# Patient Record
Sex: FEMALE | Race: WHITE | NOT HISPANIC OR LATINO | Employment: OTHER | URBAN - METROPOLITAN AREA
[De-identification: names, ages, dates, MRNs, and addresses within clinical notes are randomized per-mention and may not be internally consistent; named-entity substitution may affect disease eponyms.]

---

## 2017-01-04 ENCOUNTER — OFFICE VISIT (OUTPATIENT)
Dept: INTERNAL MEDICINE | Facility: CLINIC | Age: 82
End: 2017-01-04

## 2017-01-04 VITALS
HEIGHT: 62 IN | DIASTOLIC BLOOD PRESSURE: 52 MMHG | SYSTOLIC BLOOD PRESSURE: 124 MMHG | WEIGHT: 156.2 LBS | BODY MASS INDEX: 28.74 KG/M2 | HEART RATE: 76 BPM

## 2017-01-04 DIAGNOSIS — I50.32 CHRONIC DIASTOLIC HEART FAILURE (HCC): ICD-10-CM

## 2017-01-04 DIAGNOSIS — I10 SYSTOLIC HYPERTENSION: ICD-10-CM

## 2017-01-04 DIAGNOSIS — Z98.890 H/O AORTIC VALVULOPLASTY: ICD-10-CM

## 2017-01-04 DIAGNOSIS — I51.89 DIASTOLIC DYSFUNCTION: ICD-10-CM

## 2017-01-04 DIAGNOSIS — E11.9 TYPE 2 DIABETES MELLITUS WITHOUT COMPLICATION, WITHOUT LONG-TERM CURRENT USE OF INSULIN (HCC): Primary | ICD-10-CM

## 2017-01-04 PROCEDURE — 99213 OFFICE O/P EST LOW 20 MIN: CPT | Performed by: INTERNAL MEDICINE

## 2017-01-04 NOTE — MR AVS SNAPSHOT
Mary Shi   1/4/2017 3:20 PM   Office Visit    Dept Phone:  [phone]   Encounter #:  [id number]    Provider:  Taiwo Huston Jr., MD   Department:  [facility] INTERNAL MEDICINE                Your Full Care Plan              Your Updated Medication List          This list is accurate as of: 1/4/17  4:09 PM.  Always use your most recent med list.                aspirin 81 MG EC tablet       clopidogrel 75 MG tablet   Commonly known as:  PLAVIX   Take 1 tablet by mouth Daily.       fish oil 1000 MG capsule capsule       furosemide 20 MG tablet   Commonly known as:  LASIX       HYDROcodone-acetaminophen 5-325 MG per tablet   Commonly known as:  NORCO   Take 1 tablet by mouth Every 6 (Six) Hours As Needed for moderate pain (4-6).       metFORMIN 500 MG tablet   Commonly known as:  GLUCOPHAGE       MULTIVITAMIN ADULT PO       mupirocin 2 % nasal ointment   Commonly known as:  BACTROBAN       NAMENDA XR 28 MG capsule sustained-release 24 hr extended release capsule   Generic drug:  memantine       naproxen sodium 220 MG tablet   Commonly known as:  ALEVE       PEPCID 20 MG tablet   Generic drug:  famotidine       PERIDEX 0.12 % solution   Generic drug:  chlorhexidine       potassium chloride 20 MEQ CR tablet   Commonly known as:  K-DUR,KLOR-CON       PRILOSEC OTC 20 MG EC tablet   Generic drug:  omeprazole OTC       vitamin C 250 MG tablet   Commonly known as:  ASCORBIC ACID               You Were Diagnosed With        Codes Comments    Type 2 diabetes mellitus without complication, without long-term current use of insulin    -  Primary ICD-10-CM: E11.9  ICD-9-CM: 250.00     Diastolic dysfunction     ICD-10-CM: I51.9  ICD-9-CM: 429.9     Chronic diastolic heart failure     ICD-10-CM: I50.32  ICD-9-CM: 428.32     H/O aortic valvuloplasty     ICD-10-CM: Z98.890  ICD-9-CM: V45.89     Systolic hypertension     ICD-10-CM: I10  ICD-9-CM: 401.9       Instructions     None     Patient Instructions History      Upcoming Appointments     Visit Type Date Time Department    OFFICE VISIT 2017  3:20 PM [facility] FOLLOW UP 2017  1:40 PM [facility] [place]    HOSPITAL FOLLOW UP 2/3/2017  2:40 PM [facility] ECHO 2D COMPLETE VT 2/3/2017  2:00 PM  [facility]    OFFICE VISIT 2017  1:40 PM [facility] NAUN ECHO 2D COMPLETE VT 2017 12:00 PM  [facility]    FOLLOW UP 2017  1:00 PM [facility] [place]      Andel Signup     [facility] Andel allows you to send messages to your doctor, view your test results, renew your prescriptions, schedule appointments, and more. To sign up, go to [employer] and click on the Sign Up Now link in the New User? box. Enter your Andel Activation Code exactly as it appears below along with the last four digits of your Social Security Number and your Date of Birth () to complete the sign-up process. If you do not sign up before the expiration date, you must request a new code.    Andel Activation Code: [email or web address]  Expires: 2017 10:02 AM    If you have questions, you can email [email or web address] or call [phone] to talk to our Andel staff. Remember, Andel is NOT to be used for urgent needs. For medical emergencies, dial 911.               Other Info from Your Visit           Your Appointments     2017  1:40 PM [facility]   Hospital Follow Up with DRAGAN Rivero   [facility] [place] CARDIOLOGY (--)    [address]  [facility] [address] [phone]            2017  2:00 PM EST   ECHOCARDIOGRAM 2D COMPLETE VISIT with [facility] OUTPATIENT ECHOCARDIOGRAPHY ([place])    [address] [facility]  [address]  [facility] [address] [phone]           Bring your insurance cards with you. Wear comfortable clothing and shoes.            2017  2:40 PM EST  "  Hospital Follow Up with Eduardo Brown MD   [facility] CARDIOLOGY (--)    [address]  [facility] [address] [phone]            Apr 06, 2017  1:40 PM EDT   Office Visit with Taiwo Huston Jr., MD   [facility] INTERNAL MEDICINE (--)    [address]  [facility] [address] [phone]           Arrive 15 minutes prior to appointment.            May 09, 2017 12:00 PM EDT   ECHOCARDIOGRAM 2D COMPLETE VISIT with [facility]UN [facility] E[facility] OUTPATIENT ECHOCARDIOGRAPHY ([place])    [address]  [address]  [facility] [address] [phone]           Bring your insurance cards with you. Wear comfortable clothing and shoes.              Allergies     Codeine  Itching, GI Intolerance    Morphine And Related        Reason for Visit     Follow-up HOSP F/U      Vital Signs     Blood Pressure Pulse Height Weight Body Mass Index Smoking Status    124/52 76 62\" (157.5 cm) 156 lb 3.2 oz (70.9 kg) 28.57 kg/m2 Never Smoker      Problems and Diagnoses Noted     Diabetes    Diastolic dysfunction    History of aortic valvuloplasty    Systolic hypertension      No Longer an Issue     Angina at rest    Heart failure    VHD (valvular heart disease)        "

## 2017-01-04 NOTE — PROGRESS NOTES
Subjective   Mary Shi is a 89 y.o. female.     History of Present Illness she is here today for follow-up of her recent TAVR procedure for severe aortic stenosis.  She has done very well since the procedure little over a week ago.  Her appetite is good and she is ambulating well at home with a walker.  She denies any dyspnea or angina which she did have prior to the procedure.  She has had no dizziness or focal neurologic symptoms.  She is sleeping well.      Review of Systems   Constitutional: Negative for activity change, appetite change and fatigue.   Respiratory: Negative for cough, chest tightness and wheezing.    Cardiovascular: Negative for chest pain, palpitations and leg swelling.   Gastrointestinal: Negative for abdominal pain, diarrhea, nausea and vomiting.   Genitourinary: Negative for dysuria, flank pain, frequency and hematuria.   Musculoskeletal: Negative for arthralgias, back pain and gait problem.   Neurological: Negative for syncope, weakness and headaches.       Objective   Physical Exam   Constitutional: She is oriented to person, place, and time. Vital signs are normal. She appears well-developed and well-nourished. She is active. She does not appear ill.   Eyes: Conjunctivae are normal.   Neck: Carotid bruit is not present.   Cardiovascular: Normal rate, regular rhythm, S1 normal and S2 normal.  Exam reveals no S3 and no S4.    No murmur heard.  Pulses:       Dorsalis pedis pulses are 0 on the right side, and 0 on the left side.        Posterior tibial pulses are 0 on the right side, and 0 on the left side.   Pulmonary/Chest: No tachypnea. No respiratory distress. She has no decreased breath sounds. She has no wheezes. She has no rhonchi. She has no rales.   Abdominal: Soft. Normal appearance and bowel sounds are normal. She exhibits no abdominal bruit and no mass. There is no hepatosplenomegaly. There is no tenderness.       Vascular Status -  Her exam exhibits no right foot edema.  Her exam exhibits no left foot edema.  Neurological: She is alert and oriented to person, place, and time. Gait normal.   Psychiatric: She has a normal mood and affect. Her speech is normal and behavior is normal. Judgment and thought content normal. Cognition and memory are normal.       Assessment/Plan assessment #1 aortic valvuloplasty-he looks very good and is doing well. #2 hypertension-good control    Plan # 1follow-up scheduled for cardiology in one month.  Routine follow-up with me in 3 months.

## 2017-01-18 RX ORDER — FUROSEMIDE 20 MG/1
20 TABLET ORAL DAILY
COMMUNITY
Start: 2017-01-18 | End: 2017-02-03

## 2017-01-18 RX ORDER — POTASSIUM CHLORIDE 20 MEQ/1
20 TABLET, EXTENDED RELEASE ORAL DAILY
COMMUNITY
Start: 2017-01-18 | End: 2017-02-03

## 2017-01-23 RX ORDER — FAMOTIDINE 20 MG/1
TABLET, FILM COATED ORAL
Qty: 90 TABLET | Refills: 1 | Status: SHIPPED | OUTPATIENT
Start: 2017-01-23 | End: 2017-07-26 | Stop reason: SDUPTHER

## 2017-01-23 RX ORDER — MEMANTINE HYDROCHLORIDE 28 MG/1
CAPSULE, EXTENDED RELEASE ORAL
Qty: 90 CAPSULE | Refills: 1 | Status: SHIPPED | OUTPATIENT
Start: 2017-01-23 | End: 2017-07-26 | Stop reason: SDUPTHER

## 2017-02-03 ENCOUNTER — HOSPITAL ENCOUNTER (OUTPATIENT)
Dept: CARDIOLOGY | Facility: HOSPITAL | Age: 82
Discharge: HOME | End: 2017-02-03
Attending: INTERNAL MEDICINE | Admitting: INTERNAL MEDICINE

## 2017-02-03 ENCOUNTER — LAB (OUTPATIENT)
Dept: LAB | Facility: HOSPITAL | Age: 82
End: 2017-02-03

## 2017-02-03 ENCOUNTER — OFFICE VISIT (OUTPATIENT)
Dept: CARDIOLOGY | Facility: CLINIC | Age: 82
End: 2017-02-03

## 2017-02-03 VITALS
HEIGHT: 62 IN | DIASTOLIC BLOOD PRESSURE: 60 MMHG | SYSTOLIC BLOOD PRESSURE: 140 MMHG | WEIGHT: 156 LBS | HEART RATE: 83 BPM | BODY MASS INDEX: 28.71 KG/M2

## 2017-02-03 VITALS
WEIGHT: 155 LBS | HEIGHT: 62 IN | HEART RATE: 77 BPM | BODY MASS INDEX: 28.52 KG/M2 | DIASTOLIC BLOOD PRESSURE: 74 MMHG | SYSTOLIC BLOOD PRESSURE: 142 MMHG

## 2017-02-03 DIAGNOSIS — I50.33 ACUTE ON CHRONIC DIASTOLIC CONGESTIVE HEART FAILURE (HCC): ICD-10-CM

## 2017-02-03 DIAGNOSIS — I35.0 NONRHEUMATIC AORTIC VALVE STENOSIS: ICD-10-CM

## 2017-02-03 DIAGNOSIS — Z95.2 S/P TAVR (TRANSCATHETER AORTIC VALVE REPLACEMENT): Primary | ICD-10-CM

## 2017-02-03 DIAGNOSIS — I10 SYSTOLIC HYPERTENSION: ICD-10-CM

## 2017-02-03 DIAGNOSIS — E78.49 OTHER HYPERLIPIDEMIA: ICD-10-CM

## 2017-02-03 DIAGNOSIS — I65.29 STENOSIS OF CAROTID ARTERY, UNSPECIFIED LATERALITY: ICD-10-CM

## 2017-02-03 DIAGNOSIS — I51.89 DIASTOLIC DYSFUNCTION: ICD-10-CM

## 2017-02-03 PROBLEM — Z98.890 H/O AORTIC VALVULOPLASTY: Status: RESOLVED | Noted: 2017-01-04 | Resolved: 2017-02-03

## 2017-02-03 LAB
ALBUMIN SERPL-MCNC: 3.7 G/DL (ref 3.5–5.2)
ALBUMIN/GLOB SERPL: 1.2 G/DL
ALP SERPL-CCNC: 108 U/L (ref 39–117)
ALT SERPL W P-5'-P-CCNC: 19 U/L (ref 1–33)
ANION GAP SERPL CALCULATED.3IONS-SCNC: 13.2 MMOL/L
ASCENDING AORTA: 3.3 CM
AST SERPL-CCNC: 17 U/L (ref 1–32)
BH CV ECHO MEAS - AO MAX PG (FULL): 17.1 MMHG
BH CV ECHO MEAS - AO MAX PG: 24.1 MMHG
BH CV ECHO MEAS - AO MEAN PG (FULL): 9.4 MMHG
BH CV ECHO MEAS - AO MEAN PG: 13.3 MMHG
BH CV ECHO MEAS - AO ROOT AREA (BSA CORRECTED): 2.2
BH CV ECHO MEAS - AO ROOT AREA: 10.9 CM^2
BH CV ECHO MEAS - AO ROOT DIAM: 3.7 CM
BH CV ECHO MEAS - AO V2 MAX: 245.4 CM/SEC
BH CV ECHO MEAS - AO V2 MEAN: 168.5 CM/SEC
BH CV ECHO MEAS - AO V2 VTI: 54 CM
BH CV ECHO MEAS - AVA(I,A): 1.5 CM^2
BH CV ECHO MEAS - AVA(I,D): 1.5 CM^2
BH CV ECHO MEAS - AVA(V,A): 1.5 CM^2
BH CV ECHO MEAS - AVA(V,D): 1.5 CM^2
BH CV ECHO MEAS - BSA(HAYCOCK): 1.8 M^2
BH CV ECHO MEAS - BSA: 1.7 M^2
BH CV ECHO MEAS - BZI_BMI: 28.5 KILOGRAMS/M^2
BH CV ECHO MEAS - BZI_METRIC_HEIGHT: 157.5 CM
BH CV ECHO MEAS - BZI_METRIC_WEIGHT: 70.8 KG
BH CV ECHO MEAS - CONTRAST EF (2CH): 61.5 ML/M^2
BH CV ECHO MEAS - CONTRAST EF 4CH: 75.6 ML/M^2
BH CV ECHO MEAS - EDV(MOD-SP2): 39 ML
BH CV ECHO MEAS - EDV(MOD-SP4): 45 ML
BH CV ECHO MEAS - EDV(TEICH): 118.5 ML
BH CV ECHO MEAS - EF(CUBED): 72.5 %
BH CV ECHO MEAS - EF(MOD-SP2): 61.5 %
BH CV ECHO MEAS - EF(MOD-SP4): 75.6 %
BH CV ECHO MEAS - EF(TEICH): 64 %
BH CV ECHO MEAS - ESV(MOD-SP2): 15 ML
BH CV ECHO MEAS - ESV(MOD-SP4): 11 ML
BH CV ECHO MEAS - ESV(TEICH): 42.7 ML
BH CV ECHO MEAS - FS: 35 %
BH CV ECHO MEAS - IVS/LVPW: 1.2
BH CV ECHO MEAS - IVSD: 1.1 CM
BH CV ECHO MEAS - LAT PEAK E' VEL: 25 CM/SEC
BH CV ECHO MEAS - LV DIASTOLIC VOL/BSA (35-75): 26.2 ML/M^2
BH CV ECHO MEAS - LV MASS(C)D: 196.6 GRAMS
BH CV ECHO MEAS - LV MASS(C)DI: 114.3 GRAMS/M^2
BH CV ECHO MEAS - LV MAX PG: 7 MMHG
BH CV ECHO MEAS - LV MEAN PG: 4 MMHG
BH CV ECHO MEAS - LV SYSTOLIC VOL/BSA (12-30): 6.4 ML/M^2
BH CV ECHO MEAS - LV V1 MAX: 132 CM/SEC
BH CV ECHO MEAS - LV V1 MEAN: 90.4 CM/SEC
BH CV ECHO MEAS - LV V1 VTI: 29 CM
BH CV ECHO MEAS - LVIDD: 5 CM
BH CV ECHO MEAS - LVIDS: 3.3 CM
BH CV ECHO MEAS - LVLD AP2: 6.1 CM
BH CV ECHO MEAS - LVLD AP4: 5.3 CM
BH CV ECHO MEAS - LVLS AP2: 5.2 CM
BH CV ECHO MEAS - LVLS AP4: 4.3 CM
BH CV ECHO MEAS - LVOT AREA (M): 2.8 CM^2
BH CV ECHO MEAS - LVOT AREA: 2.8 CM^2
BH CV ECHO MEAS - LVOT DIAM: 1.9 CM
BH CV ECHO MEAS - LVPWD: 0.98 CM
BH CV ECHO MEAS - MED PEAK E' VEL: 43 CM/SEC
BH CV ECHO MEAS - MR MAX PG: 81.6 MMHG
BH CV ECHO MEAS - MR MAX VEL: 451.7 CM/SEC
BH CV ECHO MEAS - MV A DUR: 0.14 SEC
BH CV ECHO MEAS - MV A MAX VEL: 179.1 CM/SEC
BH CV ECHO MEAS - MV DEC SLOPE: 427.1 CM/SEC^2
BH CV ECHO MEAS - MV DEC TIME: 0.3 SEC
BH CV ECHO MEAS - MV E MAX VEL: 174 CM/SEC
BH CV ECHO MEAS - MV E/A: 0.97
BH CV ECHO MEAS - MV MAX PG: 12.6 MMHG
BH CV ECHO MEAS - MV MEAN PG: 7.7 MMHG
BH CV ECHO MEAS - MV P1/2T MAX VEL: 175.8 CM/SEC
BH CV ECHO MEAS - MV P1/2T: 120.6 MSEC
BH CV ECHO MEAS - MV V2 MAX: 177.8 CM/SEC
BH CV ECHO MEAS - MV V2 MEAN: 131.7 CM/SEC
BH CV ECHO MEAS - MV V2 VTI: 52.9 CM
BH CV ECHO MEAS - MVA P1/2T LCG: 1.3 CM^2
BH CV ECHO MEAS - MVA(P1/2T): 1.8 CM^2
BH CV ECHO MEAS - MVA(VTI): 1.5 CM^2
BH CV ECHO MEAS - PA MAX PG (FULL): 2.6 MMHG
BH CV ECHO MEAS - PA MAX PG: 4.6 MMHG
BH CV ECHO MEAS - PA V2 MAX: 106.9 CM/SEC
BH CV ECHO MEAS - PULM A REVS DUR: 0.1 SEC
BH CV ECHO MEAS - PULM A REVS VEL: 37.6 CM/SEC
BH CV ECHO MEAS - PULM DIAS VEL: 36.1 CM/SEC
BH CV ECHO MEAS - PULM S/D: 1.1
BH CV ECHO MEAS - PULM SYS VEL: 38.1 CM/SEC
BH CV ECHO MEAS - PVA(V,A): 2.2 CM^2
BH CV ECHO MEAS - PVA(V,D): 2.2 CM^2
BH CV ECHO MEAS - QP/QS: 0.61
BH CV ECHO MEAS - RAP SYSTOLE: 3 MMHG
BH CV ECHO MEAS - RV MAX PG: 2 MMHG
BH CV ECHO MEAS - RV MEAN PG: 1.2 MMHG
BH CV ECHO MEAS - RV V1 MAX: 69.8 CM/SEC
BH CV ECHO MEAS - RV V1 MEAN: 50.8 CM/SEC
BH CV ECHO MEAS - RV V1 VTI: 14.3 CM
BH CV ECHO MEAS - RVOT AREA: 3.4 CM^2
BH CV ECHO MEAS - RVOT DIAM: 2.1 CM
BH CV ECHO MEAS - RVSP: 48 MMHG
BH CV ECHO MEAS - SI(AO): 343.7 ML/M^2
BH CV ECHO MEAS - SI(CUBED): 52.8 ML/M^2
BH CV ECHO MEAS - SI(LVOT): 46.6 ML/M^2
BH CV ECHO MEAS - SI(MOD-SP2): 14 ML/M^2
BH CV ECHO MEAS - SI(MOD-SP4): 19.8 ML/M^2
BH CV ECHO MEAS - SI(TEICH): 44.1 ML/M^2
BH CV ECHO MEAS - SUP REN AO DIAM: 1.8 CM
BH CV ECHO MEAS - SV(AO): 591.2 ML
BH CV ECHO MEAS - SV(CUBED): 90.8 ML
BH CV ECHO MEAS - SV(LVOT): 80.1 ML
BH CV ECHO MEAS - SV(MOD-SP2): 24 ML
BH CV ECHO MEAS - SV(MOD-SP4): 34 ML
BH CV ECHO MEAS - SV(RVOT): 48.6 ML
BH CV ECHO MEAS - SV(TEICH): 75.8 ML
BH CV ECHO MEAS - TAPSE (>1.6): 1.6 CM2
BH CV ECHO MEAS - TR MAX VEL: 333.9 CM/SEC
BH CV XLRA - RV BASE: 3.6 CM
BH CV XLRA - TDI S': 15 CM/SEC
BILIRUB SERPL-MCNC: 0.2 MG/DL (ref 0.1–1.2)
BUN BLD-MCNC: 16 MG/DL (ref 8–23)
BUN/CREAT SERPL: 19.8 (ref 7–25)
CALCIUM SPEC-SCNC: 9.4 MG/DL (ref 8.2–9.6)
CHLORIDE SERPL-SCNC: 103 MMOL/L (ref 98–107)
CO2 SERPL-SCNC: 24.8 MMOL/L (ref 22–29)
CREAT BLD-MCNC: 0.81 MG/DL (ref 0.57–1)
DEPRECATED RDW RBC AUTO: 45.6 FL (ref 37–54)
E/E' RATIO: 34
ERYTHROCYTE [DISTWIDTH] IN BLOOD BY AUTOMATED COUNT: 14.3 % (ref 11.7–13)
GFR SERPL CREATININE-BSD FRML MDRD: 66 ML/MIN/1.73
GLOBULIN UR ELPH-MCNC: 3 GM/DL
GLUCOSE BLD-MCNC: 199 MG/DL (ref 65–99)
HCT VFR BLD AUTO: 35.7 % (ref 35.6–45.5)
HGB BLD-MCNC: 11.6 G/DL (ref 11.9–15.5)
LEFT ATRIUM VOLUME INDEX: 36 ML/M2
LV EF 2D ECHO EST: 70 %
MCH RBC QN AUTO: 28.5 PG (ref 26.9–32)
MCHC RBC AUTO-ENTMCNC: 32.5 G/DL (ref 32.4–36.3)
MCV RBC AUTO: 87.7 FL (ref 80.5–98.2)
PLATELET # BLD AUTO: 201 10*3/MM3 (ref 140–500)
PMV BLD AUTO: 10.8 FL (ref 6–12)
POTASSIUM BLD-SCNC: 4.2 MMOL/L (ref 3.5–5.2)
PROT SERPL-MCNC: 6.7 G/DL (ref 6–8.5)
RBC # BLD AUTO: 4.07 10*6/MM3 (ref 3.9–5.2)
SINUS: 2.7 CM
SODIUM BLD-SCNC: 141 MMOL/L (ref 136–145)
STJ: 3.2 CM
WBC NRBC COR # BLD: 7.65 10*3/MM3 (ref 4.5–10.7)

## 2017-02-03 PROCEDURE — 80053 COMPREHEN METABOLIC PANEL: CPT

## 2017-02-03 PROCEDURE — 85027 COMPLETE CBC AUTOMATED: CPT

## 2017-02-03 PROCEDURE — 76376 3D RENDER W/INTRP POSTPROCES: CPT | Performed by: INTERNAL MEDICINE

## 2017-02-03 PROCEDURE — 93306 TTE W/DOPPLER COMPLETE: CPT

## 2017-02-03 PROCEDURE — 99214 OFFICE O/P EST MOD 30 MIN: CPT | Performed by: INTERNAL MEDICINE

## 2017-02-03 PROCEDURE — 36415 COLL VENOUS BLD VENIPUNCTURE: CPT

## 2017-02-03 PROCEDURE — 93306 TTE W/DOPPLER COMPLETE: CPT | Performed by: INTERNAL MEDICINE

## 2017-02-03 PROCEDURE — 93000 ELECTROCARDIOGRAM COMPLETE: CPT | Performed by: INTERNAL MEDICINE

## 2017-02-03 RX ORDER — ATENOLOL 25 MG/1
12.5 TABLET ORAL DAILY
Qty: 45 TABLET | Refills: 3 | Status: SHIPPED | OUTPATIENT
Start: 2017-02-03 | End: 2017-10-13 | Stop reason: SDUPTHER

## 2017-02-03 NOTE — PROGRESS NOTES
Date of Office Visit: 2017  Encounter Provider: Eduardo Brown MD  Place of Service: [facility] CARDIOLOGY  Patient Name: Mary Shi  :3/3/1925  [id number]    Chief Complaint   Patient presents with   • aortic valve replacement     1 mo follow up   :     HPI: Mary Shi is a 89 y.o. female   She is here for followup after her TAVR.  We put a #23 Rubina in her about a month ago, and she had severe degenerative aortic stenosis.  No significant coronary artery disease, and she has done extremely well.  She went home, I think, on day one afterward.   She has been feeling great.  She has no orthopnea or PND, which she was having before this.   Her breathing is better.  She is able to do more activity.  She is 89 years old or, as she says 89 years old, so she is not running but I think she has class 1 [employer] [place] Heart Association Heart failure symptoms.          Past Medical History   Diagnosis Date   • Amaurosis fugax    • Anemia    • Aortic stenosis    • Aortic valve stenosis    • Arteriosclerosis of carotid artery    • Carotid artery stenosis    • Cognitive disorder    • Congestive heart failure    • Dementia    • Diabetes mellitus    • Diastolic dysfunction    • Dyspnea on exertion    • Generalized osteoarthritis of multiple sites    • GERD (gastroesophageal reflux disease)    • Health care maintenance    • Heart murmur    • Hiatal hernia    • Hyperlipidemia    • Hypertension    • Mitral valve stenosis    • MS (mitral stenosis)    • Nasal lesion    • Nasal stenosis    • Osteoarthritis      multiple sites   • Palpitations    • PONV (postoperative nausea and vomiting)    • Systolic hypertension    • Varicose veins        Past Surgical History   Procedure Laterality Date   • Hysterectomy     • Knee surgery     • Bladder repair     • Cardiac catheterization N/A 2016     Procedure: Right Heart Cath;  Surgeon: Eduardo Brown MD;  Location: [facility] INVASIVE  LOCATION;  Service:    • Cardiac catheterization N/A 12/14/2016     Procedure: Coronary angiography;  Surgeon: Eduardo Brown MD;  Location: [facility] CATH INVASIVE LOCATION;  Service:    • Aortic valve repair/replacement N/A 12/27/2016     Procedure: Transfemoral LEFT Transcatheter Aortic Valve Replacement TRANSESOPHAGEAL ECHOCARDIOGRAM WITH ANESTHESIA;  Surgeon: Eduardo Brown MD;  Location: [facility] OR 18/19;  Service:        Social History     Social History   • Marital status:      Spouse name: N/A   • Number of children: N/A   • Years of education: N/A     Occupational History   • Not on file.     Social History Main Topics   • Smoking status: Never Smoker   • Smokeless tobacco: Not on file      Comment: caffeine use   • Alcohol use No      Comment: rare -once every 2-3 months   • Drug use: No   • Sexual activity: Defer     Other Topics Concern   • Not on file     Social History Narrative       Family History   Problem Relation Age of Onset   • Heart disease Mother    • Coronary artery disease Mother    • Hyperlipidemia Mother    • Hypertension Mother    • Cancer Sister    • Cancer Brother    • Diabetes Daughter    • Diabetes Daughter    • Cancer Sister    • Cancer Sister    • Cancer Sister    • Cancer Other        Review of Systems   Constitution: Negative for decreased appetite, fever, malaise/fatigue and weight loss.   HENT: Negative for nosebleeds.    Eyes: Negative for double vision.   Cardiovascular: Negative for chest pain, claudication, cyanosis, dyspnea on exertion, irregular heartbeat, leg swelling, near-syncope, orthopnea, palpitations, paroxysmal nocturnal dyspnea and syncope.   Respiratory: Negative for cough, hemoptysis and shortness of breath.    Hematologic/Lymphatic: Negative for bleeding problem.   Skin: Negative for rash.   Musculoskeletal: Negative for falls and myalgias.   Gastrointestinal: Negative for hematochezia, jaundice, melena, nausea and vomiting.   Genitourinary:  "Negative for hematuria.   Neurological: Negative for dizziness and seizures.   Psychiatric/Behavioral: Negative for altered mental status and memory loss.       Allergies   Allergen Reactions   • Codeine Itching and GI Intolerance   • Morphine And Related          Current Outpatient Prescriptions:   •  aspirin 81 MG EC tablet, Take 81 mg by mouth Daily. HOLD PER MD INSTR, Disp: , Rfl:   •  clopidogrel (PLAVIX) 75 MG tablet, Take 1 tablet by mouth Daily., Disp: 30 tablet, Rfl: 6  •  famotidine (PEPCID) 20 MG tablet, TAKE ONE TABLET BY MOUTH EVERY NIGHT AT BEDTIME, Disp: 90 tablet, Rfl: 1  •  HYDROcodone-acetaminophen (NORCO) 5-325 MG per tablet, Take 1 tablet by mouth Every 6 (Six) Hours As Needed for moderate pain (4-6)., Disp: 12 tablet, Rfl: 0  •  metFORMIN (GLUCOPHAGE) 500 MG tablet, Take 500 mg by mouth 2 (Two) Times a Day With Meals., Disp: , Rfl:   •  Multiple Vitamins-Minerals (MULTIVITAMIN ADULT PO), Take  by mouth., Disp: , Rfl:   •  NAMENDA XR 28 MG capsule sustained-release 24 hr extended release capsule, TAKE ONE TABLET BY MOUTH DAILY, Disp: 90 capsule, Rfl: 1  •  omeprazole OTC (PRILOSEC OTC) 20 MG EC tablet, Take 20 mg by mouth Daily., Disp: , Rfl:   •  atenolol (TENORMIN) 25 MG tablet, Take 0.5 tablets by mouth Daily., Disp: 45 tablet, Rfl: 3     Objective:     Vitals:    02/03/17 1432   BP: 142/74   Pulse: 77   Weight: 155 lb (70.3 kg)   Height: 62\" (157.5 cm)     Body mass index is 28.35 kg/(m^2).    Physical Exam   Constitutional: She is oriented to person, place, and time. She appears well-developed and well-nourished.   HENT:   Head: Normocephalic.   Eyes: No scleral icterus.   Neck: No JVD present. No thyromegaly present.   Cardiovascular: Normal rate, regular rhythm and normal heart sounds.  Exam reveals no gallop and no friction rub.    No murmur heard.  Pulmonary/Chest: Effort normal and breath sounds normal. She has no wheezes. She has no rales.   Abdominal: Soft. There is no " hepatosplenomegaly. There is no tenderness.   Musculoskeletal: Normal range of motion. She exhibits no edema.   Lymphadenopathy:     She has no cervical adenopathy.   Neurological: She is alert and oriented to person, place, and time.   Skin: Skin is warm and dry. No rash noted.   Psychiatric: She has a normal mood and affect.         ECG 12 Lead  Date/Time: 2/3/2017 3:34 PM  Performed by: YOUNG BROWN  Authorized by: YOUNG BROWN   Rhythm: sinus rhythm  Ectopy: infrequent PVCs and atrial premature contractions  Clinical impression: abnormal ECG             Assessment:       Diagnosis Plan   1. S/P TAVR (transcatheter aortic valve replacement)     2. Systolic hypertension     3. Other hyperlipidemia     4. Diastolic dysfunction     5. Stenosis of carotid artery, unspecified laterality            Plan:        The patient is status post TAVR and is doing great.  Her EKG looks good.  Her QRS is narrow.  We looked at her aortic valve today.  She has no lead.  This is really hyperdynamic left ventricle.  I would actually like to put her on just a little bit of atenolol, a half of a tablet one time a day, and see if we could kind of knock that down a little bit, but she is pretty sensitive to medicine.  I am going to have her come back in and see me in a year.  She will see Dr. Mcmahan in six months.          As always, it has been a pleasure to participate in your patient's care.      Sincerely,       Young Brown MD

## 2017-03-06 ENCOUNTER — TELEPHONE (OUTPATIENT)
Dept: CARDIOLOGY | Facility: CLINIC | Age: 82
End: 2017-03-06

## 2017-03-06 RX ORDER — AMOXICILLIN 500 MG/1
2000 CAPSULE ORAL SEE ADMIN INSTRUCTIONS
Qty: 12 CAPSULE | Refills: 3 | Status: SHIPPED | OUTPATIENT
Start: 2017-03-06 | End: 2017-04-06

## 2017-03-06 NOTE — TELEPHONE ENCOUNTER
Daughter calling to request antibiotic for mother (pt). States mom had an artificial valve placed recently and is requesting antibiotic for dental cleaning. Please advise or call pt daughter. Thanks, Demetris

## 2017-04-06 ENCOUNTER — OFFICE VISIT (OUTPATIENT)
Dept: INTERNAL MEDICINE | Facility: CLINIC | Age: 82
End: 2017-04-06

## 2017-04-06 VITALS
WEIGHT: 158 LBS | HEIGHT: 62 IN | DIASTOLIC BLOOD PRESSURE: 72 MMHG | BODY MASS INDEX: 29.08 KG/M2 | HEART RATE: 76 BPM | SYSTOLIC BLOOD PRESSURE: 160 MMHG

## 2017-04-06 DIAGNOSIS — E11.9 TYPE 2 DIABETES MELLITUS WITHOUT COMPLICATION, WITHOUT LONG-TERM CURRENT USE OF INSULIN (HCC): ICD-10-CM

## 2017-04-06 DIAGNOSIS — Z95.2 S/P TAVR (TRANSCATHETER AORTIC VALVE REPLACEMENT): ICD-10-CM

## 2017-04-06 DIAGNOSIS — I10 SYSTOLIC HYPERTENSION: Primary | ICD-10-CM

## 2017-04-06 DIAGNOSIS — I51.89 DIASTOLIC DYSFUNCTION: ICD-10-CM

## 2017-04-06 LAB
ANION GAP SERPL CALCULATED.3IONS-SCNC: 5 MMOL/L
BASOPHILS # BLD AUTO: 0.03 10*3/MM3 (ref 0–0.2)
BASOPHILS NFR BLD AUTO: 0.4 % (ref 0–1.5)
BUN BLD-MCNC: 16 MG/DL (ref 6–22)
BUN/CREAT SERPL: 28.6 (ref 7–25)
CALCIUM SPEC-SCNC: 9.2 MG/DL (ref 8.6–10.5)
CHLORIDE SERPL-SCNC: 103 MMOL/L (ref 95–107)
CO2 SERPL-SCNC: 30 MMOL/L (ref 23–32)
CREAT BLD-MCNC: 0.56 MG/DL (ref 0.55–1.02)
EOSINOPHIL # BLD AUTO: 0.03 10*3/MM3 (ref 0–0.7)
EOSINOPHIL # BLD AUTO: 0.4 % (ref 0.3–6.2)
ERYTHROCYTE [DISTWIDTH] IN BLOOD BY AUTOMATED COUNT: 14.6 % (ref 11.7–13)
GFR SERPL CREATININE-BSD FRML MDRD: 101 ML/MIN/1.73
GLUCOSE BLD-MCNC: 123 MG/DL (ref 70–100)
HBA1C MFR BLD: 6.7 % (ref 4.8–5.6)
HCT VFR BLD AUTO: 53.8 % (ref 35.6–45.5)
HGB BLD-MCNC: 17.2 G/DL (ref 11.9–15.5)
IMM GRANULOCYTES # BLD: 0.02 10*3/MM3 (ref 0–0.03)
IMM GRANULOCYTES NFR BLD: 0.3 % (ref 0–0.5)
LYMPHOCYTES # BLD AUTO: 2.94 10*3/MM3 (ref 0.9–4.8)
LYMPHOCYTES NFR BLD AUTO: 40.3 % (ref 19.6–45.3)
MCH RBC QN AUTO: 27 PG (ref 26.9–32)
MCHC RBC AUTO-ENTMCNC: 32 G/DL (ref 32.4–36.3)
MCV RBC AUTO: 84.5 FL (ref 80.5–98.2)
MONOCYTES # BLD AUTO: 0.38 10*3/MM3 (ref 0.2–1.2)
MONOCYTES NFR BLD AUTO: 5.2 % (ref 5–12)
NEUTROPHILS # BLD AUTO: 3.9 10*3/MM3 (ref 1.9–8.1)
NEUTROPHILS NFR BLD AUTO: 53.4 % (ref 42.7–76)
NRBC BLD AUTO-RTO: 0 /100 WBC (ref 0–0)
PLATELET # BLD AUTO: 109 10*3/MM3 (ref 140–500)
POTASSIUM BLD-SCNC: 4.5 MMOL/L (ref 3.3–5.3)
RBC # BLD AUTO: 6.37 10*6/MM3 (ref 3.9–5.2)
SODIUM BLD-SCNC: 138 MMOL/L (ref 136–145)
WBC # BLD AUTO: 7.3 10*3/MM3 (ref 4.5–10.7)

## 2017-04-06 PROCEDURE — 80048 BASIC METABOLIC PNL TOTAL CA: CPT | Performed by: INTERNAL MEDICINE

## 2017-04-06 PROCEDURE — 99213 OFFICE O/P EST LOW 20 MIN: CPT | Performed by: INTERNAL MEDICINE

## 2017-04-06 NOTE — PROGRESS NOTES
Subjective   Mary Shi is a 89 y.o. female.     History of Present Illness she is here today for follow-up of hypertension and diabetes mellitus.  Since having her TVAR she has felt very well.  Her energy level and her exercise tolerance has improved significantly.  She did have some mild dyspnea on exertion for about a week but that has resolved.  She denied any PND or chest pain.  She has not had any leg edema.  She has not had any dizziness or focal neurologic symptoms.  Her appetite is good.  Her recent blood pressures have been 130s to 140s systolic over 70s to 80s diastolic.        Review of Systems   Constitutional: Negative for activity change, appetite change and fatigue.   Respiratory: Negative for cough, chest tightness, shortness of breath and wheezing.    Cardiovascular: Negative for chest pain, palpitations and leg swelling.   Gastrointestinal: Negative for abdominal pain, diarrhea, nausea and vomiting.   Genitourinary: Negative for flank pain and hematuria.   Musculoskeletal: Negative for arthralgias, back pain and gait problem.   Neurological: Negative for dizziness, syncope, weakness and headaches.   Psychiatric/Behavioral: Negative for dysphoric mood. The patient is not nervous/anxious.        Objective   Physical Exam   Constitutional: She is oriented to person, place, and time. She appears well-developed and well-nourished. She is active. She does not appear ill.   Eyes: Conjunctivae are normal.   Neck: Carotid bruit is not present.   Cardiovascular: Normal rate, regular rhythm, S1 normal and S2 normal.  Exam reveals no S3 and no S4.    No murmur heard.  Pulmonary/Chest: No tachypnea. No respiratory distress. She has no decreased breath sounds. She has no wheezes. She has no rhonchi. She has no rales.   Abdominal: Soft. Normal appearance and bowel sounds are normal. She exhibits no abdominal bruit and no mass. There is no hepatosplenomegaly. There is no tenderness.       Vascular Status  -  Her exam exhibits no right foot edema. Her exam exhibits no left foot edema.  Neurological: She is alert and oriented to person, place, and time. Gait normal.   Psychiatric: She has a normal mood and affect. Her speech is normal and behavior is normal. Judgment and thought content normal. Cognition and memory are normal.       Assessment/Plan ssessment #1 hypertension-appropriate out of office readings #2 diabetes mellitus-generally well-controlled #3 history of aortic stenosis, status post TVAR -she has done very well.    Plan #1 no change medication #2 CBC, hemoglobin A1c, BMP today.  Routine follow-up with me in 6 months.

## 2017-05-03 DIAGNOSIS — I50.32 CHRONIC DIASTOLIC CONGESTIVE HEART FAILURE (HCC): ICD-10-CM

## 2017-05-03 DIAGNOSIS — I35.0 NONRHEUMATIC AORTIC VALVE STENOSIS: Primary | ICD-10-CM

## 2017-07-26 ENCOUNTER — TELEPHONE (OUTPATIENT)
Dept: CARDIOLOGY | Facility: CLINIC | Age: 82
End: 2017-07-26

## 2017-07-26 RX ORDER — FAMOTIDINE 20 MG/1
TABLET, FILM COATED ORAL
Qty: 90 TABLET | Refills: 1 | Status: SHIPPED | OUTPATIENT
Start: 2017-07-26 | End: 2017-10-13

## 2017-07-26 RX ORDER — MEMANTINE HYDROCHLORIDE 28 MG/1
CAPSULE, EXTENDED RELEASE ORAL
Qty: 90 CAPSULE | Refills: 1 | Status: SHIPPED | OUTPATIENT
Start: 2017-07-26 | End: 2017-10-13 | Stop reason: SDUPTHER

## 2017-07-26 NOTE — TELEPHONE ENCOUNTER
Patients daughter Liat calling wanting to know if her mother is to D/C her plavix been 6month  [phone]

## 2017-07-27 NOTE — TELEPHONE ENCOUNTER
Called patient. S/w Liat the daughter and told her ok to stop plavix and take a baby aspirin 81 mg a day.

## 2017-08-16 ENCOUNTER — OFFICE VISIT (OUTPATIENT)
Dept: CARDIOLOGY | Facility: CLINIC | Age: 82
End: 2017-08-16

## 2017-08-16 ENCOUNTER — TELEPHONE (OUTPATIENT)
Dept: CARDIOLOGY | Facility: CLINIC | Age: 82
End: 2017-08-16

## 2017-08-16 VITALS
HEART RATE: 73 BPM | WEIGHT: 169 LBS | DIASTOLIC BLOOD PRESSURE: 60 MMHG | BODY MASS INDEX: 31.1 KG/M2 | HEIGHT: 62 IN | SYSTOLIC BLOOD PRESSURE: 142 MMHG

## 2017-08-16 DIAGNOSIS — I10 SYSTOLIC HYPERTENSION: ICD-10-CM

## 2017-08-16 DIAGNOSIS — I51.89 DIASTOLIC DYSFUNCTION: ICD-10-CM

## 2017-08-16 DIAGNOSIS — E78.49 OTHER HYPERLIPIDEMIA: ICD-10-CM

## 2017-08-16 DIAGNOSIS — E11.9 TYPE 2 DIABETES MELLITUS WITHOUT COMPLICATION, WITHOUT LONG-TERM CURRENT USE OF INSULIN (HCC): ICD-10-CM

## 2017-08-16 DIAGNOSIS — Z95.2 S/P TAVR (TRANSCATHETER AORTIC VALVE REPLACEMENT): Primary | ICD-10-CM

## 2017-08-16 PROCEDURE — 99214 OFFICE O/P EST MOD 30 MIN: CPT | Performed by: INTERNAL MEDICINE

## 2017-08-16 PROCEDURE — 93000 ELECTROCARDIOGRAM COMPLETE: CPT | Performed by: INTERNAL MEDICINE

## 2017-08-16 NOTE — PROGRESS NOTES
Date of Office Visit: 2017  Encounter Provider: Elissa Mcmahan MD  Place of Service: [facility] CARDIOLOGY  Patient Name: Mary Shi  :3/3/1925      Patient ID:  Mary Shi is a 89 y.o. female is here for  followup for AS, s/p TAVR.         History of Present Illness    She had an echocardiogram done 2012. This showed moderate aortic stenosis,  mild mitral stenosis, moderate left atrial dilation, grade I-A diastolic dysfunction,  moderate concentric left ventricular hypertrophy, ejection fraction of 65-70%. She also  had a carotid duplex study showing 50-60% stenosis of the right internal carotid artery.   She feels somewhat short-winded when she bends over because of the hiatal hernia.      She was in the hospital with chest pain and difficulty breathing on 10/08/2014. She had had a prior stress nuclear perfusion study, which was done 2014 for chest pain and this showed no evidence of ischemia. During the hospitalization in October we felt the chest pain was probably mostly related to a hiatal hernia, somewhat related to her valvular heart disease, and possibly a small amount of heart failure as well, although her BNP was not markedly elevated. We did repeat her echocardiogram, which was done 2013, showing ejection fraction of 63%, moderate concentric left ventricular hypertrophy, grade 1A diastolic dysfunction, moderate mitral insufficiency, moderate mitral stenosis, moderate aortic stenosis. This is essentially unchanged from her echocardiograms both in 2013 and 2012. She had another echocardiogram done 10/2014, which showed a grade 2 diastolic dysfunction, ejection fraction of 67%, moderate concentric left ventricular hypertrophy, moderate mitral and moderate aortic stenosis, trivial aortic regurgitation. Again, this is unchanged from echoes dated 2012 and 2013.      She had a couple of children who wanted to get a second  opinion at  so they did in 2014. They set her up to see a surgeon there thinking that she needed aortic valve replacement, specifically ERICKSON. The surgeon there said she was not a candidate for this, as I had already kind of told them. They put her through a stress echocardiogram because they were, it sounds like not quite in agreement with what he thought, but the stress echocardiogram did not show an increase in right ventricular systolic pressure nor a change in her aortic valve area. She had a workup for her hiatal hernia there as well and that has been stable, but obviously she cannot eat late at night or have spicy food or she has problems all night long.       She had a carotid duplex study done in 11/2013. She had plaquing but no significant stenosis.          She had a 2D echocardiogram with Doppler which was done 11/18/2015. This showed ejection  fraction of 74%, left ventricular hypertrophy which is moderate, RVSP which is normal,  mean gradient was 26 mmHg, and peak gradient 47 mmHg. Aortic valve area was 1.8 sq cm and  dimension index of 0.5. This is consistent with mild to moderate aortic stenosis. When  compared with her prior study done in 11/2013, this is really no change.      She had a 2-D echocardiogram with Doppler done on 11/29/2016, showing severe aortic stenosis with a mean gradient of 42 mmHg.  Her peak velocity was 3.8 meters per second with an aortic valve area of 0.92 centimeters squared.  This also showed moderate mitral stenosis and mild mitral insufficiency.  Her ejection fraction was 60% with grade 2 diastolic dysfunction and moderate to severe left ventricular hypertrophy.  In addition, she had severe left atrial enlargement.      In December 2016, she underwent TAVR with a #23 S3 Rubina valve.    She feels much better at this point.  Her breathing is good.  She's been brandyn.  She's had no tachycardia, dizziness or syncope.  She has no exertional chest tightness or pressure.   She's thinking very busy.  She said that the TAVR made a big difference for her all around.          Past Medical History:   Diagnosis Date   • Amaurosis fugax    • Anemia    • Aortic stenosis    • Aortic valve stenosis    • Arteriosclerosis of carotid artery    • Carotid artery stenosis    • Cognitive disorder    • Congestive heart failure    • Dementia    • Diabetes mellitus    • Diastolic dysfunction    • Dyspnea on exertion    • Generalized osteoarthritis of multiple sites    • GERD (gastroesophageal reflux disease)    • Health care maintenance    • Heart murmur    • Hiatal hernia    • Hyperlipidemia    • Hypertension    • Mitral valve stenosis    • MS (mitral stenosis)    • Nasal lesion    • Nasal stenosis    • Osteoarthritis     multiple sites   • Palpitations    • PONV (postoperative nausea and vomiting)    • Systolic hypertension    • Varicose veins          Past Surgical History:   Procedure Laterality Date   • AORTIC VALVE REPAIR/REPLACEMENT N/A 12/27/2016    Procedure: Transfemoral LEFT Transcatheter Aortic Valve Replacement TRANSESOPHAGEAL ECHOCARDIOGRAM WITH ANESTHESIA;  Surgeon: Eduardo Brown MD;  Location: [facility] OR 18/19;  Service:    • BLADDER REPAIR     • CARDIAC CATHETERIZATION N/A 12/14/2016    Procedure: Right Heart Cath;  Surgeon: Eduardo Brown MD;  Location: [facility] CATH INVASIVE LOCATION;  Service:    • CARDIAC CATHETERIZATION N/A 12/14/2016    Procedure: Coronary angiography;  Surgeon: Eduardo Brown MD;  Location: [facility] INVASIVE LOCATION;  Service:    • HYSTERECTOMY     • KNEE SURGERY         Current Outpatient Prescriptions on File Prior to Visit   Medication Sig Dispense Refill   • aspirin 81 MG EC tablet Take 81 mg by mouth Daily. HOLD PER MD INSTR     • atenolol (TENORMIN) 25 MG tablet Take 0.5 tablets by mouth Daily. 45 tablet 3   • famotidine (PEPCID) 20 MG tablet TAKE ONE TABLET BY MOUTH EVERY NIGHT AT BEDTIME 90 tablet 1   • metFORMIN (GLUCOPHAGE) 500 MG tablet Take  500 mg by mouth 2 (Two) Times a Day With Meals.     • Multiple Vitamins-Minerals (MULTIVITAMIN ADULT PO) Take  by mouth.     • NAMENDA XR 28 MG capsule sustained-release 24 hr extended release capsule TAKE ONE CAPSULE BY MOUTH DAILY 90 capsule 1   • omeprazole OTC (PRILOSEC OTC) 20 MG EC tablet Take 20 mg by mouth Daily.     • [DISCONTINUED] clopidogrel (PLAVIX) 75 MG tablet Take 1 tablet by mouth Daily. 30 tablet 6     No current facility-administered medications on file prior to visit.        Social History     Social History   • Marital status:      Spouse name: N/A   • Number of children: N/A   • Years of education: N/A     Occupational History   • Not on file.     Social History Main Topics   • Smoking status: Never Smoker   • Smokeless tobacco: Not on file      Comment: caffeine use   • Alcohol use No      Comment: rare -once every 2-3 months   • Drug use: No   • Sexual activity: Defer     Other Topics Concern   • Not on file     Social History Narrative           Review of Systems   Constitution: Positive for malaise/fatigue.   HENT: Negative for congestion and headaches.    Eyes: Negative for vision loss in left eye and vision loss in right eye.   Cardiovascular: Positive for dyspnea on exertion.   Respiratory: Negative.  Negative for cough, hemoptysis, shortness of breath, sleep disturbances due to breathing, snoring, sputum production and wheezing.    Endocrine: Negative.    Hematologic/Lymphatic: Negative.    Skin: Negative for poor wound healing and rash.   Musculoskeletal: Positive for joint pain and joint swelling. Negative for falls, gout, muscle cramps and myalgias.   Gastrointestinal: Negative for abdominal pain, diarrhea, dysphagia, hematemesis, melena, nausea and vomiting.   Neurological: Negative for excessive daytime sleepiness, dizziness, light-headedness, loss of balance, seizures and vertigo.   Psychiatric/Behavioral: Negative for depression and substance abuse. The patient is not  "nervous/anxious.        Procedures    ECG 12 Lead  Date/Time: 8/16/2017 10:58 AM  Performed by: AJ GUEVARA  Authorized by: AJ GUEVARA   Comparison: compared with previous ECG   Similar to previous ECG  Rhythm: sinus rhythm  Clinical impression: normal ECG               Objective:      Vitals:    08/16/17 1044   BP: 142/60   BP Location: Right arm   Patient Position: Sitting   Pulse: 73   Weight: 169 lb (76.7 kg)   Height: 62\" (157.5 cm)     Body mass index is 30.91 kg/(m^2).    Physical Exam   Constitutional: She is oriented to person, place, and time. She appears well-developed and well-nourished. No distress.   HENT:   Head: Normocephalic and atraumatic.   Eyes: Conjunctivae are normal. No scleral icterus.   Neck: Neck supple. No JVD present. Carotid bruit is not present. No thyromegaly present.   Cardiovascular: Normal rate, regular rhythm, S1 normal, S2 normal, normal heart sounds and intact distal pulses.   No extrasystoles are present. PMI is not displaced.  Exam reveals no gallop.    No murmur heard.  Pulses:       Carotid pulses are 2+ on the right side, and 2+ on the left side.       Radial pulses are 2+ on the right side, and 2+ on the left side.        Dorsalis pedis pulses are 2+ on the right side, and 2+ on the left side.        Posterior tibial pulses are 2+ on the right side, and 2+ on the left side.   Pulmonary/Chest: Effort normal and breath sounds normal. No respiratory distress. She has no wheezes. She has no rhonchi. She has no rales. She exhibits no tenderness.   Abdominal: Soft. Bowel sounds are normal. She exhibits no distension, no abdominal bruit and no mass. There is no tenderness.   Musculoskeletal: She exhibits no edema or deformity.   Lymphadenopathy:     She has no cervical adenopathy.   Neurological: She is alert and oriented to person, place, and time. No cranial nerve deficit.   Skin: Skin is warm and dry. No rash noted. She is not diaphoretic. No cyanosis. No " pallor. Nails show no clubbing.   Psychiatric: She has a normal mood and affect. Judgment normal.   Vitals reviewed.      Lab Review:       Assessment:      Diagnosis Plan   1. S/P TAVR (transcatheter aortic valve replacement)     2. Other hyperlipidemia     3. Systolic hypertension     4. Diastolic dysfunction     5. Type 2 diabetes mellitus without complication, without long-term current use of insulin       1. Severe AS. S/p TAVR 12/2016.   2. Right carotid artery stenosis, 50-60%.   3. Hypertension. Well controlled.  4. Hyperlipidemia. Per  Dr. Huston.   5. Varicose veins; stable.   6. Diabetes mellitus type 2.   7. Dementia, fairly advanced.  8. Moderate MS.  9. Grade 2 diastolic dysfunction.  10. Hiatal hernia which causes her dyspnea and chest pain.  11. Venous insufficiency, use compression stockings.     Plan:       No changes, see back in 6 months when she has her 1 year echo.

## 2017-08-16 NOTE — TELEPHONE ENCOUNTER
----- Message from Hansel Arteaga sent at 8/16/2017 11:11 AM EDT -----  Patient is already scheduled with [facility] on 02/09/2018 and echo, does  also want to see patient in Feb?

## 2017-10-13 ENCOUNTER — OFFICE VISIT (OUTPATIENT)
Dept: INTERNAL MEDICINE | Facility: CLINIC | Age: 82
End: 2017-10-13

## 2017-10-13 VITALS
WEIGHT: 162 LBS | BODY MASS INDEX: 29.81 KG/M2 | HEIGHT: 62 IN | SYSTOLIC BLOOD PRESSURE: 142 MMHG | DIASTOLIC BLOOD PRESSURE: 58 MMHG | HEART RATE: 70 BPM

## 2017-10-13 DIAGNOSIS — Z95.2 S/P TAVR (TRANSCATHETER AORTIC VALVE REPLACEMENT): ICD-10-CM

## 2017-10-13 DIAGNOSIS — F09 COGNITIVE DISORDER: ICD-10-CM

## 2017-10-13 DIAGNOSIS — E11.9 TYPE 2 DIABETES MELLITUS WITHOUT COMPLICATION, WITHOUT LONG-TERM CURRENT USE OF INSULIN (HCC): ICD-10-CM

## 2017-10-13 DIAGNOSIS — M15.9 GENERALIZED OSTEOARTHRITIS: ICD-10-CM

## 2017-10-13 DIAGNOSIS — I10 SYSTOLIC HYPERTENSION: ICD-10-CM

## 2017-10-13 DIAGNOSIS — K21.9 GASTROESOPHAGEAL REFLUX DISEASE WITHOUT ESOPHAGITIS: ICD-10-CM

## 2017-10-13 DIAGNOSIS — I65.29 STENOSIS OF CAROTID ARTERY, UNSPECIFIED LATERALITY: Primary | ICD-10-CM

## 2017-10-13 DIAGNOSIS — E11.9 TYPE 2 DIABETES MELLITUS WITHOUT COMPLICATION, WITHOUT LONG-TERM CURRENT USE OF INSULIN (HCC): Primary | ICD-10-CM

## 2017-10-13 DIAGNOSIS — Z23 FLU VACCINE NEED: ICD-10-CM

## 2017-10-13 DIAGNOSIS — I51.89 DIASTOLIC DYSFUNCTION: ICD-10-CM

## 2017-10-13 DIAGNOSIS — G63 POLYNEUROPATHY ASSOCIATED WITH UNDERLYING DISEASE (HCC): ICD-10-CM

## 2017-10-13 LAB
ALBUMIN SERPL-MCNC: 3.9 G/DL (ref 3.5–5.2)
ALBUMIN/GLOB SERPL: 1.3 G/DL
ALP SERPL-CCNC: 119 U/L (ref 39–117)
ALT SERPL W P-5'-P-CCNC: 16 U/L (ref 1–33)
ANION GAP SERPL CALCULATED.3IONS-SCNC: 12.9 MMOL/L
AST SERPL-CCNC: 12 U/L (ref 1–32)
BASOPHILS # BLD AUTO: 0.01 10*3/MM3 (ref 0–0.2)
BASOPHILS NFR BLD AUTO: 0.2 % (ref 0–2)
BILIRUB SERPL-MCNC: 0.2 MG/DL (ref 0.1–1.2)
BUN BLD-MCNC: 19 MG/DL (ref 8–23)
BUN/CREAT SERPL: 32.8 (ref 7–25)
CALCIUM SPEC-SCNC: 10 MG/DL (ref 8.2–9.6)
CHLORIDE SERPL-SCNC: 103 MMOL/L (ref 98–107)
CO2 SERPL-SCNC: 25.1 MMOL/L (ref 22–29)
CREAT BLD-MCNC: 0.58 MG/DL (ref 0.57–1)
DEPRECATED RDW RBC AUTO: 43.1 FL (ref 37–54)
EOSINOPHIL # BLD AUTO: 0.03 10*3/MM3 (ref 0–0.7)
EOSINOPHIL NFR BLD AUTO: 0.5 % (ref 0–5)
ERYTHROCYTE [DISTWIDTH] IN BLOOD BY AUTOMATED COUNT: 14 % (ref 11.5–15)
GFR SERPL CREATININE-BSD FRML MDRD: 97 ML/MIN/1.73
GLOBULIN UR ELPH-MCNC: 2.9 GM/DL
GLUCOSE BLD-MCNC: 214 MG/DL (ref 65–99)
HBA1C MFR BLD: 6.78 % (ref 4.8–5.6)
HCT VFR BLD AUTO: 36.1 % (ref 34.1–44.9)
HGB BLD-MCNC: 12.1 G/DL (ref 11.2–15.7)
LYMPHOCYTES # BLD AUTO: 1.87 10*3/MM3 (ref 0.8–7)
LYMPHOCYTES NFR BLD AUTO: 28.6 % (ref 10–60)
MCH RBC QN AUTO: 29.2 PG (ref 26–34)
MCHC RBC AUTO-ENTMCNC: 33.5 G/DL (ref 31–37)
MCV RBC AUTO: 87 FL (ref 80–100)
MONOCYTES # BLD AUTO: 0.57 10*3/MM3 (ref 0–1)
MONOCYTES NFR BLD AUTO: 8.7 % (ref 0–13)
NEUTROPHILS # BLD AUTO: 4.05 10*3/MM3 (ref 1–11)
NEUTROPHILS NFR BLD AUTO: 62 % (ref 30–85)
PLATELET # BLD AUTO: 174 10*3/MM3 (ref 150–450)
PMV BLD AUTO: 11.9 FL (ref 6–12)
POTASSIUM BLD-SCNC: 4.6 MMOL/L (ref 3.5–5.2)
PROT SERPL-MCNC: 6.8 G/DL (ref 6–8.5)
RBC # BLD AUTO: 4.15 10*6/MM3 (ref 3.93–5.22)
SODIUM BLD-SCNC: 141 MMOL/L (ref 136–145)
WBC NRBC COR # BLD: 6.53 10*3/MM3 (ref 5–10)

## 2017-10-13 PROCEDURE — G0008 ADMIN INFLUENZA VIRUS VAC: HCPCS | Performed by: INTERNAL MEDICINE

## 2017-10-13 PROCEDURE — 83036 HEMOGLOBIN GLYCOSYLATED A1C: CPT | Performed by: INTERNAL MEDICINE

## 2017-10-13 PROCEDURE — 99214 OFFICE O/P EST MOD 30 MIN: CPT | Performed by: INTERNAL MEDICINE

## 2017-10-13 PROCEDURE — 36415 COLL VENOUS BLD VENIPUNCTURE: CPT | Performed by: INTERNAL MEDICINE

## 2017-10-13 PROCEDURE — 85025 COMPLETE CBC W/AUTO DIFF WBC: CPT | Performed by: INTERNAL MEDICINE

## 2017-10-13 PROCEDURE — 80053 COMPREHEN METABOLIC PANEL: CPT | Performed by: INTERNAL MEDICINE

## 2017-10-13 RX ORDER — OMEPRAZOLE 20 MG/1
20 TABLET, DELAYED RELEASE ORAL DAILY
Qty: 90 TABLET | Refills: 3 | Status: SHIPPED | OUTPATIENT
Start: 2017-10-13 | End: 2019-09-09

## 2017-10-13 RX ORDER — MEMANTINE HYDROCHLORIDE 28 MG/1
28 CAPSULE, EXTENDED RELEASE ORAL DAILY
Qty: 90 CAPSULE | Refills: 3 | Status: SHIPPED | OUTPATIENT
Start: 2017-10-13 | End: 2018-10-22 | Stop reason: SDUPTHER

## 2017-10-13 RX ORDER — ATENOLOL 25 MG/1
12.5 TABLET ORAL DAILY
Qty: 45 TABLET | Refills: 3 | Status: SHIPPED | OUTPATIENT
Start: 2017-10-13 | End: 2018-07-18 | Stop reason: SDUPTHER

## 2017-10-13 NOTE — PROGRESS NOTES
Subjective   Mary Shi is a 89 y.o. female.     History of Present Illness she is here today for her yearly visit which includes follow-up of hypertension, diabetes mellitus, aortic valve disease, dementia, and osteoarthritis primarily of the feet.  Since her valvular surgery nearly a year ago she has done remarkably well.  She continues to enjoy herself.  She has a good appetite.  Other than chronic foot pain related to osteoarthritis she really has a negative review systems.  She denies any dizziness, headache, syncope, or focal neurologic episodes.  She has not had any PND, FLORES, chest pain, or swelling in the ankles.  She just finished brandyn 40 quarts of tomatoes.        Review of Systems   Constitutional: Negative for activity change, appetite change and fatigue.   HENT: Negative for trouble swallowing.    Respiratory: Negative for cough, chest tightness, shortness of breath and wheezing.    Cardiovascular: Negative for chest pain, palpitations and leg swelling.   Gastrointestinal: Negative for abdominal pain, anal bleeding, blood in stool, constipation, diarrhea, nausea and vomiting.   Genitourinary: Negative for dysuria, flank pain and hematuria.   Musculoskeletal: Positive for arthralgias. Negative for back pain and gait problem.   Neurological: Negative for dizziness, syncope, facial asymmetry, speech difficulty, weakness, numbness and headaches.   Psychiatric/Behavioral: Negative for dysphoric mood. The patient is not nervous/anxious.        Objective   Physical Exam   Constitutional: She appears well-developed and well-nourished. She is active. She does not appear ill.   Eyes: Conjunctivae are normal.   Fundoscopic exam:       The right eye shows no AV nicking, no exudate and no hemorrhage.        The left eye shows no exudate and no hemorrhage.   Neck: Carotid bruit is not present. No thyroid mass and no thyromegaly present.   Cardiovascular: Normal rate, regular rhythm, S1 normal and S2  normal.  Exam reveals no S3 and no S4.    No murmur heard.  Pulmonary/Chest: No tachypnea. No respiratory distress. She has no decreased breath sounds. She has no wheezes. She has no rhonchi. She has no rales.   Abdominal: Soft. Normal appearance and bowel sounds are normal. She exhibits no abdominal bruit and no mass. There is no hepatosplenomegaly. There is no tenderness.       Vascular Status -  Her exam exhibits no right foot edema. Her exam exhibits no left foot edema.  Neurological: She is alert. Gait normal.   Reflex Scores:       Bicep reflexes are 1+ on the right side.  Psychiatric: She has a normal mood and affect. Her speech is normal and behavior is normal. Judgment and thought content normal. Cognition and memory are impaired.       Assessment/Plan assessment #1 diabetes mellitus-all wheeze well-controlled with metformin # 2 hypertension-acceptable with low-dose atenolol # 3 history of aortic valve disease-she has done very well since TVAR  #4 dementia-mild to moderate severity but relatively stable on Namenda.    Plan #1 no change medication #2 flu vaccination today #3 CBC, CMP, globin A1c today.  Routine follow-up with me in 6 months.

## 2017-10-16 ENCOUNTER — TELEPHONE (OUTPATIENT)
Dept: CARDIOLOGY | Facility: CLINIC | Age: 82
End: 2017-10-16

## 2017-10-16 NOTE — TELEPHONE ENCOUNTER
----- Message from Andare Madrigal sent at 10/12/2017 10:58 AM EDT -----  Regarding: FW: 1 Year TAVR Follow up  Itzel,    Read message below and let me know were the patient can be scheduled to see dr. Brown sooner then Feb.    Andrae   ----- Message -----     From: Melissa Morel     Sent: 10/12/2017  10:43 AM       To: Andrae Madrigal  Subject: RE: 1 Year TAVR Follow up                        Let's work her into Dr. Brown's clinic at earliest convenience and do TTE/blood tests too...essentially, do the 1 year TAVR visit earlier because she is having some problems.  We can cancel the Feb appointment.  Thanks!  LPR  ----- Message -----     From: Andrae Madrigal     Sent: 10/12/2017   9:41 AM       To: Melissa Gotti  Subject: RE: 1 Year TAVR Follow up                        Melissa,    The 1yr with Tavr is scheduled for 2/9/18 with Dr. Brown. Does that need to be rescheduled or just schedule her to see Dr. Brown since she is having issues?    Andrae  ----- Message -----     From: Melissa Morel     Sent: 10/11/2017  10:48 AM       To: Andrae Gotti  Subject: 1 Year TAVR Follow up                            Good morning Ladies!  Ms. Shi is due for her 1 year TAVR follow up visit.  Her granddaughter works here and told me that she's having a few symptoms.  Will you please call her and get her in to see Dr. Kevin GONZALEZ, with TTE first, so we can make sure she's ok?  Let me know when it is scheduled and I'll send the reminder letter.  The CBC, CMP and TTE orders are in Epic.  Thanks!  LPR

## 2017-10-17 NOTE — TELEPHONE ENCOUNTER
Patient coming in at 12:45 for Echo prior to seeing Dr. Brown at 2:30  S/W Melissa and she will notify the patient. rb

## 2017-10-19 ENCOUNTER — OFFICE VISIT (OUTPATIENT)
Dept: CARDIOLOGY | Facility: CLINIC | Age: 82
End: 2017-10-19

## 2017-10-19 ENCOUNTER — HOSPITAL ENCOUNTER (OUTPATIENT)
Dept: CARDIOLOGY | Facility: HOSPITAL | Age: 82
Discharge: HOME | End: 2017-10-19
Attending: INTERNAL MEDICINE | Admitting: INTERNAL MEDICINE

## 2017-10-19 VITALS
WEIGHT: 162 LBS | HEART RATE: 85 BPM | SYSTOLIC BLOOD PRESSURE: 140 MMHG | BODY MASS INDEX: 29.81 KG/M2 | DIASTOLIC BLOOD PRESSURE: 62 MMHG | HEIGHT: 62 IN

## 2017-10-19 VITALS
BODY MASS INDEX: 30.02 KG/M2 | DIASTOLIC BLOOD PRESSURE: 80 MMHG | HEIGHT: 61 IN | WEIGHT: 159 LBS | HEART RATE: 80 BPM | SYSTOLIC BLOOD PRESSURE: 180 MMHG

## 2017-10-19 DIAGNOSIS — I51.89 DIASTOLIC DYSFUNCTION: ICD-10-CM

## 2017-10-19 DIAGNOSIS — I34.2 NON-RHEUMATIC MITRAL VALVE STENOSIS: ICD-10-CM

## 2017-10-19 DIAGNOSIS — I35.0 NONRHEUMATIC AORTIC VALVE STENOSIS: ICD-10-CM

## 2017-10-19 DIAGNOSIS — I05.0 RHEUMATIC MITRAL STENOSIS: ICD-10-CM

## 2017-10-19 DIAGNOSIS — Z95.2 S/P TAVR (TRANSCATHETER AORTIC VALVE REPLACEMENT): Primary | ICD-10-CM

## 2017-10-19 DIAGNOSIS — E11.59 TYPE 2 DIABETES MELLITUS WITH OTHER CIRCULATORY COMPLICATION, WITHOUT LONG-TERM CURRENT USE OF INSULIN (HCC): ICD-10-CM

## 2017-10-19 LAB
ASCENDING AORTA: 3.6 CM
BH CV ECHO MEAS - ACS: 1.4 CM
BH CV ECHO MEAS - AI DEC SLOPE: 175.4 CM/SEC^2
BH CV ECHO MEAS - AI MAX PG: 51.4 MMHG
BH CV ECHO MEAS - AI MAX VEL: 358.3 CM/SEC
BH CV ECHO MEAS - AI P1/2T: 598.5 MSEC
BH CV ECHO MEAS - AO MAX PG (FULL): 13.5 MMHG
BH CV ECHO MEAS - AO MAX PG: 19.5 MMHG
BH CV ECHO MEAS - AO MEAN PG (FULL): 7.4 MMHG
BH CV ECHO MEAS - AO MEAN PG: 11.4 MMHG
BH CV ECHO MEAS - AO ROOT AREA (BSA CORRECTED): 1.2
BH CV ECHO MEAS - AO ROOT AREA: 3.4 CM^2
BH CV ECHO MEAS - AO ROOT DIAM: 2.1 CM
BH CV ECHO MEAS - AO V2 MAX: 220.7 CM/SEC
BH CV ECHO MEAS - AO V2 MEAN: 155.4 CM/SEC
BH CV ECHO MEAS - AO V2 VTI: 44.4 CM
BH CV ECHO MEAS - AVA(I,A): 1.6 CM^2
BH CV ECHO MEAS - AVA(I,D): 1.6 CM^2
BH CV ECHO MEAS - AVA(V,A): 1.4 CM^2
BH CV ECHO MEAS - AVA(V,D): 1.4 CM^2
BH CV ECHO MEAS - BSA(HAYCOCK): 1.8 M^2
BH CV ECHO MEAS - BSA: 1.7 M^2
BH CV ECHO MEAS - BZI_BMI: 29.6 KILOGRAMS/M^2
BH CV ECHO MEAS - BZI_METRIC_HEIGHT: 157.5 CM
BH CV ECHO MEAS - BZI_METRIC_WEIGHT: 73.5 KG
BH CV ECHO MEAS - CONTRAST EF 4CH: 62.7 ML/M^2
BH CV ECHO MEAS - EDV(MOD-SP4): 83 ML
BH CV ECHO MEAS - EDV(TEICH): 90.3 ML
BH CV ECHO MEAS - EF(CUBED): 69.6 %
BH CV ECHO MEAS - EF(MOD-SP4): 62.7 %
BH CV ECHO MEAS - EF(TEICH): 61.4 %
BH CV ECHO MEAS - ESV(MOD-SP4): 31 ML
BH CV ECHO MEAS - ESV(TEICH): 34.9 ML
BH CV ECHO MEAS - FS: 32.8 %
BH CV ECHO MEAS - IVS/LVPW: 1
BH CV ECHO MEAS - IVSD: 1.2 CM
BH CV ECHO MEAS - LAT PEAK E' VEL: 4 CM/SEC
BH CV ECHO MEAS - LV DIASTOLIC VOL/BSA (35-75): 47.5 ML/M^2
BH CV ECHO MEAS - LV MASS(C)D: 188.5 GRAMS
BH CV ECHO MEAS - LV MASS(C)DI: 107.8 GRAMS/M^2
BH CV ECHO MEAS - LV MAX PG: 6 MMHG
BH CV ECHO MEAS - LV MEAN PG: 4 MMHG
BH CV ECHO MEAS - LV SYSTOLIC VOL/BSA (12-30): 17.7 ML/M^2
BH CV ECHO MEAS - LV V1 MAX: 122.4 CM/SEC
BH CV ECHO MEAS - LV V1 MEAN: 95.4 CM/SEC
BH CV ECHO MEAS - LV V1 VTI: 27.5 CM
BH CV ECHO MEAS - LVIDD: 4.5 CM
BH CV ECHO MEAS - LVIDS: 3 CM
BH CV ECHO MEAS - LVLD AP4: 6.6 CM
BH CV ECHO MEAS - LVLS AP4: 6 CM
BH CV ECHO MEAS - LVOT AREA (M): 2.5 CM^2
BH CV ECHO MEAS - LVOT AREA: 2.5 CM^2
BH CV ECHO MEAS - LVOT DIAM: 1.8 CM
BH CV ECHO MEAS - LVPWD: 1.2 CM
BH CV ECHO MEAS - MED PEAK E' VEL: 5 CM/SEC
BH CV ECHO MEAS - MR MAX PG: 47.4 MMHG
BH CV ECHO MEAS - MR MAX VEL: 344.2 CM/SEC
BH CV ECHO MEAS - MV A DUR: 0.14 SEC
BH CV ECHO MEAS - MV A MAX VEL: 181.8 CM/SEC
BH CV ECHO MEAS - MV DEC SLOPE: 464.1 CM/SEC^2
BH CV ECHO MEAS - MV DEC TIME: 0.29 SEC
BH CV ECHO MEAS - MV E MAX VEL: 176.3 CM/SEC
BH CV ECHO MEAS - MV E/A: 0.97
BH CV ECHO MEAS - MV MAX PG: 13.7 MMHG
BH CV ECHO MEAS - MV MEAN PG: 7.6 MMHG
BH CV ECHO MEAS - MV P1/2T MAX VEL: 175.5 CM/SEC
BH CV ECHO MEAS - MV P1/2T: 110.8 MSEC
BH CV ECHO MEAS - MV V2 MAX: 185.2 CM/SEC
BH CV ECHO MEAS - MV V2 MEAN: 131.6 CM/SEC
BH CV ECHO MEAS - MV V2 VTI: 60.3 CM
BH CV ECHO MEAS - MVA P1/2T LCG: 1.3 CM^2
BH CV ECHO MEAS - MVA(P1/2T): 2 CM^2
BH CV ECHO MEAS - MVA(VTI): 1.1 CM^2
BH CV ECHO MEAS - PA ACC TIME: 0.14 SEC
BH CV ECHO MEAS - PA MAX PG (FULL): 1.1 MMHG
BH CV ECHO MEAS - PA MAX PG: 3.8 MMHG
BH CV ECHO MEAS - PA PR(ACCEL): 15.6 MMHG
BH CV ECHO MEAS - PA V2 MAX: 97.9 CM/SEC
BH CV ECHO MEAS - PULM A REVS DUR: 0.13 SEC
BH CV ECHO MEAS - PULM A REVS VEL: 31.1 CM/SEC
BH CV ECHO MEAS - PULM DIAS VEL: 34.8 CM/SEC
BH CV ECHO MEAS - PULM S/D: 1.3
BH CV ECHO MEAS - PULM SYS VEL: 43.9 CM/SEC
BH CV ECHO MEAS - PVA(V,A): 2 CM^2
BH CV ECHO MEAS - PVA(V,D): 2 CM^2
BH CV ECHO MEAS - QP/QS: 0.57
BH CV ECHO MEAS - RAP SYSTOLE: 3 MMHG
BH CV ECHO MEAS - RV MAX PG: 2.8 MMHG
BH CV ECHO MEAS - RV MEAN PG: 1.5 MMHG
BH CV ECHO MEAS - RV V1 MAX: 82.9 CM/SEC
BH CV ECHO MEAS - RV V1 MEAN: 56.8 CM/SEC
BH CV ECHO MEAS - RV V1 VTI: 16.4 CM
BH CV ECHO MEAS - RVOT AREA: 2.4 CM^2
BH CV ECHO MEAS - RVOT DIAM: 1.8 CM
BH CV ECHO MEAS - RVSP: 43 MMHG
BH CV ECHO MEAS - SI(AO): 87 ML/M^2
BH CV ECHO MEAS - SI(CUBED): 35.2 ML/M^2
BH CV ECHO MEAS - SI(LVOT): 39.5 ML/M^2
BH CV ECHO MEAS - SI(MOD-SP4): 29.8 ML/M^2
BH CV ECHO MEAS - SI(TEICH): 31.7 ML/M^2
BH CV ECHO MEAS - SUP REN AO DIAM: 1.6 CM
BH CV ECHO MEAS - SV(AO): 152.1 ML
BH CV ECHO MEAS - SV(CUBED): 61.6 ML
BH CV ECHO MEAS - SV(LVOT): 69 ML
BH CV ECHO MEAS - SV(MOD-SP4): 52 ML
BH CV ECHO MEAS - SV(RVOT): 39.6 ML
BH CV ECHO MEAS - SV(TEICH): 55.4 ML
BH CV ECHO MEAS - TAPSE (>1.6): 2.4 CM2
BH CV ECHO MEAS - TR MAX VEL: 314.9 CM/SEC
BH CV XLRA - RV BASE: 3.3 CM
BH CV XLRA - TDI S': 11 CM/SEC
E/E' RATIO: 39.5
LEFT ATRIUM VOLUME INDEX: 58 ML/M2
LV EF 2D ECHO EST: 63 %
SINUS: 3.3 CM
STJ: 2.6 CM

## 2017-10-19 PROCEDURE — 93306 TTE W/DOPPLER COMPLETE: CPT | Performed by: INTERNAL MEDICINE

## 2017-10-19 PROCEDURE — 25010000002 PERFLUTREN (DEFINITY) 8.476 MG IN SODIUM CHLORIDE 0.9 % 10 ML INJECTION: Performed by: INTERNAL MEDICINE

## 2017-10-19 PROCEDURE — 0399T HC MYOCARDL STRAIN IMAG QUAN ASSMT PER SESS: CPT

## 2017-10-19 PROCEDURE — 99214 OFFICE O/P EST MOD 30 MIN: CPT | Performed by: INTERNAL MEDICINE

## 2017-10-19 PROCEDURE — 0399T ADULT TRANSTHORACIC ECHO COMPLETE: CPT | Performed by: INTERNAL MEDICINE

## 2017-10-19 PROCEDURE — 93000 ELECTROCARDIOGRAM COMPLETE: CPT | Performed by: INTERNAL MEDICINE

## 2017-10-19 PROCEDURE — 93306 TTE W/DOPPLER COMPLETE: CPT

## 2017-10-19 RX ADMIN — PERFLUTREN 1.5 ML: 6.52 INJECTION, SUSPENSION INTRAVENOUS at 13:36

## 2017-10-19 NOTE — PROGRESS NOTES
Date of Office Visit: 10/19/2017  Encounter Provider: Eduardo Brown MD  Place of Service: [facility] CARDIOLOGY  Patient Name: Mary Shi  :3/3/1925  [id number]    Chief Complaint   Patient presents with   • Cardiac Valve Problem   :     HPI: Mary Shi is a 89 y.o. female  lady has a #23 [employer]'s's Rubina transaortic valve replacement last year.  She's been doing great she feels much improved she doesn't think she would be doing as well as she is right now she had not have that done she doesn't get as tired she sleeps better she gets around better and in general is doing quite well she has not been checking her blood pressure but doesn't really think it's high usually at home like it is here today    Past Medical History:   Diagnosis Date   • Amaurosis fugax    • Anemia    • Aortic stenosis    • Aortic valve stenosis    • Arteriosclerosis of carotid artery    • Carotid artery stenosis    • Cognitive disorder    • Congestive heart failure    • Dementia    • Diabetes mellitus    • Diastolic dysfunction    • Dyspnea on exertion    • Generalized osteoarthritis of multiple sites    • GERD (gastroesophageal reflux disease)    • Health care maintenance    • Heart murmur    • Hiatal hernia    • Hyperlipidemia    • Hypertension    • Mitral valve stenosis    • MS (mitral stenosis)    • Nasal lesion    • Nasal stenosis    • Osteoarthritis     multiple sites   • Palpitations    • PONV (postoperative nausea and vomiting)    • Systolic hypertension    • Varicose veins        Past Surgical History:   Procedure Laterality Date   • AORTIC VALVE REPAIR/REPLACEMENT N/A 2016    Procedure: Transfemoral LEFT Transcatheter Aortic Valve Replacement TRANSESOPHAGEAL ECHOCARDIOGRAM WITH ANESTHESIA;  Surgeon: Eduardo Brown MD;  Location: [facility] ;  Service:    • BLADDER REPAIR     • CARDIAC CATHETERIZATION N/A 2016    Procedure: Right Heart Cath;  Surgeon: Eduardo  MD Kevin;  Location: [facility] CATH INVASIVE LOCATION;  Service:    • CARDIAC CATHETERIZATION N/A 12/14/2016    Procedure: Coronary angiography;  Surgeon: Eduardo Brown MD;  Location: [facility] INVASIVE LOCATION;  Service:    • HYSTERECTOMY     • KNEE SURGERY         Social History     Social History   • Marital status:      Spouse name: N/A   • Number of children: N/A   • Years of education: N/A     Occupational History   • Not on file.     Social History Main Topics   • Smoking status: Never Smoker   • Smokeless tobacco: Not on file      Comment: caffeine use   • Alcohol use No      Comment: rare -once every 2-3 months   • Drug use: No   • Sexual activity: Defer     Other Topics Concern   • Not on file     Social History Narrative       Family History   Problem Relation Age of Onset   • Heart disease Mother    • Coronary artery disease Mother    • Hyperlipidemia Mother    • Hypertension Mother    • Cancer Sister    • Cancer Brother    • Diabetes Daughter    • Diabetes Daughter    • Cancer Sister    • Cancer Sister    • Cancer Sister    • Cancer Other        Review of Systems   Constitution: Negative for decreased appetite, fever, malaise/fatigue and weight loss.   HENT: Negative for nosebleeds.    Eyes: Negative for double vision.   Cardiovascular: Negative for chest pain, claudication, cyanosis, dyspnea on exertion, irregular heartbeat, leg swelling, near-syncope, orthopnea, palpitations, paroxysmal nocturnal dyspnea and syncope.   Respiratory: Negative for cough, hemoptysis and shortness of breath.    Hematologic/Lymphatic: Negative for bleeding problem.   Skin: Negative for rash.   Musculoskeletal: Negative for falls and myalgias.   Gastrointestinal: Negative for hematochezia, jaundice, melena, nausea and vomiting.   Genitourinary: Negative for hematuria.   Neurological: Negative for dizziness and seizures.   Psychiatric/Behavioral: Negative for altered mental status and memory loss.       Allergies  "  Allergen Reactions   • Codeine Itching and GI Intolerance   • Morphine And Related          Current Outpatient Prescriptions:   •  aspirin 81 MG EC tablet, Take 81 mg by mouth Daily. HOLD PER MD INSTR, Disp: , Rfl:   •  atenolol (TENORMIN) 25 MG tablet, Take 0.5 tablets by mouth Daily., Disp: 45 tablet, Rfl: 3  •  glucose blood test strip, Patient uses TRUE METRIX TEST STRIPS 2 TIMES DAILY., Disp: 200 each, Rfl: 11  •  memantine (NAMENDA XR) 28 MG capsule sustained-release 24 hr extended release capsule, Take 1 capsule by mouth Daily., Disp: 90 capsule, Rfl: 3  •  metFORMIN (GLUCOPHAGE) 500 MG tablet, Take 1 tablet by mouth 2 (Two) Times a Day With Meals., Disp: 180 tablet, Rfl: 3  •  Multiple Vitamins-Minerals (MULTIVITAMIN ADULT PO), Take  by mouth., Disp: , Rfl:   •  omeprazole OTC (PRILOSEC OTC) 20 MG EC tablet, Take 1 tablet by mouth Daily., Disp: 90 tablet, Rfl: 3  No current facility-administered medications for this visit.      Objective:     Vitals:    10/19/17 1409   BP: 180/80   Pulse: 80   Weight: 159 lb (72.1 kg)   Height: 61\" (154.9 cm)     Body mass index is 30.04 kg/(m^2).    Physical Exam   Constitutional: She is oriented to person, place, and time. She appears well-developed and well-nourished.   HENT:   Head: Normocephalic.   Eyes: No scleral icterus.   Neck: No JVD present. No thyromegaly present.   Cardiovascular: Normal rate and regular rhythm.  Exam reveals no gallop and no friction rub.    Murmur heard.   Harsh crescendo-decrescendo early systolic murmur is present with a grade of 1/6  at the upper right sternal border radiating to the neck  Pulmonary/Chest: Effort normal and breath sounds normal. She has no wheezes. She has no rales.   Abdominal: Soft. There is no hepatosplenomegaly. There is no tenderness.   Musculoskeletal: Normal range of motion. She exhibits no edema.   Lymphadenopathy:     She has no cervical adenopathy.   Neurological: She is alert and oriented to person, place, and " time.   Skin: Skin is warm and dry. No rash noted.   Psychiatric: She has a normal mood and affect.         ECG 12 Lead  Date/Time: 10/19/2017 2:53 PM  Performed by: YOUNG BROWN  Authorized by: YOUNG BROWN   Comparison: compared with previous ECG   Rhythm: sinus rhythm  Clinical impression: normal ECG             Assessment:       Diagnosis Plan   1. S/P TAVR (transcatheter aortic valve replacement)     2. Rheumatic mitral stenosis     3. Type 2 diabetes mellitus with other circulatory complication, without long-term current use of insulin            Plan:       She's doing really well reviewed her echo today LV functions normal valve looks great there is a trivial aortic leak she does have a little bit of mitral stenosis of the gradients about 5 across it asymptomatic we knew anything about it she's got a watch her blood pressures at home and if they are tuyet Dr. Santana no I discussed have her come back and see me as needed she'll continue to follow-up with Dr. Elissa Mcmahan    As always, it has been a pleasure to participate in your patient's care.      Sincerely,       Young Brown MD

## 2017-12-06 ENCOUNTER — TELEPHONE (OUTPATIENT)
Dept: INTERNAL MEDICINE | Facility: CLINIC | Age: 82
End: 2017-12-06

## 2017-12-06 DIAGNOSIS — R60.0 BILATERAL LEG EDEMA: Primary | ICD-10-CM

## 2017-12-21 ENCOUNTER — TELEPHONE (OUTPATIENT)
Dept: CARDIOLOGY | Facility: CLINIC | Age: 82
End: 2017-12-21

## 2017-12-21 ENCOUNTER — TELEPHONE (OUTPATIENT)
Dept: INTERNAL MEDICINE | Facility: CLINIC | Age: 82
End: 2017-12-21

## 2017-12-21 DIAGNOSIS — R06.09 DYSPNEA ON EXERTION: Primary | ICD-10-CM

## 2017-12-21 RX ORDER — FUROSEMIDE 40 MG/1
40 TABLET ORAL DAILY
Qty: 30 TABLET | Refills: 0 | Status: SHIPPED | OUTPATIENT
Start: 2017-12-21 | End: 2018-04-01

## 2017-12-21 RX ORDER — POTASSIUM CHLORIDE 20 MEQ/1
20 TABLET, EXTENDED RELEASE ORAL DAILY
Qty: 30 TABLET | Refills: 0 | Status: SHIPPED | OUTPATIENT
Start: 2017-12-21 | End: 2018-05-16

## 2017-12-21 RX ORDER — BUMETANIDE 0.5 MG/1
0.5 TABLET ORAL DAILY
Qty: 90 TABLET | Refills: 3 | Status: SHIPPED | OUTPATIENT
Start: 2017-12-21 | End: 2018-04-04

## 2017-12-21 NOTE — TELEPHONE ENCOUNTER
I would use Lasix 40 mg by mouth daily with KCl 20 mEq by mouth daily this morning and tomorrow morning to see if symptoms improve.

## 2017-12-21 NOTE — TELEPHONE ENCOUNTER
----- Message from Tianna Canas sent at 12/21/2017  8:21 AM EST -----  Contact: aiyana pt Daughter  Pt was in the hospital and she was taken off lasix this spring.  She is SOA last two days worsening.  Pt was advised acute clinic is closed      Aiyana [phone]

## 2018-01-19 ENCOUNTER — DOCUMENTATION (OUTPATIENT)
Dept: CARDIOLOGY | Facility: CLINIC | Age: 83
End: 2018-01-19

## 2018-04-01 ENCOUNTER — HOSPITAL ENCOUNTER (EMERGENCY)
Facility: HOSPITAL | Age: 83
Discharge: HOME | End: 2018-04-01
Attending: FAMILY MEDICINE | Admitting: FAMILY MEDICINE

## 2018-04-01 VITALS
TEMPERATURE: 97.7 F | DIASTOLIC BLOOD PRESSURE: 57 MMHG | HEIGHT: 62 IN | OXYGEN SATURATION: 93 % | RESPIRATION RATE: 16 BRPM | HEART RATE: 82 BPM | SYSTOLIC BLOOD PRESSURE: 121 MMHG | BODY MASS INDEX: 28.52 KG/M2 | WEIGHT: 155 LBS

## 2018-04-01 DIAGNOSIS — R19.7 VOMITING AND DIARRHEA: Primary | ICD-10-CM

## 2018-04-01 DIAGNOSIS — R11.10 VOMITING AND DIARRHEA: Primary | ICD-10-CM

## 2018-04-01 LAB
ALBUMIN SERPL-MCNC: 3.8 G/DL (ref 3.5–5.2)
ALBUMIN/GLOB SERPL: 1.5 G/DL
ALP SERPL-CCNC: 89 U/L (ref 39–117)
ALT SERPL W P-5'-P-CCNC: 14 U/L (ref 1–33)
ANION GAP SERPL CALCULATED.3IONS-SCNC: 13.4 MMOL/L
AST SERPL-CCNC: 17 U/L (ref 1–32)
BASOPHILS # BLD AUTO: 0.01 10*3/MM3 (ref 0–0.2)
BASOPHILS NFR BLD AUTO: 0.1 % (ref 0–1.5)
BILIRUB SERPL-MCNC: 0.5 MG/DL (ref 0.1–1.2)
BILIRUB UR QL STRIP: NEGATIVE
BUN BLD-MCNC: 33 MG/DL (ref 8–23)
BUN/CREAT SERPL: 53.2 (ref 7–25)
CALCIUM SPEC-SCNC: 8.8 MG/DL (ref 8.2–9.6)
CHLORIDE SERPL-SCNC: 101 MMOL/L (ref 98–107)
CLARITY UR: ABNORMAL
CO2 SERPL-SCNC: 25.6 MMOL/L (ref 22–29)
COLOR UR: ABNORMAL
CREAT BLD-MCNC: 0.62 MG/DL (ref 0.57–1)
DEPRECATED RDW RBC AUTO: 43.9 FL (ref 37–54)
EOSINOPHIL # BLD AUTO: 0.03 10*3/MM3 (ref 0–0.7)
EOSINOPHIL NFR BLD AUTO: 0.4 % (ref 0.3–6.2)
ERYTHROCYTE [DISTWIDTH] IN BLOOD BY AUTOMATED COUNT: 13.8 % (ref 11.7–13)
GFR SERPL CREATININE-BSD FRML MDRD: 90 ML/MIN/1.73
GLOBULIN UR ELPH-MCNC: 2.6 GM/DL
GLUCOSE BLD-MCNC: 158 MG/DL (ref 65–99)
GLUCOSE UR STRIP-MCNC: NEGATIVE MG/DL
HCT VFR BLD AUTO: 36.4 % (ref 35.6–45.5)
HGB BLD-MCNC: 12 G/DL (ref 11.9–15.5)
HGB UR QL STRIP.AUTO: NEGATIVE
HOLD SPECIMEN: NORMAL
HOLD SPECIMEN: NORMAL
IMM GRANULOCYTES # BLD: 0.02 10*3/MM3 (ref 0–0.03)
IMM GRANULOCYTES NFR BLD: 0.3 % (ref 0–0.5)
KETONES UR QL STRIP: NEGATIVE
LEUKOCYTE ESTERASE UR QL STRIP.AUTO: NEGATIVE
LYMPHOCYTES # BLD AUTO: 1.35 10*3/MM3 (ref 0.9–4.8)
LYMPHOCYTES NFR BLD AUTO: 18.8 % (ref 19.6–45.3)
MCH RBC QN AUTO: 28.7 PG (ref 26.9–32)
MCHC RBC AUTO-ENTMCNC: 33 G/DL (ref 32.4–36.3)
MCV RBC AUTO: 87.1 FL (ref 80.5–98.2)
MONOCYTES # BLD AUTO: 0.64 10*3/MM3 (ref 0.2–1.2)
MONOCYTES NFR BLD AUTO: 8.9 % (ref 5–12)
NEUTROPHILS # BLD AUTO: 5.15 10*3/MM3 (ref 1.9–8.1)
NEUTROPHILS NFR BLD AUTO: 71.5 % (ref 42.7–76)
NITRITE UR QL STRIP: NEGATIVE
NRBC BLD MANUAL-RTO: 0 /100 WBC (ref 0–0)
NT-PROBNP SERPL-MCNC: 1647 PG/ML (ref 0–1800)
PH UR STRIP.AUTO: 5.5 [PH] (ref 5–8)
PLATELET # BLD AUTO: 175 10*3/MM3 (ref 140–500)
PMV BLD AUTO: 11.4 FL (ref 6–12)
POTASSIUM BLD-SCNC: 3.5 MMOL/L (ref 3.5–5.2)
PROT SERPL-MCNC: 6.4 G/DL (ref 6–8.5)
PROT UR QL STRIP: ABNORMAL
RBC # BLD AUTO: 4.18 10*6/MM3 (ref 3.9–5.2)
SODIUM BLD-SCNC: 140 MMOL/L (ref 136–145)
SP GR UR STRIP: 1.02 (ref 1–1.03)
UROBILINOGEN UR QL STRIP: ABNORMAL
WBC NRBC COR # BLD: 7.2 10*3/MM3 (ref 4.5–10.7)
WHOLE BLOOD HOLD SPECIMEN: NORMAL
WHOLE BLOOD HOLD SPECIMEN: NORMAL

## 2018-04-01 PROCEDURE — 25010000002 ONDANSETRON PER 1 MG: Performed by: FAMILY MEDICINE

## 2018-04-01 PROCEDURE — 81003 URINALYSIS AUTO W/O SCOPE: CPT | Performed by: FAMILY MEDICINE

## 2018-04-01 PROCEDURE — 83880 ASSAY OF NATRIURETIC PEPTIDE: CPT | Performed by: FAMILY MEDICINE

## 2018-04-01 PROCEDURE — 99284 EMERGENCY DEPT VISIT MOD MDM: CPT

## 2018-04-01 PROCEDURE — 85025 COMPLETE CBC W/AUTO DIFF WBC: CPT | Performed by: FAMILY MEDICINE

## 2018-04-01 PROCEDURE — 96374 THER/PROPH/DIAG INJ IV PUSH: CPT

## 2018-04-01 PROCEDURE — 80053 COMPREHEN METABOLIC PANEL: CPT | Performed by: FAMILY MEDICINE

## 2018-04-01 RX ORDER — ONDANSETRON 4 MG/1
4 TABLET, FILM COATED ORAL EVERY 4 HOURS PRN
Qty: 4 TABLET | Refills: 0 | Status: SHIPPED | OUTPATIENT
Start: 2018-04-01 | End: 2018-04-04

## 2018-04-01 RX ORDER — ONDANSETRON 2 MG/ML
4 INJECTION INTRAMUSCULAR; INTRAVENOUS ONCE
Status: COMPLETED | OUTPATIENT
Start: 2018-04-01 | End: 2018-04-01

## 2018-04-01 RX ADMIN — SODIUM CHLORIDE 500 ML: 9 INJECTION, SOLUTION INTRAVENOUS at 10:22

## 2018-04-01 RX ADMIN — ONDANSETRON 4 MG: 2 INJECTION INTRAMUSCULAR; INTRAVENOUS at 10:30

## 2018-04-01 NOTE — DISCHARGE INSTRUCTIONS
Immodium as needed.   We expect you to continue improving over 24 hours.  If not, or if you worsen or develop new symptoms, return.

## 2018-04-01 NOTE — ED PROVIDER NOTES
EMERGENCY DEPARTMENT ENCOUNTER    CHIEF COMPLAINT  Chief Complaint: Diarrhea  History given by: Pt  History limited by: none  Room Number: 41/41  PMD: Taiwo Huston Jr., MD      HPI:  Pt is a 89 y.o. female who presents complaining of diarrhea that began yesterday morning. She also complains of N/V that has slightly improved. She denies abd pain and blood in stool. Pt and her family state that they believe the symptoms were caused by a bad bologna sandwich she ate 2 nights ago. Pt's granddaughter had similar symptoms last week and pt was on abx this week for a dental procedure.    Duration:  1 day  Onset: gradual  Timing: constant  Intensity/Severity: moderate  Progression: N/V has slightly improved  Associated Symptoms: N/V  Aggravating Factors: Pt and her family believe her symptoms were caused by a bologna sandwich  Alleviating Factors: none stated  Previous Episodes: none stated  Treatment before arrival: none stated    PAST MEDICAL HISTORY  Active Ambulatory Problems     Diagnosis Date Noted   • Amaurosis fugax 05/24/2016   • Cognitive disorder 05/24/2016   • Diastolic dysfunction 05/24/2016   • Generalized osteoarthritis 05/24/2016   • Gastroesophageal reflux disease 05/24/2016   • Hyperlipidemia 05/24/2016   • Systolic hypertension 05/24/2016   • Carotid artery stenosis 06/06/2016   • Polyneuropathy associated with underlying disease 06/06/2016   • S/P TAVR (transcatheter aortic valve replacement) 02/03/2017   • Type 2 diabetes mellitus 04/06/2017   • Rheumatic mitral stenosis 10/19/2017     Resolved Ambulatory Problems     Diagnosis Date Noted   • Anemia 05/24/2016   • Disorder of aorta 05/24/2016   • Arteriosclerotic vascular disease 05/24/2016   • Diabetes mellitus 05/24/2016   • Diastolic heart failure 05/24/2016   • Mitral valve stenosis 05/24/2016   • Lesion of nose 05/24/2016   • Nonrheumatic aortic valve stenosis 06/06/2016   • Angina at rest 12/13/2016   • VHD (valvular heart disease) 12/27/2016    • H/O aortic valvuloplasty 01/04/2017     Past Medical History:   Diagnosis Date   • Amaurosis fugax    • Anemia    • Aortic stenosis    • Aortic valve stenosis    • Arteriosclerosis of carotid artery    • Carotid artery stenosis    • Cognitive disorder    • Congestive heart failure    • Dementia    • Diabetes mellitus    • Diastolic dysfunction    • Dyspnea on exertion    • Generalized osteoarthritis of multiple sites    • GERD (gastroesophageal reflux disease)    • Health care maintenance    • Heart murmur    • Hiatal hernia    • Hyperlipidemia    • Hypertension    • Mitral valve stenosis    • MS (mitral stenosis)    • Nasal lesion    • Nasal stenosis    • Osteoarthritis    • Palpitations    • PONV (postoperative nausea and vomiting)    • Systolic hypertension    • Varicose veins        PAST SURGICAL HISTORY  Past Surgical History:   Procedure Laterality Date   • AORTIC VALVE REPAIR/REPLACEMENT N/A 12/27/2016    Procedure: Transfemoral LEFT Transcatheter Aortic Valve Replacement TRANSESOPHAGEAL ECHOCARDIOGRAM WITH ANESTHESIA;  Surgeon: Eduardo Brown MD;  Location: [facility] OR 18/19;  Service:    • BLADDER REPAIR     • CARDIAC CATHETERIZATION N/A 12/14/2016    Procedure: Right Heart Cath;  Surgeon: Eduardo Brown MD;  Location: [facility] INVASIVE LOCATION;  Service:    • CARDIAC CATHETERIZATION N/A 12/14/2016    Procedure: Coronary angiography;  Surgeon: Eduardo Brown MD;  Location: [facility] INVASIVE LOCATION;  Service:    • HYSTERECTOMY     • KNEE SURGERY         FAMILY HISTORY  Family History   Problem Relation Age of Onset   • Heart disease Mother    • Coronary artery disease Mother    • Hyperlipidemia Mother    • Hypertension Mother    • Cancer Sister    • Cancer Brother    • Diabetes Daughter    • Diabetes Daughter    • Cancer Sister    • Cancer Sister    • Cancer Sister    • Cancer Other        SOCIAL HISTORY  Social History     Social History   • Marital status:      Spouse name: N/A    • Number of children: N/A   • Years of education: N/A     Occupational History   • Not on file.     Social History Main Topics   • Smoking status: Never Smoker   • Smokeless tobacco: Not on file      Comment: caffeine use   • Alcohol use No      Comment: rare -once every 2-3 months   • Drug use: No   • Sexual activity: Defer     Other Topics Concern   • Not on file     Social History Narrative   • No narrative on file       ALLERGIES  Codeine and Morphine and related    REVIEW OF SYSTEMS  Review of Systems   Constitutional: Negative for fever.   HENT: Negative for sore throat.    Eyes: Negative.    Respiratory: Negative for cough and shortness of breath.    Cardiovascular: Negative for chest pain.   Gastrointestinal: Positive for diarrhea, nausea and vomiting. Negative for abdominal pain and blood in stool.   Genitourinary: Negative for dysuria.   Musculoskeletal: Negative for neck pain.   Skin: Negative for rash.   Allergic/Immunologic: Negative.    Neurological: Negative for weakness, numbness and headaches.   Hematological: Negative.    Psychiatric/Behavioral: Negative.    All other systems reviewed and are negative.      PHYSICAL EXAM  ED Triage Vitals [04/01/18 0954]   Temp Heart Rate Resp BP SpO2   -- 94 16 -- 96 %      Temp src Heart Rate Source Patient Position BP Location FiO2 (%)   -- -- -- -- --       Physical Exam   Constitutional: She is oriented to person, place, and time and well-developed, well-nourished, and in no distress. No distress.   HENT:   Head: Normocephalic and atraumatic.   Eyes: EOM are normal. Pupils are equal, round, and reactive to light.   Neck: Normal range of motion. Neck supple.   Cardiovascular: Normal rate, regular rhythm and normal heart sounds.    Pulmonary/Chest: Effort normal and breath sounds normal. No respiratory distress.   Abdominal: Soft. There is no tenderness. There is no rebound and no guarding.   Musculoskeletal: Normal range of motion. She exhibits no edema.    Neurological: She is alert and oriented to person, place, and time. She has normal sensation and normal strength.   Skin: Skin is warm and dry. No rash noted.   Psychiatric: Mood and affect normal.   Nursing note and vitals reviewed.      LAB RESULTS  Lab Results (last 24 hours)     Procedure Component Value Units Date/Time    CBC & Differential [[id number]] Collected:  04/01/18 1012    Specimen:  Blood Updated:  04/01/18 1032    Narrative:       The following orders were created for panel order CBC & Differential.  Procedure                               Abnormality         Status                     ---------                               -----------         ------                     CBC Auto Differential[[id number]]        Abnormal            Final result                 Please view results for these tests on the individual orders.    Comprehensive Metabolic Panel [[id number]]  (Abnormal) Collected:  04/01/18 1012    Specimen:  Blood Updated:  04/01/18 1052     Glucose 158 (H) mg/dL      BUN 33 (H) mg/dL      Creatinine 0.62 mg/dL      Sodium 140 mmol/L      Potassium 3.5 mmol/L      Chloride 101 mmol/L      CO2 25.6 mmol/L      Calcium 8.8 mg/dL      Total Protein 6.4 g/dL      Albumin 3.80 g/dL      ALT (SGPT) 14 U/L      AST (SGOT) 17 U/L      Alkaline Phosphatase 89 U/L      Total Bilirubin 0.5 mg/dL      eGFR Non African Amer 90 mL/min/1.73      Globulin 2.6 gm/dL      A/G Ratio 1.5 g/dL      BUN/Creatinine Ratio 53.2 (H)     Anion Gap 13.4 mmol/L     Narrative:       The MDRD GFR formula is only valid for adults with stable renal function between ages 18 and 70.    BNP [[id number]]  (Normal) Collected:  04/01/18 1012    Specimen:  Blood Updated:  04/01/18 1050     proBNP 1,647.0 pg/mL     Narrative:       Among patients with dyspnea, NT-proBNP is highly sensitive for the detection of acute congestive heart failure. In addition NT-proBNP of <300 pg/ml effectively rules out acute congestive heart failure with  99% negative predictive value.    CBC Auto Differential [[id number]]  (Abnormal) Collected:  04/01/18 1012    Specimen:  Blood Updated:  04/01/18 1032     WBC 7.20 10*3/mm3      RBC 4.18 10*6/mm3      Hemoglobin 12.0 g/dL      Hematocrit 36.4 %      MCV 87.1 fL      MCH 28.7 pg      MCHC 33.0 g/dL      RDW 13.8 (H) %      RDW-SD 43.9 fl      MPV 11.4 fL      Platelets 175 10*3/mm3      Neutrophil % 71.5 %      Lymphocyte % 18.8 (L) %      Monocyte % 8.9 %      Eosinophil % 0.4 %      Basophil % 0.1 %      Immature Grans % 0.3 %      Neutrophils, Absolute 5.15 10*3/mm3      Lymphocytes, Absolute 1.35 10*3/mm3      Monocytes, Absolute 0.64 10*3/mm3      Eosinophils, Absolute 0.03 10*3/mm3      Basophils, Absolute 0.01 10*3/mm3      Immature Grans, Absolute 0.02 10*3/mm3      nRBC 0.0 /100 WBC     Urinalysis With / Culture If Indicated - Urine, Clean Catch [[id number]]  (Abnormal) Collected:  04/01/18 1017    Specimen:  Urine from Urine, Clean Catch Updated:  04/01/18 1028     Color, UA Dark Yellow (A)     Appearance, UA Cloudy (A)     pH, UA 5.5     Specific Gravity, UA 1.025     Glucose, UA Negative     Ketones, UA Negative     Bilirubin, UA Negative     Blood, UA Negative     Protein, UA Trace (A)     Leuk Esterase, UA Negative     Nitrite, UA Negative     Urobilinogen, UA 1.0 E.U./dL    Narrative:       Urine microscopic not indicated.          I ordered the above labs and reviewed the results      PROCEDURES  Procedures      PROGRESS AND CONSULTS  ED Course   10:09 AM  Ordered IVF for hydration    10:28 AM  Ordered Zofran for pt's nausea.    11:23 AM  Per RN, Pt was able to keep all of her fluids down and states feeling better.    11:39 AM  BP- 127/56 HR- 87 Temp- 97.7 °F (36.5 °C) (Oral) O2 sat- 93%  Rechecked the patient who is in NAD and is resting comfortably. Pt states she feels much better and I informed her and her family of the results of her blood work being improved and that they point to mild dehydration.   She has had no further V or D. I then informed them of the plan for discharge with zofran and instructions to purchase OTC imodium.Pt understands and agrees with the plan, all questions answered.          MEDICAL DECISION MAKING  Results were reviewed/discussed with the patient and they were also made aware of online access. Pt also made aware that some labs, such as cultures, will not be resulted during ER visit and follow up with PMD is necessary.     MDM  Number of Diagnoses or Management Options  Vomiting and diarrhea:      Amount and/or Complexity of Data Reviewed  Clinical lab tests: reviewed and ordered (Glucose-158(H)  BUN-33(H)  BUN/Creatinine Ratio-53.2(H))  Decide to obtain previous medical records or to obtain history from someone other than the patient: yes  Review and summarize past medical records: yes           DIAGNOSIS  Final diagnoses:   Vomiting and diarrhea       DISPOSITION  DISCHARGE    Patient discharged in stable condition.    Reviewed implications of results, diagnosis, meds, responsibility to follow up, warning signs and symptoms of possible worsening, potential complications and reasons to return to ER.    Patient/Family voiced understanding of above instructions.    Discussed plan for discharge, as there is no emergent indication for admission. Patient referred to primary care provider for BP management due to today's BP. Pt/family is agreeable and understands need for follow up and repeat testing.  Pt is aware that discharge does not mean that nothing is wrong but it indicates no emergency is present that requires admission and they must continue care with follow-up as given below or physician of their choice.     FOLLOW-UP  No follow-up provider specified.       Medication List      No changes were made to your prescriptions during this visit.           Latest Documented Vital Signs:  As of 11:30 AM  BP- 127/56 HR- 87 Temp- 97.7 °F (36.5 °C) (Oral) O2 sat- 93%    --  Documentation  assistance provided by marino Loyd for .  Information recorded by the scribe was done at my direction and has been verified and validated by me.            Abelardo Loyd  04/01/18 1141       Fabián Pena MD  04/01/18 6479

## 2018-04-01 NOTE — ED NOTES
"Pt reports continuous n/v/d since yesterday at 10:00. Pt reports, \"I just cannot keep anything down!\" Pt denies any new confusion, fever, or respiratory symptoms     Laura Henderson RN  04/01/18 1011    "

## 2018-04-02 ENCOUNTER — EPISODE CHANGES (OUTPATIENT)
Dept: CASE MANAGEMENT | Facility: OTHER | Age: 83
End: 2018-04-02

## 2018-04-03 ENCOUNTER — TELEPHONE (OUTPATIENT)
Dept: SOCIAL WORK | Facility: HOSPITAL | Age: 83
End: 2018-04-03

## 2018-04-04 ENCOUNTER — OFFICE VISIT (OUTPATIENT)
Dept: INTERNAL MEDICINE | Facility: CLINIC | Age: 83
End: 2018-04-04

## 2018-04-04 ENCOUNTER — TELEPHONE (OUTPATIENT)
Dept: INTERNAL MEDICINE | Facility: CLINIC | Age: 83
End: 2018-04-04

## 2018-04-04 VITALS
DIASTOLIC BLOOD PRESSURE: 80 MMHG | HEIGHT: 62 IN | OXYGEN SATURATION: 97 % | SYSTOLIC BLOOD PRESSURE: 170 MMHG | WEIGHT: 155 LBS | HEART RATE: 81 BPM | BODY MASS INDEX: 28.52 KG/M2

## 2018-04-04 DIAGNOSIS — R41.0 ACUTE CONFUSION: ICD-10-CM

## 2018-04-04 DIAGNOSIS — E11.59 TYPE 2 DIABETES MELLITUS WITH OTHER CIRCULATORY COMPLICATION, WITHOUT LONG-TERM CURRENT USE OF INSULIN (HCC): Primary | ICD-10-CM

## 2018-04-04 DIAGNOSIS — R41.0 CONFUSION: ICD-10-CM

## 2018-04-04 DIAGNOSIS — F09 COGNITIVE DISORDER: ICD-10-CM

## 2018-04-04 LAB
ALBUMIN SERPL-MCNC: 4.5 G/DL (ref 3.5–5.2)
ALBUMIN/GLOB SERPL: 1.7 G/DL
ALP SERPL-CCNC: 90 U/L (ref 39–117)
ALT SERPL-CCNC: 17 U/L (ref 1–33)
AST SERPL-CCNC: 17 U/L (ref 1–32)
BASOPHILS # BLD AUTO: 0.01 10*3/MM3 (ref 0–0.2)
BASOPHILS NFR BLD AUTO: 0.1 % (ref 0–2)
BILIRUB SERPL-MCNC: 0.3 MG/DL (ref 0.1–1.2)
BILIRUB UR QL STRIP: NEGATIVE
BUN SERPL-MCNC: 13 MG/DL (ref 8–23)
BUN/CREAT SERPL: 20.6 (ref 7–25)
CALCIUM SERPL-MCNC: 10.1 MG/DL (ref 8.2–9.6)
CHLORIDE SERPL-SCNC: 103 MMOL/L (ref 98–107)
CLARITY UR: CLEAR
CO2 SERPL-SCNC: 27 MMOL/L (ref 22–29)
COLOR UR: YELLOW
CREAT SERPL-MCNC: 0.63 MG/DL (ref 0.57–1)
DEPRECATED RDW RBC AUTO: 41.9 FL (ref 37–54)
EOSINOPHIL # BLD AUTO: 0.01 10*3/MM3 (ref 0–0.7)
EOSINOPHIL NFR BLD AUTO: 0.1 % (ref 0–5)
ERYTHROCYTE [DISTWIDTH] IN BLOOD BY AUTOMATED COUNT: 13.6 % (ref 11.5–15)
GLOBULIN SER CALC-MCNC: 2.6 GM/DL
GLUCOSE SERPL-MCNC: 127 MG/DL (ref 65–99)
GLUCOSE UR STRIP-MCNC: NEGATIVE MG/DL
HCT VFR BLD AUTO: 36 % (ref 34.1–44.9)
HGB BLD-MCNC: 12.2 G/DL (ref 11.2–15.7)
HGB UR QL STRIP.AUTO: NEGATIVE
KETONES UR QL STRIP: NEGATIVE
LEUKOCYTE ESTERASE UR QL STRIP.AUTO: NEGATIVE
LYMPHOCYTES # BLD AUTO: 2.43 10*3/MM3 (ref 0.8–7)
LYMPHOCYTES NFR BLD AUTO: 33.8 % (ref 10–60)
MCH RBC QN AUTO: 29.5 PG (ref 26–34)
MCHC RBC AUTO-ENTMCNC: 33.9 G/DL (ref 31–37)
MCV RBC AUTO: 87 FL (ref 80–100)
MONOCYTES # BLD AUTO: 0.75 10*3/MM3 (ref 0–1)
MONOCYTES NFR BLD AUTO: 10.4 % (ref 0–13)
NEUTROPHILS # BLD AUTO: 3.98 10*3/MM3 (ref 1–11)
NEUTROPHILS NFR BLD AUTO: 55.6 % (ref 30–85)
NITRITE UR QL STRIP: NEGATIVE
PH UR STRIP.AUTO: <=5 [PH] (ref 5–8)
PLATELET # BLD AUTO: 191 10*3/MM3 (ref 150–450)
PMV BLD AUTO: 11.1 FL (ref 6–12)
POTASSIUM SERPL-SCNC: 4.1 MMOL/L (ref 3.5–5.2)
PROT SERPL-MCNC: 7.1 G/DL (ref 6–8.5)
PROT UR QL STRIP: NEGATIVE
RBC # BLD AUTO: 4.14 10*6/MM3 (ref 3.93–5.22)
SODIUM SERPL-SCNC: 144 MMOL/L (ref 136–145)
SP GR UR STRIP: <=1.005 (ref 1–1.03)
UROBILINOGEN UR QL STRIP: NORMAL
WBC NRBC COR # BLD: 7.18 10*3/MM3 (ref 5–10)

## 2018-04-04 PROCEDURE — 81003 URINALYSIS AUTO W/O SCOPE: CPT | Performed by: INTERNAL MEDICINE

## 2018-04-04 PROCEDURE — 85025 COMPLETE CBC W/AUTO DIFF WBC: CPT | Performed by: INTERNAL MEDICINE

## 2018-04-04 PROCEDURE — 99213 OFFICE O/P EST LOW 20 MIN: CPT | Performed by: INTERNAL MEDICINE

## 2018-04-04 NOTE — TELEPHONE ENCOUNTER
Patient was seen in [facility] ER Sunday for vomitting and diarrhea. She was given zofran now today confused and hallucinating. Please advise

## 2018-04-04 NOTE — TELEPHONE ENCOUNTER
----- Message from Tianna Canas sent at 4/4/2018 10:09 AM EDT -----  Contact: Christina pt daughter  Pt was in ER Sunday, with virus.  Stopped yesterday, but today she is confused and hallucinating.       Caller#   [phone]

## 2018-04-04 NOTE — PROGRESS NOTES
Subjective   Mary Shi is a 89 y.o. female.     History of Present Illness she is here today for evaluation and treatment of recent gastroenteritis and subsequent confusion and hallucinations.  Over the weekend she had nausea vomiting and diarrhea with lower abdominal pain.  She was evaluated in the emergency room and treated with Zofran and 1 L of saline intravenously.  Her symptoms have resolved and she is back eating soft bland diet.  She has not had any falls or overt head injuries.  She denies any dizziness or syncope.  She does not have headache.        Review of Systems   Constitutional: Negative for activity change, appetite change and fatigue.   HENT: Negative for trouble swallowing.    Respiratory: Negative for cough, chest tightness, shortness of breath and wheezing.    Cardiovascular: Negative for chest pain, palpitations and leg swelling.   Gastrointestinal: Negative for abdominal pain, diarrhea, nausea and vomiting.   Genitourinary: Negative for dysuria, flank pain, frequency and hematuria.   Neurological: Positive for confusion. Negative for dizziness, facial asymmetry, speech difficulty, weakness, numbness and headaches.   Psychiatric/Behavioral: Positive for hallucinations.       Objective   Physical Exam   Constitutional: She appears well-developed and well-nourished. She is active. She does not appear ill.   Eyes: Conjunctivae are normal.   Cardiovascular: Normal rate, regular rhythm, S1 normal and S2 normal.  Exam reveals no S3 and no S4.    Murmur heard.   Systolic murmur is present with a grade of 2/6   There is a grade 3/6 systolic ejection murmur heard best at the base radiating down the left sternal border   Pulmonary/Chest: No tachypnea. No respiratory distress. She has no decreased breath sounds. She has no wheezes. She has no rhonchi. She has no rales.   Abdominal: Soft. Normal appearance. She exhibits no abdominal bruit and no mass. There is no hepatosplenomegaly. There is no  tenderness.     Vascular Status -  Her right foot exhibits no edema. Her left foot exhibits no edema.  Neurological: She is alert. She has normal strength. She displays no tremor. No cranial nerve deficit. Gait abnormal.   Reflex Scores:       Bicep reflexes are 2+ on the right side and 2+ on the left side.  Mild slowing but stable with use of a cane in her walk.  She knows it is 2018 although she believes it is February.  She no shoes in [facility].   Psychiatric: She has a normal mood and affect. Her speech is normal and behavior is normal. Cognition and memory are impaired.         Assessment/Plan glucose at 3:30 PM is 136 .  On 1 April ER visit showed BUN 33 and creatinine 0.62.  Glucose 158.  Hematocrit 36.4 hemoglobin 12 white count 7000.  Urinalysis normal.    Assessment #1 acute confusion and hallucinations-hopefully a temporary encephalopathy related to recent acute gastroenteritis    Plan #1 decrease metformin to 250 mg twice a day.  #2 hold Bumex for now #3 The family will be with her at all times and monitor her condition over the next few days.  #4 CBC, CMP, urine C&S.  If symptoms do not improve then MRI of the head will be done.

## 2018-05-16 ENCOUNTER — OFFICE VISIT (OUTPATIENT)
Dept: INTERNAL MEDICINE | Facility: CLINIC | Age: 83
End: 2018-05-16

## 2018-05-16 ENCOUNTER — OFFICE VISIT (OUTPATIENT)
Dept: CARDIOLOGY | Facility: CLINIC | Age: 83
End: 2018-05-16

## 2018-05-16 VITALS
HEIGHT: 62 IN | SYSTOLIC BLOOD PRESSURE: 120 MMHG | DIASTOLIC BLOOD PRESSURE: 72 MMHG | RESPIRATION RATE: 14 BRPM | WEIGHT: 155 LBS | BODY MASS INDEX: 28.52 KG/M2

## 2018-05-16 VITALS
HEIGHT: 61 IN | HEART RATE: 62 BPM | BODY MASS INDEX: 29.64 KG/M2 | WEIGHT: 157 LBS | SYSTOLIC BLOOD PRESSURE: 162 MMHG | DIASTOLIC BLOOD PRESSURE: 80 MMHG

## 2018-05-16 DIAGNOSIS — E11.59 TYPE 2 DIABETES MELLITUS WITH OTHER CIRCULATORY COMPLICATION, WITHOUT LONG-TERM CURRENT USE OF INSULIN (HCC): Primary | ICD-10-CM

## 2018-05-16 DIAGNOSIS — I10 SYSTOLIC HYPERTENSION: ICD-10-CM

## 2018-05-16 DIAGNOSIS — I51.89 DIASTOLIC DYSFUNCTION: ICD-10-CM

## 2018-05-16 DIAGNOSIS — I05.0 RHEUMATIC MITRAL STENOSIS: ICD-10-CM

## 2018-05-16 DIAGNOSIS — I49.3 PVC (PREMATURE VENTRICULAR CONTRACTION): ICD-10-CM

## 2018-05-16 DIAGNOSIS — Z95.2 S/P TAVR (TRANSCATHETER AORTIC VALVE REPLACEMENT): Primary | ICD-10-CM

## 2018-05-16 DIAGNOSIS — M15.9 GENERALIZED OSTEOARTHRITIS: ICD-10-CM

## 2018-05-16 PROBLEM — R41.0 ACUTE CONFUSION: Status: RESOLVED | Noted: 2018-04-04 | Resolved: 2018-05-16

## 2018-05-16 LAB — HBA1C MFR BLD: 6.85 % (ref 4.8–5.6)

## 2018-05-16 PROCEDURE — 36415 COLL VENOUS BLD VENIPUNCTURE: CPT | Performed by: INTERNAL MEDICINE

## 2018-05-16 PROCEDURE — 93000 ELECTROCARDIOGRAM COMPLETE: CPT | Performed by: NURSE PRACTITIONER

## 2018-05-16 PROCEDURE — 99213 OFFICE O/P EST LOW 20 MIN: CPT | Performed by: INTERNAL MEDICINE

## 2018-05-16 PROCEDURE — 99214 OFFICE O/P EST MOD 30 MIN: CPT | Performed by: NURSE PRACTITIONER

## 2018-05-16 PROCEDURE — 83036 HEMOGLOBIN GLYCOSYLATED A1C: CPT | Performed by: INTERNAL MEDICINE

## 2018-05-16 RX ORDER — BUMETANIDE 0.5 MG/1
0.5 TABLET ORAL DAILY
COMMUNITY
End: 2018-12-10 | Stop reason: SDUPTHER

## 2018-05-16 NOTE — PROGRESS NOTES
Date of Office Visit: 2018  Encounter Provider: RAYRAY Carranza  Place of Service: [facility] CARDIOLOGY  Patient Name: Mary Shi  :3/3/1925    Chief Complaint   Patient presents with   • S/P TAVR (transcatheter aortic valve replacement)     6 month follow up   :     HPI: Mary Shi is a 89 y.o. female who presents today for six-month cardiac follow-up.  She is a new patient to me and her previous records have been reviewed.  She has a history of hypertension, hyperlipidemia, diastolic dysfunction, and type 2 diabetes mellitus.    She has a history of aortic stenosis and underwent TAVR with #23 S3 Rubina valve in 2016.  She has an established patient of Dr. Elissa Mcmahan and was last seen in 2017.  She followed up with Dr. Major Brown in 2017 and he thought she was doing very well with her aortic valve.  He reviewed the echocardiogram from 2017 which showed a trivial aortic leak and severe mitral annular calcification with moderate mitral stenosis.  He recommended that she follow-up with Dr. Mcmahan in the future and could see him on an as-needed basis. Her daughter contacted our office in 2017 stating that her mother was getting short of breath and fatigued.  She started Bumex 0.5 mg daily and CMP in April showed normal BUN, creatinine, and potassium.    She presents today with her daughter accompanying her. He has been doing very well and feels that her shortness of breath has improved.  She only experiences lower extremity edema if she doesn't wear her elastic stockings.  She denies chest pain, PND, orthopnea, cough, palpitations, dizziness, or syncope.  Her blood pressure is elevated today and they will continue to monitor.    The following portion of the patient's history were reviewed and updated as appropriate: past medical history, past surgical history, past social history, past family history,  allergies, current medications, and problem list.    Past Medical History:   Diagnosis Date   • Amaurosis fugax    • Anemia    • Aortic stenosis     s/p TAVR    • Carotid artery stenosis    • Cognitive disorder    • Congestive heart failure    • Dementia    • Diabetes mellitus    • Diastolic dysfunction    • Dyspnea on exertion    • Generalized osteoarthritis of multiple sites    • GERD (gastroesophageal reflux disease)    • Health care maintenance    • Hiatal hernia    • Hyperlipidemia    • Hypertension    • Mitral valve stenosis     Moderate per echocardiogram 2/2017; severe mitral annular calcification   • Nasal lesion    • Nasal stenosis    • Osteoarthritis     multiple sites   • PONV (postoperative nausea and vomiting)    • Varicose veins        Past Surgical History:   Procedure Laterality Date   • AORTIC VALVE REPAIR/REPLACEMENT N/A 12/27/2016    Procedure: Transfemoral LEFT Transcatheter Aortic Valve Replacement TRANSESOPHAGEAL ECHOCARDIOGRAM WITH ANESTHESIA;  Surgeon: Eduardo Brown MD;  Location: [facility] OR 18/19;  Service:    • BLADDER REPAIR     • CARDIAC CATHETERIZATION N/A 12/14/2016    Procedure: Right Heart Cath;  Surgeon: Eduardo Brown MD;  Location: [facility] INVASIVE LOCATION;  Service:    • CARDIAC CATHETERIZATION N/A 12/14/2016    Procedure: Coronary angiography;  Surgeon: Eduardo Brown MD;  Location: [facility] INVASIVE LOCATION;  Service:    • HYSTERECTOMY     • KNEE SURGERY         Social History     Social History   • Marital status:      Spouse name: N/A   • Number of children: N/A   • Years of education: N/A     Occupational History   • Not on file.     Social History Main Topics   • Smoking status: Never Smoker   • Smokeless tobacco: Never Used      Comment: caffeine use   • Alcohol use No      Comment: rare -once every 2-3 months   • Drug use: No   • Sexual activity: Defer       Family History   Problem Relation Age of Onset   • Heart disease Mother    • Coronary  artery disease Mother    • Hyperlipidemia Mother    • Hypertension Mother    • Cancer Sister    • Cancer Brother    • Diabetes Daughter    • Diabetes Daughter    • Cancer Sister    • Cancer Sister    • Cancer Sister    • Cancer Other        Review of Systems   Constitution: Negative for chills, diaphoresis, fever, malaise/fatigue, night sweats, weight gain and weight loss.   HENT: Negative for hearing loss, nosebleeds, sore throat and tinnitus.    Eyes: Negative for blurred vision, double vision, pain and visual disturbance.   Cardiovascular: Positive for dyspnea on exertion and leg swelling. Negative for chest pain, claudication, cyanosis, irregular heartbeat, near-syncope, orthopnea, palpitations, paroxysmal nocturnal dyspnea and syncope.   Respiratory: Negative for cough, hemoptysis, shortness of breath, snoring and wheezing.    Endocrine: Negative for cold intolerance, heat intolerance and polyuria.   Hematologic/Lymphatic: Negative for bleeding problem. Does not bruise/bleed easily.   Skin: Negative for color change, dry skin, flushing and itching.   Musculoskeletal: Negative for falls, joint pain, joint swelling, muscle cramps, muscle weakness and myalgias.   Gastrointestinal: Negative for abdominal pain, constipation, heartburn, melena, nausea and vomiting.   Genitourinary: Negative for dysuria and hematuria.   Neurological: Negative for excessive daytime sleepiness, dizziness, light-headedness, loss of balance, numbness, paresthesias, seizures and vertigo.   Psychiatric/Behavioral: Negative for altered mental status, depression, memory loss and substance abuse. The patient does not have insomnia and is not nervous/anxious.    Allergic/Immunologic: Negative for environmental allergies.       Allergies   Allergen Reactions   • Codeine Itching and GI Intolerance   • Morphine And Related          Current Outpatient Prescriptions:   •  bumetanide (BUMEX) 0.5 MG tablet, Take 0.5 mg by mouth Daily., Disp: , Rfl:  "  •  aspirin 81 MG EC tablet, Take 81 mg by mouth Daily. HOLD PER MD CALLOWAY, Disp: , Rfl:   •  atenolol (TENORMIN) 25 MG tablet, Take 0.5 tablets by mouth Daily., Disp: 45 tablet, Rfl: 3  •  glucose blood test strip, Patient uses TRUE METRIX TEST STRIPS 2 TIMES DAILY., Disp: 200 each, Rfl: 11  •  memantine (NAMENDA XR) 28 MG capsule sustained-release 24 hr extended release capsule, Take 1 capsule by mouth Daily., Disp: 90 capsule, Rfl: 3  •  metFORMIN (GLUCOPHAGE) 500 MG tablet, Take 1 tablet by mouth 2 (Two) Times a Day With Meals., Disp: 180 tablet, Rfl: 3  •  Multiple Vitamins-Minerals (MULTIVITAMIN ADULT PO), Take  by mouth., Disp: , Rfl:   •  omeprazole OTC (PRILOSEC OTC) 20 MG EC tablet, Take 1 tablet by mouth Daily., Disp: 90 tablet, Rfl: 3      Objective:     Vitals:    05/16/18 1314   BP: 162/80   BP Location: Left arm   Pulse: 62   Weight: 71.2 kg (157 lb)   Height: 154.9 cm (61\")     Body mass index is 29.66 kg/m².    PHYSICAL EXAM:    Vitals Reviewed.   General Appearance: No acute distress, well developed and well nourished. Ambulates with cane.  Eyes: Conjunctiva and lids: No erythema, swelling, or discharge. Sclera non-icteric. Wears glasses.  HENT: Atraumatic, normocephalic. External eyes, ears, and nose normal. No hearing loss noted. Mucous membranes normal. Lips not cyanotic. Neck supple with no tenderness.  Respiratory: No signs of respiratory distress. Respiration rhythm and depth normal.   Clear to auscultation. No rales, crackles, rhonchi, or wheezing auscultated.   Cardiovascular:  Jugular Venous Pressure: Normal  Heart Rate and Rhythm: Normal, Heart Sounds: Normal S1 and S2. No S3 or S4 noted.  Murmurs: Apical grade 1/6 diastolic heart murmurs noted. No rubs, thrills, or gallops.   Arterial Pulses: Carotid pulses normal. No carotid bruit noted. Posterior tibialis and dorsalis pedis pulses normal.   Lower Extremities: No edema noted.Wearing elastic stockings.  Gastrointestinal:  Abdomen soft, " non-distended, non-tender. Normal bowel sounds. No hepatomegaly.   Musculoskeletal: Normal movement of extremities  Skin and Nails: General appearance normal. No pallor, cyanosis, diaphoresis. Skin temperature normal. No clubbing of fingernails.  Bilateral knee incision hours.  Psychiatric: Patient alert and oriented to person, place, and time. Speech and behavior appropriate. Normal mood and affect.       ECG 12 Lead  Date/Time: 5/16/2018 1:16 PM  Performed by: DEMETRIO STEWART  Authorized by: DEMETRIO STEWART   Comparison: compared with previous ECG from 10/19/2017  Comparison to previous ECG: Normal sinus rhythm  Rhythm: sinus rhythm  Ectopy: PVCs  Rate: normal  BPM: 62  Conduction: conduction normal  ST Segments: ST segments normal  T Waves: T waves normal  Clinical impression: abnormal ECG              Assessment:       Diagnosis Plan   1. S/P TAVR (transcatheter aortic valve replacement)     2. Systolic hypertension     3. Rheumatic mitral stenosis     4. PVC (premature ventricular contraction)     5. Diastolic dysfunction            Plan:       1. S/p TAVR: Doing well.  Last echocardiogram March 2017 showed stable functioning aortic valve. Her shortness of breath has improved with Bumex and recent BMP stable.    2.  Hypertension: Blood pressure is elevated today.  She's had some fluctuations looking at her vital signs heart.  Neck and a make any changes today and they will continue to monitor and call with any concerns.    3.  Mitral Valve Stenosis: Noted to be moderate per echocardiogram in March 2017.  Her shortness of breath has improved and she denies any other symptoms of worsening valvular function.  I discussed with Dr. Mcmahan and we'll hold off on repeat an echocardiogram at this time.    4.  PVCs: Noted on EKG and she is asymptomatic.    5.  Diastolic dysfunction: Stable and denies symptoms.    6.  I have recommended follow-up with Dr. Elissa Mcmahan in 6 months, unless otherwise needed  sooner.    As always, it has been a pleasure to participate in your patient's care.      Sincerely,         RAYRAY Ramos

## 2018-05-16 NOTE — PROGRESS NOTES
Subjective   Mary Shi is a 89 y.o. female.     History of Present Illness he is here today for follow-up of a confusional episode as well as diabetes mellitus, hypertension, and diastolic dysfunction.  After her last visit her confusion cleared quickly.  She is back to her normal self.  She is happy active and she has a good appetite.  She denies any PND, FLORES, chest pain, swelling in the ankles, dizziness, or focal neurologic symptoms.  Her only real complaint is pain over the instep area and both feet.  She has been using topical lidocaine with occasional Aleve with fair relief.  There is no numbness tingling or burning sensation in the feet or toes.        Review of Systems   Constitutional: Positive for activity change and appetite change. Negative for fatigue and unexpected weight gain.   Respiratory: Negative for cough, chest tightness, shortness of breath and wheezing.    Cardiovascular: Negative for chest pain, palpitations and leg swelling.   Gastrointestinal: Negative for abdominal pain, diarrhea, nausea and vomiting.   Genitourinary: Negative for flank pain and hematuria.   Musculoskeletal: Positive for arthralgias.   Neurological: Negative for dizziness, syncope, facial asymmetry, speech difficulty, weakness, numbness and headache.   Psychiatric/Behavioral: Negative for depressed mood. The patient is not nervous/anxious.        Objective   Physical Exam   Constitutional: She is oriented to person, place, and time. Vital signs are normal. She appears well-developed and well-nourished. She is active. She does not appear ill.   Eyes: Conjunctivae are normal.   Neck: Carotid bruit is not present.   Cardiovascular: Normal rate, regular rhythm, S1 normal and S2 normal.  Exam reveals no S3 and no S4.    No murmur heard.  Pulses:       Dorsalis pedis pulses are 2+ on the right side, and 2+ on the left side.   Pulmonary/Chest: No tachypnea. No respiratory distress. She has no decreased breath sounds.  She has no wheezes. She has no rhonchi. She has no rales.        Neurological: She is alert and oriented to person, place, and time. Gait normal.   Psychiatric: She has a normal mood and affect. Her speech is normal and behavior is normal. Judgment and thought content normal. Cognition and memory are impaired.         Assessment/Plan assessment #1 hypertension-good control #2 diabetes mellitus-I want to make certain that she is not overcontrolled and this was discussed with the patient and her daughter #3 diastolic dysfunction-nicely compensated #4 osteoarthritis-symptomatic both feet    Plan #1 she may continue to use topical lidocaine #2 switch from Aleve or Advil or Tylenol when necessary #3 hemoglobin A1c today.  Routine follow-up in 6 months.

## 2018-06-23 ENCOUNTER — APPOINTMENT (OUTPATIENT)
Dept: GENERAL RADIOLOGY | Facility: HOSPITAL | Age: 83
End: 2018-06-23

## 2018-06-23 ENCOUNTER — HOSPITAL ENCOUNTER (OUTPATIENT)
Facility: HOSPITAL | Age: 83
Setting detail: OBSERVATION
LOS: 1 days | Discharge: HOME | End: 2018-06-24
Attending: EMERGENCY MEDICINE | Admitting: HOSPITALIST

## 2018-06-23 DIAGNOSIS — J90 PLEURAL EFFUSION: ICD-10-CM

## 2018-06-23 DIAGNOSIS — I50.33 ACUTE ON CHRONIC DIASTOLIC CONGESTIVE HEART FAILURE (HCC): Primary | ICD-10-CM

## 2018-06-23 PROBLEM — R09.02 HYPOXEMIA: Status: ACTIVE | Noted: 2018-06-23

## 2018-06-23 LAB
ALBUMIN SERPL-MCNC: 4 G/DL (ref 3.5–5.2)
ALBUMIN/GLOB SERPL: 1.3 G/DL
ALP SERPL-CCNC: 126 U/L (ref 39–117)
ALT SERPL W P-5'-P-CCNC: 21 U/L (ref 1–33)
ANION GAP SERPL CALCULATED.3IONS-SCNC: 15.2 MMOL/L
AST SERPL-CCNC: 27 U/L (ref 1–32)
BASOPHILS # BLD AUTO: 0.03 10*3/MM3 (ref 0–0.2)
BASOPHILS NFR BLD AUTO: 0.4 % (ref 0–1.5)
BILIRUB SERPL-MCNC: 0.3 MG/DL (ref 0.1–1.2)
BUN BLD-MCNC: 19 MG/DL (ref 8–23)
BUN/CREAT SERPL: 26.8 (ref 7–25)
CALCIUM SPEC-SCNC: 9.6 MG/DL (ref 8.2–9.6)
CHLORIDE SERPL-SCNC: 101 MMOL/L (ref 98–107)
CO2 SERPL-SCNC: 23.8 MMOL/L (ref 22–29)
CREAT BLD-MCNC: 0.71 MG/DL (ref 0.57–1)
DEPRECATED RDW RBC AUTO: 44.9 FL (ref 37–54)
EOSINOPHIL # BLD AUTO: 0.01 10*3/MM3 (ref 0–0.7)
EOSINOPHIL NFR BLD AUTO: 0.1 % (ref 0.3–6.2)
ERYTHROCYTE [DISTWIDTH] IN BLOOD BY AUTOMATED COUNT: 14.1 % (ref 11.7–13)
GFR SERPL CREATININE-BSD FRML MDRD: 77 ML/MIN/1.73
GLOBULIN UR ELPH-MCNC: 3.1 GM/DL
GLUCOSE BLD-MCNC: 201 MG/DL (ref 65–99)
GLUCOSE BLDC GLUCOMTR-MCNC: 171 MG/DL (ref 70–130)
HCT VFR BLD AUTO: 36.8 % (ref 35.6–45.5)
HGB BLD-MCNC: 12 G/DL (ref 11.9–15.5)
IMM GRANULOCYTES # BLD: 0.02 10*3/MM3 (ref 0–0.03)
IMM GRANULOCYTES NFR BLD: 0.3 % (ref 0–0.5)
LYMPHOCYTES # BLD AUTO: 1.6 10*3/MM3 (ref 0.9–4.8)
LYMPHOCYTES NFR BLD AUTO: 22.3 % (ref 19.6–45.3)
MCH RBC QN AUTO: 28.6 PG (ref 26.9–32)
MCHC RBC AUTO-ENTMCNC: 32.6 G/DL (ref 32.4–36.3)
MCV RBC AUTO: 87.8 FL (ref 80.5–98.2)
MONOCYTES # BLD AUTO: 0.52 10*3/MM3 (ref 0.2–1.2)
MONOCYTES NFR BLD AUTO: 7.2 % (ref 5–12)
NEUTROPHILS # BLD AUTO: 5 10*3/MM3 (ref 1.9–8.1)
NEUTROPHILS NFR BLD AUTO: 69.7 % (ref 42.7–76)
NRBC BLD MANUAL-RTO: 0 /100 WBC (ref 0–0)
NT-PROBNP SERPL-MCNC: 1934 PG/ML (ref 0–1800)
PLATELET # BLD AUTO: 198 10*3/MM3 (ref 140–500)
PMV BLD AUTO: 11.1 FL (ref 6–12)
POTASSIUM BLD-SCNC: 4.8 MMOL/L (ref 3.5–5.2)
PROT SERPL-MCNC: 7.1 G/DL (ref 6–8.5)
RBC # BLD AUTO: 4.19 10*6/MM3 (ref 3.9–5.2)
SODIUM BLD-SCNC: 140 MMOL/L (ref 136–145)
TROPONIN T SERPL-MCNC: <0.01 NG/ML (ref 0–0.03)
WBC NRBC COR # BLD: 7.18 10*3/MM3 (ref 4.5–10.7)

## 2018-06-23 PROCEDURE — 82962 GLUCOSE BLOOD TEST: CPT

## 2018-06-23 PROCEDURE — 96374 THER/PROPH/DIAG INJ IV PUSH: CPT

## 2018-06-23 PROCEDURE — 93010 ELECTROCARDIOGRAM REPORT: CPT | Performed by: INTERNAL MEDICINE

## 2018-06-23 PROCEDURE — 80053 COMPREHEN METABOLIC PANEL: CPT | Performed by: EMERGENCY MEDICINE

## 2018-06-23 PROCEDURE — 93005 ELECTROCARDIOGRAM TRACING: CPT

## 2018-06-23 PROCEDURE — 63710000001 INSULIN ASPART PER 5 UNITS: Performed by: HOSPITALIST

## 2018-06-23 PROCEDURE — 99219 PR INITIAL OBSERVATION CARE/DAY 50 MINUTES: CPT | Performed by: INTERNAL MEDICINE

## 2018-06-23 PROCEDURE — 71046 X-RAY EXAM CHEST 2 VIEWS: CPT

## 2018-06-23 PROCEDURE — 83880 ASSAY OF NATRIURETIC PEPTIDE: CPT | Performed by: EMERGENCY MEDICINE

## 2018-06-23 PROCEDURE — 84484 ASSAY OF TROPONIN QUANT: CPT | Performed by: EMERGENCY MEDICINE

## 2018-06-23 PROCEDURE — 85025 COMPLETE CBC W/AUTO DIFF WBC: CPT | Performed by: EMERGENCY MEDICINE

## 2018-06-23 PROCEDURE — 99285 EMERGENCY DEPT VISIT HI MDM: CPT

## 2018-06-23 RX ORDER — SODIUM CHLORIDE 0.9 % (FLUSH) 0.9 %
10 SYRINGE (ML) INJECTION AS NEEDED
Status: DISCONTINUED | OUTPATIENT
Start: 2018-06-23 | End: 2018-06-24 | Stop reason: HOSPADM

## 2018-06-23 RX ORDER — SODIUM CHLORIDE 0.9 % (FLUSH) 0.9 %
1-10 SYRINGE (ML) INJECTION AS NEEDED
Status: DISCONTINUED | OUTPATIENT
Start: 2018-06-23 | End: 2018-06-24 | Stop reason: HOSPADM

## 2018-06-23 RX ORDER — DEXTROSE MONOHYDRATE 25 G/50ML
25 INJECTION, SOLUTION INTRAVENOUS
Status: DISCONTINUED | OUTPATIENT
Start: 2018-06-23 | End: 2018-06-24 | Stop reason: HOSPADM

## 2018-06-23 RX ORDER — ONDANSETRON 4 MG/1
4 TABLET, ORALLY DISINTEGRATING ORAL EVERY 6 HOURS PRN
Status: DISCONTINUED | OUTPATIENT
Start: 2018-06-23 | End: 2018-06-24 | Stop reason: HOSPADM

## 2018-06-23 RX ORDER — BUMETANIDE 0.25 MG/ML
1 INJECTION INTRAMUSCULAR; INTRAVENOUS EVERY 12 HOURS
Status: DISCONTINUED | OUTPATIENT
Start: 2018-06-23 | End: 2018-06-24

## 2018-06-23 RX ORDER — NICOTINE POLACRILEX 4 MG
15 LOZENGE BUCCAL
Status: DISCONTINUED | OUTPATIENT
Start: 2018-06-23 | End: 2018-06-24 | Stop reason: HOSPADM

## 2018-06-23 RX ORDER — ACETAMINOPHEN 325 MG/1
650 TABLET ORAL EVERY 4 HOURS PRN
Status: DISCONTINUED | OUTPATIENT
Start: 2018-06-23 | End: 2018-06-24 | Stop reason: HOSPADM

## 2018-06-23 RX ORDER — ONDANSETRON 2 MG/ML
4 INJECTION INTRAMUSCULAR; INTRAVENOUS EVERY 6 HOURS PRN
Status: DISCONTINUED | OUTPATIENT
Start: 2018-06-23 | End: 2018-06-24 | Stop reason: HOSPADM

## 2018-06-23 RX ORDER — BUMETANIDE 0.25 MG/ML
1 INJECTION INTRAMUSCULAR; INTRAVENOUS ONCE
Status: COMPLETED | OUTPATIENT
Start: 2018-06-23 | End: 2018-06-23

## 2018-06-23 RX ORDER — ONDANSETRON 4 MG/1
4 TABLET, FILM COATED ORAL EVERY 6 HOURS PRN
Status: DISCONTINUED | OUTPATIENT
Start: 2018-06-23 | End: 2018-06-24 | Stop reason: HOSPADM

## 2018-06-23 RX ORDER — ATENOLOL 25 MG/1
12.5 TABLET ORAL DAILY
Status: DISCONTINUED | OUTPATIENT
Start: 2018-06-23 | End: 2018-06-24 | Stop reason: HOSPADM

## 2018-06-23 RX ORDER — NITROGLYCERIN 0.4 MG/1
0.4 TABLET SUBLINGUAL
Status: DISCONTINUED | OUTPATIENT
Start: 2018-06-23 | End: 2018-06-24 | Stop reason: HOSPADM

## 2018-06-23 RX ORDER — ASPIRIN 81 MG/1
81 TABLET ORAL DAILY
Status: DISCONTINUED | OUTPATIENT
Start: 2018-06-23 | End: 2018-06-24 | Stop reason: HOSPADM

## 2018-06-23 RX ADMIN — BUMETANIDE 1 MG: 0.25 INJECTION, SOLUTION INTRAMUSCULAR; INTRAVENOUS at 10:29

## 2018-06-23 RX ADMIN — INSULIN ASPART 2 UNITS: 100 INJECTION, SOLUTION INTRAVENOUS; SUBCUTANEOUS at 18:49

## 2018-06-24 ENCOUNTER — APPOINTMENT (OUTPATIENT)
Dept: CARDIOLOGY | Facility: HOSPITAL | Age: 83
End: 2018-06-24
Attending: HOSPITALIST

## 2018-06-24 VITALS
HEART RATE: 86 BPM | OXYGEN SATURATION: 94 % | BODY MASS INDEX: 26.46 KG/M2 | SYSTOLIC BLOOD PRESSURE: 123 MMHG | RESPIRATION RATE: 18 BRPM | WEIGHT: 155 LBS | DIASTOLIC BLOOD PRESSURE: 51 MMHG | TEMPERATURE: 97.9 F | HEIGHT: 64 IN

## 2018-06-24 LAB
ALBUMIN SERPL-MCNC: 3.8 G/DL (ref 3.5–5.2)
ANION GAP SERPL CALCULATED.3IONS-SCNC: 14.3 MMOL/L
ASCENDING AORTA: 3.2 CM
BH CV ECHO MEAS - ACS: 1.4 CM
BH CV ECHO MEAS - AI DEC SLOPE: 230 CM/SEC^2
BH CV ECHO MEAS - AI MAX PG: 40.4 MMHG
BH CV ECHO MEAS - AI MAX VEL: 318 CM/SEC
BH CV ECHO MEAS - AI P1/2T: 405 MSEC
BH CV ECHO MEAS - AO MAX PG (FULL): 7.7 MMHG
BH CV ECHO MEAS - AO MAX PG: 18.8 MMHG
BH CV ECHO MEAS - AO MEAN PG (FULL): 1 MMHG
BH CV ECHO MEAS - AO MEAN PG: 7 MMHG
BH CV ECHO MEAS - AO ROOT AREA (BSA CORRECTED): 1.7
BH CV ECHO MEAS - AO ROOT AREA: 6.6 CM^2
BH CV ECHO MEAS - AO ROOT DIAM: 2.9 CM
BH CV ECHO MEAS - AO V2 MAX: 217 CM/SEC
BH CV ECHO MEAS - AO V2 MEAN: 118 CM/SEC
BH CV ECHO MEAS - AO V2 VTI: 42.4 CM
BH CV ECHO MEAS - ASC AORTA: 3.2 CM
BH CV ECHO MEAS - AVA(I,A): 1.7 CM^2
BH CV ECHO MEAS - AVA(I,D): 1.7 CM^2
BH CV ECHO MEAS - AVA(V,A): 2 CM^2
BH CV ECHO MEAS - AVA(V,D): 2 CM^2
BH CV ECHO MEAS - BSA(HAYCOCK): 1.8 M^2
BH CV ECHO MEAS - BSA: 1.8 M^2
BH CV ECHO MEAS - BZI_BMI: 26.6 KILOGRAMS/M^2
BH CV ECHO MEAS - BZI_METRIC_HEIGHT: 162.6 CM
BH CV ECHO MEAS - BZI_METRIC_WEIGHT: 70.3 KG
BH CV ECHO MEAS - CONTRAST EF (2CH): 63 ML/M^2
BH CV ECHO MEAS - CONTRAST EF 4CH: 68.7 ML/M^2
BH CV ECHO MEAS - EDV(CUBED): 79.5 ML
BH CV ECHO MEAS - EDV(MOD-SP2): 81 ML
BH CV ECHO MEAS - EDV(MOD-SP4): 83 ML
BH CV ECHO MEAS - EDV(TEICH): 83.1 ML
BH CV ECHO MEAS - EF(CUBED): 92.7 %
BH CV ECHO MEAS - EF(MOD-BP): 68 %
BH CV ECHO MEAS - EF(MOD-SP2): 63 %
BH CV ECHO MEAS - EF(MOD-SP4): 68.7 %
BH CV ECHO MEAS - EF(TEICH): 88.3 %
BH CV ECHO MEAS - ESV(CUBED): 5.8 ML
BH CV ECHO MEAS - ESV(MOD-SP2): 30 ML
BH CV ECHO MEAS - ESV(MOD-SP4): 26 ML
BH CV ECHO MEAS - ESV(TEICH): 9.7 ML
BH CV ECHO MEAS - FS: 58.1 %
BH CV ECHO MEAS - IVS/LVPW: 1
BH CV ECHO MEAS - IVSD: 1.1 CM
BH CV ECHO MEAS - LAT PEAK E' VEL: 4 CM/SEC
BH CV ECHO MEAS - LV DIASTOLIC VOL/BSA (35-75): 47.3 ML/M^2
BH CV ECHO MEAS - LV MASS(C)D: 162.9 GRAMS
BH CV ECHO MEAS - LV MASS(C)DI: 92.8 GRAMS/M^2
BH CV ECHO MEAS - LV MAX PG: 11.2 MMHG
BH CV ECHO MEAS - LV MEAN PG: 6 MMHG
BH CV ECHO MEAS - LV SYSTOLIC VOL/BSA (12-30): 14.8 ML/M^2
BH CV ECHO MEAS - LV V1 MAX: 167 CM/SEC
BH CV ECHO MEAS - LV V1 MEAN: 109 CM/SEC
BH CV ECHO MEAS - LV V1 VTI: 28.1 CM
BH CV ECHO MEAS - LVIDD: 4.3 CM
BH CV ECHO MEAS - LVIDS: 1.8 CM
BH CV ECHO MEAS - LVLD AP2: 6.6 CM
BH CV ECHO MEAS - LVLD AP4: 6.8 CM
BH CV ECHO MEAS - LVLS AP2: 5.6 CM
BH CV ECHO MEAS - LVLS AP4: 5.9 CM
BH CV ECHO MEAS - LVOT AREA (M): 2.5 CM^2
BH CV ECHO MEAS - LVOT AREA: 2.5 CM^2
BH CV ECHO MEAS - LVOT DIAM: 1.8 CM
BH CV ECHO MEAS - LVPWD: 1.1 CM
BH CV ECHO MEAS - MED PEAK E' VEL: 3 CM/SEC
BH CV ECHO MEAS - MR MAX PG: 100.8 MMHG
BH CV ECHO MEAS - MR MAX VEL: 502 CM/SEC
BH CV ECHO MEAS - MV A DUR: 0.13 SEC
BH CV ECHO MEAS - MV A MAX VEL: 205 CM/SEC
BH CV ECHO MEAS - MV DEC SLOPE: 854 CM/SEC^2
BH CV ECHO MEAS - MV DEC TIME: 0.31 SEC
BH CV ECHO MEAS - MV E MAX VEL: 169 CM/SEC
BH CV ECHO MEAS - MV E/A: 0.82
BH CV ECHO MEAS - MV MAX PG: 19 MMHG
BH CV ECHO MEAS - MV MEAN PG: 10 MMHG
BH CV ECHO MEAS - MV P1/2T MAX VEL: 221 CM/SEC
BH CV ECHO MEAS - MV P1/2T: 75.8 MSEC
BH CV ECHO MEAS - MV V2 MAX: 218 CM/SEC
BH CV ECHO MEAS - MV V2 MEAN: 147 CM/SEC
BH CV ECHO MEAS - MV V2 VTI: 63.8 CM
BH CV ECHO MEAS - MVA P1/2T LCG: 1 CM^2
BH CV ECHO MEAS - MVA(P1/2T): 2.9 CM^2
BH CV ECHO MEAS - MVA(VTI): 1.1 CM^2
BH CV ECHO MEAS - PA ACC TIME: 0.13 SEC
BH CV ECHO MEAS - PA MAX PG (FULL): 1.8 MMHG
BH CV ECHO MEAS - PA MAX PG: 4.4 MMHG
BH CV ECHO MEAS - PA PR(ACCEL): 18.7 MMHG
BH CV ECHO MEAS - PA V2 MAX: 105 CM/SEC
BH CV ECHO MEAS - PULM A REVS DUR: 0.13 SEC
BH CV ECHO MEAS - PULM A REVS VEL: 29.6 CM/SEC
BH CV ECHO MEAS - PULM DIAS VEL: 31.5 CM/SEC
BH CV ECHO MEAS - PULM S/D: 1.2
BH CV ECHO MEAS - PULM SYS VEL: 36.9 CM/SEC
BH CV ECHO MEAS - PVA(V,A): 2.4 CM^2
BH CV ECHO MEAS - PVA(V,D): 2.4 CM^2
BH CV ECHO MEAS - QP/QS: 0.72
BH CV ECHO MEAS - RAP SYSTOLE: 3 MMHG
BH CV ECHO MEAS - RV MAX PG: 2.6 MMHG
BH CV ECHO MEAS - RV MEAN PG: 1 MMHG
BH CV ECHO MEAS - RV V1 MAX: 80.4 CM/SEC
BH CV ECHO MEAS - RV V1 MEAN: 51.3 CM/SEC
BH CV ECHO MEAS - RV V1 VTI: 16.4 CM
BH CV ECHO MEAS - RVOT AREA: 3.1 CM^2
BH CV ECHO MEAS - RVOT DIAM: 2 CM
BH CV ECHO MEAS - RVSP: 56.9 MMHG
BH CV ECHO MEAS - SI(AO): 159.6 ML/M^2
BH CV ECHO MEAS - SI(CUBED): 42 ML/M^2
BH CV ECHO MEAS - SI(LVOT): 40.7 ML/M^2
BH CV ECHO MEAS - SI(MOD-SP2): 29.1 ML/M^2
BH CV ECHO MEAS - SI(MOD-SP4): 32.5 ML/M^2
BH CV ECHO MEAS - SI(TEICH): 41.8 ML/M^2
BH CV ECHO MEAS - SV(AO): 280.1 ML
BH CV ECHO MEAS - SV(CUBED): 73.7 ML
BH CV ECHO MEAS - SV(LVOT): 71.5 ML
BH CV ECHO MEAS - SV(MOD-SP2): 51 ML
BH CV ECHO MEAS - SV(MOD-SP4): 57 ML
BH CV ECHO MEAS - SV(RVOT): 51.5 ML
BH CV ECHO MEAS - SV(TEICH): 73.3 ML
BH CV ECHO MEAS - TAPSE (>1.6): 2.8 CM2
BH CV ECHO MEAS - TR MAX VEL: 367 CM/SEC
BH CV ECHO MEASUREMENTS AVERAGE E/E' RATIO: 48.29
BH CV VAS BP RIGHT ARM: NORMAL MMHG
BH CV XLRA - RV BASE: 3 CM
BH CV XLRA - TDI S': 11 CM/SEC
BUN BLD-MCNC: 17 MG/DL (ref 8–23)
BUN/CREAT SERPL: 27.4 (ref 7–25)
CALCIUM SPEC-SCNC: 9.2 MG/DL (ref 8.2–9.6)
CHLORIDE SERPL-SCNC: 100 MMOL/L (ref 98–107)
CHOLEST SERPL-MCNC: 167 MG/DL (ref 0–200)
CO2 SERPL-SCNC: 28.7 MMOL/L (ref 22–29)
CREAT BLD-MCNC: 0.62 MG/DL (ref 0.57–1)
GFR SERPL CREATININE-BSD FRML MDRD: 90 ML/MIN/1.73
GLUCOSE BLD-MCNC: 147 MG/DL (ref 65–99)
GLUCOSE BLDC GLUCOMTR-MCNC: 152 MG/DL (ref 70–130)
GLUCOSE BLDC GLUCOMTR-MCNC: 277 MG/DL (ref 70–130)
GLUCOSE BLDC GLUCOMTR-MCNC: 311 MG/DL (ref 70–130)
HDLC SERPL-MCNC: 33 MG/DL (ref 40–60)
LDLC SERPL CALC-MCNC: 71 MG/DL (ref 0–100)
LDLC/HDLC SERPL: 2.16 {RATIO}
LEFT ATRIUM VOLUME INDEX: 70 ML/M2
LV EF 2D ECHO EST: 68 %
MAGNESIUM SERPL-MCNC: 1.9 MG/DL (ref 1.7–2.3)
MAXIMAL PREDICTED HEART RATE: 127 BPM
PHOSPHATE SERPL-MCNC: 4.4 MG/DL (ref 2.5–4.5)
POTASSIUM BLD-SCNC: 3.5 MMOL/L (ref 3.5–5.2)
PROCALCITONIN SERPL-MCNC: <0.02 NG/ML (ref 0.1–0.25)
SODIUM BLD-SCNC: 143 MMOL/L (ref 136–145)
STRESS TARGET HR: 108 BPM
TRIGL SERPL-MCNC: 314 MG/DL (ref 0–150)
URATE SERPL-MCNC: 7.1 MG/DL (ref 2.4–5.7)
VLDLC SERPL-MCNC: 62.8 MG/DL (ref 5–40)

## 2018-06-24 PROCEDURE — 99226 PR SBSQ OBSERVATION CARE/DAY 35 MINUTES: CPT | Performed by: INTERNAL MEDICINE

## 2018-06-24 PROCEDURE — G0378 HOSPITAL OBSERVATION PER HR: HCPCS

## 2018-06-24 PROCEDURE — 93306 TTE W/DOPPLER COMPLETE: CPT

## 2018-06-24 PROCEDURE — 83735 ASSAY OF MAGNESIUM: CPT | Performed by: HOSPITALIST

## 2018-06-24 PROCEDURE — 80061 LIPID PANEL: CPT | Performed by: HOSPITALIST

## 2018-06-24 PROCEDURE — 82962 GLUCOSE BLOOD TEST: CPT

## 2018-06-24 PROCEDURE — 96376 TX/PRO/DX INJ SAME DRUG ADON: CPT

## 2018-06-24 PROCEDURE — 84145 PROCALCITONIN (PCT): CPT | Performed by: HOSPITALIST

## 2018-06-24 PROCEDURE — 84550 ASSAY OF BLOOD/URIC ACID: CPT | Performed by: HOSPITALIST

## 2018-06-24 PROCEDURE — 63710000001 INSULIN ASPART PER 5 UNITS: Performed by: HOSPITALIST

## 2018-06-24 PROCEDURE — 93306 TTE W/DOPPLER COMPLETE: CPT | Performed by: INTERNAL MEDICINE

## 2018-06-24 PROCEDURE — 80069 RENAL FUNCTION PANEL: CPT | Performed by: HOSPITALIST

## 2018-06-24 RX ORDER — BUMETANIDE 0.5 MG/1
0.5 TABLET ORAL DAILY
Status: DISCONTINUED | OUTPATIENT
Start: 2018-06-25 | End: 2018-06-24 | Stop reason: HOSPADM

## 2018-06-24 RX ADMIN — BUMETANIDE 1 MG: 0.25 INJECTION INTRAMUSCULAR; INTRAVENOUS at 09:14

## 2018-06-24 RX ADMIN — INSULIN ASPART 2 UNITS: 100 INJECTION, SOLUTION INTRAVENOUS; SUBCUTANEOUS at 00:56

## 2018-06-24 RX ADMIN — INSULIN ASPART 4 UNITS: 100 INJECTION, SOLUTION INTRAVENOUS; SUBCUTANEOUS at 12:11

## 2018-06-24 RX ADMIN — ASPIRIN 81 MG: 81 TABLET, DELAYED RELEASE ORAL at 09:15

## 2018-06-24 RX ADMIN — ATENOLOL 12.5 MG: 25 TABLET ORAL at 09:15

## 2018-06-24 RX ADMIN — BUMETANIDE 1 MG: 0.25 INJECTION INTRAMUSCULAR; INTRAVENOUS at 00:02

## 2018-06-25 ENCOUNTER — EPISODE CHANGES (OUTPATIENT)
Dept: CASE MANAGEMENT | Facility: OTHER | Age: 83
End: 2018-06-25

## 2018-07-02 ENCOUNTER — TELEPHONE (OUTPATIENT)
Dept: SOCIAL WORK | Facility: HOSPITAL | Age: 83
End: 2018-07-02

## 2018-07-02 NOTE — TELEPHONE ENCOUNTER
I called [facility] and left a msg for Dr. Mcmahan's  requesting they call the patient to set up an appt and to call me back to comfirm that has happened.

## 2018-07-18 ENCOUNTER — OFFICE VISIT (OUTPATIENT)
Dept: CARDIOLOGY | Facility: CLINIC | Age: 83
End: 2018-07-18

## 2018-07-18 VITALS
WEIGHT: 156.8 LBS | BODY MASS INDEX: 26.77 KG/M2 | SYSTOLIC BLOOD PRESSURE: 128 MMHG | HEIGHT: 64 IN | HEART RATE: 65 BPM | DIASTOLIC BLOOD PRESSURE: 62 MMHG

## 2018-07-18 DIAGNOSIS — Z95.2 S/P TAVR (TRANSCATHETER AORTIC VALVE REPLACEMENT): ICD-10-CM

## 2018-07-18 DIAGNOSIS — I50.33 ACUTE ON CHRONIC DIASTOLIC CONGESTIVE HEART FAILURE (HCC): Primary | ICD-10-CM

## 2018-07-18 DIAGNOSIS — I10 SYSTOLIC HYPERTENSION: ICD-10-CM

## 2018-07-18 DIAGNOSIS — I05.0 RHEUMATIC MITRAL STENOSIS: ICD-10-CM

## 2018-07-18 PROCEDURE — 93000 ELECTROCARDIOGRAM COMPLETE: CPT | Performed by: NURSE PRACTITIONER

## 2018-07-18 PROCEDURE — 99214 OFFICE O/P EST MOD 30 MIN: CPT | Performed by: NURSE PRACTITIONER

## 2018-07-18 RX ORDER — ATENOLOL 25 MG/1
25 TABLET ORAL DAILY
Qty: 90 TABLET | Refills: 1 | Status: SHIPPED | OUTPATIENT
Start: 2018-07-18 | End: 2019-01-25 | Stop reason: SDUPTHER

## 2018-07-18 NOTE — PROGRESS NOTES
Date of Office Visit: 2018  Encounter Provider: RAYRAY Carranza  Place of Service: [facility] CARDIOLOGY  Patient Name: Mary Shi  :3/3/1925    Chief Complaint   Patient presents with   • Follow-up   :     HPI: Mary hSi is a 89 y.o. female who presents today for hospital follow-up.  She is a new patient to me and her previous records have been reviewed.  She has a history of hypertension, hyperlipidemia, diastolic dysfunction, and type 2 diabetes mellitus.    She has a history of aortic stenosis and underwent TAVR with #23 S3 Rubina valve in 2016.  She is an established patient of Dr. Elissa Mcmahan. She followed up with Dr. Major Brown in 2017 and he thought she was doing very well with her aortic valve.  He reviewed the echocardiogram from 2017 which showed a trivial aortic leak and severe mitral annular calcification with moderate mitral stenosis.  He recommended that she follow-up with Dr. Mcmahan in the future and could see him on an as-needed basis. Her daughter contacted our office in 2017 stating that her mother was getting short of breath and fatigued.  She started Bumex 0.5 mg daily and CMP in April showed normal BUN, creatinine, and potassium.    I evaluated her in May 2018. She was doing well and her shortness of breath had improved.  She only experiences lower extremity edema if she doesn't wear her elastic stockings.     She was recently hospitalized in 2018 for shortness of breath felt to be secondary to acute on chronic diastolic heart failure.  She responded well to IV diuresis and was transitioned back to her oral bumetanide.  This exacerbation was felt to be secondary to eating a high sodium diet and going out [place] [address] with family visiting her.  Her A1c was 6.85.  Her blood pressure control improved with additional diuresis and if it remains elevated the consideration of ace versus R may  be recommended.  Her triglycerides were elevated at 314 and she was not recommended to start statin therapy due to her age.    She presents today for follow-up with her daughter accompanying her.  They been doing an excellent job of monitoring her home weights which is stable at 153 pounds.  Her blood pressures at been elevated at home in the 140s-160s over 50s to 60s with heart rates in the 70s and 80s.  The daughter does not believe that her home blood pressure machine's have been double checked for accuracy.  Overall the patient is feeling much better.  She is following a very low salt diet at home.  She denies chest pain, shortness of air, PND, orthopnea, cough, edema, palpitations, dizziness, or syncope.  Her blood pressure was checked 3 times today in the office and is normal.    The following portion of the patient's history were reviewed and updated as appropriate: past medical history, past surgical history, past social history, past family history, allergies, current medications, and problem list.    Past Medical History:   Diagnosis Date   • Amaurosis fugax    • Anemia    • Aortic stenosis     s/p TAVR    • Carotid artery stenosis    • Cognitive disorder    • Congestive heart failure (CMS/HCC)    • Dementia    • Diabetes mellitus (CMS/HCC)    • Diastolic dysfunction    • Dyspnea on exertion    • Generalized osteoarthritis of multiple sites    • GERD (gastroesophageal reflux disease)    • Health care maintenance    • Hiatal hernia    • Hyperlipidemia    • Hypertension    • Mitral valve stenosis     Moderate per echocardiogram 2/2017; severe mitral annular calcification   • Nasal lesion    • Nasal stenosis    • Osteoarthritis     multiple sites   • PONV (postoperative nausea and vomiting)    • Varicose veins        Past Surgical History:   Procedure Laterality Date   • AORTIC VALVE REPAIR/REPLACEMENT N/A 12/27/2016    Procedure: Transfemoral LEFT Transcatheter Aortic Valve Replacement TRANSESOPHAGEAL  ECHOCARDIOGRAM WITH ANESTHESIA;  Surgeon: Eduardo Brown MD;  Location: [facility] HYBRID OR 18/19;  Service:    • BLADDER REPAIR     • CARDIAC CATHETERIZATION N/A 12/14/2016    Procedure: Right Heart Cath;  Surgeon: Eduardo Brown MD;  Location: [facility] CATH INVASIVE LOCATION;  Service:    • CARDIAC CATHETERIZATION N/A 12/14/2016    Procedure: Coronary angiography;  Surgeon: Eduardo Brown MD;  Location: [facility] CATH INVASIVE LOCATION;  Service:    • HYSTERECTOMY     • KNEE SURGERY         Social History     Social History   • Marital status:      Spouse name: N/A   • Number of children: N/A   • Years of education: N/A     Occupational History   • Not on file.     Social History Main Topics   • Smoking status: Never Smoker   • Smokeless tobacco: Never Used      Comment: caffeine use   • Alcohol use No      Comment: rare -once every 2-3 months   • Drug use: No   • Sexual activity: Defer       Family History   Problem Relation Age of Onset   • Heart disease Mother    • Coronary artery disease Mother    • Hyperlipidemia Mother    • Hypertension Mother    • Cancer Sister    • Cancer Brother    • Diabetes Daughter    • Diabetes Daughter    • Cancer Sister    • Cancer Sister    • Cancer Sister    • Cancer Other        Review of Systems   Constitution: Positive for malaise/fatigue. Negative for chills, diaphoresis, fever, night sweats, weight gain and weight loss.   HENT: Positive for hearing loss. Negative for nosebleeds, sore throat and tinnitus.    Eyes: Negative for blurred vision, double vision, pain and visual disturbance.   Cardiovascular: Positive for dyspnea on exertion and leg swelling. Negative for chest pain, claudication, cyanosis, irregular heartbeat, near-syncope, orthopnea, palpitations, paroxysmal nocturnal dyspnea and syncope.   Respiratory: Negative for cough, hemoptysis, shortness of breath, snoring and wheezing.    Endocrine: Negative for cold intolerance, heat intolerance and polyuria.    Hematologic/Lymphatic: Negative for bleeding problem. Does not bruise/bleed easily.   Skin: Negative for color change, dry skin, flushing and itching.   Musculoskeletal: Negative for falls, joint pain, joint swelling, muscle cramps, muscle weakness and myalgias.   Gastrointestinal: Negative for abdominal pain, constipation, heartburn, melena, nausea and vomiting.   Genitourinary: Positive for frequency. Negative for dysuria and hematuria.   Neurological: Positive for excessive daytime sleepiness. Negative for dizziness, light-headedness, loss of balance, numbness, paresthesias, seizures and vertigo.   Psychiatric/Behavioral: Negative for altered mental status, depression, memory loss and substance abuse. The patient does not have insomnia and is not nervous/anxious.    Allergic/Immunologic: Negative for environmental allergies.       Allergies   Allergen Reactions   • Codeine Itching and GI Intolerance   • Morphine And Related Itching         Current Outpatient Prescriptions:   •  aspirin 81 MG EC tablet, Take 81 mg by mouth Daily. HOLD PER MD CALLOWAY, Disp: , Rfl:   •  atenolol (TENORMIN) 25 MG tablet, Take 1 tablet by mouth Daily., Disp: 90 tablet, Rfl: 1  •  bumetanide (BUMEX) 0.5 MG tablet, Take 0.5 mg by mouth Daily., Disp: , Rfl:   •  glucose blood test strip, Patient uses TRUE METRIX TEST STRIPS 2 TIMES DAILY., Disp: 200 each, Rfl: 11  •  memantine (NAMENDA XR) 28 MG capsule sustained-release 24 hr extended release capsule, Take 1 capsule by mouth Daily., Disp: 90 capsule, Rfl: 3  •  metFORMIN (GLUCOPHAGE) 500 MG tablet, Take one half tablet by mouth twice a day., Disp: 180 tablet, Rfl: 3  •  Multiple Vitamins-Minerals (MULTIVITAMIN ADULT PO), Take  by mouth., Disp: , Rfl:   •  omeprazole OTC (PRILOSEC OTC) 20 MG EC tablet, Take 1 tablet by mouth Daily., Disp: 90 tablet, Rfl: 3      Objective:     Vitals:    07/18/18 0146 07/18/18 1301 07/18/18 1345   BP: 120/60 120/60 128/62   BP Location: Left arm  Right  "arm   Patient Position: Sitting  Sitting   Pulse:  65    Weight:  71.1 kg (156 lb 12.8 oz)    Height:  162.6 cm (64\")      Body mass index is 26.91 kg/m².    PHYSICAL EXAM:    Vitals Reviewed.   General Appearance: No acute distress, well developed and well nourished. Ambulates with cane.  Eyes: Conjunctiva and lids: No erythema, swelling, or discharge. Sclera non-icteric. Wears glasses.  HENT: Atraumatic, normocephalic. External eyes, ears, and nose normal. No hearing loss noted. Mucous membranes normal. Lips not cyanotic. Neck supple with no tenderness.  Respiratory: No signs of respiratory distress. Respiration rhythm and depth normal.   Clear to auscultation. No rales, crackles, rhonchi, or wheezing auscultated.   Cardiovascular:  Jugular Venous Pressure: Normal  Heart Rate and Rhythm: Normal, Heart Sounds: Normal S1 and S2. No S3 or S4 noted.  Murmurs: Apical grade 1/6 diastolic heart murmurs noted. No rubs, thrills, or gallops.   Arterial Pulses: Carotid pulses normal. No carotid bruit noted. Posterior tibialis and dorsalis pedis pulses normal.   Lower Extremities: No edema noted. Wearing elastic stockings.  Gastrointestinal:  Abdomen soft, non-distended, non-tender. Normal bowel sounds. No hepatomegaly.   Musculoskeletal: Normal movement of extremities  Skin and Nails: General appearance normal. No pallor, cyanosis, diaphoresis. Skin temperature normal. No clubbing of fingernails.  Bilateral knee incision hours.  Psychiatric: Patient alert and oriented to person, place, and time. Speech and behavior appropriate. Normal mood and affect.       ECG 12 Lead  Date/Time: 7/18/2018 12:58 PM  Performed by: DEMETRIO STEWART  Authorized by: DEMETRIO STEWART   Comparison: compared with previous ECG from 5/16/2018  Similar to previous ECG  Rhythm: sinus rhythm  Ectopy: PVCs  Rate: normal  BPM: 65  Conduction: conduction normal  ST Segments: ST segments normal  T Waves: T waves normal  QRS axis: normal  Q waves: III and " aVF  Clinical impression: abnormal ECG              Assessment:       Diagnosis Plan   1. Acute on chronic diastolic congestive heart failure (CMS/HCC)     2. Systolic hypertension     3. S/P TAVR (transcatheter aortic valve replacement)     4. Rheumatic mitral stenosis            Plan:       1. Diastolic Heart Failure: Recently hospitalized for diastolic heart failure exacerbation secondary to high sodium foods and eating out frequently with family visiting in town.  She is now following a low sodium diet and has resumed her oral bumetanide.  Her weights of been stable at home and she denies any further shortness of breath or edema.    Heart Failure  Assessment  • NYHA class II - There is slight limitation of physical activity. The patient is comfortable at rest, but physical activity results in fatigue, palpitations or shortness of breath.  • Beta blocker prescribed  • Diuretics prescribed  • Left ventricular function is normal by qualitative assessment    Plan  • The patient has received heart failure education on the following topics: dietary sodium restriction, medication instructions, symptom management, physical activity and weight monitoring        2.  S/p TAVR: She has done well.  Last echocardiogram March 2017 showed stable functioning aortic valve.    3.  Hypertension: She has had trouble in the past with fluctuations in blood pressure readings.  Her blood pressure at home is averaging 140s to 160s and here is well-controlled.  Before adding ACE/ARB therapy I have recommended increasing her atenolol to 25 mg 1 tablet daily.  I'm actually seeing her daughter in the office next month and we will double check the machines at that time.    4.  Mitral Valve Stenosis: Noted to be moderate per echocardiogram in March 2017.  Her shortness of breath has improved and she denies any other symptoms of worsening valvular function.  Dr. Montgomery did not feel that we needed to repeat an echocardiogram on the last  hospitalization.    5.  I have recommended follow-up with Dr. Elissa Mcmahan in 6 months, unless otherwise needed sooner.    As always, it has been a pleasure to participate in your patient's care.      Sincerely,         RAYRAY Ramos

## 2018-07-20 ENCOUNTER — EPISODE CHANGES (OUTPATIENT)
Dept: CASE MANAGEMENT | Facility: OTHER | Age: 83
End: 2018-07-20

## 2018-08-08 ENCOUNTER — TELEPHONE (OUTPATIENT)
Dept: CARDIOLOGY | Facility: CLINIC | Age: 83
End: 2018-08-08

## 2018-08-08 NOTE — TELEPHONE ENCOUNTER
----- Message from RAYRAY Ibrahim sent at 7/18/2018  1:40 PM EDT -----    Call to reassess BP/HR check at home on increased atenolol 25 mg daily

## 2018-08-08 NOTE — TELEPHONE ENCOUNTER
I called and s/w Liat(daughter-JOSE ALFREDO).  She states she is at work today and will have to get the readings and call me back tomorrow./justyn

## 2018-08-09 NOTE — TELEPHONE ENCOUNTER
Pts daughter called back with bp readings:    8/1/18: 129/55  8/2/18: 131/61  8/3/18: 165/72  8/4/18: 140/52  8/5/18: 145/64  8/6/18: 129/57 P: 79  8/7/18: 164/65 P: 72  8/8/18: 130/51    Average heart rate has been 66-74

## 2018-10-05 ENCOUNTER — OFFICE VISIT (OUTPATIENT)
Dept: FAMILY MEDICINE CLINIC | Facility: CLINIC | Age: 83
End: 2018-10-05

## 2018-10-05 VITALS
TEMPERATURE: 98.2 F | WEIGHT: 152 LBS | SYSTOLIC BLOOD PRESSURE: 120 MMHG | BODY MASS INDEX: 25.95 KG/M2 | HEIGHT: 64 IN | OXYGEN SATURATION: 98 % | DIASTOLIC BLOOD PRESSURE: 62 MMHG | RESPIRATION RATE: 18 BRPM | HEART RATE: 72 BPM

## 2018-10-05 DIAGNOSIS — I10 SYSTOLIC HYPERTENSION: Primary | ICD-10-CM

## 2018-10-05 DIAGNOSIS — Z23 NEED FOR VACCINATION: ICD-10-CM

## 2018-10-05 DIAGNOSIS — E11.59 TYPE 2 DIABETES MELLITUS WITH OTHER CIRCULATORY COMPLICATION, WITHOUT LONG-TERM CURRENT USE OF INSULIN (HCC): ICD-10-CM

## 2018-10-05 DIAGNOSIS — F09 COGNITIVE DISORDER: ICD-10-CM

## 2018-10-05 DIAGNOSIS — E78.49 OTHER HYPERLIPIDEMIA: ICD-10-CM

## 2018-10-05 PROCEDURE — G0008 ADMIN INFLUENZA VIRUS VAC: HCPCS | Performed by: INTERNAL MEDICINE

## 2018-10-05 PROCEDURE — 90662 IIV NO PRSV INCREASED AG IM: CPT | Performed by: INTERNAL MEDICINE

## 2018-10-05 PROCEDURE — 99214 OFFICE O/P EST MOD 30 MIN: CPT | Performed by: INTERNAL MEDICINE

## 2018-10-05 RX ORDER — MELOXICAM 15 MG/1
TABLET ORAL
COMMUNITY
Start: 2018-10-04 | End: 2018-10-05

## 2018-10-05 NOTE — PROGRESS NOTES
Subjective   Mary Shi is a 89 y.o. female. Patient is here today for   Chief Complaint   Patient presents with   • Diabetes          Vitals:    10/05/18 1328   BP: 120/62   Pulse: 72   Resp: 18   Temp: 98.2 °F (36.8 °C)   SpO2: 98%     The following portions of the patient's history were reviewed and updated as appropriate: allergies, current medications, past family history, past medical history, past social history, past surgical history and problem list.    Past Medical History:   Diagnosis Date   • Amaurosis fugax    • Anemia    • Aortic stenosis     s/p TAVR    • Carotid artery stenosis    • Cognitive disorder    • Congestive heart failure (CMS/HCC)    • Dementia    • Diabetes mellitus (CMS/HCC)    • Diastolic dysfunction    • Dyspnea on exertion    • Generalized osteoarthritis of multiple sites    • GERD (gastroesophageal reflux disease)    • Health care maintenance    • Hiatal hernia    • Hyperlipidemia    • Hypertension    • Mitral valve stenosis     Moderate per echocardiogram 2/2017; severe mitral annular calcification   • Nasal lesion    • Nasal stenosis    • Osteoarthritis     multiple sites   • PONV (postoperative nausea and vomiting)    • Varicose veins       Allergies   Allergen Reactions   • Codeine Itching and GI Intolerance   • Morphine And Related Itching      Social History     Social History   • Marital status:      Spouse name: N/A   • Number of children: N/A   • Years of education: N/A     Occupational History   • Not on file.     Social History Main Topics   • Smoking status: Never Smoker   • Smokeless tobacco: Never Used      Comment: caffeine use   • Alcohol use No      Comment: rare -once every 2-3 months   • Drug use: No   • Sexual activity: Defer     Other Topics Concern   • Not on file     Social History Narrative   • No narrative on file        Current Outpatient Prescriptions:   •  aspirin 81 MG EC tablet, Take 81 mg by mouth Daily. HOLD PER MD INSTR, Disp: , Rfl:    •  atenolol (TENORMIN) 25 MG tablet, Take 1 tablet by mouth Daily., Disp: 90 tablet, Rfl: 1  •  bumetanide (BUMEX) 0.5 MG tablet, Take 0.5 mg by mouth Daily., Disp: , Rfl:   •  glucose blood test strip, Patient uses TRUE METRIX TEST STRIPS 2 TIMES DAILY., Disp: 200 each, Rfl: 11  •  metFORMIN (GLUCOPHAGE) 500 MG tablet, Take one half tablet by mouth twice a day., Disp: 180 tablet, Rfl: 3  •  Multiple Vitamins-Minerals (MULTIVITAMIN ADULT PO), Take  by mouth., Disp: , Rfl:   •  omeprazole OTC (PRILOSEC OTC) 20 MG EC tablet, Take 1 tablet by mouth Daily., Disp: 90 tablet, Rfl: 3  •  memantine (NAMENDA XR) 28 MG capsule sustained-release 24 hr extended release capsule, Take 1 capsule by mouth Daily., Disp: 90 capsule, Rfl: 1     Objective     Diabetes   Pertinent negatives for diabetes include no chest pain.    Zahra is here today as a new patient to me.  She is with her daughter.  She has upper tension, type 2 diabetes mellitus, hypertriglyceridemia, memory loss, and diastolic heart failure.  She is followed by cardiology.  She is status post T AVR.  She feels well.  She denies chest pain or any symptoms of heart failure.  He is on restricted sodium diet.  Accu-Cheks are around 150.  Blood pressure when checked at home stable.  Her only complaint is that her feet hurt when she walks.  She has no pain at rest.  She takes half of 500 mg metformin twice a day.  She is on Namenda XR    Review of Systems   Constitutional: Negative for unexpected weight change.   Respiratory: Negative for shortness of breath.    Cardiovascular: Negative for chest pain and leg swelling.   Gastrointestinal:        Takes Pepcid as needed   Musculoskeletal:        Feet pain when walking   Neurological: Negative.    Psychiatric/Behavioral: Negative for behavioral problems.       Physical Exam   Constitutional: She appears well-developed and well-nourished.   Elderly female who is pleasant alert and cooperative   Neck: No JVD present.    Cardiovascular: Normal rate, regular rhythm and normal heart sounds.  Exam reveals no gallop.    Pulmonary/Chest: Effort normal. She has no rales.   Musculoskeletal: She exhibits no edema.   Neurological: She is alert.   Psychiatric: She has a normal mood and affect. Her behavior is normal.   Vitals reviewed.      ASSESSMENT     Problem List Items Addressed This Visit        Cardiovascular and Mediastinum    Hyperlipidemia    Systolic hypertension - Primary       Endocrine    Type 2 diabetes mellitus (CMS/HCC)       Nervous and Auditory    Cognitive disorder      Other Visit Diagnoses     Need for vaccination        Relevant Orders    Fluzone High Dose =>65Years (Completed)          PLAN  Patient Instructions   Reviewed and discussed medical records and most recent lab work.  Blood pressure and Accu-Chek readings are stable.  Heart failure is stable.  Will continue current medicines and follow-up in a couple months with fasting labs.    Return in about 2 months (around 12/5/2018) for labsCBC,CMP,LIPID,TSH, urine MA.

## 2018-10-05 NOTE — PATIENT INSTRUCTIONS
Reviewed and discussed medical records and most recent lab work.  Blood pressure and Accu-Chek readings are stable.  Heart failure is stable.  Will continue current medicines and follow-up in a couple months with fasting labs.

## 2018-10-05 NOTE — PROGRESS NOTES
Subjective   Mary Shi is a 89 y.o. female. Patient is here today for   Chief Complaint   Patient presents with   • Diabetes          Vitals:    10/05/18 1328   BP: 120/62   Pulse: 72   Resp: 18   Temp: 98.2 °F (36.8 °C)   SpO2: 98%     The following portions of the patient's history were reviewed and updated as appropriate: allergies, current medications, past family history, past medical history, past social history, past surgical history and problem list.    Past Medical History:   Diagnosis Date   • Amaurosis fugax    • Anemia    • Aortic stenosis     s/p TAVR    • Carotid artery stenosis    • Cognitive disorder    • Congestive heart failure (CMS/HCC)    • Dementia    • Diabetes mellitus (CMS/HCC)    • Diastolic dysfunction    • Dyspnea on exertion    • Generalized osteoarthritis of multiple sites    • GERD (gastroesophageal reflux disease)    • Health care maintenance    • Hiatal hernia    • Hyperlipidemia    • Hypertension    • Mitral valve stenosis     Moderate per echocardiogram 2/2017; severe mitral annular calcification   • Nasal lesion    • Nasal stenosis    • Osteoarthritis     multiple sites   • PONV (postoperative nausea and vomiting)    • Varicose veins       Allergies   Allergen Reactions   • Codeine Itching and GI Intolerance   • Morphine And Related Itching      Social History     Social History   • Marital status:      Spouse name: N/A   • Number of children: N/A   • Years of education: N/A     Occupational History   • Not on file.     Social History Main Topics   • Smoking status: Never Smoker   • Smokeless tobacco: Never Used      Comment: caffeine use   • Alcohol use No      Comment: rare -once every 2-3 months   • Drug use: No   • Sexual activity: Defer     Other Topics Concern   • Not on file     Social History Narrative   • No narrative on file        Current Outpatient Prescriptions:   •  aspirin 81 MG EC tablet, Take 81 mg by mouth Daily. HOLD PER MD INSTR, Disp: , Rfl:    •  atenolol (TENORMIN) 25 MG tablet, Take 1 tablet by mouth Daily., Disp: 90 tablet, Rfl: 1  •  bumetanide (BUMEX) 0.5 MG tablet, Take 0.5 mg by mouth Daily., Disp: , Rfl:   •  glucose blood test strip, Patient uses TRUE METRIX TEST STRIPS 2 TIMES DAILY., Disp: 200 each, Rfl: 11  •  memantine (NAMENDA XR) 28 MG capsule sustained-release 24 hr extended release capsule, Take 1 capsule by mouth Daily., Disp: 90 capsule, Rfl: 3  •  metFORMIN (GLUCOPHAGE) 500 MG tablet, Take one half tablet by mouth twice a day., Disp: 180 tablet, Rfl: 3  •  Multiple Vitamins-Minerals (MULTIVITAMIN ADULT PO), Take  by mouth., Disp: , Rfl:   •  omeprazole OTC (PRILOSEC OTC) 20 MG EC tablet, Take 1 tablet by mouth Daily., Disp: 90 tablet, Rfl: 3     Objective     History of Present Illness Zahra is here today as a new patient to me.  She is with her daughter.  She has upper tension, type 2 diabetes mellitus, hypertriglyceridemia, memory loss, and diastolic heart failure.  She is followed by cardiology.  She is status post T AVR.  She feels well.  She denies chest pain or any symptoms of heart failure.  He is on restricted sodium diet.  Accu-Cheks are around 150.  Blood pressure when checked at home stable.  Her only complaint is that her feet hurt when she walks.  She has no pain at rest.  She takes half of 500 mg metformin twice a day.  She is on Namenda XR    Review of Systems   Constitutional: Negative for unexpected weight change.   Respiratory: Negative for shortness of breath.    Cardiovascular: Negative for chest pain and leg swelling.   Gastrointestinal:        Takes Pepcid as needed   Musculoskeletal:        Feet pain when walking   Neurological: Negative.    Psychiatric/Behavioral: Negative for behavioral problems.       Physical Exam   Constitutional: She appears well-developed and well-nourished.   Elderly female who is pleasant alert and cooperative   Neck: No JVD present.   Cardiovascular: Normal rate, regular rhythm  and normal heart sounds.  Exam reveals no gallop.    Pulmonary/Chest: Effort normal. She has no rales.   Musculoskeletal: She exhibits no edema.   Neurological: She is alert.   Psychiatric: She has a normal mood and affect. Her behavior is normal.   Vitals reviewed.      ASSESSMENT     Problem List Items Addressed This Visit        Cardiovascular and Mediastinum    Hyperlipidemia    Systolic hypertension - Primary       Endocrine    Type 2 diabetes mellitus (CMS/HCC)       Nervous and Auditory    Cognitive disorder      Other Visit Diagnoses     Need for vaccination        Relevant Orders    Fluzone High Dose =>65Years (Completed)          PLAN  Patient Instructions   Reviewed and discussed medical records and most recent lab work.  Blood pressure and Accu-Chek readings are stable.  Heart failure is stable.  Will continue current medicines and follow-up in a couple months with fasting labs.    Return in about 2 months (around 12/5/2018) for labsCBC,CMP,LIPID,TSH, urine MA.

## 2018-10-09 ENCOUNTER — EPISODE CHANGES (OUTPATIENT)
Dept: CASE MANAGEMENT | Facility: OTHER | Age: 83
End: 2018-10-09

## 2018-10-22 ENCOUNTER — TELEPHONE (OUTPATIENT)
Dept: FAMILY MEDICINE CLINIC | Facility: CLINIC | Age: 83
End: 2018-10-22

## 2018-10-22 RX ORDER — MEMANTINE HYDROCHLORIDE 28 MG/1
28 CAPSULE, EXTENDED RELEASE ORAL DAILY
Qty: 90 CAPSULE | Refills: 1 | Status: SHIPPED | OUTPATIENT
Start: 2018-10-22 | End: 2019-04-26 | Stop reason: SDUPTHER

## 2018-10-22 NOTE — TELEPHONE ENCOUNTER
----- Message from Milana Snow sent at 10/22/2018 10:37 AM EDT -----  Contact: PT  DAUGHTER  NEEDS REFILL    MEMANTINE 28 MG ER   1 QD   [facility]     30 DAY W/ REFILLS    SHE WILL CHECK WITH PHARMACY LATER TODAY     [facility]  [phone]

## 2018-11-14 ENCOUNTER — OFFICE VISIT (OUTPATIENT)
Dept: FAMILY MEDICINE CLINIC | Facility: CLINIC | Age: 83
End: 2018-11-14

## 2018-11-14 VITALS
SYSTOLIC BLOOD PRESSURE: 164 MMHG | OXYGEN SATURATION: 98 % | BODY MASS INDEX: 26.8 KG/M2 | TEMPERATURE: 98 F | HEART RATE: 70 BPM | RESPIRATION RATE: 16 BRPM | DIASTOLIC BLOOD PRESSURE: 82 MMHG | HEIGHT: 64 IN | WEIGHT: 157 LBS

## 2018-11-14 DIAGNOSIS — M79.642 PAIN OF LEFT HAND: Primary | ICD-10-CM

## 2018-11-14 PROCEDURE — 99213 OFFICE O/P EST LOW 20 MIN: CPT | Performed by: INTERNAL MEDICINE

## 2018-11-14 RX ORDER — METHYLPREDNISOLONE 4 MG/1
TABLET ORAL
COMMUNITY
Start: 2018-11-13 | End: 2018-11-20

## 2018-11-14 NOTE — PATIENT INSTRUCTIONS
Uric acid was elevated 4 months ago.  Will recheck uric acid today.  Continue Medrol Dosepak as instructed.  Reviewed x-ray.  There is some arthritis of the DIP joints

## 2018-11-14 NOTE — PROGRESS NOTES
Subjective   Mary Shi is a 89 y.o. female. Patient is here today for   Chief Complaint   Patient presents with   • Hand Pain     hand pain and swelling x 2 days no known injury           Vitals:    11/14/18 0912   BP: 164/82   Pulse: 70   Resp: 16   Temp: 98 °F (36.7 °C)   SpO2: 98%     The following portions of the patient's history were reviewed and updated as appropriate: allergies, current medications, past family history, past medical history, past social history, past surgical history and problem list.    Past Medical History:   Diagnosis Date   • Amaurosis fugax    • Anemia    • Aortic stenosis     s/p TAVR    • Carotid artery stenosis    • Cognitive disorder    • Congestive heart failure (CMS/HCC)    • Dementia    • Diabetes mellitus (CMS/HCC)    • Diastolic dysfunction    • Dyspnea on exertion    • Generalized osteoarthritis of multiple sites    • GERD (gastroesophageal reflux disease)    • Health care maintenance    • Hiatal hernia    • Hyperlipidemia    • Hypertension    • Mitral valve stenosis     Moderate per echocardiogram 2/2017; severe mitral annular calcification   • Nasal lesion    • Nasal stenosis    • Osteoarthritis     multiple sites   • PONV (postoperative nausea and vomiting)    • Varicose veins       Allergies   Allergen Reactions   • Codeine Itching and GI Intolerance   • Morphine And Related Itching      Social History     Socioeconomic History   • Marital status:      Spouse name: Not on file   • Number of children: Not on file   • Years of education: Not on file   • Highest education level: Not on file   Social Needs   • Financial resource strain: Not on file   • Food insecurity - worry: Not on file   • Food insecurity - inability: Not on file   • Transportation needs - medical: Not on file   • Transportation needs - non-medical: Not on file   Occupational History   • Not on file   Tobacco Use   • Smoking status: Never Smoker   • Smokeless tobacco: Never Used   • Tobacco  comment: caffeine use   Substance and Sexual Activity   • Alcohol use: No     Comment: rare -once every 2-3 months   • Drug use: No   • Sexual activity: Defer   Other Topics Concern   • Not on file   Social History Narrative   • Not on file        Current Outpatient Medications:   •  aspirin 81 MG EC tablet, Take 81 mg by mouth Daily. HOLD PER MD INSTR, Disp: , Rfl:   •  atenolol (TENORMIN) 25 MG tablet, Take 1 tablet by mouth Daily., Disp: 90 tablet, Rfl: 1  •  bumetanide (BUMEX) 0.5 MG tablet, Take 0.5 mg by mouth Daily., Disp: , Rfl:   •  glucose blood test strip, Patient uses TRUE METRIX TEST STRIPS 2 TIMES DAILY., Disp: 200 each, Rfl: 11  •  memantine (NAMENDA XR) 28 MG capsule sustained-release 24 hr extended release capsule, Take 1 capsule by mouth Daily., Disp: 90 capsule, Rfl: 1  •  metFORMIN (GLUCOPHAGE) 500 MG tablet, Take one half tablet by mouth twice a day., Disp: 180 tablet, Rfl: 3  •  MethylPREDNISolone (MEDROL, NIYAH,) 4 MG tablet, , Disp: , Rfl:   •  Multiple Vitamins-Minerals (MULTIVITAMIN ADULT PO), Take  by mouth., Disp: , Rfl:   •  omeprazole OTC (PRILOSEC OTC) 20 MG EC tablet, Take 1 tablet by mouth Daily., Disp: 90 tablet, Rfl: 3     Objective     History of Present Illness Zahra complains of acute onset of left hand pain and swelling that started a couple days ago.  She denies any injury.  She went to an [facility] center in [facility] yesterday where an x-ray was done which just showed some arthritis.  She was given a Medrol Dosepak.  Her pain is 5 out of 10 in severity and only when she moves the hand.  She had no prior problems with the hand.  The pain is worse with movement.  He also has taken some Tylenol.    Review of Systems   Constitutional: Negative.    Respiratory: Negative.    Cardiovascular: Negative.    Musculoskeletal: Positive for joint swelling.   Psychiatric/Behavioral: Negative.        Physical Exam   Constitutional: She appears well-developed and  well-nourished.   Elderly female who is alert pleasant and cooperative.  Her daughter is present.   Cardiovascular: Normal rate, regular rhythm and normal heart sounds.   Musculoskeletal:   And left hand is not swollen.  There is some tenderness of the MCP joints.  There is some deformity of the DIP joints.  Still is nontender and has normal range of motion.   Neurological: She is alert.   Psychiatric: She has a normal mood and affect. Her behavior is normal. Judgment and thought content normal.   Vitals reviewed.      ASSESSMENT     Problem List Items Addressed This Visit        Nervous and Auditory    Pain of left hand - Primary    Relevant Orders    Uric Acid          PLAN  Patient Instructions   Uric acid was elevated 4 months ago.  Will recheck uric acid today.  Continue Medrol Dosepak as instructed.  Reviewed x-ray.  There is some arthritis of the DIP joints     No Follow-up on file.

## 2018-11-20 ENCOUNTER — OFFICE VISIT (OUTPATIENT)
Dept: CARDIOLOGY | Facility: CLINIC | Age: 83
End: 2018-11-20

## 2018-11-20 VITALS
DIASTOLIC BLOOD PRESSURE: 62 MMHG | WEIGHT: 152 LBS | HEIGHT: 64 IN | BODY MASS INDEX: 25.95 KG/M2 | SYSTOLIC BLOOD PRESSURE: 120 MMHG | HEART RATE: 59 BPM

## 2018-11-20 DIAGNOSIS — I10 SYSTOLIC HYPERTENSION: ICD-10-CM

## 2018-11-20 DIAGNOSIS — Z95.2 S/P TAVR (TRANSCATHETER AORTIC VALVE REPLACEMENT): Primary | ICD-10-CM

## 2018-11-20 DIAGNOSIS — E11.59 TYPE 2 DIABETES MELLITUS WITH OTHER CIRCULATORY COMPLICATION, WITHOUT LONG-TERM CURRENT USE OF INSULIN (HCC): ICD-10-CM

## 2018-11-20 PROCEDURE — 99214 OFFICE O/P EST MOD 30 MIN: CPT | Performed by: INTERNAL MEDICINE

## 2018-11-20 PROCEDURE — 93000 ELECTROCARDIOGRAM COMPLETE: CPT | Performed by: INTERNAL MEDICINE

## 2018-11-20 NOTE — PROGRESS NOTES
Date of Office Visit: 2018  Encounter Provider: Elissa Mcmahan MD  Place of Service: [facility] CARDIOLOGY  Patient Name: Mary Shi  :3/3/1925      Patient ID:  Mary Shi is a 89 y.o. female is here for  followup for AS, s/p TAVR.         History of Present Illness       She had an echocardiogram done 2012. This showed moderate aortic stenosis,  mild mitral stenosis, moderate left atrial dilation, grade I-A diastolic dysfunction,  moderate concentric left ventricular hypertrophy, ejection fraction of 65-70%. She also  had a carotid duplex study showing 50-60% stenosis of the right internal carotid artery.   She feels somewhat short-winded when she bends over because of the hiatal hernia.      She was in the hospital with chest pain and difficulty breathing on 10/08/2014. She had had a prior stress nuclear perfusion study, which was done 2014 for chest pain and this showed no evidence of ischemia. During the hospitalization in October we felt the chest pain was probably mostly related to a hiatal hernia, somewhat related to her valvular heart disease, and possibly a small amount of heart failure as well, although her BNP was not markedly elevated. We did repeat her echocardiogram, which was done 2013, showing ejection fraction of 63%, moderate concentric left ventricular hypertrophy, grade 1A diastolic dysfunction, moderate mitral insufficiency, moderate mitral stenosis, moderate aortic stenosis. This is essentially unchanged from her echocardiograms both in 2013 and 2012. She had another echocardiogram done 10/2014, which showed a grade 2 diastolic dysfunction, ejection fraction of 67%, moderate concentric left ventricular hypertrophy, moderate mitral and moderate aortic stenosis, trivial aortic regurgitation. Again, this is unchanged from echoes dated 2012 and 2013.      She had a couple of children who wanted to get a  second opinion at  so they did in 2014. They set her up to see a surgeon there thinking that she needed aortic valve replacement, specifically ERICKSON. The surgeon there said she was not a candidate for this, as I had already kind of told them. They put her through a stress echocardiogram because they were, it sounds like not quite in agreement with what he thought, but the stress echocardiogram did not show an increase in right ventricular systolic pressure nor a change in her aortic valve area. She had a workup for her hiatal hernia there as well and that has been stable, but obviously she cannot eat late at night or have spicy food or she has problems all night long.       She had a carotid duplex study done in 11/2013. She had plaquing but no significant stenosis.          She had a 2D echocardiogram with Doppler which was done 11/18/2015. This showed ejection  fraction of 74%, left ventricular hypertrophy which is moderate, RVSP which is normal,  mean gradient was 26 mmHg, and peak gradient 47 mmHg. Aortic valve area was 1.8 sq cm and  dimension index of 0.5. This is consistent with mild to moderate aortic stenosis. When  compared with her prior study done in 11/2013, this is really no change.      She had a 2-D echocardiogram with Doppler done on 11/29/2016, showing severe aortic stenosis with a mean gradient of 42 mmHg.  Her peak velocity was 3.8 meters per second with an aortic valve area of 0.92 centimeters squared.  This also showed moderate mitral stenosis and mild mitral insufficiency.  Her ejection fraction was 60% with grade 2 diastolic dysfunction and moderate to severe left ventricular hypertrophy.  In addition, she had severe left atrial enlargement.       In December 2016, she underwent TAVR with a #23 S3 Rubina valve.    She had an echocardiogram done 6/24/18 showing LVEF 60%, severe left atrial enlargement, mild to moderate tricuspid insufficiency, RVSP elevated gradient of 55 mmHg, severe mitral  stenosis, mild mitral insufficiency.  The mean mitral valve gradient was 10 mmHg.     She was hospitalized in June 2018 for shortness of breath felt to be secondary to acute on chronic diastolic heart failure.  She responded well to IV diuresis and was transitioned back to her oral bumetanide.  This exacerbation was felt to be secondary to eating a high sodium diet and going out to eat with family visiting her.      She has her BP checked at home and it has been high, 160-170/70.  She's recently been on prednisone for gout.  She is not avoiding salt.  She does feel fatigued a lot.  She's had no dizziness or syncope.  She has no orthopnea or PND.  She's had no chest pain or pressure.  She denies exertional dyspnea.  She still stays very active.  She has a lot of arthritis in her feet.    Past Medical History:   Diagnosis Date   • Amaurosis fugax    • Anemia    • Aortic stenosis     s/p TAVR    • Carotid artery stenosis    • Cognitive disorder    • Congestive heart failure (CMS/HCC)    • Dementia    • Diabetes mellitus (CMS/HCC)    • Diastolic dysfunction    • Dyspnea on exertion    • Generalized osteoarthritis of multiple sites    • GERD (gastroesophageal reflux disease)    • Gout    • Health care maintenance    • Hiatal hernia    • Hyperlipidemia    • Hypertension    • Mitral valve stenosis     Moderate per echocardiogram 2/2017; severe mitral annular calcification   • Nasal lesion    • Nasal stenosis    • Osteoarthritis     multiple sites   • PONV (postoperative nausea and vomiting)    • Varicose veins          Past Surgical History:   Procedure Laterality Date   • BLADDER REPAIR     • HYSTERECTOMY     • KNEE SURGERY         Current Outpatient Medications on File Prior to Visit   Medication Sig Dispense Refill   • aspirin 81 MG EC tablet Take 81 mg by mouth Daily. HOLD PER MD INSTR     • atenolol (TENORMIN) 25 MG tablet Take 1 tablet by mouth Daily. 90 tablet 1   • bumetanide (BUMEX) 0.5 MG tablet Take 0.5 mg by  mouth Daily.     • glucose blood test strip Patient uses TRUE METRIX TEST STRIPS 2 TIMES DAILY. 200 each 11   • memantine (NAMENDA XR) 28 MG capsule sustained-release 24 hr extended release capsule Take 1 capsule by mouth Daily. 90 capsule 1   • metFORMIN (GLUCOPHAGE) 500 MG tablet Take 250 mg by mouth 2 (Two) Times a Day With Meals.     • Multiple Vitamins-Minerals (MULTIVITAMIN ADULT PO) Take  by mouth.     • omeprazole OTC (PRILOSEC OTC) 20 MG EC tablet Take 1 tablet by mouth Daily. 90 tablet 3   • [DISCONTINUED] metFORMIN (GLUCOPHAGE) 500 MG tablet Take one half tablet by mouth twice a day. 180 tablet 3   • [DISCONTINUED] MethylPREDNISolone (MEDROL, NIYAH,) 4 MG tablet        No current facility-administered medications on file prior to visit.        Social History     Socioeconomic History   • Marital status:      Spouse name: Not on file   • Number of children: Not on file   • Years of education: Not on file   • Highest education level: Not on file   Social Needs   • Financial resource strain: Not on file   • Food insecurity - worry: Not on file   • Food insecurity - inability: Not on file   • Transportation needs - medical: Not on file   • Transportation needs - non-medical: Not on file   Occupational History   • Not on file   Tobacco Use   • Smoking status: Never Smoker   • Smokeless tobacco: Never Used   • Tobacco comment: caffeine use   Substance and Sexual Activity   • Alcohol use: No     Comment: rare -once every 2-3 months   • Drug use: No   • Sexual activity: Defer   Other Topics Concern   • Not on file   Social History Narrative   • Not on file           Review of Systems   Constitution: Negative.   HENT: Negative for congestion.    Eyes: Negative for vision loss in left eye and vision loss in right eye.   Respiratory: Negative.  Negative for cough, hemoptysis, shortness of breath, sleep disturbances due to breathing, snoring, sputum production and wheezing.    Endocrine: Negative.   "  Hematologic/Lymphatic: Negative.    Skin: Negative for poor wound healing and rash.   Musculoskeletal: Negative for falls, gout, muscle cramps and myalgias.   Gastrointestinal: Negative for abdominal pain, diarrhea, dysphagia, hematemesis, melena, nausea and vomiting.   Neurological: Negative for excessive daytime sleepiness, dizziness, headaches, light-headedness, loss of balance, seizures and vertigo.   Psychiatric/Behavioral: Negative for depression and substance abuse. The patient is not nervous/anxious.        Procedures    ECG 12 Lead  Date/Time: 11/20/2018 12:13 PM  Performed by: Elissa Mcmahan MD  Authorized by: Elissa Mcmahan MD   Comparison: compared with previous ECG   Similar to previous ECG  Rhythm: sinus rhythm  Ectopy: atrial premature contractions  Other findings: PRWP  Clinical impression: non-specific ECG                Objective:      Vitals:    11/20/18 1202   BP: 120/62   BP Location: Right arm   Patient Position: Sitting   Pulse: 59   Weight: 68.9 kg (152 lb)   Height: 162.2 cm (63.86\")     Body mass index is 26.21 kg/m².    Physical Exam   Constitutional: She is oriented to person, place, and time. She appears well-developed and well-nourished. No distress.   HENT:   Head: Normocephalic and atraumatic.   Eyes: Conjunctivae are normal. No scleral icterus.   Neck: Neck supple. No JVD present. Carotid bruit is not present. No thyromegaly present.   Cardiovascular: Normal rate, regular rhythm, S1 normal, S2 normal, normal heart sounds and intact distal pulses.  No extrasystoles are present. PMI is not displaced. Exam reveals no gallop.   No murmur heard.  Pulses:       Carotid pulses are 2+ on the right side, and 2+ on the left side.       Radial pulses are 2+ on the right side, and 2+ on the left side.        Dorsalis pedis pulses are 2+ on the right side, and 2+ on the left side.        Posterior tibial pulses are 2+ on the right side, and 2+ on the left side.   Pulmonary/Chest: " Effort normal and breath sounds normal. No respiratory distress. She has no wheezes. She has no rhonchi. She has no rales. She exhibits no tenderness.   Abdominal: Soft. Bowel sounds are normal. She exhibits no distension, no abdominal bruit and no mass. There is no tenderness.   Musculoskeletal: She exhibits no edema or deformity.   Lymphadenopathy:     She has no cervical adenopathy.   Neurological: She is alert and oriented to person, place, and time. No cranial nerve deficit.   Skin: Skin is warm and dry. No rash noted. She is not diaphoretic. No cyanosis. No pallor. Nails show no clubbing.   Psychiatric: She has a normal mood and affect. Judgment normal.   Vitals reviewed.      Lab Review:       Assessment:      Diagnosis Plan   1. S/P TAVR (transcatheter aortic valve replacement)     2. Systolic hypertension     3. Type 2 diabetes mellitus with other circulatory complication, without long-term current use of insulin (CMS/[facility])       1. Severe AS. S/p TAVR 12/2016.   2. Right carotid artery stenosis, 50-60%.   3. Hypertension. BP high.  Has been on steroids for gout.   4. Hyperlipidemia. Per  Dr. Huston.   5. Varicose veins; stable.   6. Diabetes mellitus type 2.   7. Dementia, fairly advanced.  8. Moderate to severe MS.  9. Grade 2 diastolic dysfunction.  10. Hiatal hernia which causes her dyspnea and chest pain.  11. Venous insufficiency, use compression stockings.  12. Gout.      Plan:       F/u with marino in 6 months. No changes. Avoid salt.

## 2018-11-26 ENCOUNTER — TELEPHONE (OUTPATIENT)
Dept: FAMILY MEDICINE CLINIC | Facility: CLINIC | Age: 83
End: 2018-11-26

## 2018-11-26 DIAGNOSIS — E11.9 TYPE 2 DIABETES MELLITUS WITHOUT COMPLICATION, WITHOUT LONG-TERM CURRENT USE OF INSULIN (HCC): ICD-10-CM

## 2018-11-26 NOTE — TELEPHONE ENCOUNTER
Rx sent to pharmacy      ----- Message from Evita Larry sent at 11/26/2018 10:14 AM EST -----  REFILL ON:     LUIS E- METRIX GLUCOSE TEST STRIPS- TESTS 1QD QTY: 100    PHARMACY: [facility] -[phone]

## 2018-11-29 ENCOUNTER — OFFICE VISIT (OUTPATIENT)
Dept: FAMILY MEDICINE CLINIC | Facility: CLINIC | Age: 83
End: 2018-11-29

## 2018-11-29 VITALS
HEART RATE: 102 BPM | BODY MASS INDEX: 25.95 KG/M2 | HEIGHT: 64 IN | RESPIRATION RATE: 18 BRPM | SYSTOLIC BLOOD PRESSURE: 152 MMHG | OXYGEN SATURATION: 100 % | DIASTOLIC BLOOD PRESSURE: 64 MMHG | TEMPERATURE: 98.9 F | WEIGHT: 152 LBS

## 2018-11-29 DIAGNOSIS — J40 BRONCHITIS: Primary | ICD-10-CM

## 2018-11-29 PROCEDURE — 99213 OFFICE O/P EST LOW 20 MIN: CPT | Performed by: INTERNAL MEDICINE

## 2018-11-29 RX ORDER — ALBUTEROL SULFATE 90 UG/1
2 AEROSOL, METERED RESPIRATORY (INHALATION) EVERY 4 HOURS PRN
Qty: 18 G | Refills: 0 | Status: SHIPPED | OUTPATIENT
Start: 2018-11-29 | End: 2019-03-15

## 2018-11-29 RX ORDER — AZITHROMYCIN 250 MG/1
TABLET, FILM COATED ORAL
Qty: 6 TABLET | Refills: 0 | Status: SHIPPED | OUTPATIENT
Start: 2018-11-29 | End: 2018-12-14

## 2018-11-29 NOTE — PROGRESS NOTES
Subjective   Mary Shi is a 89 y.o. female. Patient is here today for   Chief Complaint   Patient presents with   • Cough     x 4 days           Vitals:    11/29/18 1032   BP: 152/64   Pulse: 102   Resp: 18   Temp: 98.9 °F (37.2 °C)   SpO2: 100%     The following portions of the patient's history were reviewed and updated as appropriate: allergies, current medications, past family history, past medical history, past social history, past surgical history and problem list.    Past Medical History:   Diagnosis Date   • Amaurosis fugax    • Anemia    • Aortic stenosis     s/p TAVR    • Carotid artery stenosis    • Cognitive disorder    • Congestive heart failure (CMS/HCC)    • Dementia    • Diabetes mellitus (CMS/HCC)    • Diastolic dysfunction    • Dyspnea on exertion    • Generalized osteoarthritis of multiple sites    • GERD (gastroesophageal reflux disease)    • Gout    • Health care maintenance    • Hiatal hernia    • Hyperlipidemia    • Hypertension    • Mitral valve stenosis     Moderate per echocardiogram 2/2017; severe mitral annular calcification   • Nasal lesion    • Nasal stenosis    • Osteoarthritis     multiple sites   • PONV (postoperative nausea and vomiting)    • Varicose veins       Allergies   Allergen Reactions   • Codeine Itching and GI Intolerance   • Morphine And Related Itching      Social History     Socioeconomic History   • Marital status:      Spouse name: Not on file   • Number of children: Not on file   • Years of education: Not on file   • Highest education level: Not on file   Social Needs   • Financial resource strain: Not on file   • Food insecurity - worry: Not on file   • Food insecurity - inability: Not on file   • Transportation needs - medical: Not on file   • Transportation needs - non-medical: Not on file   Occupational History   • Not on file   Tobacco Use   • Smoking status: Never Smoker   • Smokeless tobacco: Never Used   • Tobacco comment: caffeine use    Substance and Sexual Activity   • Alcohol use: No     Comment: rare -once every 2-3 months   • Drug use: No   • Sexual activity: Defer   Other Topics Concern   • Not on file   Social History Narrative   • Not on file        Current Outpatient Medications:   •  aspirin 81 MG EC tablet, Take 81 mg by mouth Daily. HOLD PER MD INSTR, Disp: , Rfl:   •  atenolol (TENORMIN) 25 MG tablet, Take 1 tablet by mouth Daily., Disp: 90 tablet, Rfl: 1  •  bumetanide (BUMEX) 0.5 MG tablet, Take 0.5 mg by mouth Daily., Disp: , Rfl:   •  glucose blood test strip, Patient uses TRUE METRIX TEST STRIPS 2 TIMES DAILY., Disp: 200 each, Rfl: 11  •  memantine (NAMENDA XR) 28 MG capsule sustained-release 24 hr extended release capsule, Take 1 capsule by mouth Daily., Disp: 90 capsule, Rfl: 1  •  metFORMIN (GLUCOPHAGE) 500 MG tablet, Take 250 mg by mouth 2 (Two) Times a Day With Meals., Disp: , Rfl:   •  Multiple Vitamins-Minerals (MULTIVITAMIN ADULT PO), Take  by mouth., Disp: , Rfl:   •  omeprazole OTC (PRILOSEC OTC) 20 MG EC tablet, Take 1 tablet by mouth Daily., Disp: 90 tablet, Rfl: 3  •  albuterol (PROVENTIL HFA;VENTOLIN HFA;PROAIR HFA) 108 (90 Base) MCG/ACT inhaler, Inhale 2 puffs Every 4 (Four) Hours As Needed for Wheezing., Disp: 18 g, Rfl: 0  •  azithromycin (ZITHROMAX) 250 MG tablet, Take 2 tablets the first day, then 1 tablet daily for 4 days., Disp: 6 tablet, Rfl: 0     Objective     History of Present Illness Zahra is here today with her daughter.  She complains of a cough and wheezing that started 4 days ago.  She denies any fever, chills, or shortness of air.  Her daughter is been giving her Mucinex.  Her appetite is good and she is drinking plenty of fluids.    Review of Systems   Constitutional: Negative for fever.   HENT: Positive for congestion.    Respiratory: Positive for cough and wheezing. Negative for shortness of breath.        Physical Exam   HENT:   Right Ear: Tympanic membrane normal.   Left Ear: Tympanic  membrane normal.   Nose: Mucosal edema present.   Mouth/Throat: Oropharynx is clear and moist.   Pulmonary/Chest: Effort normal. She has wheezes. She has no rales.   Psychiatric: She has a normal mood and affect.   Vitals reviewed.      ASSESSMENT     Problem List Items Addressed This Visit        Respiratory    Bronchitis - Primary    Relevant Medications    albuterol (PROVENTIL HFA;VENTOLIN HFA;PROAIR HFA) 108 (90 Base) MCG/ACT inhaler          PLAN  Patient Instructions   Continue fluids.  Start azithromycin as directed.  Use Proventil inhaler 2 puffs every 6 hours as needed for wheezing.    Return if symptoms worsen or fail to improve.

## 2018-11-29 NOTE — PATIENT INSTRUCTIONS
Continue fluids.  Start azithromycin as directed.  Use Proventil inhaler 2 puffs every 6 hours as needed for wheezing.

## 2018-12-05 DIAGNOSIS — E11.9 TYPE 2 DIABETES MELLITUS WITHOUT COMPLICATION, WITHOUT LONG-TERM CURRENT USE OF INSULIN (HCC): ICD-10-CM

## 2018-12-05 DIAGNOSIS — E78.49 OTHER HYPERLIPIDEMIA: ICD-10-CM

## 2018-12-10 LAB
ALBUMIN SERPL-MCNC: 4.3 G/DL (ref 3.5–5.2)
ALBUMIN/GLOB SERPL: 1.7 G/DL
ALP SERPL-CCNC: 104 U/L (ref 39–117)
ALT SERPL-CCNC: 12 U/L (ref 1–33)
AST SERPL-CCNC: 15 U/L (ref 1–32)
BASOPHILS # BLD AUTO: 0.02 10*3/MM3 (ref 0–0.2)
BASOPHILS NFR BLD AUTO: 0.3 % (ref 0–1.5)
BILIRUB SERPL-MCNC: 0.2 MG/DL (ref 0.1–1.2)
BUN SERPL-MCNC: 16 MG/DL (ref 8–23)
BUN/CREAT SERPL: 23.9 (ref 7–25)
CALCIUM SERPL-MCNC: 10.1 MG/DL (ref 8.2–9.6)
CHLORIDE SERPL-SCNC: 99 MMOL/L (ref 98–107)
CHOLEST SERPL-MCNC: 209 MG/DL (ref 0–200)
CO2 SERPL-SCNC: 28.8 MMOL/L (ref 22–29)
CREAT SERPL-MCNC: 0.67 MG/DL (ref 0.57–1)
EOSINOPHIL # BLD AUTO: 0.09 10*3/MM3 (ref 0–0.7)
EOSINOPHIL NFR BLD AUTO: 1.4 % (ref 0.3–6.2)
ERYTHROCYTE [DISTWIDTH] IN BLOOD BY AUTOMATED COUNT: 13.7 % (ref 11.7–13)
GLOBULIN SER CALC-MCNC: 2.6 GM/DL
GLUCOSE SERPL-MCNC: 140 MG/DL (ref 65–99)
HBA1C MFR BLD: 6.7 % (ref 4.8–5.6)
HCT VFR BLD AUTO: 39.7 % (ref 35.6–45.5)
HDLC SERPL-MCNC: 31 MG/DL (ref 40–60)
HGB BLD-MCNC: 12.3 G/DL (ref 11.9–15.5)
IMM GRANULOCYTES # BLD: 0.02 10*3/MM3 (ref 0–0.03)
IMM GRANULOCYTES NFR BLD: 0.3 % (ref 0–0.5)
LDLC SERPL CALC-MCNC: ABNORMAL MG/DL
LDLC/HDLC SERPL: ABNORMAL {RATIO}
LYMPHOCYTES # BLD AUTO: 2.02 10*3/MM3 (ref 0.9–4.8)
LYMPHOCYTES NFR BLD AUTO: 32.1 % (ref 19.6–45.3)
MCH RBC QN AUTO: 28.1 PG (ref 26.9–32)
MCHC RBC AUTO-ENTMCNC: 31 G/DL (ref 32.4–36.3)
MCV RBC AUTO: 90.8 FL (ref 80.5–98.2)
MICROALBUMIN UR-MCNC: 7.3 UG/ML
MONOCYTES # BLD AUTO: 0.42 10*3/MM3 (ref 0.2–1.2)
MONOCYTES NFR BLD AUTO: 6.7 % (ref 5–12)
NEUTROPHILS # BLD AUTO: 3.73 10*3/MM3 (ref 1.9–8.1)
NEUTROPHILS NFR BLD AUTO: 59.2 % (ref 42.7–76)
PLATELET # BLD AUTO: 221 10*3/MM3 (ref 140–500)
POTASSIUM SERPL-SCNC: 4.9 MMOL/L (ref 3.5–5.2)
PROT SERPL-MCNC: 6.9 G/DL (ref 6–8.5)
RBC # BLD AUTO: 4.37 10*6/MM3 (ref 3.9–5.2)
SODIUM SERPL-SCNC: 139 MMOL/L (ref 136–145)
TRIGL SERPL-MCNC: 587 MG/DL (ref 0–150)
TSH SERPL DL<=0.005 MIU/L-ACNC: 1.66 MIU/ML (ref 0.27–4.2)
VLDLC SERPL CALC-MCNC: ABNORMAL MG/DL
WBC # BLD AUTO: 6.3 10*3/MM3 (ref 4.5–10.7)

## 2018-12-10 RX ORDER — BUMETANIDE 0.5 MG/1
TABLET ORAL
Qty: 90 TABLET | Refills: 1 | Status: SHIPPED | OUTPATIENT
Start: 2018-12-10 | End: 2019-04-26 | Stop reason: SDUPTHER

## 2018-12-14 ENCOUNTER — OFFICE VISIT (OUTPATIENT)
Dept: FAMILY MEDICINE CLINIC | Facility: CLINIC | Age: 83
End: 2018-12-14

## 2018-12-14 VITALS
WEIGHT: 148 LBS | BODY MASS INDEX: 25.27 KG/M2 | HEIGHT: 64 IN | RESPIRATION RATE: 16 BRPM | TEMPERATURE: 98.2 F | HEART RATE: 72 BPM | SYSTOLIC BLOOD PRESSURE: 112 MMHG | OXYGEN SATURATION: 94 % | DIASTOLIC BLOOD PRESSURE: 62 MMHG

## 2018-12-14 DIAGNOSIS — E78.49 OTHER HYPERLIPIDEMIA: ICD-10-CM

## 2018-12-14 DIAGNOSIS — E11.59 TYPE 2 DIABETES MELLITUS WITH OTHER CIRCULATORY COMPLICATION, WITHOUT LONG-TERM CURRENT USE OF INSULIN (HCC): ICD-10-CM

## 2018-12-14 DIAGNOSIS — I10 SYSTOLIC HYPERTENSION: Primary | ICD-10-CM

## 2018-12-14 PROCEDURE — 99214 OFFICE O/P EST MOD 30 MIN: CPT | Performed by: INTERNAL MEDICINE

## 2018-12-14 NOTE — PATIENT INSTRUCTIONS
Blood pressure is stable.  Fasting blood sugar is 140 and hemoglobin A1c is stable at 6.7.  Cholesterol is elevated and triglyceride is over 500 and are much higher than when checked 6 months ago.  Kidney and liver functions are normal.  Complete blood count is normal.  Discussed diet.  We'll continue current medicines.  Discussed getting a mammogram area did

## 2018-12-14 NOTE — PROGRESS NOTES
Subjective   Mary Shi is a 89 y.o. female. Patient is here today for   Chief Complaint   Patient presents with   • Bronchitis     follow up          Vitals:    12/14/18 1402   BP: 112/62   Pulse: 72   Resp: 16   Temp: 98.2 °F (36.8 °C)   SpO2: 94%       The following portions of the patient's history were reviewed and updated as appropriate: allergies, current medications, past family history, past medical history, past social history, past surgical history and problem list.    Past Medical History:   Diagnosis Date   • Amaurosis fugax    • Anemia    • Aortic stenosis     s/p TAVR    • Carotid artery stenosis    • Cognitive disorder    • Congestive heart failure (CMS/HCC)    • Dementia    • Diabetes mellitus (CMS/HCC)    • Diastolic dysfunction    • Dyspnea on exertion    • Generalized osteoarthritis of multiple sites    • GERD (gastroesophageal reflux disease)    • Gout    • Health care maintenance    • Hiatal hernia    • Hyperlipidemia    • Hypertension    • Mitral valve stenosis     Moderate per echocardiogram 2/2017; severe mitral annular calcification   • Nasal lesion    • Nasal stenosis    • Osteoarthritis     multiple sites   • PONV (postoperative nausea and vomiting)    • Varicose veins       Allergies   Allergen Reactions   • Codeine Itching and GI Intolerance   • Morphine And Related Itching      Social History     Socioeconomic History   • Marital status:      Spouse name: Not on file   • Number of children: Not on file   • Years of education: Not on file   • Highest education level: Not on file   Social Needs   • Financial resource strain: Not on file   • Food insecurity - worry: Not on file   • Food insecurity - inability: Not on file   • Transportation needs - medical: Not on file   • Transportation needs - non-medical: Not on file   Occupational History   • Not on file   Tobacco Use   • Smoking status: Never Smoker   • Smokeless tobacco: Never Used   • Tobacco comment: caffeine use    Substance and Sexual Activity   • Alcohol use: No     Comment: rare -once every 2-3 months   • Drug use: No   • Sexual activity: Defer   Other Topics Concern   • Not on file   Social History Narrative   • Not on file        Current Outpatient Medications:   •  albuterol (PROVENTIL HFA;VENTOLIN HFA;PROAIR HFA) 108 (90 Base) MCG/ACT inhaler, Inhale 2 puffs Every 4 (Four) Hours As Needed for Wheezing., Disp: 18 g, Rfl: 0  •  aspirin 81 MG EC tablet, Take 81 mg by mouth Daily. HOLD PER MD INSTR, Disp: , Rfl:   •  atenolol (TENORMIN) 25 MG tablet, Take 1 tablet by mouth Daily., Disp: 90 tablet, Rfl: 1  •  bumetanide (BUMEX) 0.5 MG tablet, TAKE ONE TABLET BY MOUTH DAILY, Disp: 90 tablet, Rfl: 1  •  glucose blood test strip, Patient uses TRUE METRIX TEST STRIPS 2 TIMES DAILY., Disp: 200 each, Rfl: 11  •  memantine (NAMENDA XR) 28 MG capsule sustained-release 24 hr extended release capsule, Take 1 capsule by mouth Daily., Disp: 90 capsule, Rfl: 1  •  metFORMIN (GLUCOPHAGE) 500 MG tablet, Take 250 mg by mouth 2 (Two) Times a Day With Meals., Disp: , Rfl:   •  Multiple Vitamins-Minerals (MULTIVITAMIN ADULT PO), Take  by mouth., Disp: , Rfl:   •  omeprazole OTC (PRILOSEC OTC) 20 MG EC tablet, Take 1 tablet by mouth Daily., Disp: 90 tablet, Rfl: 3     Objective   History of Present Illness Zahra is here today with her her daughter.  She has hypertension, hyperlipidemia, heart failure, type 2 diabetes, carotid artery disease, and dementia.  She feels well.  She eats healthy and is physically active.  She does eat sweets.  She was seen recently and treated for bronchitis and is getting better.  She uses albuterol occasionally wheezing.  She denies any chest pain or any symptoms of heart failure.    Review of Systems   Constitutional: Negative.    Respiratory: Positive for cough.    Cardiovascular: Negative.    Genitourinary: Negative.    Psychiatric/Behavioral: Negative for behavioral problems.       Physical Exam    Constitutional: She appears well-developed and well-nourished.   Neck: No JVD present.   Cardiovascular: Normal rate and regular rhythm.   Murmur heard.   Systolic murmur is present with a grade of 2/6.  Pulmonary/Chest: Effort normal. She has no wheezes. She has no rales.   Musculoskeletal: She exhibits no edema.   Neurological: She is alert.   Psychiatric: She has a normal mood and affect. Her behavior is normal.   Vitals reviewed.      ASSESSMENT     Problem List Items Addressed This Visit        Cardiovascular and Mediastinum    Hyperlipidemia    Systolic hypertension - Primary       Endocrine    Type 2 diabetes mellitus (CMS/HCC)          PLAN  Patient Instructions   Blood pressure is stable.  Fasting blood sugar is 140 and hemoglobin A1c is stable at 6.7.  Cholesterol is elevated and triglyceride is over 500 and are much higher than when checked 6 months ago.  Kidney and liver functions are normal.  Complete blood count is normal.  Discussed diet.  We'll continue current medicines.  Discussed getting a mammogram area did      Return in about 6 months (around 6/14/2019) for labslipid,BMP,A!C.

## 2019-01-23 ENCOUNTER — TELEPHONE (OUTPATIENT)
Dept: FAMILY MEDICINE CLINIC | Facility: CLINIC | Age: 84
End: 2019-01-23

## 2019-01-23 NOTE — TELEPHONE ENCOUNTER
Called patient's daughter back and notified her that in her previous records that we received, it did not shot she had any pneumonia shots.   She is certain she got one but not sure which one or when.   I advised that she can call patient's insurance and they might be able to let her know.         ----- Message from Morelia Brandt sent at 1/23/2019 11:17 AM EST -----  Wanting to know whem she had her pnemonia shot form her previous medical records     Please call [phone]

## 2019-01-25 RX ORDER — ATENOLOL 25 MG/1
25 TABLET ORAL DAILY
Qty: 90 TABLET | Refills: 0 | Status: SHIPPED | OUTPATIENT
Start: 2019-01-25 | End: 2019-09-09 | Stop reason: SDUPTHER

## 2019-01-28 ENCOUNTER — TELEPHONE (OUTPATIENT)
Dept: FAMILY MEDICINE CLINIC | Facility: CLINIC | Age: 84
End: 2019-01-28

## 2019-01-28 DIAGNOSIS — E11.9 TYPE 2 DIABETES MELLITUS WITHOUT COMPLICATION, WITHOUT LONG-TERM CURRENT USE OF INSULIN (HCC): ICD-10-CM

## 2019-01-28 RX ORDER — LANCETS 28 GAUGE
EACH MISCELLANEOUS
Qty: 100 EACH | Refills: 2 | Status: SHIPPED | OUTPATIENT
Start: 2019-01-28 | End: 2019-04-08 | Stop reason: SDUPTHER

## 2019-01-28 NOTE — TELEPHONE ENCOUNTER
Rx sent to pharmacy      ----- Message from Ange Palmer sent at 1/28/2019 12:07 PM EST -----  Refill on     Free style Lancets tests once a day    Pharmacy: [phone]    [facility]

## 2019-03-15 ENCOUNTER — OFFICE VISIT (OUTPATIENT)
Dept: FAMILY MEDICINE CLINIC | Facility: CLINIC | Age: 84
End: 2019-03-15

## 2019-03-15 VITALS
HEIGHT: 64 IN | DIASTOLIC BLOOD PRESSURE: 58 MMHG | HEART RATE: 68 BPM | RESPIRATION RATE: 16 BRPM | TEMPERATURE: 97.7 F | OXYGEN SATURATION: 98 % | SYSTOLIC BLOOD PRESSURE: 116 MMHG | WEIGHT: 145 LBS | BODY MASS INDEX: 24.75 KG/M2

## 2019-03-15 DIAGNOSIS — R11.2 NAUSEA AND VOMITING, INTRACTABILITY OF VOMITING NOT SPECIFIED, UNSPECIFIED VOMITING TYPE: Primary | ICD-10-CM

## 2019-03-15 PROCEDURE — 99214 OFFICE O/P EST MOD 30 MIN: CPT | Performed by: INTERNAL MEDICINE

## 2019-03-15 RX ORDER — ONDANSETRON 4 MG/1
4 TABLET, FILM COATED ORAL EVERY 8 HOURS PRN
Qty: 20 TABLET | Refills: 1 | Status: SHIPPED | OUTPATIENT
Start: 2019-03-15 | End: 2019-06-18

## 2019-03-15 NOTE — PROGRESS NOTES
Subjective   Mary Shi is a 89 y.o. female. Patient is here today for   Chief Complaint   Patient presents with   • Vomiting     x 1 day          Vitals:    03/15/19 1501   BP: 116/58   Pulse: 68   Resp: 16   Temp: 97.7 °F (36.5 °C)   SpO2: 98%     The following portions of the patient's history were reviewed and updated as appropriate: allergies, current medications, past family history, past medical history, past social history, past surgical history and problem list.    Past Medical History:   Diagnosis Date   • Amaurosis fugax    • Anemia    • Aortic stenosis     s/p TAVR    • Carotid artery stenosis    • Cognitive disorder    • Congestive heart failure (CMS/HCC)    • Dementia    • Diabetes mellitus (CMS/HCC)    • Diastolic dysfunction    • Dyspnea on exertion    • Generalized osteoarthritis of multiple sites    • GERD (gastroesophageal reflux disease)    • Gout    • Health care maintenance    • Hiatal hernia    • Hyperlipidemia    • Hypertension    • Mitral valve stenosis     Moderate per echocardiogram 2/2017; severe mitral annular calcification   • Nasal lesion    • Nasal stenosis    • Osteoarthritis     multiple sites   • PONV (postoperative nausea and vomiting)    • Varicose veins       Allergies   Allergen Reactions   • Codeine Itching and GI Intolerance   • Morphine And Related Itching      Social History     Socioeconomic History   • Marital status:      Spouse name: Not on file   • Number of children: Not on file   • Years of education: Not on file   • Highest education level: Not on file   Social Needs   • Financial resource strain: Not on file   • Food insecurity - worry: Not on file   • Food insecurity - inability: Not on file   • Transportation needs - medical: Not on file   • Transportation needs - non-medical: Not on file   Occupational History   • Not on file   Tobacco Use   • Smoking status: Never Smoker   • Smokeless tobacco: Never Used   • Tobacco comment: caffeine use    Substance and Sexual Activity   • Alcohol use: No     Comment: rare -once every 2-3 months   • Drug use: No   • Sexual activity: Defer   Other Topics Concern   • Not on file   Social History Narrative   • Not on file        Current Outpatient Medications:   •  aspirin 81 MG EC tablet, Take 81 mg by mouth Daily. HOLD PER MD INSTR, Disp: , Rfl:   •  atenolol (TENORMIN) 25 MG tablet, TAKE 1 TABLET BY MOUTH DAILY, Disp: 90 tablet, Rfl: 0  •  bumetanide (BUMEX) 0.5 MG tablet, TAKE ONE TABLET BY MOUTH DAILY, Disp: 90 tablet, Rfl: 1  •  glucose blood test strip, Patient uses TRUE METRIX TEST STRIPS 2 TIMES DAILY., Disp: 200 each, Rfl: 11  •  Lancets (FREESTYLE) lancets, USE TO TEST BLOOD SUGAR ONCE DAILY., Disp: 100 each, Rfl: 2  •  memantine (NAMENDA XR) 28 MG capsule sustained-release 24 hr extended release capsule, Take 1 capsule by mouth Daily., Disp: 90 capsule, Rfl: 1  •  metFORMIN (GLUCOPHAGE) 500 MG tablet, Take 250 mg by mouth 2 (Two) Times a Day With Meals., Disp: , Rfl:   •  Multiple Vitamins-Minerals (MULTIVITAMIN ADULT PO), Take  by mouth., Disp: , Rfl:   •  omeprazole OTC (PRILOSEC OTC) 20 MG EC tablet, Take 1 tablet by mouth Daily., Disp: 90 tablet, Rfl: 3  •  ondansetron (ZOFRAN) 4 MG tablet, Take 1 tablet by mouth Every 8 (Eight) Hours As Needed for Nausea or Vomiting., Disp: 20 tablet, Rfl: 1     Objective     History of Present Illness Zahra complains of nausea and vomiting that started last night.  HER-2 daughters are present today as well.  She ate Mexican food last night.  She does have a hiatal hernia.  She denies fever, chills, abdominal pain, or diarrhea.  She try to take her medicines this morning but threw them up.  She has history of heart failure and is on Bumex and also diabetes and is on metformin.    Review of Systems   Constitutional: Negative for fever.   Gastrointestinal: Positive for nausea and vomiting. Negative for abdominal pain.   Psychiatric/Behavioral: Negative.         Physical Exam   Constitutional: She appears well-developed and well-nourished.   Elderly female who is alert and cooperative.   HENT:   Mucous membranes are moist   Cardiovascular: Normal rate, regular rhythm and normal heart sounds.   Pulmonary/Chest: Effort normal and breath sounds normal.   Abdominal: Soft.   Bowel sounds are increased.  Abdomen is soft and nontender   Musculoskeletal: She exhibits no edema.   Neurological: She is alert.   Psychiatric: She has a normal mood and affect. Her behavior is normal. Judgment and thought content normal.   Vitals reviewed.      ASSESSMENT     Problem List Items Addressed This Visit        Digestive    Nausea and vomiting - Primary          PLAN  Patient Instructions   Symptoms and examination are most consistent with gastroenteritis.  Suggest Gatorade and Zofran 4 mg as prescribed.  Start brat diet when better and advance diet as tolerated.  Suggest holding metformin and Bumex until p.o. intake is adequate.    Return if symptoms worsen or fail to improve.

## 2019-04-08 ENCOUNTER — TELEPHONE (OUTPATIENT)
Dept: FAMILY MEDICINE CLINIC | Facility: CLINIC | Age: 84
End: 2019-04-08

## 2019-04-08 RX ORDER — LANCETS 28 GAUGE
EACH MISCELLANEOUS
Qty: 100 EACH | Refills: 5 | Status: SHIPPED | OUTPATIENT
Start: 2019-04-08 | End: 2019-09-09

## 2019-04-08 NOTE — TELEPHONE ENCOUNTER
----- Message from Fara Castillo sent at 4/8/2019  8:58 AM EDT -----  Contact: PT'S DAUGHTER BELINDA [phone]  PT'S DAUGHTER BELINDA CALL AND SAID PT USED HER LAST LANCET TODAY AND NEED A REFILL SENT TO     [facility], [phone]    ULTRA THIN LANCETS 30 G PT TEST 1 TIME PER DAY     PT'S DAUGHTER BELINDA [phone]

## 2019-04-11 ENCOUNTER — TELEPHONE (OUTPATIENT)
Dept: FAMILY MEDICINE CLINIC | Facility: CLINIC | Age: 84
End: 2019-04-11

## 2019-04-11 NOTE — TELEPHONE ENCOUNTER
Called daughter and notified her not to take anything OTC since she has an appointment tomorrow. She understood.     ----- Message from Evita Larry sent at 4/11/2019  9:42 AM EDT -----  PTS DAUGHTER CALLED AND SAID THAT SHAILA IS POSSIBLE CATCHING A COLD AND WANTED TO KNOW WHAT DR SAMUEL THINKS SHE SHOULD TAKE OVER THE COUNTER. SHE IS COMING ITOMORROW AS WELL    PLEASE CALL DAUGHTER MONROE BACK - [phone]

## 2019-04-12 ENCOUNTER — OFFICE VISIT (OUTPATIENT)
Dept: FAMILY MEDICINE CLINIC | Facility: CLINIC | Age: 84
End: 2019-04-12

## 2019-04-12 VITALS
BODY MASS INDEX: 25.27 KG/M2 | OXYGEN SATURATION: 98 % | HEART RATE: 64 BPM | DIASTOLIC BLOOD PRESSURE: 58 MMHG | HEIGHT: 64 IN | TEMPERATURE: 98.5 F | SYSTOLIC BLOOD PRESSURE: 122 MMHG | WEIGHT: 148 LBS | RESPIRATION RATE: 16 BRPM

## 2019-04-12 DIAGNOSIS — J40 BRONCHITIS: Primary | ICD-10-CM

## 2019-04-12 PROCEDURE — 99214 OFFICE O/P EST MOD 30 MIN: CPT | Performed by: INTERNAL MEDICINE

## 2019-04-12 RX ORDER — AZITHROMYCIN 250 MG/1
TABLET, FILM COATED ORAL
Qty: 6 TABLET | Refills: 0 | Status: SHIPPED | OUTPATIENT
Start: 2019-04-12 | End: 2019-06-18

## 2019-04-12 NOTE — PATIENT INSTRUCTIONS
Drink plenty of fluids and start azithromycin as instructed.  If symptoms continue suggest loratadine 10 mg daily.

## 2019-04-12 NOTE — PROGRESS NOTES
Subjective   Mary Shi is a 89 y.o. female. Patient is here today for   Chief Complaint   Patient presents with   • Cough     congestion, runny nose x 2 days           Vitals:    04/12/19 1121   BP: 122/58   Pulse: 64   Resp: 16   Temp: 98.5 °F (36.9 °C)   SpO2: 98%     The following portions of the patient's history were reviewed and updated as appropriate: allergies, current medications, past family history, past medical history, past social history, past surgical history and problem list.    Past Medical History:   Diagnosis Date   • Amaurosis fugax    • Anemia    • Aortic stenosis     s/p TAVR    • Carotid artery stenosis    • Cognitive disorder    • Congestive heart failure (CMS/HCC)    • Dementia    • Diabetes mellitus (CMS/HCC)    • Diastolic dysfunction    • Dyspnea on exertion    • Generalized osteoarthritis of multiple sites    • GERD (gastroesophageal reflux disease)    • Gout    • Health care maintenance    • Hiatal hernia    • Hyperlipidemia    • Hypertension    • Mitral valve stenosis     Moderate per echocardiogram 2/2017; severe mitral annular calcification   • Nasal lesion    • Nasal stenosis    • Osteoarthritis     multiple sites   • PONV (postoperative nausea and vomiting)    • Varicose veins       Allergies   Allergen Reactions   • Codeine Itching and GI Intolerance   • Morphine And Related Itching      Social History     Socioeconomic History   • Marital status:      Spouse name: Not on file   • Number of children: Not on file   • Years of education: Not on file   • Highest education level: Not on file   Tobacco Use   • Smoking status: Never Smoker   • Smokeless tobacco: Never Used   • Tobacco comment: caffeine use   Substance and Sexual Activity   • Alcohol use: No     Comment: rare -once every 2-3 months   • Drug use: No   • Sexual activity: Defer        Current Outpatient Medications:   •  aspirin 81 MG EC tablet, Take 81 mg by mouth Daily. HOLD PER MD INSTR, Disp: , Rfl:   •   atenolol (TENORMIN) 25 MG tablet, TAKE 1 TABLET BY MOUTH DAILY, Disp: 90 tablet, Rfl: 0  •  bumetanide (BUMEX) 0.5 MG tablet, TAKE ONE TABLET BY MOUTH DAILY, Disp: 90 tablet, Rfl: 1  •  glucose blood test strip, Patient uses TRUE METRIX TEST STRIPS 2 TIMES DAILY., Disp: 200 each, Rfl: 11  •  Lancets (FREESTYLE) lancets, ULTRA FINE LANCETS 30G. USE TO TEST BLOOD SUGAR ONCE DAILY., Disp: 100 each, Rfl: 5  •  memantine (NAMENDA XR) 28 MG capsule sustained-release 24 hr extended release capsule, Take 1 capsule by mouth Daily., Disp: 90 capsule, Rfl: 1  •  metFORMIN (GLUCOPHAGE) 500 MG tablet, Take 250 mg by mouth 2 (Two) Times a Day With Meals., Disp: , Rfl:   •  Multiple Vitamins-Minerals (MULTIVITAMIN ADULT PO), Take  by mouth., Disp: , Rfl:   •  omeprazole OTC (PRILOSEC OTC) 20 MG EC tablet, Take 1 tablet by mouth Daily., Disp: 90 tablet, Rfl: 3  •  ondansetron (ZOFRAN) 4 MG tablet, Take 1 tablet by mouth Every 8 (Eight) Hours As Needed for Nausea or Vomiting., Disp: 20 tablet, Rfl: 1  •  azithromycin (ZITHROMAX) 250 MG tablet, Take 2 tablets the first day, then 1 tablet daily for 4 days., Disp: 6 tablet, Rfl: 0     Objective     History of Present Illness Zahra is here today with her daughter.  She complains of nasal congestion, postnasal drip, and cough that started 2 days ago.  She does not feel bad.  She denies fever, chills, shortness of breath.  She has no history of allergies.  She had similar symptoms last November and was treated for bronchitis.    Review of Systems   Constitutional: Negative for fatigue and fever.   HENT: Positive for congestion and postnasal drip. Negative for sneezing and sore throat.    Respiratory: Positive for cough. Negative for shortness of breath and wheezing.    Musculoskeletal: Negative for myalgias.   Neurological: Negative for weakness and headaches.       Physical Exam   Constitutional: She appears well-developed and well-nourished.   HENT:   Right Ear: Hearing normal.    Left Ear: Hearing normal.   Nose: Nose normal.   Mouth/Throat: Oropharynx is clear and moist.   Cardiovascular: S1 normal and S2 normal. An irregular rhythm present.   Pulmonary/Chest: Effort normal. She has no wheezes. She has no rales.   Psychiatric: She has a normal mood and affect. Her behavior is normal. Judgment and thought content normal.   Vitals reviewed.      ASSESSMENT     Problem List Items Addressed This Visit        Respiratory    Bronchitis - Primary          PLAN  Patient Instructions   Drink plenty of fluids and start azithromycin as instructed.  If symptoms continue suggest loratadine 10 mg daily.    No Follow-up on file.

## 2019-04-26 RX ORDER — MEMANTINE HYDROCHLORIDE 28 MG/1
CAPSULE, EXTENDED RELEASE ORAL
Qty: 90 CAPSULE | Refills: 0 | Status: SHIPPED | OUTPATIENT
Start: 2019-04-26 | End: 2019-07-19 | Stop reason: SDUPTHER

## 2019-04-26 RX ORDER — ATENOLOL 25 MG/1
TABLET ORAL
Qty: 90 TABLET | Refills: 0 | OUTPATIENT
Start: 2019-04-26

## 2019-04-26 RX ORDER — BUMETANIDE 0.5 MG/1
TABLET ORAL
Qty: 90 TABLET | Refills: 0 | Status: SHIPPED | OUTPATIENT
Start: 2019-04-26 | End: 2019-06-16 | Stop reason: SDUPTHER

## 2019-04-26 RX ORDER — ATENOLOL 25 MG/1
25 TABLET ORAL DAILY
Qty: 90 TABLET | Refills: 0 | Status: SHIPPED | OUTPATIENT
Start: 2019-04-26 | End: 2019-07-25 | Stop reason: SDUPTHER

## 2019-05-21 ENCOUNTER — OFFICE VISIT (OUTPATIENT)
Dept: CARDIOLOGY | Facility: CLINIC | Age: 84
End: 2019-05-21

## 2019-05-21 VITALS
HEART RATE: 64 BPM | SYSTOLIC BLOOD PRESSURE: 120 MMHG | DIASTOLIC BLOOD PRESSURE: 66 MMHG | HEIGHT: 64 IN | BODY MASS INDEX: 24.96 KG/M2 | WEIGHT: 146.2 LBS

## 2019-05-21 DIAGNOSIS — I10 SYSTOLIC HYPERTENSION: ICD-10-CM

## 2019-05-21 DIAGNOSIS — I50.33 ACUTE ON CHRONIC DIASTOLIC CONGESTIVE HEART FAILURE (HCC): ICD-10-CM

## 2019-05-21 DIAGNOSIS — I65.29 STENOSIS OF CAROTID ARTERY, UNSPECIFIED LATERALITY: ICD-10-CM

## 2019-05-21 DIAGNOSIS — Z95.2 S/P TAVR (TRANSCATHETER AORTIC VALVE REPLACEMENT): ICD-10-CM

## 2019-05-21 DIAGNOSIS — I05.0 RHEUMATIC MITRAL STENOSIS: Primary | ICD-10-CM

## 2019-05-21 PROBLEM — R11.2 NAUSEA AND VOMITING: Status: RESOLVED | Noted: 2019-03-15 | Resolved: 2019-05-21

## 2019-05-21 PROBLEM — M79.642 PAIN OF LEFT HAND: Status: RESOLVED | Noted: 2018-11-14 | Resolved: 2019-05-21

## 2019-05-21 PROBLEM — J40 BRONCHITIS: Status: RESOLVED | Noted: 2018-11-29 | Resolved: 2019-05-21

## 2019-05-21 PROBLEM — R09.02 HYPOXEMIA: Status: RESOLVED | Noted: 2018-06-23 | Resolved: 2019-05-21

## 2019-05-21 PROCEDURE — 93000 ELECTROCARDIOGRAM COMPLETE: CPT | Performed by: NURSE PRACTITIONER

## 2019-05-21 PROCEDURE — 99214 OFFICE O/P EST MOD 30 MIN: CPT | Performed by: NURSE PRACTITIONER

## 2019-05-21 NOTE — PROGRESS NOTES
Date of Office Visit: 2019  Encounter Provider: RAYRAY Carranza  Place of Service: [facility] CARDIOLOGY  Patient Name: Mary Shi  :3/3/1925      Chief Complaint   Patient presents with   • Congestive Heart Failure   :     Dear Dr. Rubio,     HPI: Mary Shi is a pleasant 89 y.o. female who presents today for cardiac follow up. In , she was diagnosed with aortic stenosis and mitral stenosis per echocardiogram.  She continued to have echocardiograms throughout the years.  In 2016, she underwent TAVR with a #23 Rubina valve.     She was hospitalized in 2018 for shortness of breath due to acute on chronic diastolic heart failure and high sodium diet.  She had a repeat echocardiogram  which revealed an EF of 60%, severe left atrial enlargement, normal functioning aortic valve, mild to moderate tricuspid insufficiency, RVSP elevated at 55 mmHg, severe mitral annular calcification, mild mitral valve regurgitation, and severe mitral valve stenosis. She underwent IV diuresis and was transitioned back to oral bumetanide. She was last seen in our office in 2018 by Dr. Mcmahan and was recommended to avoid salt because of her elevated blood pressure.    She presents today for a six-month follow-up visit with her daughter accompanying her.  She still lives independently and her family visits with her about 4 hours a day.  Her biggest complaint is fatigue but this is not changed since her last visit.  She denies chest pain, shortness of air, PND, orthopnea, cough, edema, palpitations, dizziness, or syncope.  Her blood pressure and heart rate are both normal.  She had a CMP completed in 2018 which showed normal kidney function and potassium since she remains on bumetanide.    Past Medical History:   Diagnosis Date   • Amaurosis fugax    • Anemia    • Aortic stenosis     s/p TAVR    • Carotid artery stenosis     Per  duplex 12/16/2016-proximal right internal carotid artery mild stenosis and proximal left moderate stenosis   • Cognitive disorder    • Congestive heart failure (CMS/HCC)    • Dementia    • Diabetes mellitus (CMS/HCC)    • Diastolic dysfunction    • Dyspnea on exertion    • Generalized osteoarthritis of multiple sites    • GERD (gastroesophageal reflux disease)    • Gout    • Health care maintenance    • Hiatal hernia    • Hyperlipidemia    • Hypertension    • Mitral valve stenosis     Moderate per echocardiogram 2/2017; severe mitral annular calcification   • Nasal lesion    • Nasal stenosis    • Osteoarthritis     multiple sites   • PONV (postoperative nausea and vomiting)    • Systolic hypertension    • Varicose veins        Past Surgical History:   Procedure Laterality Date   • AORTIC VALVE REPAIR/REPLACEMENT N/A 12/27/2016    Procedure: Transfemoral LEFT Transcatheter Aortic Valve Replacement TRANSESOPHAGEAL ECHOCARDIOGRAM WITH ANESTHESIA;  Surgeon: Eduardo Brown MD;  Location: [facility] OR 18/19;  Service:    • BLADDER REPAIR     • CARDIAC CATHETERIZATION N/A 12/14/2016    Procedure: Right Heart Cath;  Surgeon: Eduardo Brown MD;  Location: [facility] INVASIVE LOCATION;  Service:    • CARDIAC CATHETERIZATION N/A 12/14/2016    Procedure: Coronary angiography;  Surgeon: Eduardo Brown MD;  Location: [facility] INVASIVE LOCATION;  Service:    • HYSTERECTOMY     • KNEE SURGERY         Social History     Socioeconomic History   • Marital status:      Spouse name: Not on file   • Number of children: Not on file   • Years of education: Not on file   • Highest education level: Not on file   Tobacco Use   • Smoking status: Never Smoker   • Smokeless tobacco: Never Used   Substance and Sexual Activity   • Alcohol use: No     Comment: caffeine use   • Drug use: No   • Sexual activity: Defer       Family History   Problem Relation Age of Onset   • Heart disease Mother    • Coronary artery disease Mother     • Hyperlipidemia Mother    • Hypertension Mother    • Cancer Sister    • Cancer Brother    • Diabetes Daughter    • Diabetes Daughter    • Cancer Sister    • Cancer Sister    • Cancer Sister    • Cancer Other        The following portion of the patient's history were reviewed and updated as appropriate: past medical history, past surgical history, past social history, past family history, allergies, current medications, and problem list.    Review of Systems   Constitution: Positive for malaise/fatigue. Negative for chills, diaphoresis, fever, night sweats, weight gain and weight loss.   HENT: Negative for hearing loss, nosebleeds, sore throat and tinnitus.    Eyes: Positive for blurred vision. Negative for double vision, pain and visual disturbance.        Dry eyes   Cardiovascular: Negative for chest pain, claudication, cyanosis, dyspnea on exertion, irregular heartbeat, leg swelling, near-syncope, orthopnea, palpitations, paroxysmal nocturnal dyspnea and syncope.   Respiratory: Positive for shortness of breath. Negative for cough, hemoptysis, snoring and wheezing.    Endocrine: Negative for cold intolerance, heat intolerance and polyuria.   Hematologic/Lymphatic: Negative for bleeding problem. Does not bruise/bleed easily.   Skin: Negative for color change, dry skin, flushing and itching.   Musculoskeletal: Negative for falls, joint pain, joint swelling, muscle cramps, muscle weakness and myalgias.   Gastrointestinal: Negative for abdominal pain, constipation, heartburn, melena, nausea and vomiting.   Genitourinary: Negative for dysuria and hematuria.   Neurological: Negative for excessive daytime sleepiness, dizziness, light-headedness, loss of balance, numbness, paresthesias, seizures and vertigo.   Psychiatric/Behavioral: Negative for altered mental status, depression, memory loss and substance abuse. The patient does not have insomnia and is not nervous/anxious.    Allergic/Immunologic: Negative for  "environmental allergies.       Allergies   Allergen Reactions   • Codeine Itching and GI Intolerance   • Morphine And Related Itching         Current Outpatient Medications:   •  aspirin 81 MG EC tablet, Take 81 mg by mouth Daily. HOLD PER MD CALLOWAY, Disp: , Rfl:   •  atenolol (TENORMIN) 25 MG tablet, TAKE 1 TABLET BY MOUTH DAILY, Disp: 90 tablet, Rfl: 0  •  bumetanide (BUMEX) 0.5 MG tablet, TAKE ONE TABLET BY MOUTH DAILY, Disp: 90 tablet, Rfl: 0  •  glucose blood test strip, Patient uses TRUE METRIX TEST STRIPS 2 TIMES DAILY., Disp: 200 each, Rfl: 11  •  Lancets (FREESTYLE) lancets, ULTRA FINE LANCETS 30G. USE TO TEST BLOOD SUGAR ONCE DAILY., Disp: 100 each, Rfl: 5  •  memantine (NAMENDA XR) 28 MG capsule sustained-release 24 hr extended release capsule, TAKE ONE CAPSULE BY MOUTH DAILY, Disp: 90 capsule, Rfl: 0  •  metFORMIN (GLUCOPHAGE) 500 MG tablet, Take 250 mg by mouth 2 (Two) Times a Day With Meals., Disp: , Rfl:   •  Multiple Vitamins-Minerals (MULTIVITAMIN ADULT PO), Take  by mouth., Disp: , Rfl:   •  omeprazole OTC (PRILOSEC OTC) 20 MG EC tablet, Take 1 tablet by mouth Daily., Disp: 90 tablet, Rfl: 3  •  atenolol (TENORMIN) 25 MG tablet, TAKE 1 TABLET BY MOUTH DAILY, Disp: 90 tablet, Rfl: 0  •  azithromycin (ZITHROMAX) 250 MG tablet, Take 2 tablets the first day, then 1 tablet daily for 4 days., Disp: 6 tablet, Rfl: 0  •  ondansetron (ZOFRAN) 4 MG tablet, Take 1 tablet by mouth Every 8 (Eight) Hours As Needed for Nausea or Vomiting., Disp: 20 tablet, Rfl: 1        Objective:     Vitals:    05/21/19 1239   BP: 120/66   BP Location: Left arm   Pulse: 64   Weight: 66.3 kg (146 lb 3.2 oz)   Height: 162.6 cm (64\")     Body mass index is 25.1 kg/m².    PHYSICAL EXAM:    Vitals Reviewed.   General Appearance: No acute distress, well developed and well nourished.   Eyes: Conjunctiva and lids: No erythema, swelling, or discharge. Sclera non-icteric.  Wears glasses  HENT: Atraumatic, normocephalic. External eyes, " ears, and nose normal.  Hearing loss noted. Mucous membranes normal. Lips not cyanotic. Neck supple with no tenderness.  Respiratory: No signs of respiratory distress. Respiration rhythm and depth normal.   Clear to auscultation. No rales, crackles, rhonchi, or wheezing auscultated.   Cardiovascular:  Jugular Venous Pressure: Normal  Heart Rate and Rhythm: Normal, Heart Sounds: Normal S1 and S2. No S3 or S4 noted.  Murmurs: No murmurs noted. No rubs, thrills, or gallops.   Arterial Pulses: Carotid pulses normal. No carotid bruit noted. Posterior tibialis and dorsalis pedis pulses normal.   Lower Extremities: No edema noted.  Gastrointestinal:  Abdomen soft, non-distended, non-tender. Normal bowel sounds. No hepatomegaly.   Musculoskeletal: Normal movement of extremities  Skin and Nails: General appearance normal. No pallor, cyanosis, diaphoresis. Skin temperature normal. No clubbing of fingernails.   Psychiatric: Patient alert and oriented to person, place, and time. Speech and behavior appropriate. Normal mood and affect.       ECG 12 Lead  Date/Time: 5/21/2019 12:47 PM  Performed by: Marylin Conway APRN  Authorized by: Marylin Conway APRN   Comparison: compared with previous ECG from 11/20/2018  Similar to previous ECG  Rhythm: sinus rhythm  Rate: normal  BPM: 64  Conduction: 1st degree AV block  Q waves: V1, V2 and V3    ST Segments: ST segments normal  T Waves: T waves normal  QRS axis: normal    Clinical impression: abnormal EKG              Assessment:       Diagnosis Plan   1. Rheumatic mitral stenosis     2. S/P TAVR (transcatheter aortic valve replacement)     3. Acute on chronic diastolic congestive heart failure (CMS/HCC)     4. Systolic hypertension     5. Stenosis of carotid artery, unspecified laterality            Plan:       1.   Mitral Valve Stenosis: Noted to be severe per echocardiogram 6/2018.  She denies symptoms of worsening valvular function.  I discussed with Dr. Mcmahan and we do not  feel that we need to repeat an echocardiogram at this time.    2.  Aortic Stenosis: Status post TAVR and has done well.  Echocardiogram 6/2018 showed normal functioning aortic valve.    3. Diastolic Heart Failure: She was hospitalized with diastolic heart failure in June 2018 and was due to high sodium intake. She avoids salt now and is well controlled on bumetanide.    Heart Failure  Assessment  • NYHA class II - There is slight limitation of physical activity. The patient is comfortable at rest, but physical activity results in fatigue, palpitations or shortness of breath.  • Beta blocker prescribed  • Diuretics prescribed    Plan  • The patient has received heart failure education on the following topics: dietary sodium restriction, medication instructions, symptom management, physical activity and weight monitoring  • The heart failure care plan was discussed with the patient today including: continuing the current program    Subjective/Objective  • The patient reports dyspnea        4.  Hypertension: Her blood pressure is under good control.    5.  Carotid Artery Stenosis: Per duplex 12/16/2016-proximal right internal carotid artery mild stenosis and proximal left moderate stenosis.    5.  I have recommended follow-up with Dr. Elissa Mcmahan in 6 months, unless otherwise needed sooner.    As always, it has been a pleasure to participate in your patient's care. Thank you.       Sincerely,         RAYRAY Ramos        **Dragon Disclaimer:**  Much of this encounter note is an electronic transcription/translation of spoken language to printed text. The electronic translation of spoken language may permit erroneous, or at times, nonsensical words or phrases to be inadvertently transcribed. Although I have reviewed the note for such errors, some may still exist.

## 2019-06-10 ENCOUNTER — TELEPHONE (OUTPATIENT)
Dept: FAMILY MEDICINE CLINIC | Facility: CLINIC | Age: 84
End: 2019-06-10

## 2019-06-11 ENCOUNTER — TELEPHONE (OUTPATIENT)
Dept: FAMILY MEDICINE CLINIC | Facility: CLINIC | Age: 84
End: 2019-06-11

## 2019-06-11 NOTE — TELEPHONE ENCOUNTER
Prescription sent and lab faxed  ----- Message from Morelia Brandt sent at 6/7/2019  9:49 AM EDT -----  NEEDS RX FOR METFORMIN 500MG  BID # [id number]       PLEASE SEND TO [facility]     PLEASE CALL PT WITH QUESTIONS     [phone]      PT SAYS SHE IS GETTING NEUROPATHY IN HER FEET AND IS WANTING TO WHAT OPTIONS DOES THE PT HAVE ? SAYS SHE CAN BARELY WALK WITH THE NEUROPATHY       ALSO NEEDS LAB ORDER SENT TO [facility] IN [place]  ( SHE IS GETTING HER LABS THERE )

## 2019-06-12 LAB
ALBUMIN SERPL-MCNC: 4.2 G/DL (ref 3.2–4.6)
ALBUMIN/GLOB SERPL: 2 {RATIO} (ref 1.2–2.2)
ALP SERPL-CCNC: 111 IU/L (ref 39–117)
ALT SERPL-CCNC: 14 IU/L (ref 0–32)
AST SERPL-CCNC: 15 IU/L (ref 0–40)
BASOPHILS # BLD AUTO: 0 X10E3/UL (ref 0–0.2)
BASOPHILS NFR BLD AUTO: 0 %
BILIRUB SERPL-MCNC: 0.3 MG/DL (ref 0–1.2)
BUN SERPL-MCNC: 24 MG/DL (ref 10–36)
BUN/CREAT SERPL: 35 (ref 12–28)
CALCIUM SERPL-MCNC: 9.4 MG/DL (ref 8.7–10.3)
CHLORIDE SERPL-SCNC: 101 MMOL/L (ref 96–106)
CHOLEST SERPL-MCNC: 155 MG/DL (ref 100–199)
CO2 SERPL-SCNC: 25 MMOL/L (ref 20–29)
CREAT SERPL-MCNC: 0.69 MG/DL (ref 0.57–1)
EOSINOPHIL # BLD AUTO: 0 X10E3/UL (ref 0–0.4)
EOSINOPHIL NFR BLD AUTO: 0 %
ERYTHROCYTE [DISTWIDTH] IN BLOOD BY AUTOMATED COUNT: 14.3 % (ref 12.3–15.4)
GLOBULIN SER CALC-MCNC: 2.1 G/DL (ref 1.5–4.5)
GLUCOSE SERPL-MCNC: 129 MG/DL (ref 65–99)
HBA1C MFR BLD: 6.4 % (ref 4.8–5.6)
HCT VFR BLD AUTO: 34.9 % (ref 34–46.6)
HDLC SERPL-MCNC: 31 MG/DL
HGB BLD-MCNC: 11.7 G/DL (ref 11.1–15.9)
IMM GRANULOCYTES # BLD AUTO: 0 X10E3/UL (ref 0–0.1)
IMM GRANULOCYTES NFR BLD AUTO: 0 %
LDLC SERPL CALC-MCNC: 60 MG/DL (ref 0–99)
LDLC/HDLC SERPL: 1.9 RATIO (ref 0–3.2)
LYMPHOCYTES # BLD AUTO: 2.3 X10E3/UL (ref 0.7–3.1)
LYMPHOCYTES NFR BLD AUTO: 33 %
MCH RBC QN AUTO: 28.7 PG (ref 26.6–33)
MCHC RBC AUTO-ENTMCNC: 33.5 G/DL (ref 31.5–35.7)
MCV RBC AUTO: 86 FL (ref 79–97)
MONOCYTES # BLD AUTO: 0.6 X10E3/UL (ref 0.1–0.9)
MONOCYTES NFR BLD AUTO: 8 %
NEUTROPHILS # BLD AUTO: 4.1 X10E3/UL (ref 1.4–7)
NEUTROPHILS NFR BLD AUTO: 59 %
PLATELET # BLD AUTO: 218 X10E3/UL (ref 150–450)
POTASSIUM SERPL-SCNC: 4.8 MMOL/L (ref 3.5–5.2)
PROT SERPL-MCNC: 6.3 G/DL (ref 6–8.5)
RBC # BLD AUTO: 4.07 X10E6/UL (ref 3.77–5.28)
SODIUM SERPL-SCNC: 140 MMOL/L (ref 134–144)
TRIGL SERPL-MCNC: 321 MG/DL (ref 0–149)
VLDLC SERPL CALC-MCNC: 64 MG/DL (ref 5–40)
WBC # BLD AUTO: 7.1 X10E3/UL (ref 3.4–10.8)

## 2019-06-17 RX ORDER — BUMETANIDE 0.5 MG/1
TABLET ORAL
Qty: 90 TABLET | Refills: 0 | Status: SHIPPED | OUTPATIENT
Start: 2019-06-17 | End: 2019-09-09

## 2019-06-18 ENCOUNTER — OFFICE VISIT (OUTPATIENT)
Dept: FAMILY MEDICINE CLINIC | Facility: CLINIC | Age: 84
End: 2019-06-18

## 2019-06-18 VITALS
BODY MASS INDEX: 24.92 KG/M2 | HEART RATE: 68 BPM | SYSTOLIC BLOOD PRESSURE: 115 MMHG | DIASTOLIC BLOOD PRESSURE: 60 MMHG | RESPIRATION RATE: 16 BRPM | HEIGHT: 64 IN | WEIGHT: 146 LBS | OXYGEN SATURATION: 94 %

## 2019-06-18 DIAGNOSIS — G63 POLYNEUROPATHY ASSOCIATED WITH UNDERLYING DISEASE (HCC): ICD-10-CM

## 2019-06-18 DIAGNOSIS — E78.49 OTHER HYPERLIPIDEMIA: Primary | ICD-10-CM

## 2019-06-18 DIAGNOSIS — E11.59 TYPE 2 DIABETES MELLITUS WITH OTHER CIRCULATORY COMPLICATION, WITHOUT LONG-TERM CURRENT USE OF INSULIN (HCC): ICD-10-CM

## 2019-06-18 DIAGNOSIS — I10 SYSTOLIC HYPERTENSION: ICD-10-CM

## 2019-06-18 PROCEDURE — 99214 OFFICE O/P EST MOD 30 MIN: CPT | Performed by: INTERNAL MEDICINE

## 2019-06-18 NOTE — PATIENT INSTRUCTIONS
Blood pressure is normal.  Fasting blood sugar is mildly elevated at 129 and hemoglobin A1c is stable at 6.4.  Total cholesterol is normal but triglycerides are high.  Kidney and liver functions are normal.  Complete blood count is normal.  To new current diet and medicines.  Suggest taking Tylenol extra strength for neuropathy symptoms but do not exceed 3000 mg daily.

## 2019-06-18 NOTE — PROGRESS NOTES
Subjective   Mary Shi is a 89 y.o. female. Patient is here today for   Chief Complaint   Patient presents with   • Hypertension     lab f/u    • Hyperlipidemia   • Diabetes          Vitals:    06/18/19 1015   BP: 115/60   Pulse: 68   Resp: 16   SpO2: 94%       The following portions of the patient's history were reviewed and updated as appropriate: allergies, current medications, past family history, past medical history, past social history, past surgical history and problem list.    Past Medical History:   Diagnosis Date   • Amaurosis fugax    • Anemia    • Aortic stenosis     s/p TAVR    • Carotid artery stenosis     Per duplex 12/16/2016-proximal right internal carotid artery mild stenosis and proximal left moderate stenosis   • Cognitive disorder    • Congestive heart failure (CMS/HCC)    • Dementia    • Diabetes mellitus (CMS/HCC)    • Diastolic dysfunction    • Dyspnea on exertion    • Generalized osteoarthritis of multiple sites    • GERD (gastroesophageal reflux disease)    • Gout    • Health care maintenance    • Hiatal hernia    • Hyperlipidemia    • Hypertension    • Mitral valve stenosis     Moderate per echocardiogram 2/2017; severe mitral annular calcification   • Nasal lesion    • Nasal stenosis    • Osteoarthritis     multiple sites   • PONV (postoperative nausea and vomiting)    • Systolic hypertension    • Varicose veins       Allergies   Allergen Reactions   • Codeine Itching and GI Intolerance   • Morphine And Related Itching      Social History     Socioeconomic History   • Marital status:      Spouse name: Not on file   • Number of children: Not on file   • Years of education: Not on file   • Highest education level: Not on file   Tobacco Use   • Smoking status: Never Smoker   • Smokeless tobacco: Never Used   Substance and Sexual Activity   • Alcohol use: No     Comment: caffeine use   • Drug use: No   • Sexual activity: Defer        Current Outpatient Medications:   •   aspirin 81 MG EC tablet, Take 81 mg by mouth Daily. HOLD PER MD INSTR, Disp: , Rfl:   •  atenolol (TENORMIN) 25 MG tablet, TAKE 1 TABLET BY MOUTH DAILY, Disp: 90 tablet, Rfl: 0  •  atenolol (TENORMIN) 25 MG tablet, TAKE 1 TABLET BY MOUTH DAILY, Disp: 90 tablet, Rfl: 0  •  bumetanide (BUMEX) 0.5 MG tablet, TAKE ONE TABLET BY MOUTH DAILY, Disp: 90 tablet, Rfl: 0  •  glucose blood test strip, Patient uses TRUE METRIX TEST STRIPS 2 TIMES DAILY., Disp: 200 each, Rfl: 11  •  Lancets (FREESTYLE) lancets, ULTRA FINE LANCETS 30G. USE TO TEST BLOOD SUGAR ONCE DAILY., Disp: 100 each, Rfl: 5  •  memantine (NAMENDA XR) 28 MG capsule sustained-release 24 hr extended release capsule, TAKE ONE CAPSULE BY MOUTH DAILY, Disp: 90 capsule, Rfl: 0  •  metFORMIN (GLUCOPHAGE) 500 MG tablet, Take 0.5 tablets by mouth 2 (Two) Times a Day With Meals., Disp: 180 tablet, Rfl: 1  •  Multiple Vitamins-Minerals (MULTIVITAMIN ADULT PO), Take  by mouth., Disp: , Rfl:   •  omeprazole OTC (PRILOSEC OTC) 20 MG EC tablet, Take 1 tablet by mouth Daily., Disp: 90 tablet, Rfl: 3     Objective   History of Present Illness Zahra is here today with her daughter for a blood pressure and lab follow-up.  She has hypertension, type 2 diabetes mellitus, mild dementia, carotid artery stenosis, and history of heart failure.  She also is status post TAVR.  She feels well.  Her daughters make sure she eats healthy.  She tries to be physically active and does use a cane when she walks.  She does complain of some burning and stinging of both knees and sometimes both feet.  She has had both knees replaced twice.    Review of Systems   Constitutional: Negative for activity change and unexpected weight change.   Respiratory: Negative.    Cardiovascular: Negative.    Musculoskeletal:        As in HPI   Neurological: Negative.    Psychiatric/Behavioral: Negative.        Physical Exam   Constitutional: She appears well-developed and well-nourished.   Cardiovascular:  Normal rate, regular rhythm and normal heart sounds.   Pulmonary/Chest: Effort normal and breath sounds normal.   Musculoskeletal: She exhibits no edema.   Neurological: She is alert.   Psychiatric: She has a normal mood and affect. Her behavior is normal. Judgment and thought content normal.   Vitals reviewed.      ASSESSMENT     Problem List Items Addressed This Visit        Cardiovascular and Mediastinum    Hyperlipidemia - Primary    Systolic hypertension       Endocrine    Type 2 diabetes mellitus (CMS/HCC)       Nervous and Auditory    Polyneuropathy associated with underlying disease (CMS/HCC)          PLAN  Patient Instructions   Blood pressure is normal.  Fasting blood sugar is mildly elevated at 129 and hemoglobin A1c is stable at 6.4.  Total cholesterol is normal but triglycerides are high.  Kidney and liver functions are normal.  Complete blood count is normal.  To new current diet and medicines.  Suggest taking Tylenol extra strength for neuropathy symptoms but do not exceed 3000 mg daily.      Return in about 6 months (around 12/18/2019) for labsBMP,A1C.

## 2019-07-19 RX ORDER — MEMANTINE HYDROCHLORIDE 28 MG/1
CAPSULE, EXTENDED RELEASE ORAL
Qty: 90 CAPSULE | Refills: 0 | Status: SHIPPED | OUTPATIENT
Start: 2019-07-19 | End: 2019-09-09

## 2019-07-25 RX ORDER — ATENOLOL 25 MG/1
TABLET ORAL
Qty: 90 TABLET | Refills: 0 | Status: SHIPPED | OUTPATIENT
Start: 2019-07-25 | End: 2019-09-09

## 2019-09-03 DIAGNOSIS — G63 POLYNEUROPATHY ASSOCIATED WITH UNDERLYING DISEASE (HCC): Primary | ICD-10-CM

## 2019-09-03 DIAGNOSIS — M15.9 GENERALIZED OSTEOARTHRITIS: ICD-10-CM

## 2019-09-09 ENCOUNTER — APPOINTMENT (OUTPATIENT)
Dept: CT IMAGING | Facility: HOSPITAL | Age: 84
End: 2019-09-09

## 2019-09-09 ENCOUNTER — APPOINTMENT (OUTPATIENT)
Dept: GENERAL RADIOLOGY | Facility: HOSPITAL | Age: 84
End: 2019-09-09

## 2019-09-09 ENCOUNTER — HOSPITAL ENCOUNTER (INPATIENT)
Facility: HOSPITAL | Age: 84
LOS: 1 days | Discharge: HOME HEALTH | End: 2019-09-11
Attending: EMERGENCY MEDICINE | Admitting: INTERNAL MEDICINE

## 2019-09-09 DIAGNOSIS — I63.512 ACUTE ISCHEMIC LEFT MCA STROKE (HCC): ICD-10-CM

## 2019-09-09 DIAGNOSIS — I50.33 ACUTE ON CHRONIC DIASTOLIC CONGESTIVE HEART FAILURE (HCC): ICD-10-CM

## 2019-09-09 DIAGNOSIS — R41.82 ALTERED MENTAL STATUS, UNSPECIFIED ALTERED MENTAL STATUS TYPE: ICD-10-CM

## 2019-09-09 DIAGNOSIS — R53.1 WEAKNESS: Primary | ICD-10-CM

## 2019-09-09 LAB
ALBUMIN SERPL-MCNC: 4.1 G/DL (ref 3.5–5.2)
ALBUMIN/GLOB SERPL: 1.3 G/DL
ALP SERPL-CCNC: 89 U/L (ref 39–117)
ALT SERPL W P-5'-P-CCNC: 11 U/L (ref 1–33)
ANION GAP SERPL CALCULATED.3IONS-SCNC: 14 MMOL/L (ref 5–15)
ARTERIAL PATENCY WRIST A: POSITIVE
AST SERPL-CCNC: 18 U/L (ref 1–32)
ATMOSPHERIC PRESS: 750 MMHG
BASE EXCESS BLDA CALC-SCNC: 2.5 MMOL/L (ref 0–2)
BASOPHILS # BLD AUTO: 0.04 10*3/MM3 (ref 0–0.2)
BASOPHILS NFR BLD AUTO: 0.5 % (ref 0–1.5)
BDY SITE: ABNORMAL
BILIRUB SERPL-MCNC: 0.4 MG/DL (ref 0.2–1.2)
BILIRUB UR QL STRIP: NEGATIVE
BUN BLD-MCNC: 22 MG/DL (ref 8–23)
BUN/CREAT SERPL: 27.8 (ref 7–25)
CALCIUM SPEC-SCNC: 9.3 MG/DL (ref 8.2–9.6)
CHLORIDE SERPL-SCNC: 98 MMOL/L (ref 98–107)
CLARITY UR: CLEAR
CO2 SERPL-SCNC: 24 MMOL/L (ref 22–29)
COLOR UR: YELLOW
CREAT BLD-MCNC: 0.79 MG/DL (ref 0.57–1)
DEPRECATED RDW RBC AUTO: 42.7 FL (ref 37–54)
EOSINOPHIL # BLD AUTO: 0.03 10*3/MM3 (ref 0–0.4)
EOSINOPHIL NFR BLD AUTO: 0.4 % (ref 0.3–6.2)
ERYTHROCYTE [DISTWIDTH] IN BLOOD BY AUTOMATED COUNT: 13.6 % (ref 12.3–15.4)
GFR SERPL CREATININE-BSD FRML MDRD: 68 ML/MIN/1.73
GLOBULIN UR ELPH-MCNC: 3.1 GM/DL
GLUCOSE BLD-MCNC: 258 MG/DL (ref 65–99)
GLUCOSE BLDC GLUCOMTR-MCNC: 169 MG/DL (ref 70–130)
GLUCOSE UR STRIP-MCNC: NEGATIVE MG/DL
HCO3 BLDA-SCNC: 26.9 MMOL/L (ref 22–28)
HCT VFR BLD AUTO: 39.9 % (ref 34–46.6)
HGB BLD-MCNC: 12.8 G/DL (ref 12–15.9)
HGB UR QL STRIP.AUTO: NEGATIVE
IMM GRANULOCYTES # BLD AUTO: 0.03 10*3/MM3 (ref 0–0.05)
IMM GRANULOCYTES NFR BLD AUTO: 0.4 % (ref 0–0.5)
KETONES UR QL STRIP: NEGATIVE
LEUKOCYTE ESTERASE UR QL STRIP.AUTO: NEGATIVE
LYMPHOCYTES # BLD AUTO: 1.61 10*3/MM3 (ref 0.7–3.1)
LYMPHOCYTES NFR BLD AUTO: 19.5 % (ref 19.6–45.3)
MCH RBC QN AUTO: 27.8 PG (ref 26.6–33)
MCHC RBC AUTO-ENTMCNC: 32.1 G/DL (ref 31.5–35.7)
MCV RBC AUTO: 86.7 FL (ref 79–97)
MODALITY: ABNORMAL
MONOCYTES # BLD AUTO: 0.51 10*3/MM3 (ref 0.1–0.9)
MONOCYTES NFR BLD AUTO: 6.2 % (ref 5–12)
NEUTROPHILS # BLD AUTO: 6.02 10*3/MM3 (ref 1.7–7)
NEUTROPHILS NFR BLD AUTO: 73 % (ref 42.7–76)
NITRITE UR QL STRIP: NEGATIVE
NRBC BLD AUTO-RTO: 0.1 /100 WBC (ref 0–0.2)
NT-PROBNP SERPL-MCNC: 2611 PG/ML (ref 5–1800)
PCO2 BLDA: 40 MM HG (ref 35–45)
PH BLDA: 7.44 PH UNITS (ref 7.35–7.45)
PH UR STRIP.AUTO: <=5 [PH] (ref 5–8)
PLAT MORPH BLD: NORMAL
PLATELET # BLD AUTO: 141 10*3/MM3 (ref 140–450)
PMV BLD AUTO: 11.5 FL (ref 6–12)
PO2 BLDA: 63.4 MM HG (ref 80–100)
POTASSIUM BLD-SCNC: 4.2 MMOL/L (ref 3.5–5.2)
PROT SERPL-MCNC: 7.2 G/DL (ref 6–8.5)
PROT UR QL STRIP: NEGATIVE
RBC # BLD AUTO: 4.6 10*6/MM3 (ref 3.77–5.28)
RBC MORPH BLD: NORMAL
SAO2 % BLDCOA: 92.6 % (ref 92–99)
SODIUM BLD-SCNC: 136 MMOL/L (ref 136–145)
SP GR UR STRIP: 1.01 (ref 1–1.03)
TOTAL RATE: 16 BREATHS/MINUTE
TROPONIN T SERPL-MCNC: <0.01 NG/ML (ref 0–0.03)
UROBILINOGEN UR QL STRIP: NORMAL
WBC MORPH BLD: NORMAL
WBC NRBC COR # BLD: 8.24 10*3/MM3 (ref 3.4–10.8)

## 2019-09-09 PROCEDURE — G0378 HOSPITAL OBSERVATION PER HR: HCPCS

## 2019-09-09 PROCEDURE — 94799 UNLISTED PULMONARY SVC/PX: CPT

## 2019-09-09 PROCEDURE — 85025 COMPLETE CBC W/AUTO DIFF WBC: CPT | Performed by: NURSE PRACTITIONER

## 2019-09-09 PROCEDURE — 80053 COMPREHEN METABOLIC PANEL: CPT | Performed by: NURSE PRACTITIONER

## 2019-09-09 PROCEDURE — 36600 WITHDRAWAL OF ARTERIAL BLOOD: CPT

## 2019-09-09 PROCEDURE — 84484 ASSAY OF TROPONIN QUANT: CPT | Performed by: NURSE PRACTITIONER

## 2019-09-09 PROCEDURE — 99284 EMERGENCY DEPT VISIT MOD MDM: CPT

## 2019-09-09 PROCEDURE — 93005 ELECTROCARDIOGRAM TRACING: CPT | Performed by: NURSE PRACTITIONER

## 2019-09-09 PROCEDURE — 83880 ASSAY OF NATRIURETIC PEPTIDE: CPT | Performed by: NURSE PRACTITIONER

## 2019-09-09 PROCEDURE — 82962 GLUCOSE BLOOD TEST: CPT

## 2019-09-09 PROCEDURE — 93010 ELECTROCARDIOGRAM REPORT: CPT | Performed by: INTERNAL MEDICINE

## 2019-09-09 PROCEDURE — 70450 CT HEAD/BRAIN W/O DYE: CPT

## 2019-09-09 PROCEDURE — 71045 X-RAY EXAM CHEST 1 VIEW: CPT

## 2019-09-09 PROCEDURE — 81003 URINALYSIS AUTO W/O SCOPE: CPT | Performed by: NURSE PRACTITIONER

## 2019-09-09 PROCEDURE — 85007 BL SMEAR W/DIFF WBC COUNT: CPT | Performed by: NURSE PRACTITIONER

## 2019-09-09 PROCEDURE — P9612 CATHETERIZE FOR URINE SPEC: HCPCS

## 2019-09-09 PROCEDURE — 82803 BLOOD GASES ANY COMBINATION: CPT

## 2019-09-09 RX ORDER — PANTOPRAZOLE SODIUM 40 MG/1
40 TABLET, DELAYED RELEASE ORAL EVERY MORNING
Status: DISCONTINUED | OUTPATIENT
Start: 2019-09-10 | End: 2019-09-11 | Stop reason: HOSPADM

## 2019-09-09 RX ORDER — SODIUM CHLORIDE 0.9 % (FLUSH) 0.9 %
10 SYRINGE (ML) INJECTION AS NEEDED
Status: DISCONTINUED | OUTPATIENT
Start: 2019-09-09 | End: 2019-09-11 | Stop reason: HOSPADM

## 2019-09-09 RX ORDER — ATENOLOL 25 MG/1
25 TABLET ORAL DAILY
COMMUNITY
End: 2019-09-24 | Stop reason: SDUPTHER

## 2019-09-09 RX ORDER — DEXTROSE MONOHYDRATE 25 G/50ML
25 INJECTION, SOLUTION INTRAVENOUS
Status: DISCONTINUED | OUTPATIENT
Start: 2019-09-09 | End: 2019-09-11 | Stop reason: HOSPADM

## 2019-09-09 RX ORDER — ASPIRIN 81 MG/1
81 TABLET ORAL DAILY
Status: DISCONTINUED | OUTPATIENT
Start: 2019-09-10 | End: 2019-09-11

## 2019-09-09 RX ORDER — ACETAMINOPHEN 325 MG/1
650 TABLET ORAL EVERY 6 HOURS PRN
Status: DISCONTINUED | OUTPATIENT
Start: 2019-09-09 | End: 2019-09-11 | Stop reason: HOSPADM

## 2019-09-09 RX ORDER — ATENOLOL 25 MG/1
25 TABLET ORAL DAILY
Status: DISCONTINUED | OUTPATIENT
Start: 2019-09-10 | End: 2019-09-11 | Stop reason: HOSPADM

## 2019-09-09 RX ORDER — OMEPRAZOLE 20 MG/1
20 TABLET, DELAYED RELEASE ORAL DAILY
COMMUNITY
End: 2019-09-24 | Stop reason: SDUPTHER

## 2019-09-09 RX ORDER — NICOTINE POLACRILEX 4 MG
15 LOZENGE BUCCAL
Status: DISCONTINUED | OUTPATIENT
Start: 2019-09-09 | End: 2019-09-11 | Stop reason: HOSPADM

## 2019-09-09 RX ORDER — SENNOSIDES 8.6 MG
650 CAPSULE ORAL EVERY 8 HOURS PRN
COMMUNITY

## 2019-09-09 RX ORDER — MEMANTINE HYDROCHLORIDE 28 MG/1
28 CAPSULE, EXTENDED RELEASE ORAL DAILY
COMMUNITY
End: 2019-09-24 | Stop reason: SDUPTHER

## 2019-09-09 RX ORDER — MULTIPLE VITAMINS W/ MINERALS TAB 9MG-400MCG
1 TAB ORAL DAILY
Status: DISCONTINUED | OUTPATIENT
Start: 2019-09-10 | End: 2019-09-11 | Stop reason: HOSPADM

## 2019-09-09 RX ORDER — MULTIPLE VITAMINS W/ MINERALS TAB 9MG-400MCG
1 TAB ORAL DAILY
COMMUNITY

## 2019-09-09 RX ORDER — SODIUM CHLORIDE 0.9 % (FLUSH) 0.9 %
10 SYRINGE (ML) INJECTION EVERY 12 HOURS SCHEDULED
Status: DISCONTINUED | OUTPATIENT
Start: 2019-09-09 | End: 2019-09-11 | Stop reason: HOSPADM

## 2019-09-09 RX ORDER — BUMETANIDE 0.5 MG/1
0.5 TABLET ORAL DAILY
Status: DISCONTINUED | OUTPATIENT
Start: 2019-09-10 | End: 2019-09-10

## 2019-09-09 RX ORDER — BUMETANIDE 0.5 MG/1
0.5 TABLET ORAL DAILY
COMMUNITY
End: 2019-09-24 | Stop reason: SDUPTHER

## 2019-09-09 RX ORDER — MEMANTINE HYDROCHLORIDE 10 MG/1
10 TABLET ORAL EVERY 12 HOURS SCHEDULED
Status: DISCONTINUED | OUTPATIENT
Start: 2019-09-09 | End: 2019-09-11 | Stop reason: HOSPADM

## 2019-09-09 RX ADMIN — SODIUM CHLORIDE, PRESERVATIVE FREE 10 ML: 5 INJECTION INTRAVENOUS at 22:53

## 2019-09-09 RX ADMIN — MEMANTINE HYDROCHLORIDE 10 MG: 10 TABLET, FILM COATED ORAL at 22:52

## 2019-09-09 NOTE — ED TRIAGE NOTES
"Patient presents to er via ems from home.  Patient family called ems reporting altered mental status, decreased responsiveness.  Patient is alert and oriented at this time GCS 15,  Negative stroke scale. Patient states she has been experiencing \"feeling bad\" generalized weakness for several days.  States \"I just felt faint for a few minutes\"  Ambulatory on scene.    "

## 2019-09-09 NOTE — ED PROVIDER NOTES
"Patient presents to the ED with altered mental status and confusion. Per the pt's daughter, the pt has had intermittent speech difficulty,  will \"abruptly stop talking\", and has difficulty following commands intermittently. Pt denies chest pain, SOA, headache, or dysuria.The pt's daughter reports the patient's last known normal was earlier this morning. Pt has a hx of UTIs.      On exam,   GENERAL: not distressed  HENT: nares patent  EYES: no scleral icterus  CV: regular rate, soft systolic murmur at RUSB  RESPIRATORY: normal effort, CTAB  ABDOMEN: soft  MUSCULOSKELETAL: no deformity  NEURO: cranial nerves are intact, slowed cognition, difficulty following two step commands, intact finger-nose-finger  SKIN: warm, dry    Vital signs and nursing notes reviewed.      ED Course as of Sep 09 2235   Mon Sep 09, 2019   1335 I viewed and interpreted the EKG.  It was performed at 11:24 AM.  It shows atrial fibrillation.  Rate 73.  There is low voltage in the limb leads.  Normal axis.  There is nonspecific ST depression in the precordial leads, most notably in V3 through V5.  [TD]   1336 I reviewed old EKG that was performed on 6/23/2018.  This EKG showed a sinus rhythm.  There were no ST depressions at that time.  [TD]      ED Course User Index  [TD] Jonh De Santiago II, MD         I agree with the plan to admit the patient for further evaluation and management. Pt and family understand and agree with the plan. All questions have been answered.        MD ATTESTATION NOTE    The ROSA and I have discussed this patient's history, physical exam, and treatment plan.  I have reviewed the documentation and personally had a face to face interaction with the patient. I affirm the documentation and agree with the treatment and plan.  The attached note describes my personal findings.      Documentation assistance provided by marino Ramos for Dr. De Santiago.  Information recorded by the marino was done at my direction and has " been verified and validated by me.     Rosmery Ramos  09/09/19 1415       Nelsy Foster  09/09/19 1416       Jonh De Santiago II, MD  09/09/19 [id number]

## 2019-09-09 NOTE — PROGRESS NOTES
Clinical Pharmacy Services: Medication History    Mary Shi is a 89 y.o. female presenting to [facility] for   Chief Complaint   Patient presents with   • Weakness - Generalized       She  has a past medical history of 'light-for-dates' infant with signs of fetal malnutrition, Amaurosis fugax, Anemia, Aortic stenosis, Carotid artery stenosis, Cognitive disorder, Congestive heart failure (CMS/HCC), Dementia, Diabetes mellitus (CMS/HCC), Diastolic dysfunction, Dyspnea on exertion, Generalized osteoarthritis of multiple sites, GERD (gastroesophageal reflux disease), Gout, Health care maintenance, Hiatal hernia, Hyperlipidemia, Hypertension, Mitral valve stenosis, Nasal lesion, Nasal stenosis, Osteoarthritis, PONV (postoperative nausea and vomiting), Systolic hypertension, and Varicose veins.    Allergies as of 09/09/2019 - Reviewed 09/09/2019   Allergen Reaction Noted   • Codeine Itching and GI Intolerance 05/21/2016   • Morphine and related Itching 05/21/2016       Medication information was obtained from: Patient  Pharmacy and Phone Number: [facility] [phone]    Prior to Admission Medications     Prescriptions Last Dose Informant Patient Reported? Taking?    acetaminophen (TYLENOL) 650 MG 8 hr tablet  Family Member Yes Yes    Take 650 mg by mouth Every 8 (Eight) Hours As Needed for Mild Pain .    aspirin 81 MG EC tablet 9/9/2019 Family Member Yes Yes    Take 81 mg by mouth Daily.    atenolol (TENORMIN) 25 MG tablet 9/9/2019 Family Member Yes Yes    Take 25 mg by mouth Daily.    bumetanide (BUMEX) 0.5 MG tablet 9/9/2019 Family Member Yes Yes    Take 0.5 mg by mouth Daily.    memantine (NAMENDA XR) 28 MG capsule sustained-release 24 hr extended release capsule 9/9/2019 Family Member Yes Yes    Take 28 mg by mouth Daily.    metFORMIN (GLUCOPHAGE) 500 MG tablet 9/9/2019 Family Member No Yes    Take 0.5 tablets by mouth 2 (Two) Times a Day With Meals.    Multiple Vitamins-Minerals (MULTIVITAMIN  WITH MINERALS) tablet tablet 9/9/2019 Family Member Yes Yes    Take 1 tablet by mouth Daily.    omeprazole OTC (PriLOSEC OTC) 20 MG EC tablet 9/9/2019 Family Member Yes Yes    Take 20 mg by mouth Daily.            Medication notes: None    This medication list is complete to the best of my knowledge as of 9/9/2019    Please call if questions.    Elissa Tse, Medication History Technician  9/9/2019 4:55 PM

## 2019-09-09 NOTE — ED PROVIDER NOTES
EMERGENCY DEPARTMENT ENCOUNTER    CHIEF COMPLAINT  Chief Complaint: Generalized weakness  History given by:Pt  History limited by:none  Time Seen: 1108  Room Number: 34/34  PMD: Everette Rubio MD      HPI:  Pt is a 89 y.o. female who presents with generalized weakness and fatigue for the past several days. Per EMS, they were called by NH for AMS. The pt states other than feeling weak and fatigued she currently feels fine. The pt denies CP, abd pain, headache, nausea, vomiting, SOA, fever, cough, dizziness, numbness, tingling, or focal weakness. The pt states she slept decently last night. She denies eating today. She has no further complaints.     Duration: several days  Timing:consant  Location:generalized  Radiation:none  Quality:weakness;fatigue  Intensity/Severity:moderate  Progression:unchanged  Associated Symptoms:none  Aggravating Factors:none  Alleviating Factors:none  Previous Episodes:n/a  Treatment before arrival:n/a    PAST MEDICAL HISTORY  Active Ambulatory Problems     Diagnosis Date Noted   • Cognitive disorder 05/24/2016   • Generalized osteoarthritis 05/24/2016   • Gastroesophageal reflux disease 05/24/2016   • Hyperlipidemia 05/24/2016   • Systolic hypertension 05/24/2016   • Carotid artery stenosis 06/06/2016   • Polyneuropathy associated with underlying disease (CMS/[facility]) 06/06/2016   • S/P TAVR (transcatheter aortic valve replacement) 02/03/2017   • Type 2 diabetes mellitus (CMS/[facility]) 04/06/2017   • Rheumatic mitral stenosis 10/19/2017   • PVC (premature ventricular contraction) 05/16/2018   • Acute on chronic diastolic congestive heart failure (CMS/[facility]) 06/23/2018     Resolved Ambulatory Problems     Diagnosis Date Noted   • Amaurosis fugax 05/24/2016   • Anemia 05/24/2016   • Disorder of aorta (CMS/[facility]) 05/24/2016   • Arteriosclerotic vascular disease 05/24/2016   • Diabetes mellitus (CMS/[facility]) 05/24/2016   • Diastolic heart failure (CMS/[facility]) 05/24/2016   • Diastolic dysfunction 05/24/2016    • Mitral valve stenosis 05/24/2016   • Lesion of nose 05/24/2016   • Nonrheumatic aortic valve stenosis 06/06/2016   • Angina at rest (CMS/HCC) 12/13/2016   • VHD (valvular heart disease) 12/27/2016   • H/O aortic valvuloplasty 01/04/2017   • Acute confusion 04/04/2018   • Hypoxemia 06/23/2018   • Pain of left hand 11/14/2018   • Bronchitis 11/29/2018   • Nausea and vomiting 03/15/2019     Past Medical History:   Diagnosis Date   • 'light-for-dates' infant with signs of fetal malnutrition    • Amaurosis fugax    • Anemia    • Aortic stenosis    • Carotid artery stenosis    • Cognitive disorder    • Congestive heart failure (CMS/HCC)    • Dementia    • Diabetes mellitus (CMS/[facility])    • Diastolic dysfunction    • Dyspnea on exertion    • Generalized osteoarthritis of multiple sites    • GERD (gastroesophageal reflux disease)    • Gout    • Health care maintenance    • Hiatal hernia    • Hyperlipidemia    • Hypertension    • Mitral valve stenosis    • Nasal lesion    • Nasal stenosis    • Osteoarthritis    • PONV (postoperative nausea and vomiting)    • Systolic hypertension    • Varicose veins        PAST SURGICAL HISTORY  Past Surgical History:   Procedure Laterality Date   • AORTIC VALVE REPAIR/REPLACEMENT N/A 12/27/2016    Procedure: Transfemoral LEFT Transcatheter Aortic Valve Replacement TRANSESOPHAGEAL ECHOCARDIOGRAM WITH ANESTHESIA;  Surgeon: Eduardo Brown MD;  Location: [facility] OR 18/19;  Service:    • BLADDER REPAIR     • CARDIAC CATHETERIZATION N/A 12/14/2016    Procedure: Right Heart Cath;  Surgeon: Eduardo Brown MD;  Location: [facility] INVASIVE LOCATION;  Service:    • CARDIAC CATHETERIZATION N/A 12/14/2016    Procedure: Coronary angiography;  Surgeon: Eduardo Brown MD;  Location: [facility] INVASIVE LOCATION;  Service:    • HYSTERECTOMY     • KNEE SURGERY         FAMILY HISTORY  Family History   Problem Relation Age of Onset   • Heart disease Mother    • Coronary artery disease Mother     • Hyperlipidemia Mother    • Hypertension Mother    • Cancer Sister    • Cancer Brother    • Diabetes Daughter    • Diabetes Daughter    • Cancer Sister    • Cancer Sister    • Cancer Sister    • Cancer Other        SOCIAL HISTORY  Social History     Socioeconomic History   • Marital status:      Spouse name: Not on file   • Number of children: Not on file   • Years of education: Not on file   • Highest education level: Not on file   Tobacco Use   • Smoking status: Never Smoker   • Smokeless tobacco: Never Used   Substance and Sexual Activity   • Alcohol use: No     Comment: caffeine use   • Drug use: No   • Sexual activity: Defer         ALLERGIES  Codeine and Morphine and related    REVIEW OF SYSTEMS  Review of Systems   Constitutional: Positive for fatigue. Negative for fever.   HENT: Negative for sore throat.    Eyes: Negative.    Respiratory: Negative for cough and shortness of breath.    Cardiovascular: Negative for chest pain.   Gastrointestinal: Negative for abdominal pain, diarrhea and vomiting.   Genitourinary: Negative for dysuria.   Musculoskeletal: Negative for neck pain.   Skin: Negative for rash.   Allergic/Immunologic: Negative.    Neurological: Positive for weakness (generalized). Negative for numbness and headaches.   Hematological: Negative.    Psychiatric/Behavioral: Negative.    All other systems reviewed and are negative.      PHYSICAL EXAM  ED Triage Vitals   Temp Pulse Resp BP SpO2   -- -- -- -- --      Temp src Heart Rate Source Patient Position BP Location FiO2 (%)   -- -- -- -- --       Physical Exam   Constitutional: She is oriented to person, place, and time and well-developed, well-nourished, and in no distress. No distress.   HENT:   Head: Normocephalic and atraumatic.   Mouth/Throat: Oropharynx is clear and moist and mucous membranes are normal.   Eyes: Pupils are equal, round, and reactive to light.   Neck: Normal range of motion.   Cardiovascular: Normal rate, regular  rhythm and normal heart sounds.   Pulmonary/Chest: Effort normal and breath sounds normal. She has no wheezes.   Abdominal: Soft. Bowel sounds are normal. There is no tenderness.   Musculoskeletal: Normal range of motion. She exhibits no edema.   Neurological: She is alert and oriented to person, place, and time. She has normal sensation and normal strength.   Slow to answer questions   Skin: Skin is warm and dry. No rash noted.   Psychiatric: Mood, memory, affect and judgment normal.   Nursing note and vitals reviewed.      LAB RESULTS  Recent Results (from the past 24 hour(s))   Comprehensive Metabolic Panel    Collection Time: 09/09/19 11:27 AM   Result Value Ref Range    Glucose 258 (H) 65 - 99 mg/dL    BUN 22 8 - 23 mg/dL    Creatinine 0.79 0.57 - 1.00 mg/dL    Sodium 136 136 - 145 mmol/L    Potassium 4.2 3.5 - 5.2 mmol/L    Chloride 98 98 - 107 mmol/L    CO2 24.0 22.0 - 29.0 mmol/L    Calcium 9.3 8.2 - 9.6 mg/dL    Total Protein 7.2 6.0 - 8.5 g/dL    Albumin 4.10 3.50 - 5.20 g/dL    ALT (SGPT) 11 1 - 33 U/L    AST (SGOT) 18 1 - 32 U/L    Alkaline Phosphatase 89 39 - 117 U/L    Total Bilirubin 0.4 0.2 - 1.2 mg/dL    eGFR Non African Amer 68 >60 mL/min/1.73    Globulin 3.1 gm/dL    A/G Ratio 1.3 g/dL    BUN/Creatinine Ratio 27.8 (H) 7.0 - 25.0    Anion Gap 14.0 5.0 - 15.0 mmol/L   Troponin    Collection Time: 09/09/19 11:27 AM   Result Value Ref Range    Troponin T <0.010 0.000 - 0.030 ng/mL   BNP    Collection Time: 09/09/19 11:27 AM   Result Value Ref Range    proBNP 2,611.0 (H) 5.0-1,800.0 pg/mL   CBC Auto Differential    Collection Time: 09/09/19 11:27 AM   Result Value Ref Range    WBC 8.24 3.40 - 10.80 10*3/mm3    RBC 4.60 3.77 - 5.28 10*6/mm3    Hemoglobin 12.8 12.0 - 15.9 g/dL    Hematocrit 39.9 34.0 - 46.6 %    MCV 86.7 79.0 - 97.0 fL    MCH 27.8 26.6 - 33.0 pg    MCHC 32.1 31.5 - 35.7 g/dL    RDW 13.6 12.3 - 15.4 %    RDW-SD 42.7 37.0 - 54.0 fl    MPV 11.5 6.0 - 12.0 fL    Platelets 141 140 - 450  10*3/mm3    Neutrophil % 73.0 42.7 - 76.0 %    Lymphocyte % 19.5 (L) 19.6 - 45.3 %    Monocyte % 6.2 5.0 - 12.0 %    Eosinophil % 0.4 0.3 - 6.2 %    Basophil % 0.5 0.0 - 1.5 %    Immature Grans % 0.4 0.0 - 0.5 %    Neutrophils, Absolute 6.02 1.70 - 7.00 10*3/mm3    Lymphocytes, Absolute 1.61 0.70 - 3.10 10*3/mm3    Monocytes, Absolute 0.51 0.10 - 0.90 10*3/mm3    Eosinophils, Absolute 0.03 0.00 - 0.40 10*3/mm3    Basophils, Absolute 0.04 0.00 - 0.20 10*3/mm3    Immature Grans, Absolute 0.03 0.00 - 0.05 10*3/mm3    nRBC 0.1 0.0 - 0.2 /100 WBC   Scan Slide    Collection Time: 09/09/19 11:27 AM   Result Value Ref Range    RBC Morphology Normal Normal    WBC Morphology Normal Normal    Platelet Morphology Normal Normal   Urinalysis With Microscopic If Indicated (No Culture) - Urine, Catheter In/Out    Collection Time: 09/09/19  2:22 PM   Result Value Ref Range    Color, UA Yellow Yellow, Straw    Appearance, UA Clear Clear    pH, UA <=5.0 5.0 - 8.0    Specific Gravity, UA 1.011 1.005 - 1.030    Glucose, UA Negative Negative    Ketones, UA Negative Negative    Bilirubin, UA Negative Negative    Blood, UA Negative Negative    Protein, UA Negative Negative    Leuk Esterase, UA Negative Negative    Nitrite, UA Negative Negative    Urobilinogen, UA 0.2 E.U./dL 0.2 - 1.0 E.U./dL       I ordered the above labs and reviewed the results    RADIOLOGY  XR Chest 1 View   Final Result   FINDINGS AND IMPRESSION:   The lungs are hypoinflated with bibasilar pulmonary opacification, right   greater than left. Superimposed pulmonary vascular congestion and   interstitial thickening is present. Small-to-moderate right pleural   effusion is suspected. Findings are favored to represent pulmonary edema   with multifocal pneumonia being less likely. Correlation with patient   history is recommended. No pneumothorax is seen. Heart size accentuated   by low lung volumes what is likely mild to moderately enlarged..       This report was  "finalized on 9/9/2019 12:38 PM by Dr. Toño Kat M.D.          CT Head Without Contrast   Final Result       No acute intracranial hemorrhage or hydrocephalus. Chronic changes of   the brain. If there is further clinical concern, MRI could be considered   for further evaluation.       This report was finalized on 9/9/2019 12:08 PM by Dr. Reymundo Cruz M.D.              I ordered the above noted radiological studies and reviewed the images on the PACS system.      EKG    ekg was interpreted by Dr. De Santiago      PROGRESS AND CONSULTS    Reviewed pt's history and workup with Dr. De Santiago.   After a bedside evaluation; Dr De Santiago agrees with the plan of care    Dr. De Santiago updated family on workup results and plan of care    Dr. De Santiago spoke with Dr. Redd to admit pt.       MEDICATIONS GIVEN IN ER  Medications - No data to display    /60   Pulse 76   Temp 97.8 °F (36.6 °C) (Oral)   Resp 18   Ht 157.5 cm (62\")   Wt 63.5 kg (140 lb)   SpO2 94%   BMI 25.61 kg/m²     ADMISSION    Discussed treatment plan and reason for admission with pt/family and admitting physician.  Pt/family voiced understanding of the plan for admission for further testing/treatment as needed.      DIAGNOSIS  Final diagnoses:   Weakness   Altered mental status, unspecified altered mental status type         Documentation assistance provided by marino Johnson for [employer].  Information recorded by the marino was done at my direction and has been verified and validated by me.         Macario Johnson  09/09/19 1315       Darline Montgomery APRN  09/09/19 1605    "

## 2019-09-10 ENCOUNTER — APPOINTMENT (OUTPATIENT)
Dept: CARDIOLOGY | Facility: HOSPITAL | Age: 84
End: 2019-09-10

## 2019-09-10 ENCOUNTER — APPOINTMENT (OUTPATIENT)
Dept: MRI IMAGING | Facility: HOSPITAL | Age: 84
End: 2019-09-10

## 2019-09-10 PROBLEM — I63.512 ACUTE ISCHEMIC LEFT MCA STROKE (HCC): Status: ACTIVE | Noted: 2019-09-10

## 2019-09-10 LAB
ALBUMIN SERPL-MCNC: 4.2 G/DL (ref 3.5–5.2)
ALBUMIN/GLOB SERPL: 1.9 G/DL
ALP SERPL-CCNC: 81 U/L (ref 39–117)
ALT SERPL W P-5'-P-CCNC: 9 U/L (ref 1–33)
ANION GAP SERPL CALCULATED.3IONS-SCNC: 10.8 MMOL/L (ref 5–15)
AORTIC DIMENSIONLESS INDEX: 0.5 (DI)
AST SERPL-CCNC: 11 U/L (ref 1–32)
BASOPHILS # BLD AUTO: 0.03 10*3/MM3 (ref 0–0.2)
BASOPHILS NFR BLD AUTO: 0.4 % (ref 0–1.5)
BH CV ECHO MEAS - ACS: 1.1 CM
BH CV ECHO MEAS - AI DEC SLOPE: 302 CM/SEC^2
BH CV ECHO MEAS - AI MAX PG: 76 MMHG
BH CV ECHO MEAS - AI MAX VEL: 436 CM/SEC
BH CV ECHO MEAS - AI P1/2T: 422.9 MSEC
BH CV ECHO MEAS - AO MAX PG (FULL): 12.4 MMHG
BH CV ECHO MEAS - AO MAX PG: 17.3 MMHG
BH CV ECHO MEAS - AO MEAN PG (FULL): 7 MMHG
BH CV ECHO MEAS - AO MEAN PG: 10 MMHG
BH CV ECHO MEAS - AO ROOT AREA (BSA CORRECTED): 2.1
BH CV ECHO MEAS - AO ROOT AREA: 9.1 CM^2
BH CV ECHO MEAS - AO ROOT DIAM: 3.4 CM
BH CV ECHO MEAS - AO V2 MAX: 208 CM/SEC
BH CV ECHO MEAS - AO V2 MEAN: 146 CM/SEC
BH CV ECHO MEAS - AO V2 VTI: 50.1 CM
BH CV ECHO MEAS - AVA(I,A): 1 CM^2
BH CV ECHO MEAS - AVA(I,D): 1 CM^2
BH CV ECHO MEAS - AVA(V,A): 1.1 CM^2
BH CV ECHO MEAS - AVA(V,D): 1.1 CM^2
BH CV ECHO MEAS - BSA(HAYCOCK): 1.7 M^2
BH CV ECHO MEAS - BSA: 1.6 M^2
BH CV ECHO MEAS - BZI_BMI: 25.6 KILOGRAMS/M^2
BH CV ECHO MEAS - BZI_METRIC_HEIGHT: 157.5 CM
BH CV ECHO MEAS - BZI_METRIC_WEIGHT: 63.5 KG
BH CV ECHO MEAS - EDV(CUBED): 79.5 ML
BH CV ECHO MEAS - EDV(MOD-SP2): 80 ML
BH CV ECHO MEAS - EDV(MOD-SP4): 66 ML
BH CV ECHO MEAS - EDV(TEICH): 83.1 ML
BH CV ECHO MEAS - EF(CUBED): 72.4 %
BH CV ECHO MEAS - EF(MOD-BP): 71 %
BH CV ECHO MEAS - EF(MOD-SP2): 73.8 %
BH CV ECHO MEAS - EF(MOD-SP4): 69.7 %
BH CV ECHO MEAS - EF(TEICH): 64.4 %
BH CV ECHO MEAS - ESV(CUBED): 22 ML
BH CV ECHO MEAS - ESV(MOD-SP2): 21 ML
BH CV ECHO MEAS - ESV(MOD-SP4): 20 ML
BH CV ECHO MEAS - ESV(TEICH): 29.6 ML
BH CV ECHO MEAS - FS: 34.9 %
BH CV ECHO MEAS - IVS/LVPW: 1
BH CV ECHO MEAS - IVSD: 1 CM
BH CV ECHO MEAS - LAT PEAK E' VEL: 5 CM/SEC
BH CV ECHO MEAS - LV DIASTOLIC VOL/BSA (35-75): 40.2 ML/M^2
BH CV ECHO MEAS - LV MASS(C)D: 142.5 GRAMS
BH CV ECHO MEAS - LV MASS(C)DI: 86.7 GRAMS/M^2
BH CV ECHO MEAS - LV MAX PG: 4.9 MMHG
BH CV ECHO MEAS - LV MEAN PG: 3 MMHG
BH CV ECHO MEAS - LV SYSTOLIC VOL/BSA (12-30): 12.2 ML/M^2
BH CV ECHO MEAS - LV V1 MAX: 111 CM/SEC
BH CV ECHO MEAS - LV V1 MEAN: 75 CM/SEC
BH CV ECHO MEAS - LV V1 VTI: 25.5 CM
BH CV ECHO MEAS - LVIDD: 4.3 CM
BH CV ECHO MEAS - LVIDS: 2.8 CM
BH CV ECHO MEAS - LVLD AP2: 6.7 CM
BH CV ECHO MEAS - LVLD AP4: 6.3 CM
BH CV ECHO MEAS - LVLS AP2: 5.7 CM
BH CV ECHO MEAS - LVLS AP4: 5.3 CM
BH CV ECHO MEAS - LVOT AREA (M): 2 CM^2
BH CV ECHO MEAS - LVOT AREA: 2 CM^2
BH CV ECHO MEAS - LVOT DIAM: 1.6 CM
BH CV ECHO MEAS - LVPWD: 1 CM
BH CV ECHO MEAS - MED PEAK E' VEL: 4 CM/SEC
BH CV ECHO MEAS - MR MAX PG: 65.3 MMHG
BH CV ECHO MEAS - MR MAX VEL: 404 CM/SEC
BH CV ECHO MEAS - MV DEC SLOPE: 646 CM/SEC^2
BH CV ECHO MEAS - MV DEC TIME: 460 MSEC
BH CV ECHO MEAS - MV E MAX VEL: 237 CM/SEC
BH CV ECHO MEAS - MV MAX PG: 19 MMHG
BH CV ECHO MEAS - MV MEAN PG: 10 MMHG
BH CV ECHO MEAS - MV P1/2T MAX VEL: 218 CM/SEC
BH CV ECHO MEAS - MV P1/2T: 98.8 MSEC
BH CV ECHO MEAS - MV V2 MAX: 215 CM/SEC
BH CV ECHO MEAS - MV V2 MEAN: 144 CM/SEC
BH CV ECHO MEAS - MV V2 VTI: 59 CM
BH CV ECHO MEAS - MVA P1/2T LCG: 1 CM^2
BH CV ECHO MEAS - MVA(P1/2T): 2.2 CM^2
BH CV ECHO MEAS - MVA(VTI): 0.87 CM^2
BH CV ECHO MEAS - PA ACC TIME: 0.1 SEC
BH CV ECHO MEAS - PA MAX PG (FULL): 1.6 MMHG
BH CV ECHO MEAS - PA MAX PG: 2.9 MMHG
BH CV ECHO MEAS - PA PR(ACCEL): 34.9 MMHG
BH CV ECHO MEAS - PA V2 MAX: 85.3 CM/SEC
BH CV ECHO MEAS - PULM DIAS VEL: 41.1 CM/SEC
BH CV ECHO MEAS - PULM S/D: 0.64
BH CV ECHO MEAS - PULM SYS VEL: 26.1 CM/SEC
BH CV ECHO MEAS - PVA(V,A): 2.1 CM^2
BH CV ECHO MEAS - PVA(V,D): 2.1 CM^2
BH CV ECHO MEAS - QP/QS: 0.74
BH CV ECHO MEAS - RAP SYSTOLE: 8 MMHG
BH CV ECHO MEAS - RV MAX PG: 1.3 MMHG
BH CV ECHO MEAS - RV MEAN PG: 1 MMHG
BH CV ECHO MEAS - RV V1 MAX: 56.9 CM/SEC
BH CV ECHO MEAS - RV V1 MEAN: 37 CM/SEC
BH CV ECHO MEAS - RV V1 VTI: 12.1 CM
BH CV ECHO MEAS - RVOT AREA: 3.1 CM^2
BH CV ECHO MEAS - RVOT DIAM: 2 CM
BH CV ECHO MEAS - RVSP: 49.5 MMHG
BH CV ECHO MEAS - SI(AO): 276.9 ML/M^2
BH CV ECHO MEAS - SI(CUBED): 35 ML/M^2
BH CV ECHO MEAS - SI(LVOT): 31.2 ML/M^2
BH CV ECHO MEAS - SI(MOD-SP2): 35.9 ML/M^2
BH CV ECHO MEAS - SI(MOD-SP4): 28 ML/M^2
BH CV ECHO MEAS - SI(TEICH): 32.6 ML/M^2
BH CV ECHO MEAS - SV(AO): 454.9 ML
BH CV ECHO MEAS - SV(CUBED): 57.6 ML
BH CV ECHO MEAS - SV(LVOT): 51.3 ML
BH CV ECHO MEAS - SV(MOD-SP2): 59 ML
BH CV ECHO MEAS - SV(MOD-SP4): 46 ML
BH CV ECHO MEAS - SV(RVOT): 38 ML
BH CV ECHO MEAS - SV(TEICH): 53.5 ML
BH CV ECHO MEAS - TAPSE (>1.6): 1.5 CM2
BH CV ECHO MEAS - TR MAX VEL: 322 CM/SEC
BH CV ECHO MEASUREMENTS AVERAGE E/E' RATIO: 52.67
BH CV VAS BP LEFT ARM: NORMAL MMHG
BH CV XLRA - RV BASE: 3.5 CM
BH CV XLRA - TDI S': 10 CM/SEC
BILIRUB SERPL-MCNC: 0.4 MG/DL (ref 0.2–1.2)
BUN BLD-MCNC: 24 MG/DL (ref 8–23)
BUN/CREAT SERPL: 35.3 (ref 7–25)
CALCIUM SPEC-SCNC: 9.3 MG/DL (ref 8.2–9.6)
CHLORIDE SERPL-SCNC: 100 MMOL/L (ref 98–107)
CO2 SERPL-SCNC: 27.2 MMOL/L (ref 22–29)
CREAT BLD-MCNC: 0.68 MG/DL (ref 0.57–1)
DEPRECATED RDW RBC AUTO: 44.2 FL (ref 37–54)
EOSINOPHIL # BLD AUTO: 0.03 10*3/MM3 (ref 0–0.4)
EOSINOPHIL NFR BLD AUTO: 0.4 % (ref 0.3–6.2)
ERYTHROCYTE [DISTWIDTH] IN BLOOD BY AUTOMATED COUNT: 13.8 % (ref 12.3–15.4)
FOLATE SERPL-MCNC: >20 NG/ML (ref 4.78–24.2)
GFR SERPL CREATININE-BSD FRML MDRD: 81 ML/MIN/1.73
GLOBULIN UR ELPH-MCNC: 2.2 GM/DL
GLUCOSE BLD-MCNC: 117 MG/DL (ref 65–99)
GLUCOSE BLDC GLUCOMTR-MCNC: 122 MG/DL (ref 70–130)
GLUCOSE BLDC GLUCOMTR-MCNC: 139 MG/DL (ref 70–130)
GLUCOSE BLDC GLUCOMTR-MCNC: 191 MG/DL (ref 70–130)
HBA1C MFR BLD: 6.4 % (ref 4.8–5.6)
HCT VFR BLD AUTO: 35 % (ref 34–46.6)
HGB BLD-MCNC: 11.2 G/DL (ref 12–15.9)
IMM GRANULOCYTES # BLD AUTO: 0.03 10*3/MM3 (ref 0–0.05)
IMM GRANULOCYTES NFR BLD AUTO: 0.4 % (ref 0–0.5)
LEFT ATRIUM VOLUME INDEX: 55 ML/M2
LEFT ATRIUM VOLUME: 75 CM3
LYMPHOCYTES # BLD AUTO: 2.72 10*3/MM3 (ref 0.7–3.1)
LYMPHOCYTES NFR BLD AUTO: 35.4 % (ref 19.6–45.3)
MAXIMAL PREDICTED HEART RATE: 126 BPM
MCH RBC QN AUTO: 28.1 PG (ref 26.6–33)
MCHC RBC AUTO-ENTMCNC: 32 G/DL (ref 31.5–35.7)
MCV RBC AUTO: 87.7 FL (ref 79–97)
MONOCYTES # BLD AUTO: 0.78 10*3/MM3 (ref 0.1–0.9)
MONOCYTES NFR BLD AUTO: 10.1 % (ref 5–12)
NEUTROPHILS # BLD AUTO: 4.1 10*3/MM3 (ref 1.7–7)
NEUTROPHILS NFR BLD AUTO: 53.3 % (ref 42.7–76)
NRBC BLD AUTO-RTO: 0 /100 WBC (ref 0–0.2)
NT-PROBNP SERPL-MCNC: 2813 PG/ML (ref 5–1800)
PLATELET # BLD AUTO: 203 10*3/MM3 (ref 140–450)
PMV BLD AUTO: 10.3 FL (ref 6–12)
POTASSIUM BLD-SCNC: 3.9 MMOL/L (ref 3.5–5.2)
PROT SERPL-MCNC: 6.4 G/DL (ref 6–8.5)
RBC # BLD AUTO: 3.99 10*6/MM3 (ref 3.77–5.28)
SODIUM BLD-SCNC: 138 MMOL/L (ref 136–145)
STRESS TARGET HR: 107 BPM
T4 FREE SERPL-MCNC: 1.18 NG/DL (ref 0.93–1.7)
TSH SERPL DL<=0.05 MIU/L-ACNC: 2.22 UIU/ML (ref 0.27–4.2)
VIT B12 BLD-MCNC: 648 PG/ML (ref 211–946)
WBC NRBC COR # BLD: 7.69 10*3/MM3 (ref 3.4–10.8)

## 2019-09-10 PROCEDURE — 70549 MR ANGIOGRAPH NECK W/O&W/DYE: CPT

## 2019-09-10 PROCEDURE — 83036 HEMOGLOBIN GLYCOSYLATED A1C: CPT | Performed by: INTERNAL MEDICINE

## 2019-09-10 PROCEDURE — 97110 THERAPEUTIC EXERCISES: CPT

## 2019-09-10 PROCEDURE — A9577 INJ MULTIHANCE: HCPCS | Performed by: INTERNAL MEDICINE

## 2019-09-10 PROCEDURE — 84439 ASSAY OF FREE THYROXINE: CPT | Performed by: PSYCHIATRY & NEUROLOGY

## 2019-09-10 PROCEDURE — 97162 PT EVAL MOD COMPLEX 30 MIN: CPT

## 2019-09-10 PROCEDURE — 83880 ASSAY OF NATRIURETIC PEPTIDE: CPT | Performed by: INTERNAL MEDICINE

## 2019-09-10 PROCEDURE — 25010000002 FUROSEMIDE PER 20 MG: Performed by: INTERNAL MEDICINE

## 2019-09-10 PROCEDURE — 94799 UNLISTED PULMONARY SVC/PX: CPT

## 2019-09-10 PROCEDURE — 70544 MR ANGIOGRAPHY HEAD W/O DYE: CPT

## 2019-09-10 PROCEDURE — 99222 1ST HOSP IP/OBS MODERATE 55: CPT | Performed by: INTERNAL MEDICINE

## 2019-09-10 PROCEDURE — 80053 COMPREHEN METABOLIC PANEL: CPT | Performed by: INTERNAL MEDICINE

## 2019-09-10 PROCEDURE — 82962 GLUCOSE BLOOD TEST: CPT

## 2019-09-10 PROCEDURE — 82607 VITAMIN B-12: CPT | Performed by: PSYCHIATRY & NEUROLOGY

## 2019-09-10 PROCEDURE — 84443 ASSAY THYROID STIM HORMONE: CPT | Performed by: PSYCHIATRY & NEUROLOGY

## 2019-09-10 PROCEDURE — 93306 TTE W/DOPPLER COMPLETE: CPT | Performed by: INTERNAL MEDICINE

## 2019-09-10 PROCEDURE — 0 GADOBENATE DIMEGLUMINE 529 MG/ML SOLUTION: Performed by: INTERNAL MEDICINE

## 2019-09-10 PROCEDURE — 93306 TTE W/DOPPLER COMPLETE: CPT

## 2019-09-10 PROCEDURE — 85025 COMPLETE CBC W/AUTO DIFF WBC: CPT | Performed by: INTERNAL MEDICINE

## 2019-09-10 PROCEDURE — 82746 ASSAY OF FOLIC ACID SERUM: CPT | Performed by: PSYCHIATRY & NEUROLOGY

## 2019-09-10 PROCEDURE — 99221 1ST HOSP IP/OBS SF/LOW 40: CPT | Performed by: PSYCHIATRY & NEUROLOGY

## 2019-09-10 PROCEDURE — 86592 SYPHILIS TEST NON-TREP QUAL: CPT | Performed by: PSYCHIATRY & NEUROLOGY

## 2019-09-10 PROCEDURE — 70553 MRI BRAIN STEM W/O & W/DYE: CPT

## 2019-09-10 RX ORDER — FUROSEMIDE 10 MG/ML
40 INJECTION INTRAMUSCULAR; INTRAVENOUS EVERY 12 HOURS
Status: COMPLETED | OUTPATIENT
Start: 2019-09-10 | End: 2019-09-11

## 2019-09-10 RX ORDER — FUROSEMIDE 10 MG/ML
40 INJECTION INTRAMUSCULAR; INTRAVENOUS ONCE
Status: COMPLETED | OUTPATIENT
Start: 2019-09-10 | End: 2019-09-10

## 2019-09-10 RX ADMIN — ATENOLOL 25 MG: 25 TABLET ORAL at 08:36

## 2019-09-10 RX ADMIN — ASPIRIN 81 MG: 81 TABLET, COATED ORAL at 08:36

## 2019-09-10 RX ADMIN — SODIUM CHLORIDE, PRESERVATIVE FREE 10 ML: 5 INJECTION INTRAVENOUS at 08:41

## 2019-09-10 RX ADMIN — MEMANTINE HYDROCHLORIDE 10 MG: 10 TABLET, FILM COATED ORAL at 21:10

## 2019-09-10 RX ADMIN — MEMANTINE HYDROCHLORIDE 10 MG: 10 TABLET, FILM COATED ORAL at 08:36

## 2019-09-10 RX ADMIN — FUROSEMIDE 40 MG: 10 INJECTION, SOLUTION INTRAMUSCULAR; INTRAVENOUS at 08:36

## 2019-09-10 RX ADMIN — PANTOPRAZOLE SODIUM 40 MG: 40 TABLET, DELAYED RELEASE ORAL at 06:11

## 2019-09-10 RX ADMIN — GADOBENATE DIMEGLUMINE 13 ML: 529 INJECTION, SOLUTION INTRAVENOUS at 11:13

## 2019-09-10 RX ADMIN — MULTIPLE VITAMINS W/ MINERALS TAB 1 TABLET: TAB at 08:36

## 2019-09-10 RX ADMIN — FUROSEMIDE 40 MG: 10 INJECTION, SOLUTION INTRAMUSCULAR; INTRAVENOUS at 21:10

## 2019-09-10 NOTE — THERAPY EVALUATION
Patient Name: Mary Shi  : 3/3/1925    MRN: [id number]                              Today's Date: 9/10/2019       Admit Date: 2019    Visit Dx:     ICD-10-CM ICD-9-CM   1. Weakness R53.1 780.79   2. Altered mental status, unspecified altered mental status type R41.82 780.97     Patient Active Problem List   Diagnosis   • Cognitive disorder   • Generalized osteoarthritis   • Gastroesophageal reflux disease   • Hyperlipidemia   • Systolic hypertension   • Carotid artery stenosis   • Polyneuropathy associated with underlying disease (CMS/[facility])   • S/P TAVR (transcatheter aortic valve replacement)   • Type 2 diabetes mellitus (CMS/[facility])   • Rheumatic mitral stenosis   • PVC (premature ventricular contraction)   • Acute on chronic diastolic congestive heart failure (CMS/HCC)   • Weakness   • Altered mental status   • Acute ischemic left MCA stroke (CMS/HCC)     Past Medical History:   Diagnosis Date   • 'light-for-dates' infant with signs of fetal malnutrition    • Amaurosis fugax    • Anemia    • Aortic stenosis     s/p TAVR    • Carotid artery stenosis     Per duplex 2016-proximal right internal carotid artery mild stenosis and proximal left moderate stenosis   • Cognitive disorder    • Congestive heart failure (CMS/HCC)    • Dementia    • Diabetes mellitus (CMS/[facility])    • Diastolic dysfunction    • Dyspnea on exertion    • Generalized osteoarthritis of multiple sites    • GERD (gastroesophageal reflux disease)    • Gout    • Health care maintenance    • Hiatal hernia    • Hyperlipidemia    • Hypertension    • Mitral valve stenosis     Moderate per echocardiogram 2017; severe mitral annular calcification   • Nasal lesion    • Nasal stenosis    • Osteoarthritis     multiple sites   • PONV (postoperative nausea and vomiting)    • Systolic hypertension    • Varicose veins      Past Surgical History:   Procedure Laterality Date   • AORTIC VALVE REPAIR/REPLACEMENT N/A 2016    Procedure:  Transfemoral LEFT Transcatheter Aortic Valve Replacement TRANSESOPHAGEAL ECHOCARDIOGRAM WITH ANESTHESIA;  Surgeon: Eduardo Brown MD;  Location: [facility] OR 18/19;  Service:    • BLADDER REPAIR     • CARDIAC CATHETERIZATION N/A 12/14/2016    Procedure: Right Heart Cath;  Surgeon: Eduardo Brown MD;  Location: [facility] CATH INVASIVE LOCATION;  Service:    • CARDIAC CATHETERIZATION N/A 12/14/2016    Procedure: Coronary angiography;  Surgeon: Eduardo Brown MD;  Location: [facility] CATH INVASIVE LOCATION;  Service:    • HYSTERECTOMY     • KNEE SURGERY       General Information     Row Name 09/10/19 1638          PT Evaluation Time/Intention    Document Type  evaluation  -AR     Mode of Treatment  physical therapy  -AR     Row Name 09/10/19 1638          General Information    Patient Profile Reviewed?  yes  -AR     Prior Level of Function  min assist: one of her 14 children is always at her home w/ her, she doesn't drive, denies falls  -AR     Existing Precautions/Restrictions  fall  -AR     Row Name 09/10/19 1638          Relationship/Environment    Lives With  alone one of her kids is with her, even overnight  -AR     Row Name 09/10/19 1638          Resource/Environmental Concerns    Current Living Arrangements  home/apartment/condo  -AR     Row Name 09/10/19 1638          Home Main Entrance    Number of Stairs, Main Entrance  three  -AR     Stair Railings, Main Entrance  railings safe and in good condition  -AR     Row Name 09/10/19 1638          Stairs Within Home, Primary    Number of Stairs, Within Home, Primary  none  -AR     Row Name 09/10/19 1638          Cognitive Assessment/Intervention- PT/OT    Orientation Status (Cognition)  oriented to;person;place  -AR       User Key  (r) = Recorded By, (t) = Taken By, (c) = Cosigned By    Initials Name Provider Type    AR Kelsi Epperson PT Physical Therapist        Mobility     Row Name 09/10/19 1639          Bed Mobility Assessment/Treatment    Bed Mobility  Assessment/Treatment  supine-sit;sit-supine  -AR     Supine-Sit Pine (Bed Mobility)  supervision  -AR     Sit-Supine [place] (Bed Mobility)  not tested  -AR     Assistive Device (Bed Mobility)  head of bed elevated  -AR     Row Name 09/10/19 1639          Sit-Stand Transfer    Sit-Stand [place] (Transfers)  contact guard  -AR     Assistive Device (Sit-Stand Transfers)  cane, quad  -AR     Row Name 09/10/19 1639          Gait/Stairs Assessment/Training    Gait/Stairs Assessment/Training  gait/ambulation independence  -AR     [place] Level (Gait)  contact guard  -AR     Assistive Device (Gait)  cane, quad  -AR     Distance in Feet (Gait)  120, carries cane for few steps  -AR     Pattern (Gait)  swing-through  -AR     Deviations/Abnormal Patterns (Gait)  sherron decreased;festinating/shuffling  -AR     Bilateral Gait Deviations  forward flexed posture;heel strike decreased  -AR     Comment (Gait/Stairs)  no LOB or safety concerns,   -AR       User Key  (r) = Recorded By, (t) = Taken By, (c) = Cosigned By    Initials Name Provider Type    AR Kelsi Epperosn, PT Physical Therapist        Obj/Interventions     Row Name 09/10/19 1640          General ROM    GENERAL ROM COMMENTS  B LE WFL  -AR     Row Name 09/10/19 1640          MMT (Manual Muscle Testing)    General MMT Comments  B LE 4/5, some difficulty following instructions  -AR     Row Name 09/10/19 1640          Therapeutic Exercise    Comment (Therapeutic Exercise)  B LE AP, LAQ 10x  -AR     Row Name 09/10/19 1640          Static Sitting Balance    Level of [place] (Unsupported Sitting, Static Balance)  independent  -AR     Sitting Position (Unsupported Sitting, Static Balance)  sitting on edge of bed  -AR     Time Able to Maintain Position (Unsupported Sitting, Static Balance)  2 to 3 minutes  -AR       User Key  (r) = Recorded By, (t) = Taken By, (c) = Cosigned By    Initials Name Provider Type    Kelsi Rehman, PT Physical  Therapist        Goals/Plan     Row Name 09/10/19 1641          Gait Training Goal 1 (PT)    Activity/Assistive Device (Gait Training Goal 1, PT)  gait (walking locomotion);cane, quad  -AR     [place] Level (Gait Training Goal 1, PT)  supervision required  -AR     Distance (Gait Goal 1, PT)  250  -AR     Time Frame (Gait Training Goal 1, PT)  1 week  -AR     Row Name 09/10/19 1641          Stairs Goal 1 (PT)    Activity/Assistive Device (Stairs Goal 1, PT)  stairs, all skills  -AR     [place] Level/Cues Needed (Stairs Goal 1, PT)  contact guard assist  -AR     Number of Stairs (Stairs Goal 1, PT)  3, 1 HR  -AR     Time Frame (Stairs Goal 1, PT)  1 week  -AR       User Key  (r) = Recorded By, (t) = Taken By, (c) = Cosigned By    Initials Name Provider Type    AR Kelsi Epperson, PT Physical Therapist        Clinical Impression     Row Name 09/10/19 1640          Pain Assessment    Additional Documentation  Pain Scale: Numbers Pre/Post-Treatment (Group)  -AR     [facility] Name 09/10/19 1640          Pain Scale: Numbers Pre/Post-Treatment    Pain Scale: Numbers, Pretreatment  0/10 - no pain  -AR     Pain Scale: Numbers, Post-Treatment  0/10 - no pain  -AR     Pain Intervention(s)  Repositioned  -AR     Row Name 09/10/19 1640          Plan of Care Review    Plan of Care Reviewed With  patient;daughter  -AR     [facility] Name 09/10/19 1640          Physical Therapy Clinical Impression    Patient/Family Goals Statement (PT Clinical Impression)  DC home 9/11  -AR     Criteria for Skilled Interventions Met (PT Clinical Impression)  yes  -AR     Rehab Potential (PT Clinical Summary)  good, to achieve stated therapy goals  -AR     Row Name 09/10/19 1640          Vital Signs    O2 Delivery Pre Treatment  room air  -AR     O2 Delivery Intra Treatment  room air  -AR     Row Name 09/10/19 1640          Positioning and Restraints    Pre-Treatment Position  in bed  -AR     Post Treatment Position  chair  -AR     In Chair  notified  nsg;reclined;sitting;call light within reach;encouraged to call for assist;exit alarm on;with family/caregiver  -AR       User Key  (r) = Recorded By, (t) = Taken By, (c) = Cosigned By    Initials Name Provider Type    Kelsi Rehman, PT Physical Therapist        Outcome Measures     Row Name 09/10/19 1642          How much help from another person do you currently need...    Turning from your back to your side while in flat bed without using bedrails?  4  -AR     Moving from lying on back to sitting on the side of a flat bed without bedrails?  4  -AR     Moving to and from a bed to a chair (including a wheelchair)?  4  -AR     Standing up from a chair using your arms (e.g., wheelchair, bedside chair)?  3  -AR     Climbing 3-5 steps with a railing?  3  -AR     To walk in hospital room?  3  -AR     AM-PAC 6 Clicks Score (PT)  21  -AR     Row Name 09/10/19 1642          Modified [place] Scale    Modified [place] Scale  1 - No significant disability despite symptoms.  Able to carry out all usual duties and activities.  -AR     Row Name 09/10/19 1642          Functional Assessment    Outcome Measure Options  AM-PAC 6 Clicks Basic Mobility (PT);Modified [place]  -AR       User Key  (r) = Recorded By, (t) = Taken By, (c) = Cosigned By    Initials Name Provider Type    Kelsi Rehman, PT Physical Therapist        Physical Therapy Education     Title: PT OT SLP Therapies (In Progress)     Topic: Physical Therapy (In Progress)     Point: Mobility training (In Progress)     Learning Progress Summary           Patient Acceptance, E, NR by AR at 9/10/2019  4:43 PM                   Point: Home exercise program (In Progress)     Learning Progress Summary           Patient Acceptance, E, NR by AR at 9/10/2019  4:43 PM                   Point: Body mechanics (In Progress)     Learning Progress Summary           Patient Acceptance, E, NR by AR at 9/10/2019  4:43 PM                   Point: Precautions (In Progress)      Learning Progress Summary           Patient Acceptance, E, NR by AR at 9/10/2019  4:43 PM                               User Key     Initials Effective Dates Name Provider Type Discipline    AR 04/03/18 -  Kelsi Epperson PT Physical Therapist PT              PT Recommendation and Plan  Planned Therapy Interventions (PT Eval): balance training, bed mobility training, gait training, strengthening, stair training, ROM (range of motion), transfer training  Outcome Summary/Treatment Plan (PT)  Anticipated Equipment Needs at Discharge (PT): (no equip needs anticipated)  Anticipated Discharge Disposition (PT): home with 24/7 care(has 24/7 assist at home prior to admission)  Plan of Care Reviewed With: patient  Outcome Summary: Pt admitted 9/9 w/ speech distrubances; + L MCA CVA.  Pt and dtr report pt lives alone, 1 of 14 children are always at home w/ her - even overnight, uses quad cane, no falls. Today pt demonstrates generalized weakness, impaired gait pattern but near baseline.  Able to ambulate 120' w/ contact assist, no significant LOB.  Educated on activity/walking recommendations while inpatient and PT POC to f/u 9/12, pt, dtr and RN agreeable.  Recommend DC to home w/ assist as needed.       Time Calculation:   PT Charges     Row Name 09/10/19 1643             Time Calculation    Start Time  1620  -AR      Stop Time  1643  -AR      Time Calculation (min)  23 min  -AR      PT Received On  09/10/19  -AR      PT - Next Appointment  09/12/19  -AR      PT Goal Re-Cert Due Date  09/17/19  -AR        User Key  (r) = Recorded By, (t) = Taken By, (c) = Cosigned By    Initials Name Provider Type    AR Kelsi Epperson PT Physical Therapist        Therapy Charges for Today     Code Description Service Date Service Provider Modifiers Qty    83891213649 HC PT EVAL MOD COMPLEXITY 2 9/10/2019 Kelsi Epperson, PT GP 1    39850978639 HC PT THER PROC EA 15 MIN 9/10/2019 Kelsi Epperson PT GP 1          PT G-Codes  Outcome  Measure Options: AM-PAC 6 Clicks Basic Mobility (PT), Modified [place]  AM-PAC 6 Clicks Score (PT): 21  Modified [place] Scale: 1 - No significant disability despite symptoms.  Able to carry out all usual duties and activities.    Kelsi Epperson, PT  9/10/2019

## 2019-09-10 NOTE — CONSULTS
Patient Name: Mary Shi  :3/3/1925  89 y.o.    Date of Admission: 2019  Date of Consultation:  09/10/19  Encounter Provider: Elissa Mcmahan MD  Place of Service: [facility] CARDIOLOGY  Referring Provider: Kip Redd MD  Patient Care Team:  Everette Rubio MD as PCP - General (Internal Medicine)  Everette Rubio MD as PCP - Claims Attributed      Chief complaint: Generalized  weakness    History of Present Illness:     Mary Shi is a 89-year-old patient with a history of diabetes mellitus, CHF, hypertension, hyperlipidemia, aortic stenosis status post TAVR, #23 CPN done 2016.  She has no history of mitral stenosis which was diagnosed by echo in .    She was in the hospital for short windedness and acute on chronic diastolic heart failure and a high sodium in 2018.  An echocardiogram at that time showed ejection fraction 60% with severe left atrial enlargement, normal AVR, mild to moderate tricuspid insufficiency, RVSP 55 mmHg, severe mitral stenosis with mild mitral insufficiency.  She was started on IV diuretics and switch to oral Bumex.    She was seen in the office 2019, at that time she was still living independently with frequent check ins from her family.  She was doing fine then.    She presented to the emergency department on 2019 with generalized weakness and fatigue starting several days prior to presentation.  She was living in a nursing care facility at the time they called EMS for acute mental status changes.  Her troponins been negative, BMP unremarkable, proBNP 2611, CBC normal, CT head negative for intrarenal hemorrhage or hydrocephalus but old left occipital stroke.  ECG showed atrial fibrillation heart rate 73.  Chest x-ray shows pulmonary edema with multifocal pneumonia less likely.      We have been asked to see her for TIA with atrial fibrillation and TAVR history.  atrial fibrillation is new for her.      Denies chest pain, difficulty breathing.  She is not a good historian.  She denies nausea, vomiting or fevers.    ECHO 6/24/18    · Left ventricular systolic function is normal. Estimated EF = 68%.  · Left atrial cavity size is severely dilated.  · Mild to moderate tricuspid valve regurgitation is present.  · Estimated right ventricular systolic pressure from tricuspid regurgitation is markedly elevated (>55 mmHg).  · Severe MAC is present. Mild mitral valve regurgitation is present. Severe mitral valve stenosis is present. The mitral valve mean gradient is 10 mmHg.    Cath 12/14/16      Past Medical History:   Diagnosis Date   • 'light-for-dates' infant with signs of fetal malnutrition    • Amaurosis fugax    • Anemia    • Aortic stenosis     s/p TAVR    • Carotid artery stenosis     Per duplex 12/16/2016-proximal right internal carotid artery mild stenosis and proximal left moderate stenosis   • Cognitive disorder    • Congestive heart failure (CMS/HCC)    • Dementia    • Diabetes mellitus (CMS/HCC)    • Diastolic dysfunction    • Dyspnea on exertion    • Generalized osteoarthritis of multiple sites    • GERD (gastroesophageal reflux disease)    • Gout    • Health care maintenance    • Hiatal hernia    • Hyperlipidemia    • Hypertension    • Mitral valve stenosis     Moderate per echocardiogram 2/2017; severe mitral annular calcification   • Nasal lesion    • Nasal stenosis    • Osteoarthritis     multiple sites   • PONV (postoperative nausea and vomiting)    • Systolic hypertension    • Varicose veins        Past Surgical History:   Procedure Laterality Date   • AORTIC VALVE REPAIR/REPLACEMENT N/A 12/27/2016    Procedure: Transfemoral LEFT Transcatheter Aortic Valve Replacement TRANSESOPHAGEAL ECHOCARDIOGRAM WITH ANESTHESIA;  Surgeon: Eduardo Brown MD;  Location: [facility] 18/19;  Service:    • BLADDER REPAIR     • CARDIAC CATHETERIZATION N/A 12/14/2016    Procedure: Right Heart Cath;  Surgeon:  Eduardo Brown MD;  Location: [facility] CATH INVASIVE LOCATION;  Service:    • CARDIAC CATHETERIZATION N/A 12/14/2016    Procedure: Coronary angiography;  Surgeon: Eduardo Brown MD;  Location: [facility] INVASIVE LOCATION;  Service:    • HYSTERECTOMY     • KNEE SURGERY           Prior to Admission medications    Medication Sig Start Date End Date Taking? Authorizing Provider   acetaminophen (TYLENOL) 650 MG 8 hr tablet Take 650 mg by mouth Every 8 (Eight) Hours As Needed for Mild Pain .   Yes Ahmet Raphael MD   aspirin 81 MG EC tablet Take 81 mg by mouth Daily. 9/27/13  Yes Ahmet Raphael MD   atenolol (TENORMIN) 25 MG tablet Take 25 mg by mouth Daily.   Yes Ahmet Raphael MD   bumetanide (BUMEX) 0.5 MG tablet Take 0.5 mg by mouth Daily.   Yes Ahmet Raphael MD   memantine (NAMENDA XR) 28 MG capsule sustained-release 24 hr extended release capsule Take 28 mg by mouth Daily.   Yes Ahmet Raphael MD   metFORMIN (GLUCOPHAGE) 500 MG tablet Take 0.5 tablets by mouth 2 (Two) Times a Day With Meals. 6/7/19  Yes Everette Rubio MD   Multiple Vitamins-Minerals (MULTIVITAMIN WITH MINERALS) tablet tablet Take 1 tablet by mouth Daily.   Yes Ahmet Raphael MD   omeprazole OTC (PriLOSEC OTC) 20 MG EC tablet Take 20 mg by mouth Daily.   Yes Ahmet Raphael MD       Allergies   Allergen Reactions   • Codeine Itching and GI Intolerance   • Morphine And Related Itching       Social History     Socioeconomic History   • Marital status:      Spouse name: Not on file   • Number of children: Not on file   • Years of education: Not on file   • Highest education level: Not on file   Tobacco Use   • Smoking status: Never Smoker   • Smokeless tobacco: Never Used   Substance and Sexual Activity   • Alcohol use: No     Comment: caffeine use   • Drug use: No   • Sexual activity: Defer       Family History   Problem Relation Age of Onset   • Heart disease Mother    • Coronary artery  disease Mother    • Hyperlipidemia Mother    • Hypertension Mother    • Cancer Sister    • Cancer Brother    • Diabetes Daughter    • Diabetes Daughter    • Cancer Sister    • Cancer Sister    • Cancer Sister    • Cancer Other        REVIEW OF SYSTEMS:   All systems reviewed.  Pertinent positives identified in HPI.  All other systems are negative.      Objective:     Vitals:    09/09/19 1945 09/09/19 2245 09/10/19 0414 09/10/19 0713   BP:  117/88  127/56   BP Location:  Left arm  Left arm   Patient Position:  Lying  Lying   Pulse: 76 88 69 67   Resp: 16 16  18   Temp:  99.1 °F (37.3 °C)  97.3 °F (36.3 °C)   TempSrc:  Oral  Oral   SpO2: 93% 91% 91% 92%   Weight:       Height:         Body mass index is 25.61 kg/m².    General Appearance:    Alert, cooperative, in no acute distress   Head:    Normocephalic, without obvious abnormality, atraumatic   Eyes:            Lids and lashes normal, conjunctivae and sclerae normal, no   icterus, no pallor, corneas clear   Ears:    Ears appear intact with no abnormalities noted   Throat:   No oral lesions, no thrush, oral mucosa moist   Neck:   No adenopathy, supple, trachea midline, no thyromegaly, no   carotid bruit, no JVD   Back:     No kyphosis present, no scoliosis present, no skin lesions, erythema or scars, no tenderness, range of motion normal   Lungs:     Clear to auscultation,respirations regular, even and unlabored    Heart:    irregular rhythm and normal rate, normal S1 and S2, 2/6 HSM at LLSB, no gallop, no rub, no click   Chest Wall:    No abnormalities observed   Abdomen:     Normal bowel sounds, no masses, no organomegaly, soft        non-tender, non-distended, no guarding, no rebound  tenderness   Extremities:   Moves all extremities well, no edema, no cyanosis, no redness   Pulses:   Pulses palpable and equal bilaterally. Normal radial, carotid, dorsalis pedis and posterior tibial pulses bilaterally.    Skin:  Psychiatric:   No bleeding, bruising or rash     Alert  normal mood and affect   Lab Review:     Results from last 7 days   Lab Units 09/10/19  0544   SODIUM mmol/L 138   POTASSIUM mmol/L 3.9   CHLORIDE mmol/L 100   CO2 mmol/L 27.2   BUN mg/dL 24*   CREATININE mg/dL 0.68   CALCIUM mg/dL 9.3   BILIRUBIN mg/dL 0.4   ALK PHOS U/L 81   ALT (SGPT) U/L 9   AST (SGOT) U/L 11   GLUCOSE mg/dL 117*     Results from last 7 days   Lab Units 09/09/19  1127   TROPONIN T ng/mL <0.010     Results from last 7 days   Lab Units 09/10/19  0544   WBC 10*3/mm3 7.69   HEMOGLOBIN g/dL 11.2*   HEMATOCRIT % 35.0   PLATELETS 10*3/mm3 203                                       I personally viewed and interpreted the patient's EKG/Telemetry data.        Assessment and Plan:       1. Weakness and AMS, workup in progress.   2. Atrial fibrillation, new, normal thyroid panel. Repeat echo.   3. Mitral stenosis  4. H/o AS, s/p TAVR.   5. Diastolic chf, diurese with IV lasix.     Await echo, fady, will follow.       Elissa Mcmahan MD  09/10/19  11:31 AM

## 2019-09-10 NOTE — PLAN OF CARE
Problem: Patient Care Overview  Goal: Plan of Care Review   09/10/19 2677   Coping/Psychosocial   Plan of Care Reviewed With patient   OTHER   Outcome Summary Pt admitted 9/9 w/ speech distrubances; + L MCA CVA. Pt and dtr report pt lives alone, 1 of 14 children are always at home w/ her - even overnight, uses quad cane, no falls. Today pt demonstrates generalized weakness, impaired gait pattern but near baseline. Able to ambulate 120' w/ contact assist, no significant LOB. Educated on activity/walking recommendations while inpatient and PT POC to f/u 9/12, pt, dtr and RN agreeable. Recommend DC to home w/ assist as needed.           none

## 2019-09-10 NOTE — CONSULTS
Neurology Note    Patient:  Mary Shi    YOB: 1925    REFERRING PHYSICIAN:  Kip Redd MD    CHIEF COMPLAINT:    Difficulty with speech    HISTORY OF PRESENT ILLNESS:   The patient is a 89 y.o. female with DM, HTN, HLP, valvular heart disease, mild dementia, had two episodes of difficulty with speech yday am, one at home that lasted 30 minutes when she could not answer questions, another brief episode in ED of not answering questions in ED per her daughter, now back close to baseline. She has had somewhat similar spells in the past diagnosed as TIAs per her daughter. In ED /64, ECG shows Afib, possibly new onset, CT head with old left PCA stroke, personally reviewed. She is on low dose ASA. Patient lives by herself with family visiting daily and give her meds. She walks with a cane. No recent bleeding or falls.    Past Medical History:  Past Medical History:   Diagnosis Date   • 'light-for-dates' infant with signs of fetal malnutrition    • Amaurosis fugax    • Anemia    • Aortic stenosis     s/p TAVR    • Carotid artery stenosis     Per duplex 12/16/2016-proximal right internal carotid artery mild stenosis and proximal left moderate stenosis   • Cognitive disorder    • Congestive heart failure (CMS/HCC)    • Dementia    • Diabetes mellitus (CMS/HCC)    • Diastolic dysfunction    • Dyspnea on exertion    • Generalized osteoarthritis of multiple sites    • GERD (gastroesophageal reflux disease)    • Gout    • Health care maintenance    • Hiatal hernia    • Hyperlipidemia    • Hypertension    • Mitral valve stenosis     Moderate per echocardiogram 2/2017; severe mitral annular calcification   • Nasal lesion    • Nasal stenosis    • Osteoarthritis     multiple sites   • PONV (postoperative nausea and vomiting)    • Systolic hypertension    • Varicose veins        Past Surgical History:  Past Surgical History:   Procedure Laterality Date   • AORTIC VALVE REPAIR/REPLACEMENT N/A  12/27/2016    Procedure: Transfemoral LEFT Transcatheter Aortic Valve Replacement TRANSESOPHAGEAL ECHOCARDIOGRAM WITH ANESTHESIA;  Surgeon: Eduardo Brown MD;  Location: [facility] OR 18/19;  Service:    • BLADDER REPAIR     • CARDIAC CATHETERIZATION N/A 12/14/2016    Procedure: Right Heart Cath;  Surgeon: Eduardo Brown MD;  [facility]AS[facility];  Service:    • CARDIAC CATHETERIZATION N/A 12/14/2016    Procedure: Coronary angiography;  Surgeon: Eduardo Brown MD;  [facility];  Service:    • HYSTERECTOMY     • KNEE SURGERY         Social History:   Social History     Socioeconomic History   • Marital status:      Spouse name: Not on file   • Number of children: Not on file   • Years of education: Not on file   • Highest education level: Not on file   Tobacco Use   • Smoking status: Never Smoker   • Smokeless tobacco: Never Used   Substance and Sexual Activity   • Alcohol use: No     Comment: caffeine use   • Drug use: No   • Sexual activity: Defer        Family History:   Family History   Problem Relation Age of Onset   • Heart disease Mother    • Coronary artery disease Mother    • Hyperlipidemia Mother    • Hypertension Mother    • Cancer Sister    • Cancer Brother    • Diabetes Daughter    • Diabetes Daughter    • Cancer Sister    • Cancer Sister    • Cancer Sister    • Cancer Other        Medications Prior to Admission:    Prior to Admission medications    Medication Sig Start Date End Date Taking? Authorizing Provider   acetaminophen (TYLENOL) 650 MG 8 hr tablet Take 650 mg by mouth Every 8 (Eight) Hours As Needed for Mild Pain .   Yes Ahmet Raphael MD   aspirin 81 MG EC tablet Take 81 mg by mouth Daily. 9/27/13  Yes Ahmte Raphael MD   atenolol (TENORMIN) 25 MG tablet Take 25 mg by mouth Daily.   Yes Ahmet Raphael MD   bumetanide (BUMEX) 0.5 MG tablet Take 0.5 mg by mouth Daily.   Yes Ahmet Raphael MD   memantine  "(NAMENDA XR) 28 MG capsule sustained-release 24 hr extended release capsule Take 28 mg by mouth Daily.   Yes Ahmet Raphael MD   metFORMIN (GLUCOPHAGE) 500 MG tablet Take 0.5 tablets by mouth 2 (Two) Times a Day With Meals. 6/7/19  Yes Everette Rubio MD   Multiple Vitamins-Minerals (MULTIVITAMIN WITH MINERALS) tablet tablet Take 1 tablet by mouth Daily.   Yes Ahmet Raphael MD   omeprazole OTC (PriLOSEC OTC) 20 MG EC tablet Take 20 mg by mouth Daily.   Yes ProviderAhmet MD       Allergies:  Codeine and Morphine and related      Review of system  Review of Systems   Unable to perform ROS: Dementia       Vitals:    09/10/19 0713   BP: 127/56   Pulse: 67   Resp: 18   Temp: 97.3 °F (36.3 °C)   SpO2: 92%       Physical exam  Physical Exam   Constitutional: She appears well-developed and well-nourished.   HENT:   Head: Normocephalic and atraumatic.   Eyes: EOM are normal. Pupils are equal, round, and reactive to light.   Cardiovascular: Normal rate and regular rhythm.   Pulmonary/Chest: Effort normal.   Neurological: She is alert. She has normal reflexes. She displays normal reflexes. No cranial nerve deficit or sensory deficit. She exhibits normal muscle tone. Coordination normal.   Oriented to name, \"rehab in [place]\", not month or year. Speech clear, no facial droop, able to name, repeat, follow commands. Counts fingers better on the left.   Psychiatric: She has a normal mood and affect. Her behavior is normal.         Lab Results   Component Value Date    WBC 7.69 09/10/2019    HGB 11.2 (L) 09/10/2019    HCT 35.0 09/10/2019    MCV 87.7 09/10/2019     09/10/2019     Lab Results   Component Value Date    GLUCOSE 117 (H) 09/10/2019    BUN 24 (H) 09/10/2019    CREATININE 0.68 09/10/2019    EGFRIFNONA 81 09/10/2019    EGFRIFAFRI 86 06/11/2019    BCR 35.3 (H) 09/10/2019    CO2 27.2 09/10/2019    CALCIUM 9.3 09/10/2019    PROTENTOTREF 6.3 06/11/2019    ALBUMIN 4.20 09/10/2019    LABIL2 2.0 " 06/11/2019    AST 11 09/10/2019    ALT 9 09/10/2019   Urinalysis With Microscopic If Indicated (No Culture) - Urine, Catheter In/Out   Order: [id number]   Status:  Final result   Visible to patient:  No (Not Released)   Next appt:  11/26/2019 at 11:30 AM in Cardiology (Elissa Mcmahan MD)   Specimen Information: Urine, Catheter In/Out        Component  Ref Range & Units 1d ago   Color, UA  Yellow, Straw Yellow    Appearance, UA  Clear Clear    pH, UA  5.0 - 8.0 <=5.0    Specific Gravity, UA  1.005 - 1.030 1.011    Glucose, UA  Negative Negative    Ketones, UA  Negative Negative    Bilirubin, UA  Negative Negative    Blood, UA  Negative Negative    Protein, UA  Negative Negative    Leuk Esterase, UA  Negative Negative    Nitrite, UA  Negative Negative    Urobilinogen, UA  0.2 - 1.0 E.U./dL 0.2 E.U./dL              ECG 12 Lead   Order: [id number]   Status:  Final result   Visible to patient:  No (Not Released)   Next appt:  11/26/2019 at 11:30 AM in Cardiology (Elissa Mcmahan MD)      Narrative     HEART RATE= 73  bpm  RR Interval= 819  ms  PA Interval=   ms  P Horizontal Axis=   deg  P Front Axis=   deg  QRSD Interval= 93  ms  QT Interval= 399  ms  QRS Axis= -1  deg  T Wave Axis= 42  deg  - ABNORMAL ECG -  Atrial fibrillation  Low voltage, extremity leads  Borderline ST depression, anterolateral leads  af is new  Electronically Signed By: Eduardo BrownBERE) ([facility]) 09-Sep-2019 15:57:40  Date and Time of Study: 2019-09-09 11:24:42      Specimen Collected: 09/09/19 11:24   Last Resulted: 09/09/19 15:57               Radiological Studies:    Ct Head Without Contrast    Result Date: 9/9/2019  CT HEAD WO CONTRAST-  INDICATIONS: Weakness  TECHNIQUE: Radiation dose reduction techniques were utilized, including automated exposure control and exposure modulation based on body size. Noncontrast head CT  COMPARISON: None available  FINDINGS:    No acute intracranial hemorrhage, midline shift or mass effect. No acute  territorial infarct is identified. Myelomalacia/old infarct changes at the left occipital lobe.  Mild to moderate periventricular hypodensities suggest chronic small vessel ischemic change in a patient this age.  Arterial calcifications are seen at the base of the brain.  Ventricles, cisterns, cerebral sulci are unremarkable for patient age.  The visualized paranasal sinuses, orbits, mastoid air cells are unremarkable.           No acute intracranial hemorrhage or hydrocephalus. Chronic changes of the brain. If there is further clinical concern, MRI could be considered for further evaluation.  This report was finalized on 9/9/2019 12:08 PM by Dr. Reymundo Cruz M.D.      Xr Chest 1 View    Result Date: 9/9/2019  Portable chest radiograph  HISTORY:Weakness  TECHNIQUE: Single AP portable radiograph of the chest  COMPARISON:Chest radiograph 06/23/2018      FINDINGS AND IMPRESSION: The lungs are hypoinflated with bibasilar pulmonary opacification, right greater than left. Superimposed pulmonary vascular congestion and interstitial thickening is present. Small-to-moderate right pleural effusion is suspected. Findings are favored to represent pulmonary edema with multifocal pneumonia being less likely. Correlation with patient history is recommended. No pneumothorax is seen. Heart size accentuated by low lung volumes what is likely mild to moderately enlarged..  This report was finalized on 9/9/2019 12:38 PM by Dr. Toño Kat M.D.          During this visit the following were done:  Labs Reviewed [x]    Labs Ordered [x]    Radiology Reports Reviewed [x]    Radiology Ordered []    EKG, echo, and/or stress test reviewed [x]    EEG results reviewed  []    EEG reviewed and interpreted per myself   []    Discussed case with neurointerventionalist or neuroradiologist []    Referring Provider Records Reviewed []    ER Records Reviewed [x]    Hospital Records Reviewed [x]    History Obtained From Family [x]     Radiological images view and Interpreted per myself [x]    Case Discussed with referring provider [x]     Decision to obtain and request outside records  []        Assessment and Plan     Recurrent expressive aphasia, r/o threatened stroke vs partial seizures. Afib concerning for possible embolic source but stereotyped symptoms atypical for embolism. Mild to moderate baseline dementia and gait disorder.   - Observation on telemetry.   - MRI/MRAs ordered.   - TTE.   - EEG.   - TSH, T4, b12, folate, RPR.   - Cardiology consult.   - consider AC vs DAPT depending on w/u.    Thanks,                 Electronically signed by Alberto Escobar MD on 9/10/2019 at 9:40 AM

## 2019-09-10 NOTE — PLAN OF CARE
Problem: Patient Care Overview  Goal: Plan of Care Review  Outcome: Ongoing (interventions implemented as appropriate)   09/10/19 9167   Coping/Psychosocial   Plan of Care Reviewed With patient;daughter   OTHER   Outcome Summary Pt transferred from [facility] approximately 1600. Pt disoriented to time and situation, NIH 2, denies pain. Pt denies any numbness or tingling. Up x1 assist when ambulating. Pt to complete EEG tomorrow. Will continue to monitor.    Plan of Care Review   Progress no change       Problem: Fall Risk (Adult)  Goal: Absence of Fall  Outcome: Ongoing (interventions implemented as appropriate)      Problem: Pain, Chronic (Adult)  Goal: Acceptable Pain/Comfort Level and Functional Ability  Outcome: Ongoing (interventions implemented as appropriate)

## 2019-09-10 NOTE — H&P
Internal medicine history and physical  INTERNAL MEDICINE   [facility]       Patient Identification:  Name: Mary Shi  Age: 89 y.o.  Sex: female  :  3/3/1925  MRN: [id number]                   Primary Care Physician: Everette Rubio MD                                   Chief Complaint: Sudden onset of inability to speak and answer questions lasting for about 30 minutes this morning.    History of Present Illness:   Patient is a 89-year-old female whose past medical history as noted below has been on metformin, Bumex and atenolol as well as history of valvular heart disease for which she had a TAVR and history of mild internal carotid artery disease was noted to be her usual self when she woke up this morning.  Somewhere around breakfast time or afterwards and patient was interacting with her daughter she could not answer questions except to just laugh and giggle.  She did not have any associated focal weakness at that time and that got everybody concerned.  Patient symptoms improved after 30 minutes and she is been back to herself according to the family members in the room.  Patient herself denies any other symptoms and was able to ambulate to the bathroom and come back without any obvious abnormality noted by family members in terms of her pattern of movement.      Past Medical History:  Past Medical History:   Diagnosis Date   • 'light-for-dates' infant with signs of fetal malnutrition    • Amaurosis fugax    • Anemia    • Aortic stenosis     s/p TAVR    • Carotid artery stenosis     Per duplex 2016-proximal right internal carotid artery mild stenosis and proximal left moderate stenosis   • Cognitive disorder    • Congestive heart failure (CMS/HCC)    • Dementia    • Diabetes mellitus (CMS/HCC)    • Diastolic dysfunction    • Dyspnea on exertion    • Generalized osteoarthritis of multiple sites    • GERD (gastroesophageal reflux disease)    • Gout    • Health care  maintenance    • Hiatal hernia    • Hyperlipidemia    • Hypertension    • Mitral valve stenosis     Moderate per echocardiogram 2/2017; severe mitral annular calcification   • Nasal lesion    • Nasal stenosis    • Osteoarthritis     multiple sites   • PONV (postoperative nausea and vomiting)    • Systolic hypertension    • Varicose veins      Past Surgical History:  Past Surgical History:   Procedure Laterality Date   • AORTIC VALVE REPAIR/REPLACEMENT N/A 12/27/2016    Procedure: Transfemoral LEFT Transcatheter Aortic Valve Replacement TRANSESOPHAGEAL ECHOCARDIOGRAM WITH ANESTHESIA;  Surgeon: Eduardo Brown MD;  Location: [facility] OR 18/19;  Service:    • BLADDER REPAIR     • CARDIAC CATHETERIZATION N/A 12/14/2016    Procedure: Right Heart Cath;  Surgeon: Eduardo Brown MD;  Location: [facility] CATH INVASIVE LOCATION;  Service:    • CARDIAC CATHETERIZATION N/A 12/14/2016    Procedure: Coronary angiography;  Surgeon: Eduardo Brown MD;  Location: [facility] CATH INVASIVE LOCATION;  Service:    • HYSTERECTOMY     • KNEE SURGERY        Home Meds:  Medications Prior to Admission   Medication Sig Dispense Refill Last Dose   • acetaminophen (TYLENOL) 650 MG 8 hr tablet Take 650 mg by mouth Every 8 (Eight) Hours As Needed for Mild Pain .      • aspirin 81 MG EC tablet Take 81 mg by mouth Daily.   9/9/2019 at Unknown time   • atenolol (TENORMIN) 25 MG tablet Take 25 mg by mouth Daily.   9/9/2019 at Unknown time   • bumetanide (BUMEX) 0.5 MG tablet Take 0.5 mg by mouth Daily.   9/9/2019 at Unknown time   • memantine (NAMENDA XR) 28 MG capsule sustained-release 24 hr extended release capsule Take 28 mg by mouth Daily.   9/9/2019 at Unknown time   • metFORMIN (GLUCOPHAGE) 500 MG tablet Take 0.5 tablets by mouth 2 (Two) Times a Day With Meals. 180 tablet 1 9/9/2019 at am   • Multiple Vitamins-Minerals (MULTIVITAMIN WITH MINERALS) tablet tablet Take 1 tablet by mouth Daily.   9/9/2019 at Unknown time   • omeprazole OTC  "(PriLOSEC OTC) 20 MG EC tablet Take 20 mg by mouth Daily.   9/9/2019 at Unknown time     Current Meds:     Current Facility-Administered Medications:   •  acetaminophen (TYLENOL) tablet 650 mg, 650 mg, Oral, Q6H PRN, Kip Redd MD  Allergies:  Allergies   Allergen Reactions   • Codeine Itching and GI Intolerance   • Morphine And Related Itching     Social History:   Social History     Tobacco Use   • Smoking status: Never Smoker   • Smokeless tobacco: Never Used   Substance Use Topics   • Alcohol use: No     Comment: caffeine use      Family History:  Family History   Problem Relation Age of Onset   • Heart disease Mother    • Coronary artery disease Mother    • Hyperlipidemia Mother    • Hypertension Mother    • Cancer Sister    • Cancer Brother    • Diabetes Daughter    • Diabetes Daughter    • Cancer Sister    • Cancer Sister    • Cancer Sister    • Cancer Other           Review of Systems  See history of present illness and past medical history.    Constitutional: No fever chills.   HENT: Negative for sore throat.    Eyes: Negative.    Respiratory: Negative for cough and shortness of breath.    Cardiovascular: Negative for chest pain.   Gastrointestinal: Negative for abdominal pain, diarrhea and vomiting.   Genitourinary: Negative for dysuria.   Musculoskeletal: Negative for neck pain.   Skin: Negative for rash.   Allergic/Immunologic: Negative.    Neurological: Sudden onset of inability to speak or answer.   Hematological: Negative.    Psychiatric/Behavioral: Negative.    All other systems reviewed and are negative.  Remainder of ROS is negative.      Vitals:   /75 (BP Location: Left arm, Patient Position: Lying)   Pulse 76   Temp 98.9 °F (37.2 °C) (Oral)   Resp 16   Ht 157.5 cm (62\")   Wt 63.5 kg (140 lb)   SpO2 93%   BMI 25.61 kg/m²   I/O: No intake or output data in the 24 hours ending 09/09/19 2057  Exam:  General Appearance:    Alert, cooperative, no distress, appears stated age does not " appear to be ill or toxic.   Head:    Normocephalic, without obvious abnormality, atraumatic   Eyes:    PERRL, conjunctiva/corneas clear, EOM's intact, both eyes   Ears:    Normal external ear canals, both ears   Nose:   Nares normal, septum midline, mucosa normal, no drainage    or sinus tenderness   Throat:   Lips, tongue, gums normal; oral mucosa pink and moist   Neck:   Supple, symmetrical, trachea midline, no adenopathy;     thyroid:  no enlargement/tenderness/nodules; no carotid    bruit or JVD   Back:     Symmetric, no curvature, ROM normal, no CVA tenderness   Lungs:     Clear to auscultation bilaterally, respirations unlabored   Chest Wall:    No tenderness or deformity    Heart:    Regular rate and rhythm, S1 and S2 normal, 2/6 systolic murmur noted   Abdomen:     Soft, non-tender, bowel sounds active all four quadrants,     no masses, no hepatomegaly, no splenomegaly   Extremities:   Extremities normal, atraumatic, no cyanosis or edema   Pulses:   Pulses palpable in all extremities; symmetric all extremities   Skin:  Chronic changes in the lower extremities noted   Neurologic:   CNII-XII intact, motor strength grossly intact, sensation grossly intact to light touch, no focal deficits noted       Data Review:      I reviewed the patient's new clinical results.  Results from last 7 days   Lab Units 09/09/19  1127   WBC 10*3/mm3 8.24   HEMOGLOBIN g/dL 12.8   PLATELETS 10*3/mm3 141     Results from last 7 days   Lab Units 09/09/19  1127   SODIUM mmol/L 136   POTASSIUM mmol/L 4.2   CHLORIDE mmol/L 98   CO2 mmol/L 24.0   BUN mg/dL 22   CREATININE mg/dL 0.79   CALCIUM mg/dL 9.3   GLUCOSE mg/dL 258*     ECG 12 Lead   Final Result   HEART RATE= 73  bpm   RR Interval= 819  ms   NC Interval=   ms   P Horizontal Axis=   deg   P Front Axis=   deg   QRSD Interval= 93  ms   QT Interval= 399  ms   QRS Axis= -1  deg   T Wave Axis= 42  deg   - ABNORMAL ECG -   Atrial fibrillation   Low voltage, extremity leads   Borderline  ST depression, anterolateral leads   af is new   Electronically Signed By: Eduardo BrownBERE) ([facility]) 09-Sep-2019 15:57:40   Date and Time of Study: 2019-09-09 11:24:42        Ct Head Without Contrast    Result Date: 9/9/2019   No acute intracranial hemorrhage or hydrocephalus. Chronic changes of the brain. If there is further clinical concern, MRI could be considered for further evaluation.  This report was finalized on 9/9/2019 12:08 PM by Dr. Reymundo Cruz M.D.      Xr Chest 1 View    Result Date: 9/9/2019  FINDINGS AND IMPRESSION: The lungs are hypoinflated with bibasilar pulmonary opacification, right greater than left. Superimposed pulmonary vascular congestion and interstitial thickening is present. Small-to-moderate right pleural effusion is suspected. Findings are favored to represent pulmonary edema with multifocal pneumonia being less likely. Correlation with patient history is recommended. No pneumothorax is seen. Heart size accentuated by low lung volumes what is likely mild to moderately enlarged..  This report was finalized on 9/9/2019 12:38 PM by Dr. Toño Kat M.D.              Assessment:  Active Hospital Problems    Diagnosis POA   • **Altered mental status [R41.82] Yes   • Weakness [R53.1] Yes   • Type 2 diabetes mellitus (CMS/HCC) [E11.9] Yes   • S/P TAVR (transcatheter aortic valve replacement) [Z95.2] Not Applicable   • Systolic hypertension [I10] Yes       Medical decision making:  Sudden onset of aphasia lasting for about 30 minutes in the context of risk factors such as her age, diabetes, atrial fibrillation and previous history of TAVR and hypertension.  This is very much concerning for TIA.  Plan is to admit the patient continue her statins and aspirin, neurochecks an MRI of her brain with MRA of head and neck vessels.  Consult stroke neurology service and further studies as per their recommendations.  Continue with physical therapy and Occupational Therapy.  Diabetes  mellitus-provided with Accu-Cheks and sliding scale coverage check hemoglobin A1c.  Hypertension-continue antihypertensive regimen.  Atrial fibrillation seen on the EKG-rate is controlled.  Patient may need conversation about risks and benefits of anticoagulation therapy given her age and underlying dementia in the context of acute presentation concerning for TIA.  Dementia-at risk of sundowning plan is to monitor.    Kip Redd MD   9/9/2019  8:57 PM  Much of this encounter note is an electronic transcription/translation of spoken language to printed text. The electronic translation of spoken language may permit erroneous, or at times, nonsensical words or phrases to be inadvertently transcribed; Although I have reviewed the note for such errors, some may still exist

## 2019-09-10 NOTE — PROGRESS NOTES
Discharge Planning Assessment  [facility]     Patient Name: Mary Shi  MRN: [id number]  Today's Date: 9/10/2019    Admit Date: 9/9/2019    Discharge Needs Assessment     Row Name 09/10/19 1436       Living Environment    Lives With  alone    Name(s) of Who Lives With Patient  Pt lives alone but has 7 children that take turns staying with her in her home.      Current Living Arrangements  home/apartment/condo    Primary Care Provided by  child(kd)    Provides Primary Care For  no one, unable/limited ability to care for self    Family Caregiver if Needed  child(kd), adult    Family Caregiver Names  7 children take turns staying with pt.     Quality of Family Relationships  involved;supportive    Able to Return to Prior Arrangements  yes       Resource/Environmental Concerns    Resource/Environmental Concerns  none       Transition Planning    Patient/Family Anticipates Transition to  home with family;inpatient rehabilitation facility;home with help/services    Patient/Family Anticipated Services at Transition  home health care;skilled nursing    Transportation Anticipated  family or friend will provide       Discharge Needs Assessment    Readmission Within the Last 30 Days  no previous admission in last 30 days    Concerns to be Addressed  care coordination/care conferences;decision making;discharge planning;adjustment to diagnosis/illness    Equipment Currently Used at Home  glucometer;bath bench;cane, quad;walker, rolling;rollator;other (see comments) Medic alert system    Anticipated Changes Related to Illness  inability to care for self    Equipment Needed After Discharge  none    Outpatient/Agency/Support Group Needs  skilled nursing facility    Discharge Facility/Level of Care Needs  home with home health;nursing facility, skilled    Offered/Gave Vendor List  yes    Current Discharge Risk  cognitively impaired;lives alone;dependent with mobility/activities of daily living;chronically ill         Discharge Plan     Row Name 09/10/19 1280       Plan    Plan  Home with family and HH vs SNF    Patient/Family in Agreement with Plan  yes    Plan Comments  Met with daughter at bedside. Explained roll of . Face sheet and pharmacy verified. Pt lives alone but has seven children that take turns staying with her.  The daughter states they give her all three meals, give meds, and assist with bathing. Daughter voices concern over pt. returning to her home if she requires 24-hour care.  Daughter stated that they do leave pt. alone after meals when she falls asleep. DME equipment: Bath bench, glucometer, cane, walker, medical alert system, and a rollator.  States pt. has used [facility] in the past but has never been to rehab. [facility] and list of SNFs and HH agencies given to daughter.  Discussed possible need at discharge. PCP: Dr JIMMIE Rubio.  Uses [facility]. At discharge, family will transport.  Pt with diagnosis of stroke.  Will follow for home needs.  Remberto Oviedo RN-BC                Demographic Summary     Row Name 09/10/19 1431       General Information    Admission Type  inpatient    Arrived From  emergency department    Required Notices Provided  Important Message from Medicare    Reason for Consult  discharge planning;care coordination/care conference;decision making    Preferred Language  English     Used During This Interaction  no       Contact Information    Permission Granted to Share Info With  family/designee        Functional Status     Row Name 09/10/19 1435       Functional Status    Usual Activity Tolerance  moderate    Current Activity Tolerance  moderate       Functional Status, IADL    Medications  completely dependent    Meal Preparation  assistive person    Housekeeping  assistive person    Laundry  assistive person    Shopping  assistive person       Mental Status    General Appearance WDL  WDL       Mental Status Summary    Recent Changes  in Mental Status/Cognitive Functioning  mental status        Psychosocial    No documentation.    Patient Forms     Row Name 09/10/19 6043       Patient Forms    Provider Choice List  Delivered [facility] magazine and list of SNF's/ HH agencies given to daughter    Delivered to  Other (comment) Daughter    Method of delivery  In person    Important Message from Medicare (IMM)  Delivered    Delivered to  Other (comment) Daughter    Method of delivery  In person            Remberto Oviedo RN

## 2019-09-10 NOTE — PROGRESS NOTES
"     LOS: 0 days   Primary Care Physician: Everette Rubio MD     Subjective   Daughter at bedside.  Patient alert and appropriate.  No complaints.  Denies shortness of breath or chest pain.  Daughter reports that patient's long-term memory is not as good    Vital Signs  Body mass index is 25.61 kg/m².  Temp:  [97.3 °F (36.3 °C)-99.1 °F (37.3 °C)] 97.3 °F (36.3 °C)  Heart Rate:  [65-88] 67  Resp:  [16-18] 18  BP: (117-155)/(56-88) 127/56      Objective:  General Appearance:  In no acute distress (She looks younger than age.  Hard of hearing).    Vital signs: (most recent): Blood pressure 127/56, pulse 67, temperature 97.3 °F (36.3 °C), temperature source Oral, resp. rate 18, height 157.5 cm (62\"), weight 63.5 kg (140 lb), SpO2 92 %.    HEENT: (JVD resident.  Neck supple.  Trachea midline)    Lungs:  She is not in respiratory distress.  No stridor.  There are rales and decreased breath sounds.  No wheezes or rhonchi.  (Occasional crackle at bases)  Heart: Normal rate.  Irregular rhythm.  Positive for murmur.  (Grade 3/6 early systolic murmur right and left sternal borders)  Abdomen: Abdomen is soft and non-distended.  Bowel sounds are normal.   There is no abdominal tenderness.   There is no splenomegaly. There is no hepatomegaly.   Extremities: There is no dependent edema.    Neurological: Patient is alert.    Skin:  Warm.          Results Review:    I reviewed the patient's new clinical results.    Results from last 7 days   Lab Units 09/10/19  0544 09/09/19  1127   WBC 10*3/mm3 7.69 8.24   HEMOGLOBIN g/dL 11.2* 12.8   PLATELETS 10*3/mm3 203 141     Results from last 7 days   Lab Units 09/10/19  0544 09/09/19  1127   SODIUM mmol/L 138 136   POTASSIUM mmol/L 3.9 4.2   CHLORIDE mmol/L 100 98   CO2 mmol/L 27.2 24.0   BUN mg/dL 24* 22   CREATININE mg/dL 0.68 0.79   CALCIUM mg/dL 9.3 9.3   GLUCOSE mg/dL 117* 258*         Hemoglobin A1C:  Lab Results   Component Value Date    HGBA1C 6.40 (H) 09/10/2019       Glucose " Range:  Glucose   Date/Time Value Ref Range Status   09/10/2019 0603 122 70 - 130 mg/dL Final   09/09/2019 2027 169 (H) 70 - 130 mg/dL Final       No results found for: [id number]    Lab Results   Component Value Date    TSH 1.66 12/07/2018       Assessment & Plan      Medication Review: Yes    Active Hospital Problems    Diagnosis  POA   • **Altered mental status [R41.82]  Yes   • Weakness [R53.1]  Yes   • Type 2 diabetes mellitus (CMS/HCC) [E11.9]  Yes   • S/P TAVR (transcatheter aortic valve replacement) [Z95.2]  Not Applicable   • Systolic hypertension [I10]  Yes      Resolved Hospital Problems   No resolved problems to display.       Assessment/Plan  1.  Brief episode of a aphasia.  MRI/MRA pending.  Neurology to see.  Add echo  2.  Pulmonary edema by chest x-ray.  IV Lasix today instead of Bumex.  No peripheral edema.  Patient without symptoms  3.  Atrial fibrillation, new dx?  With the above scenario, may need to be on anticoagulation.  Discussed with Dr. Escobar.  Will ask cardiology to see.  On Tenormin.  Does have history of a TAVR  4.  Diabetes mellitus type 2, diet controlled.  Sugars are fine.  I stopped sliding scale to see what her numbers will do.  5.  Dementia.  Continue Tim Greco MD  09/10/19  9:29 AM

## 2019-09-10 NOTE — PLAN OF CARE
Problem: Patient Care Overview  Goal: Plan of Care Review  Outcome: Ongoing (interventions implemented as appropriate)   09/10/19 3733   Coping/Psychosocial   Plan of Care Reviewed With patient   OTHER   Outcome Summary VSS on RA. No c/o pain, SOA, n/v. Daughter at bedside. MRI and MRA ordered for AM. Neuro consulted. Continue to monitor.     Goal: Individualization and Mutuality  Outcome: Ongoing (interventions implemented as appropriate)    Goal: Discharge Needs Assessment  Outcome: Ongoing (interventions implemented as appropriate)    Goal: Interprofessional Rounds/Family Conf  Outcome: Ongoing (interventions implemented as appropriate)      Problem: Fall Risk (Adult)  Goal: Identify Related Risk Factors and Signs and Symptoms  Outcome: Outcome(s) achieved Date Met: 09/10/19    Goal: Absence of Fall  Outcome: Ongoing (interventions implemented as appropriate)      Problem: Pain, Chronic (Adult)  Goal: Identify Related Risk Factors and Signs and Symptoms  Outcome: Outcome(s) achieved Date Met: 09/10/19    Goal: Acceptable Pain/Comfort Level and Functional Ability  Outcome: Ongoing (interventions implemented as appropriate)

## 2019-09-11 ENCOUNTER — APPOINTMENT (OUTPATIENT)
Dept: NEUROLOGY | Facility: HOSPITAL | Age: 84
End: 2019-09-11

## 2019-09-11 VITALS
DIASTOLIC BLOOD PRESSURE: 53 MMHG | SYSTOLIC BLOOD PRESSURE: 113 MMHG | HEART RATE: 79 BPM | WEIGHT: 140.3 LBS | TEMPERATURE: 98.7 F | HEIGHT: 62 IN | RESPIRATION RATE: 16 BRPM | OXYGEN SATURATION: 91 % | BODY MASS INDEX: 25.82 KG/M2

## 2019-09-11 PROBLEM — I48.0 PAROXYSMAL ATRIAL FIBRILLATION: Status: ACTIVE | Noted: 2019-09-11

## 2019-09-11 LAB
ANION GAP SERPL CALCULATED.3IONS-SCNC: 14.5 MMOL/L (ref 5–15)
BUN BLD-MCNC: 19 MG/DL (ref 8–23)
BUN/CREAT SERPL: 26.4 (ref 7–25)
CALCIUM SPEC-SCNC: 9.3 MG/DL (ref 8.2–9.6)
CHLORIDE SERPL-SCNC: 97 MMOL/L (ref 98–107)
CO2 SERPL-SCNC: 27.5 MMOL/L (ref 22–29)
CREAT BLD-MCNC: 0.72 MG/DL (ref 0.57–1)
DEPRECATED RDW RBC AUTO: 43.8 FL (ref 37–54)
ERYTHROCYTE [DISTWIDTH] IN BLOOD BY AUTOMATED COUNT: 13.7 % (ref 12.3–15.4)
GFR SERPL CREATININE-BSD FRML MDRD: 75 ML/MIN/1.73
GLUCOSE BLD-MCNC: 118 MG/DL (ref 65–99)
GLUCOSE BLDC GLUCOMTR-MCNC: 138 MG/DL (ref 70–130)
GLUCOSE BLDC GLUCOMTR-MCNC: 182 MG/DL (ref 70–130)
HCT VFR BLD AUTO: 36 % (ref 34–46.6)
HGB BLD-MCNC: 11.7 G/DL (ref 12–15.9)
MCH RBC QN AUTO: 28.5 PG (ref 26.6–33)
MCHC RBC AUTO-ENTMCNC: 32.5 G/DL (ref 31.5–35.7)
MCV RBC AUTO: 87.8 FL (ref 79–97)
PLATELET # BLD AUTO: 189 10*3/MM3 (ref 140–450)
PMV BLD AUTO: 10.5 FL (ref 6–12)
POTASSIUM BLD-SCNC: 4.1 MMOL/L (ref 3.5–5.2)
RBC # BLD AUTO: 4.1 10*6/MM3 (ref 3.77–5.28)
RPR SER QL: NORMAL
SODIUM BLD-SCNC: 139 MMOL/L (ref 136–145)
WBC NRBC COR # BLD: 7.5 10*3/MM3 (ref 3.4–10.8)

## 2019-09-11 PROCEDURE — 25010000002 FUROSEMIDE PER 20 MG: Performed by: INTERNAL MEDICINE

## 2019-09-11 PROCEDURE — 80048 BASIC METABOLIC PNL TOTAL CA: CPT | Performed by: INTERNAL MEDICINE

## 2019-09-11 PROCEDURE — 99232 SBSQ HOSP IP/OBS MODERATE 35: CPT | Performed by: NURSE PRACTITIONER

## 2019-09-11 PROCEDURE — 95819 EEG AWAKE AND ASLEEP: CPT | Performed by: PSYCHIATRY & NEUROLOGY

## 2019-09-11 PROCEDURE — 82962 GLUCOSE BLOOD TEST: CPT

## 2019-09-11 PROCEDURE — 99232 SBSQ HOSP IP/OBS MODERATE 35: CPT | Performed by: INTERNAL MEDICINE

## 2019-09-11 PROCEDURE — 95816 EEG AWAKE AND DROWSY: CPT

## 2019-09-11 PROCEDURE — 85027 COMPLETE CBC AUTOMATED: CPT | Performed by: INTERNAL MEDICINE

## 2019-09-11 RX ORDER — ATORVASTATIN CALCIUM 20 MG/1
40 TABLET, FILM COATED ORAL NIGHTLY
Status: DISCONTINUED | OUTPATIENT
Start: 2019-09-11 | End: 2019-09-11 | Stop reason: HOSPADM

## 2019-09-11 RX ORDER — ATORVASTATIN CALCIUM 40 MG/1
40 TABLET, FILM COATED ORAL NIGHTLY
Qty: 30 TABLET | Refills: 0 | Status: SHIPPED | OUTPATIENT
Start: 2019-09-11 | End: 2019-09-24 | Stop reason: SDUPTHER

## 2019-09-11 RX ADMIN — MULTIPLE VITAMINS W/ MINERALS TAB 1 TABLET: TAB at 08:25

## 2019-09-11 RX ADMIN — APIXABAN 2.5 MG: 2.5 TABLET, FILM COATED ORAL at 11:14

## 2019-09-11 RX ADMIN — ATENOLOL 25 MG: 25 TABLET ORAL at 08:25

## 2019-09-11 RX ADMIN — PANTOPRAZOLE SODIUM 40 MG: 40 TABLET, DELAYED RELEASE ORAL at 06:46

## 2019-09-11 RX ADMIN — MEMANTINE HYDROCHLORIDE 10 MG: 10 TABLET, FILM COATED ORAL at 08:25

## 2019-09-11 RX ADMIN — SODIUM CHLORIDE, PRESERVATIVE FREE 10 ML: 5 INJECTION INTRAVENOUS at 08:25

## 2019-09-11 RX ADMIN — FUROSEMIDE 40 MG: 10 INJECTION, SOLUTION INTRAMUSCULAR; INTRAVENOUS at 08:26

## 2019-09-11 NOTE — PROGRESS NOTES
Continued Stay Note  [facility]     Patient Name: Mary Shi  MRN: [id number]  Today's Date: 9/11/2019    Admit Date: 9/9/2019    Discharge Plan     Row Name 09/11/19 1533       Plan    Plan  Home with [facility]    Patient/Family in Agreement with Plan  yes    Plan Comments  Per Irina with [facility], they do not cover [place]. Spoke with daughter, Aiyana who reguested [facility]. Referral made and VM left with Lucila.    Row Name 09/11/19 1415       Plan    Plan  Home with [facility]    Patient/Family in Agreement with Plan  yes    Plan Comments  Spoke with patient and daughter, Aiyana at bedside. Discussed need for HH. Requested [facility]. Referral made and Irina notified and aware of DC today.        Discharge Codes    No documentation.       Expected Discharge Date and Time     Expected Discharge Date Expected Discharge Time    Sep 11, 2019             Hali Garcia RN

## 2019-09-11 NOTE — PROGRESS NOTES
LOS: 1 day   Patient Care Team:  Everette Rubio MD as PCP - General (Internal Medicine)  Everette Rubio MD as PCP - Claims Attributed    Chief Complaint:     F/u a fib and CVA    Interval History:     She denies chest pain, dyspnea, tachycardia, dizziness.  She feels well.    Echo 9/10/19  Interpretation Summary     · Left ventricular wall thickness is consistent with hypertrophy. Sigmoid-shaped ventricular septum is present.  · Left ventricular systolic function is normal.  · Calculated EF = 71.0%.  · Left atrial cavity size is severely dilated.  · Mild tricuspid valve regurgitation is present.  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 Mild aortic valve regurgitation is present. 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 amber The aortic valve peak and mean gradients are within defined limits. The prosthetic valve is normal.  · Mild to moderate pulmonary hypertension is present.           Objective   Vital Signs  Temp:  [98 °F (36.7 °C)-98.7 °F (37.1 °C)] 98.7 °F (37.1 °C)  Heart Rate:  [68-78] 75  Resp:  [16-19] 16  BP: (119-159)/(56-70) 119/56  No intake or output data in the 24 hours ending 09/11/19 0722    Comfortable NAD  Neck supple, no JVD or thyromegaly appreciated  S1/S2 irregular, no /r/g, 3/6 thomas at the rusb  Lungs CTA B, normal effort  Abdomen S/NT/ND (+) BS, no HSM appreciated  Extremities warm, no clubbing, cyanosis, or edema  No visible or palpable skin lesions  A/Ox4, mood and affect appropriate    Results Review:      Results from last 7 days   Lab Units 09/11/19  0451 09/10/19  0544 09/09/19  1127   SODIUM mmol/L 139 138 136   POTASSIUM mmol/L 4.1 3.9 4.2   CHLORIDE mmol/L 97* 100 98   CO2 mmol/L 27.5 27.2 24.0   BUN mg/dL 19 24* 22   CREATININE mg/dL 0.72 0.68 0.79   GLUCOSE mg/dL 118* 117* 258*   CALCIUM mg/dL 9.3 9.3 9.3     Results from last 7 days   Lab Units 09/09/19  1127   TROPONIN T ng/mL <0.010      Results from last 7 days   Lab Units 09/11/19  0451 09/10/19  0544 09/09/19  1127   WBC 10*3/mm3 7.50 7.69 8.24   HEMOGLOBIN g/dL 11.7* 11.2* 12.8   HEMATOCRIT % 36.0 35.0 39.9   PLATELETS 10*3/mm3 189 203 141                       I reviewed the patient's new clinical results.  I personally viewed and interpreted the patient's EKG/Telemetry data        Medication Review:     aspirin 81 mg Oral Daily   atenolol 25 mg Oral Daily   furosemide 40 mg Intravenous Q12H   memantine 10 mg Oral Q12H   multivitamin with minerals 1 tablet Oral Daily   pantoprazole 40 mg Oral QAM   sodium chloride 10 mL Intravenous Q12H            Assessment/Plan       Acute ischemic left MCA stroke (CMS/HCC)    Systolic hypertension    S/P TAVR (transcatheter aortic valve replacement)    Type 2 diabetes mellitus (CMS/HCC)    Weakness    Altered mental status    1. Left MCA CVAs, needs AC - likely due to a fib.   2. Atrial fibrillation, new, normal thyroid panel.   3. Mitral stenosis, moderate  4. H/o AS, s/p TAVR, mild AI.   5. Diastolic chf, diurese with IV lasix.     Needs AC. Stop asa, start eliquis 2.5mg BID. Normally would prefer warfarin due to mitral stenosis but given her frailty, would prefer eliquis.      Daughter at bedside.  We discussed her need for anticoagulation and the risks associated with it.  They are amenable to Eliquis.  Stable cardiac status.  We will see back in the office in 8 weeks.    Elissa Mcmahan MD  09/11/19  7:22 AM

## 2019-09-11 NOTE — PROGRESS NOTES
Case Management Discharge Note    Final Note: Home with [facility] (Lucila bateman)         Transportation Services  Private: Car    Final Discharge Disposition Code: 06 - home with home health care

## 2019-09-11 NOTE — DISCHARGE SUMMARY
Date of Admission: 9/9/2019  Date of Discharge:  9/11/2019  Primary Care Physician: Everette Rubio MD     Discharge Diagnosis:  Active Hospital Problems    Diagnosis  POA   • **Acute ischemic left MCA stroke (CMS/HCC) [I63.512]  Yes   • Paroxysmal atrial fibrillation (CMS/HCC) [I48.0]  Unknown   • Weakness [R53.1]  Yes   • Altered mental status [R41.82]  Yes   • Type 2 diabetes mellitus (CMS/[facility]) [E11.9]  Yes   • S/P TAVR (transcatheter aortic valve replacement) [Z95.2]  Not Applicable   • Systolic hypertension [I10]  Yes      Resolved Hospital Problems   No resolved problems to display.       Presenting Problem/History of Present Illness:  Weakness [R53.1]  Altered mental status, unspecified altered mental status type [R41.82]  Acute ischemic left MCA stroke (CMS/HCC) [I63.512]     Hospital Course:  The patient is a 89 y.o. admitted for confusion.  MRI showed 2 acute strokes in the distribution of the left MCA.  She also had new onset atrial fibrillation.  She was seen by neurology and cardiology.  Stroke protocol was initiated.  She was started on low-dose Eliquis yesterday.  She is a fall risk but risks of anticoagulation outweighed by benefit.  Cardiology discussed this with the patient and her family at length.  Patient feels fine and is ready for discharge home.  Kiley Henna will review EEG and notify patient/family if abnormal or any additional intervention indicated.    She had evidence of acute exacerbation of diastolic congestive heart failure; pulmonary vascular congestion noted on chest x-ray.  She was given IV Lasix yesterday in place of her oral Bumex.  She will continue to weigh every day.  Restart Bumex today.  Exam definitely improved.    Regarding her diabetes, she can restart metformin tomorrow but at just once daily instead of twice daily.  A1c and sugars are fine.      Stable condition; fair prognosis    Exam Today: Feels great.  No complaints.  Back to normal.  Daughter at bedside and  agrees.  Vital signs noted.  No distress.  Heart irregularly irregular.  I do not hear a murmur currently.  Lungs a few crackles at bases, improved.  Extremities no edema.  Abdomen soft and nontender    Procedures Performed:  Ct Head Without Contrast    Result Date: 9/9/2019   No acute intracranial hemorrhage or hydrocephalus. Chronic changes of the brain. If there is further clinical concern, MRI could be considered for further evaluation.  This report was finalized on 9/9/2019 12:08 PM by Dr. Reymundo Cruz M.D.      Mri Angiogram Head Without Contrast    Result Date: 9/10/2019  1. Multiple acute infarcts involving the left MCA vascular distribution involving the superior aspect of the left temporal lobe and to a lesser extent cortex and subcortical white matter of the left frontal lobe. 2. Remote left occipital infarct. 3. Mild to moderate small vessel ischemic disease.  MRA OF THE HEAD WITHOUT CONTRAST:  FINDINGS: There is signal present within the distal aspect of the vertebral arteries bilaterally. The vertebral arteries are codominant. The basilar artery is of normal caliber. The proximal aspects of the posterior cerebral arteries appear unremarkable. The distal aspects of the internal carotid arteries are of normal caliber. The proximal aspects of the anterior and middle cerebral arteries appear unremarkable.  IMPRESSION: No evidence of focal high-grade stenosis or of aneurysm.  MRA OF THE NECK WITH AND WITHOUT CONTRAST:  FINDINGS: The study is hampered by patient motion. The great vessels are arranged in a classic configuration. There is mild irregularity and stenosis involving the proximal aspects of the left subclavian artery and left common carotid artery. There is mild irregularity involving the carotid bifurcations bilaterally but with 0% stenosis using NASCET criteria. Irregularity is more prominent on the left. The left vertebral artery arises from the aortic arch. There is mild signal loss at  the origin of the right vertebral artery but this may be secondary to tortuosity. Otherwise, the cervical segments of the vertebral arteries are of relatively uniform caliber.  Bilateral pleural effusions are noted larger on the right, only partially visualized.  IMPRESSION: There is atherosclerotic disease with irregularity involving the carotid bifurcations bilaterally, more prominent on the left but without high-grade stenosis. The left vertebral artery arises from the aortic arch. There is mild stenosis involving the proximal aspects of left common carotid artery and the left subclavian artery.  The above information was called to and discussed with Dr. Greco on 09/10/2019 at 1130 hours.  This report was finalized on 9/10/2019 4:34 PM by Dr. Kenny Villanueva M.D.      Mri Angiogram Neck With & Without Contrast    Result Date: 9/10/2019  1. Multiple acute infarcts involving the left MCA vascular distribution involving the superior aspect of the left temporal lobe and to a lesser extent cortex and subcortical white matter of the left frontal lobe. 2. Remote left occipital infarct. 3. Mild to moderate small vessel ischemic disease.  MRA OF THE HEAD WITHOUT CONTRAST:  FINDINGS: There is signal present within the distal aspect of the vertebral arteries bilaterally. The vertebral arteries are codominant. The basilar artery is of normal caliber. The proximal aspects of the posterior cerebral arteries appear unremarkable. The distal aspects of the internal carotid arteries are of normal caliber. The proximal aspects of the anterior and middle cerebral arteries appear unremarkable.  IMPRESSION: No evidence of focal high-grade stenosis or of aneurysm.  MRA OF THE NECK WITH AND WITHOUT CONTRAST:  FINDINGS: The study is hampered by patient motion. The great vessels are arranged in a classic configuration. There is mild irregularity and stenosis involving the proximal aspects of the left subclavian artery and left common  carotid artery. There is mild irregularity involving the carotid bifurcations bilaterally but with 0% stenosis using NASCET criteria. Irregularity is more prominent on the left. The left vertebral artery arises from the aortic arch. There is mild signal loss at the origin of the right vertebral artery but this may be secondary to tortuosity. Otherwise, the cervical segments of the vertebral arteries are of relatively uniform caliber.  Bilateral pleural effusions are noted larger on the right, only partially visualized.  IMPRESSION: There is atherosclerotic disease with irregularity involving the carotid bifurcations bilaterally, more prominent on the left but without high-grade stenosis. The left vertebral artery arises from the aortic arch. There is mild stenosis involving the proximal aspects of left common carotid artery and the left subclavian artery.  The above information was called to and discussed with Dr. Greco on 09/10/2019 at 1130 hours.  This report was finalized on 9/10/2019 4:34 PM by Dr. Kenny Villanueva M.D.      Mri Brain With & Without Contrast    Result Date: 9/10/2019  1. Multiple acute infarcts involving the left MCA vascular distribution involving the superior aspect of the left temporal lobe and to a lesser extent cortex and subcortical white matter of the left frontal lobe. 2. Remote left occipital infarct. 3. Mild to moderate small vessel ischemic disease.  MRA OF THE HEAD WITHOUT CONTRAST:  FINDINGS: There is signal present within the distal aspect of the vertebral arteries bilaterally. The vertebral arteries are codominant. The basilar artery is of normal caliber. The proximal aspects of the posterior cerebral arteries appear unremarkable. The distal aspects of the internal carotid arteries are of normal caliber. The proximal aspects of the anterior and middle cerebral arteries appear unremarkable.  IMPRESSION: No evidence of focal high-grade stenosis or of aneurysm.  MRA OF THE NECK WITH  AND WITHOUT CONTRAST:  FINDINGS: The study is hampered by patient motion. The great vessels are arranged in a classic configuration. There is mild irregularity and stenosis involving the proximal aspects of the left subclavian artery and left common carotid artery. There is mild irregularity involving the carotid bifurcations bilaterally but with 0% stenosis using NASCET criteria. Irregularity is more prominent on the left. The left vertebral artery arises from the aortic arch. There is mild signal loss at the origin of the right vertebral artery but this may be secondary to tortuosity. Otherwise, the cervical segments of the vertebral arteries are of relatively uniform caliber.  Bilateral pleural effusions are noted larger on the right, only partially visualized.  IMPRESSION: There is atherosclerotic disease with irregularity involving the carotid bifurcations bilaterally, more prominent on the left but without high-grade stenosis. The left vertebral artery arises from the aortic arch. There is mild stenosis involving the proximal aspects of left common carotid artery and the left subclavian artery.  The above information was called to and discussed with Dr. Greco on 09/10/2019 at 1130 hours.  This report was finalized on 9/10/2019 4:34 PM by Dr. Kenny Villanueva M.D.      Xr Chest 1 View    Result Date: 9/9/2019  FINDINGS AND IMPRESSION: The lungs are hypoinflated with bibasilar pulmonary opacification, right greater than left. Superimposed pulmonary vascular congestion and interstitial thickening is present. Small-to-moderate right pleural effusion is suspected. Findings are favored to represent pulmonary edema with multifocal pneumonia being less likely. Correlation with patient history is recommended. No pneumothorax is seen. Heart size accentuated by low lung volumes what is likely mild to moderately enlarged..  This report was finalized on 9/9/2019 12:38 PM by Dr. Toño Kat M.D.      Echocardiogram  9/10/2019 which showed ejection fraction 71%.  Mild tricuspid, aortic and mitral regurg.  Moderate mitral stenosis.  TAVR valve noted.  Mild to moderate pulmonary hypertension       Labs:  Lab Results   Component Value Date    WBC 7.50 09/11/2019    HGB 11.7 (L) 09/11/2019    HCT 36.0 09/11/2019     09/11/2019     Lab Results   Component Value Date     09/11/2019    K 4.1 09/11/2019    CL 97 (L) 09/11/2019    CO2 27.5 09/11/2019    BUN 19 09/11/2019    CREATININE 0.72 09/11/2019    GLUCOSE 118 (H) 09/11/2019     Lab Results   Component Value Date    AST 11 09/10/2019    ALT 9 09/10/2019    ALKPHOS 81 09/10/2019     A1c 6.4.  TSH 2.2 with free T4 1.18  proBNP 2813 on 9/10/2019  B12 648.  Folate greater than 20.  RPR nonreactive        Consults:   Dr. Martir Escobar    Discharge Disposition:  Home or Self Care    Discharge Medications:     Discharge Medications      New Medications      Instructions Start Date   apixaban 2.5 MG tablet tablet  Commonly known as:  ELIQUIS   2.5 mg, Oral, Every 12 Hours Scheduled      atorvastatin 40 MG tablet  Commonly known as:  LIPITOR   40 mg, Oral, Nightly         Changes to Medications      Instructions Start Date   metFORMIN 500 MG tablet  Commonly known as:  GLUCOPHAGE  What changed:  when to take this   250 mg, Oral, Daily With Breakfast   Start Date:  9/12/2019        Continue These Medications      Instructions Start Date   acetaminophen 650 MG 8 hr tablet  Commonly known as:  TYLENOL   650 mg, Oral, Every 8 Hours PRN      atenolol 25 MG tablet  Commonly known as:  TENORMIN   25 mg, Oral, Daily      bumetanide 0.5 MG tablet  Commonly known as:  BUMEX   0.5 mg, Oral, Daily      multivitamin with minerals tablet tablet   1 tablet, Oral, Daily      NAMENDA XR 28 MG capsule sustained-release 24 hr extended release capsule  Generic drug:  memantine   28 mg, Oral, Daily      omeprazole OTC 20 MG EC tablet  Commonly known as:  PriLOSEC OTC   20 mg, Oral, Daily          Stop These Medications    aspirin 81 MG EC tablet            Discharge Diet:   Diet Instructions     Diet: Regular      Discharge Diet:  Regular          Activity at Discharge:   Activity Instructions     Activity as Tolerated            Follow-up Appointments:  Future Appointments   Date Time Provider Department Center   11/26/2019 11:30 AM Elissa Mcmahan MD [facility] None   12/6/2019  1:00 PM Kiley Aguillon APRN [facility] None   12/10/2019 10:15 AM Jalil Rubio MD [facility] None     Additional Instructions for the Follow-ups that You Need to Schedule     Referral to Home Health   As directed      Face to Face Visit Date:  9/11/2019    Follow-up provider for Plan of Care?:  I treated the patient in an acute care facility and will not continue treatment after discharge.    Follow-up provider:  JALIL RUBIO [[id number]]    Reason/Clinical Findings:  Acute stroke; new A. fib; acute on chronic congestive heart failure    Describe mobility limitations that make leaving home difficult:  Acute stroke; elderly    Nursing/Therapeutic Services Requested:  Skilled Nursing    Skilled nursing orders:  Cardiopulmonary assessments Neurovascular assessments    Frequency:  1 Week 1           Follow-up Information     Jalil Rubio MD Follow up in 1 week(s).    Specialty:  Internal Medicine  Why:  Acute stroke; diabetes.  Acute on chronic diastolic heart failure  Contact information:  [address] [place], [place] [id number] [phone]                 Additional Instructions for the Follow-ups that You Need to Schedule     Referral to Home Health   As directed      Face to Face Visit Date:  9/11/2019    Follow-up provider for Plan of Care?:  I treated the patient in an acute care facility and will not continue treatment after discharge.    Follow-up provider:  JALIL RUBIO [[id number]]    Reason/Clinical Findings:  Acute stroke; new A. fib; acute on chronic congestive heart failure    Describe mobility  limitations that make leaving home difficult:  Acute stroke; elderly    Nursing/Therapeutic Services Requested:  Skilled Nursing    Skilled nursing orders:  Cardiopulmonary assessments Neurovascular assessments    Frequency:  1 Week 1               Test Results Pending at Discharge: EEG       Christine Greco MD  09/11/19  2:02 PM    Time Spent on Discharge Activities: 35 minutes.  Discussed with patient and daughter at bedside.  Discussed with Kiley Aguillon, neurology

## 2019-09-11 NOTE — PROGRESS NOTES
Continued Stay Note  [facility]     Patient Name: Mary Shi  MRN: [id number]  Today's Date: 9/11/2019    Admit Date: 9/9/2019    Discharge Plan     Row Name 09/11/19 1614       Plan    Plan  Home with [facility]    Plan Comments  Lucila with [facility] stated they will follow.    Row Name 09/11/19 1533       Plan    Plan  Home with [facility]    Patient/Family in Agreement with Plan  yes    Plan Comments  Per Irina with [facility], they do not cover [place]. Spoke with daughter, Aiyana who reguested VNA. Referral made and VM left with Lucila.    Row Name 09/11/19 1415       Plan    Plan  Home with [facility]    Patient/Family in Agreement with Plan  yes    Plan Comments  Spoke with patient and daughter, Aiyana at bedside. Discussed need for HH. Requested [facility]. Referral made and Irina notified and aware of DC today.        Discharge Codes    No documentation.       Expected Discharge Date and Time     Expected Discharge Date Expected Discharge Time    Sep 11, 2019             Hali Garcia RN

## 2019-09-11 NOTE — PROGRESS NOTES
"DOS: 2019  NAME: Mary Shi   : 3/3/1925  PCP: Everette Rubio MD  Chief Complaint   Patient presents with   • Weakness - Generalized     Patient seen in follow-up today; new to me        Stroke    Subjective: No events overnight. Neurologically she is back to her baseline.     Daughters at bedside reviewed MRI/MRA results.     Objective:  Vital signs: /53   Pulse 79   Temp 98.7 °F (37.1 °C) (Oral)   Resp 16   Ht 157.5 cm (62\")   Wt 63.6 kg (140 lb 4.8 oz)   SpO2 91%   BMI 25.65 kg/m²       HEENT: Normocephalic, atraumatic   COR: RRR  Resp: Even and unlabored  Extremities: Equal pulses, evidence of arthritis     Neurological:   MS: AO. Language normal. No neglect. Higher integrative function normal  CN: II-XII normal  Motor: 5/5, normal tone  Reflexes: Toes downgoing   Sensory: Intact  Coordination: Normal    Laboratory results:  Lab Results   Component Value Date    GLUCOSE 118 (H) 2019    CALCIUM 9.3 2019     2019    K 4.1 2019    CO2 27.5 2019    CL 97 (L) 2019    BUN 19 2019    CREATININE 0.72 2019    EGFRIFAFRI 86 2019    EGFRIFNONA 75 2019    BCR 26.4 (H) 2019    ANIONGAP 14.5 2019     Lab Results   Component Value Date    WBC 7.50 2019    HGB 11.7 (L) 2019    HCT 36.0 2019    MCV 87.8 2019     2019     Lab Results   Component Value Date    CHOL 167 2018     Lab Results   Component Value Date    HDL 31 (L) 2019    HDL 31 (L) 2018    HDL 33 (L) 2018     Lab Results   Component Value Date    LDL 60 2019    LDL CANCELED 2018    LDL 71 2018     Lab Results   Component Value Date    TRIG 321 (H) 2019    TRIG 587 (H) 2018    TRIG 314 (H) 2018     Lab Results   Component Value Date    TSH 2.220 09/10/2019     Lab Results   Component Value Date    OAMAMGYY12 648 09/10/2019     Lab Results   Component Value Date    " HGBA1C 6.40 (H) 09/10/2019     Lab Results   Component Value Date    CHOL 167 06/24/2018    CHLPL 155 06/11/2019    CHLPL 209 (H) 12/07/2018    CHLPL 240 (H) 05/28/2015     Lab Results   Component Value Date    TRIG 321 (H) 06/11/2019    TRIG 587 (H) 12/07/2018    TRIG 314 (H) 06/24/2018     Lab Results   Component Value Date    HDL 31 (L) 06/11/2019    HDL 31 (L) 12/07/2018    HDL 33 (L) 06/24/2018     Lab Results   Component Value Date    LDL 60 06/11/2019    LDL CANCELED 12/07/2018    LDL 71 06/24/2018             Review and interpretation of imaging:  Interpretation Summary     · Left ventricular wall thickness is consistent with hypertrophy. Sigmoid-shaped ventricular septum is present.  · Left ventricular systolic function is normal.  · Calculated EF = 71.0%.  · Left atrial cavity size is severely dilated.  · Mild tricuspid valve regurgitation is present.  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 Mild aortic valve regurgitation is present. 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 amber The aortic valve peak and mean gradients are within defined limits. The prosthetic valve is normal.  · Mild to moderate pulmonary hypertension is present.      MRI EXAMINATION OF THE BRAIN WITH AND WITHOUT CONTRAST, MRI OF THE HEAD  WITHOUT CONTRAST AND MRI OF THE NECK WITH AND WITHOUT CONTRAST     HISTORY: TIA.     COMPARISON: CT head 09/09/2019.     FINDINGS: The study is hampered somewhat by patient motion.     MRI EXAMINATION OF THE BRAIN WITH AND WITHOUT CONTRAST:      FINDINGS: The diffusion sequence demonstrates an area of acute  infarction involving the superior aspect of the left temporal lobe  measuring 11 x 9 mm in size as well as smaller cortical and subcortical  infarcts involving the left frontal lobe more anteriorly and superiorly.  These are within the left MCA vascular distribution. A remote infarct  involving the left occipital  lobe medially is appreciated with an area  of encephalomalacia present measuring approximately 4 cm in AP  dimension. An area of signal loss is appreciated involving the  subcortical white matter of the right frontal lobe anterior laterally  and superiorly on the susceptibility weighted sequence likely the result  of a small remote infarct and subsequent hemosiderin deposition. There  is expected flow-void in the basilar artery and in the distal aspects of  the internal carotid arteries bilaterally on the axial T2 sequence.  Bilateral lens implants are noted. Axial T2 FLAIR sequence demonstrates  increased signal intensity involving the white matter cerebral  hemispheres bilaterally suggesting mild to moderate small vessel  ischemic disease. After contrast administration there was no evidence of  abnormal enhancement.     IMPRESSION:  1. Multiple acute infarcts involving the left MCA vascular distribution  involving the superior aspect of the left temporal lobe and to a lesser  extent cortex and subcortical white matter of the left frontal lobe.   2. Remote left occipital infarct.   3. Mild to moderate small vessel ischemic disease.     MRA OF THE HEAD WITHOUT CONTRAST:     FINDINGS: There is signal present within the distal aspect of the  vertebral arteries bilaterally. The vertebral arteries are codominant.  The basilar artery is of normal caliber. The proximal aspects of the  posterior cerebral arteries appear unremarkable. The distal aspects of  the internal carotid arteries are of normal caliber. The proximal  aspects of the anterior and middle cerebral arteries appear  unremarkable.     IMPRESSION: No evidence of focal high-grade stenosis or of aneurysm.     MRA OF THE NECK WITH AND WITHOUT CONTRAST:     FINDINGS: The study is hampered by patient motion. The great vessels are  arranged in a classic configuration. There is mild irregularity and  stenosis involving the proximal aspects of the left subclavian  artery  and left common carotid artery. There is mild irregularity involving the  carotid bifurcations bilaterally but with 0% stenosis using NASCET  criteria. Irregularity is more prominent on the left. The left vertebral  artery arises from the aortic arch. There is mild signal loss at the  origin of the right vertebral artery but this may be secondary to  tortuosity. Otherwise, the cervical segments of the vertebral arteries  are of relatively uniform caliber.     Bilateral pleural effusions are noted larger on the right, only  partially visualized.     IMPRESSION: There is atherosclerotic disease with irregularity involving  the carotid bifurcations bilaterally, more prominent on the left but  without high-grade stenosis. The left vertebral artery arises from the  aortic arch. There is mild stenosis involving the proximal aspects of  left common carotid artery and the left subclavian artery.     The above information was called to and discussed with Dr. Greco on  09/10/2019 at 1130 hours.     This report was finalized on 9/10/2019 4:34 PM by Dr. Kenny Villanueva M.D.  CT HEAD WO CONTRAST-     INDICATIONS: Weakness     TECHNIQUE: Radiation dose reduction techniques were utilized, including  automated exposure control and exposure modulation based on body size.  Noncontrast head CT     COMPARISON: None available     FINDINGS:           No acute intracranial hemorrhage, midline shift or mass effect. No acute  territorial infarct is identified. Myelomalacia/old infarct changes at  the left occipital lobe.     Mild to moderate periventricular hypodensities suggest chronic small  vessel ischemic change in a patient this age.     Arterial calcifications are seen at the base of the brain.     Ventricles, cisterns, cerebral sulci are unremarkable for patient age.     The visualized paranasal sinuses, orbits, mastoid air cells are  unremarkable.                 IMPRESSION:     No acute intracranial hemorrhage or  hydrocephalus. Chronic changes of  the brain. If there is further clinical concern, MRI could be considered  for further evaluation.     This report was finalized on 9/9/2019 12:08 PM by Dr. Reymundo Cruz,  Portable chest radiograph     HISTORY:Weakness     TECHNIQUE: Single AP portable radiograph of the chest     COMPARISON:Chest radiograph 06/23/2018     IMPRESSION:  FINDINGS AND IMPRESSION:  The lungs are hypoinflated with bibasilar pulmonary opacification, right  greater than left. Superimposed pulmonary vascular congestion and  interstitial thickening is present. Small-to-moderate right pleural  effusion is suspected. Findings are favored to represent pulmonary edema  with multifocal pneumonia being less likely. Correlation with patient  history is recommended. No pneumothorax is seen. Heart size accentuated  by low lung volumes what is likely mild to moderately enlarged..     This report was finalized on 9/9/2019 12:38 PM by Dr. Toño Kat M.D.       Impression: This is a 89-year-old female with history of congestive heart failure, diabetes mellitus, dementia, hyperlipidemia and hypertension who presented to [facility] on September 9 2019th due to 2 episodes of difficulty with speech both of which lasted approximately 30 minutes.  According to the chart patient was unable to answer questions.  Family reported previous similar episode in the past.  On arrival to the emergency room she was normotensive although EKG showed atrial fibrillation; new onset.  Initial imaging showed evidence of old left PCA stroke.  MRI of the brain revealed multiple acute left MCA infarction.  Also noted was chronic left occipital infarct and evidence of mild to moderate small vessel ischemic disease.  MRA neck revealed evidence of atherosclerotic disease without high-grade stenosis.  MRA of the head showed no evidence of high-grade stenosis and/or aneurysm.  TTE showed ejection fraction 71%.  LV normal.  LA  severely dilated.  No evidence/mention of PFO.  Left atrial volume index 55; suspicious for chronic AF. Recommendations below. No further neurology workup indicated. We will sign off and see again upon request.        Plan:  ASA not indicated per cardiology   EEG pending (ok to d/c prior to results as seizure is low yield and patient will not be driving and will be monitored by family around the clock)   Eliquis 2.5mg BID   Lipitor 40mg daily (LDL 60)   Neurochecks  BP control  Stroke Education  GLORIA/SCDs  PT/OT/ST  Follow-up w/ Me in clinic in 3 months; sooner if needed     Case reviewed w/ Dr. Escobar and he agrees with plan above.     Kiley Aguillon, APRN

## 2019-09-11 NOTE — DISCHARGE INSTRUCTIONS
Risk factors for stroke:   High blood pressure   Atrial fibrillation  Diabetes   History of stroke

## 2019-09-11 NOTE — DISCHARGE PLACEMENT REQUEST
"Mary Figueroa (89 y.o. Female)     Date of Birth Social Security Number Address Home Phone MRN    03/03/1925  [address]  [facility] [address] [phone] [id number]    Mormon Marital Status          Taoist        Admission Date Admission Type Admitting Provider Attending Provider Department, Room/Bed    9/9/19 Emergency Kip Redd MD Hayden, Juliana, MD [address], P580/1    Discharge Date Discharge Disposition Discharge Destination         Home or Self Care              Attending Provider:  Christine Greco MD    Allergies:  Codeine, Morphine And Related    Isolation:  None   Infection:  None   Code Status:  CPR    Ht:  157.5 cm (62\")   Wt:  63.6 kg (140 lb 4.8 oz)    Admission Cmt:  None   Principal Problem:  Acute ischemic left MCA stroke (CMS/[facility]) [I63.512]                 Active Insurance as of 9/9/2019     Primary Coverage     Payor Plan Insurance Group Employer/Plan Group    MEDICARE MEDICARE A & B      Payor Plan Address Payor Plan Phone Number Payor Plan Fax Number Effective Dates    [address] [phone]  3/1/1990 - None Entered    [facility] [address]       Subscriber Name Subscriber Birth Date Member ID       MARY FIGUEROA 3/3/1925 [id number]           Secondary Coverage     Payor Plan Insurance Group Employer/Plan Group    AARP MED SUPP AARP HEALTH CARE OPTIONS      Payor Plan Address Payor Plan Phone Number Payor Plan Fax Number Effective Dates    [facility] [phone]  1/1/2016 - None Entered    [address]       [facility] [id number]       Subscriber Name Subscriber Birth Date Member ID       MARY FIGUEROA 3/3/1925 [id number]                 Emergency Contacts      (Rel.) Home Phone Work Phone Mobile Phone    Liat Patel (Daughter) [phone] -- --    Aiyana Donis (Daughter) [phone] -- [phone]    Megan Snow (Daughter) [phone] -- [phone]              "

## 2019-09-11 NOTE — DISCHARGE PLACEMENT REQUEST
"Mary Figueroa (89 y.o. Female)     Date of Birth Social Security Number Address Home Phone MRN    03/03/1925  [address]  [facility] [address] [phone] [id number]    Confucianism Marital Status          Restorationist        Admission Date Admission Type Admitting Provider Attending Provider Department, Room/Bed    9/9/19 Emergency Kip Redd MD Hayden, Juliana, MD [address], P580/1    Discharge Date Discharge Disposition Discharge Destination         Home or Self Care              Attending Provider:  Christine Greco MD    Allergies:  Codeine, Morphine And Related    Isolation:  None   Infection:  None   Code Status:  CPR    Ht:  157.5 cm (62\")   Wt:  63.6 kg (140 lb 4.8 oz)    Admission Cmt:  None   Principal Problem:  Acute ischemic left MCA stroke (CMS/[facility]) [I63.512]                 Active Insurance as of 9/9/2019     Primary Coverage     Payor Plan Insurance Group Employer/Plan Group    MEDICARE MEDICARE A & B      Payor Plan Address Payor Plan Phone Number Payor Plan Fax Number Effective Dates    [address] [phone]  3/1/1990 - None Entered    [facility] [address]       Subscriber Name Subscriber Birth Date Member ID       MARY FIGUEROA 3/3/1925 [id number]           Secondary Coverage     Payor Plan Insurance Group Employer/Plan Group    AARP MED SUPP AARP HEALTH CARE OPTIONS      Payor Plan Address Payor Plan Phone Number Payor Plan Fax Number Effective Dates    [facility] [phone]  1/1/2016 - None Entered    [address]       [facility] [address]       Subscriber Name Subscriber Birth Date Member ID       MARY FIGUEROA 3/3/1925 [id number]                 Emergency Contacts      (Rel.) Home Phone Work Phone Mobile Phone    Liat Patel (Daughter) [phone] -- --    Aiyana Donis (Daughter) [phone] -- [phone]    Megan Snow (Daughter) [phone] -- [phone]              "

## 2019-09-11 NOTE — PROGRESS NOTES
Continued Stay Note  [facility]     Patient Name: Mary Shi  MRN: [id number]  Today's Date: 9/11/2019    Admit Date: 9/9/2019    Discharge Plan     Row Name 09/11/19 1415       Plan    Plan  Home with [facility]    Patient/Family in Agreement with Plan  yes    Plan Comments  Spoke with patient and daughter, Aiyana at bedside. Discussed need for HH. Requested [facility]. Referral made and Irina notified and aware of DC today.        Discharge Codes    No documentation.       Expected Discharge Date and Time     Expected Discharge Date Expected Discharge Time    Sep 11, 2019             Hali Garcia RN

## 2019-09-12 ENCOUNTER — TRANSITIONAL CARE MANAGEMENT TELEPHONE ENCOUNTER (OUTPATIENT)
Dept: FAMILY MEDICINE CLINIC | Facility: CLINIC | Age: 84
End: 2019-09-12

## 2019-09-12 ENCOUNTER — READMISSION MANAGEMENT (OUTPATIENT)
Dept: CALL CENTER | Facility: HOSPITAL | Age: 84
End: 2019-09-12

## 2019-09-12 NOTE — OUTREACH NOTE
Prep Survey      Responses   Facility patient discharged from?  [facility]   Is patient eligible?  Yes   Discharge diagnosis  Acute CVA   Does the patient have one of the following disease processes/diagnoses(primary or secondary)?  Stroke (TIA)   Does the patient have Home health ordered?  Yes   What is the Home health agency?   [facility]   Is there a DME ordered?  No   Medication alerts for this patient  Maty started    Prep survey completed?  Yes          Tierra Dorantes RN

## 2019-09-13 ENCOUNTER — READMISSION MANAGEMENT (OUTPATIENT)
Dept: CALL CENTER | Facility: HOSPITAL | Age: 84
End: 2019-09-13

## 2019-09-13 ENCOUNTER — EPISODE CHANGES (OUTPATIENT)
Dept: CASE MANAGEMENT | Facility: OTHER | Age: 84
End: 2019-09-13

## 2019-09-13 NOTE — OUTREACH NOTE
Stroke Week 1 Survey      Responses   Facility patient discharged from?  [facility]   Does the patient have one of the following disease processes/diagnoses(primary or secondary)?  Stroke (TIA)   Is there a successful TCM telephone encounter documented?  No   Week 1 attempt successful?  Yes   Call start time  1523   Call end time  1535   Discharge diagnosis  Acute CVA   Is patient permission given to speak with other caregiver?  Yes   Person spoke with today (if not patient) and relationship  Liat/ daughter    Medication alerts for this patient  Eliquis started    Meds reviewed with patient/caregiver?  Yes   Is the patient having any side effects they believe may be caused by any medication additions or changes?  No   Does the patient have all medications ordered at discharge?  Yes   Is the patient taking all medications as directed (includes completed medication regime)?  Yes   Does the patient have a primary care provider?   Yes   Does the patient have an appointment with their PCP within 7 days of discharge?  N/A   Has the patient kept scheduled appointments due by today?  Yes   What is the Home health agency?   [facility]   Has home health visited the patient within 72 hours of discharge?  Yes   Psychosocial issues?  No   Does the patient require any assistance with activities of daily living such as eating, bathing, dressing, walking, etc.?  No   Does the patient have any residual symptoms from stroke/TIA?  No   Does the patient understand the diet ordered at discharge?  Yes   Did the patient receive a copy of their discharge instructions?  Yes   Nursing interventions  Reviewed instructions with patient   What is the patient's perception of their health status since discharge?  Improving   Nursing interventions  Nurse provided patient education   Is the patient able to teach back FAST for Stroke?  Yes   Is the patient/caregiver able to teach back the risk factors for a stroke?  High blood pressure-goal below  120/80, Diabetes, History of Afib   Is the patient/caregiver able to teach back signs and symptoms related to disease process for when to call PCP?  Yes   Is the patient/caregiver able to teach back signs and symptoms related to disease process for when to call 911?  Yes   Is the patient/caregiver able to teach back the hierarchy of who to call/visit for symptoms/problems? PCP, Specialist, Home health nurse, Urgent Care, ED, 911  Yes   Week 1 call completed?  Yes          Matt Nicholson RN

## 2019-09-20 ENCOUNTER — READMISSION MANAGEMENT (OUTPATIENT)
Dept: CALL CENTER | Facility: HOSPITAL | Age: 84
End: 2019-09-20

## 2019-09-20 NOTE — OUTREACH NOTE
Stroke Week 2 Survey      Responses   Facility patient discharged from?  [facility]   Does the patient have one of the following disease processes/diagnoses(primary or secondary)?  Stroke (TIA)   Week 2 attempt successful?  Yes   Call start time  1426   Call end time  1430   Discharge diagnosis  Acute CVA   Is patient permission given to speak with other caregiver?  Yes   List who call center can speak with  Daughters   Person spoke with today (if not patient) and relationship  Aiyana- Daughter    Meds reviewed with patient/caregiver?  Yes   Is the patient having any side effects they believe may be caused by any medication additions or changes?  No   Does the patient have all medications ordered at discharge?  Yes   Is the patient taking all medications as directed (includes completed medication regime)?  Yes   Does the patient have a primary care provider?   Yes   Does the patient have an appointment with their PCP within 7 days of discharge?  Yes   Has the patient kept scheduled appointments due by today?  Yes   Psychosocial issues?  No   Does the patient require any assistance with activities of daily living such as eating, bathing, dressing, walking, etc.?  No   Does the patient have any residual symptoms from stroke/TIA?  No   Does the patient understand the diet ordered at discharge?  Yes   Did the patient receive a copy of their discharge instructions?  Yes   Nursing interventions  Reviewed instructions with patient, Educated on MyChart   What is the patient's perception of their health status since discharge?  Improving   Nursing interventions  Nurse provided patient education   Is the patient able to teach back FAST for Stroke?  Yes   Is the patient/caregiver able to teach back the risk factors for a stroke?  History of Afib, Diabetes, High blood pressure-goal below 120/80   Is the patient/caregiver able to teach back signs and symptoms related to disease process for when to call PCP?  Yes   Is the  patient/caregiver able to teach back signs and symptoms related to disease process for when to call 911?  Yes   Is the patient/caregiver able to teach back the hierarchy of who to call/visit for symptoms/problems? PCP, Specialist, Home health nurse, Urgent Care, ED, 911  Yes   Week 2 call completed?  Yes          Nia Beltran RN

## 2019-09-24 ENCOUNTER — OFFICE VISIT (OUTPATIENT)
Dept: FAMILY MEDICINE CLINIC | Facility: CLINIC | Age: 84
End: 2019-09-24

## 2019-09-24 VITALS
OXYGEN SATURATION: 98 % | DIASTOLIC BLOOD PRESSURE: 72 MMHG | WEIGHT: 142 LBS | HEIGHT: 62 IN | HEART RATE: 56 BPM | RESPIRATION RATE: 18 BRPM | TEMPERATURE: 98.1 F | BODY MASS INDEX: 26.13 KG/M2 | SYSTOLIC BLOOD PRESSURE: 130 MMHG

## 2019-09-24 DIAGNOSIS — I10 SYSTOLIC HYPERTENSION: ICD-10-CM

## 2019-09-24 DIAGNOSIS — I63.512 ACUTE ISCHEMIC LEFT MCA STROKE (HCC): Primary | ICD-10-CM

## 2019-09-24 DIAGNOSIS — Z09 HOSPITAL DISCHARGE FOLLOW-UP: ICD-10-CM

## 2019-09-24 PROCEDURE — 99495 TRANSJ CARE MGMT MOD F2F 14D: CPT | Performed by: INTERNAL MEDICINE

## 2019-09-24 RX ORDER — BUMETANIDE 0.5 MG/1
0.5 TABLET ORAL DAILY
Qty: 30 TABLET | Refills: 3 | Status: SHIPPED | OUTPATIENT
Start: 2019-09-24 | End: 2020-01-07 | Stop reason: DRUGHIGH

## 2019-09-24 RX ORDER — ATORVASTATIN CALCIUM 40 MG/1
40 TABLET, FILM COATED ORAL NIGHTLY
Qty: 30 TABLET | Refills: 3 | Status: SHIPPED | OUTPATIENT
Start: 2019-09-24 | End: 2019-10-24 | Stop reason: SDUPTHER

## 2019-09-24 RX ORDER — MELOXICAM 15 MG/1
TABLET ORAL
COMMUNITY
Start: 2019-09-04 | End: 2019-09-24 | Stop reason: SDUPTHER

## 2019-09-24 RX ORDER — ATENOLOL 25 MG/1
25 TABLET ORAL DAILY
Qty: 30 TABLET | Refills: 3 | Status: SHIPPED | OUTPATIENT
Start: 2019-09-24 | End: 2019-10-24 | Stop reason: SDUPTHER

## 2019-09-24 RX ORDER — MELOXICAM 15 MG/1
15 TABLET ORAL DAILY
Qty: 30 TABLET | Refills: 3 | Status: SHIPPED | OUTPATIENT
Start: 2019-09-24 | End: 2019-11-01

## 2019-09-24 RX ORDER — MEMANTINE HYDROCHLORIDE 28 MG/1
28 CAPSULE, EXTENDED RELEASE ORAL DAILY
Qty: 30 CAPSULE | Refills: 3 | Status: SHIPPED | OUTPATIENT
Start: 2019-09-24 | End: 2019-10-24 | Stop reason: SDUPTHER

## 2019-09-24 RX ORDER — OMEPRAZOLE 20 MG/1
20 TABLET, DELAYED RELEASE ORAL DAILY
Qty: 30 TABLET | Refills: 3 | Status: SHIPPED | OUTPATIENT
Start: 2019-09-24 | End: 2019-10-24 | Stop reason: SDUPTHER

## 2019-09-24 NOTE — PATIENT INSTRUCTIONS
Reviewed and discussed hospital records as well as hospital discharge medication list.  Continue daily weights and current medicines.  We will continue physical therapy and also start speech therapy if indicated.

## 2019-09-24 NOTE — PROGRESS NOTES
Subjective   Mary Shi is a 89 y.o. female. Patient is here today for   Chief Complaint   Patient presents with   • HOSPITAL FOLLOW UP     stroke and altered mental status       (Not on file)-  Risk for Readmission (LACE) Score: 9 (9/11/2019  6:01 AM)           Vitals:    09/24/19 0933   BP: 130/72   Pulse: 56   Resp: 18   Temp: 98.1 °F (36.7 °C)   SpO2: 98%     The following portions of the patient's history were reviewed and updated as appropriate: allergies, current medications, past family history, past medical history, past social history, past surgical history and problem list.    Past Medical History:   Diagnosis Date   • 'light-for-dates' infant with signs of fetal malnutrition    • Amaurosis fugax    • Anemia    • Aortic stenosis     s/p TAVR    • Carotid artery stenosis     Per duplex 12/16/2016-proximal right internal carotid artery mild stenosis and proximal left moderate stenosis   • Cognitive disorder    • Congestive heart failure (CMS/HCC)    • Dementia    • Diabetes mellitus (CMS/HCC)    • Diastolic dysfunction    • Dyspnea on exertion    • Generalized osteoarthritis of multiple sites    • GERD (gastroesophageal reflux disease)    • Gout    • Health care maintenance    • Hiatal hernia    • Hyperlipidemia    • Hypertension    • Mitral valve stenosis     Moderate per echocardiogram 2/2017; severe mitral annular calcification   • Nasal lesion    • Nasal stenosis    • Osteoarthritis     multiple sites   • PONV (postoperative nausea and vomiting)    • Systolic hypertension    • Varicose veins       Allergies   Allergen Reactions   • Codeine Itching and GI Intolerance   • Morphine And Related Itching      Social History     Socioeconomic History   • Marital status:      Spouse name: Not on file   • Number of children: Not on file   • Years of education: Not on file   • Highest education level: Not on file   Tobacco Use   • Smoking status: Never Smoker   • Smokeless tobacco: Never Used    Substance and Sexual Activity   • Alcohol use: No     Comment: caffeine use   • Drug use: No   • Sexual activity: Defer        Current Outpatient Medications:   •  acetaminophen (TYLENOL) 650 MG 8 hr tablet, Take 650 mg by mouth Every 8 (Eight) Hours As Needed for Mild Pain ., Disp: , Rfl:   •  apixaban (ELIQUIS) 2.5 MG tablet tablet, Take 1 tablet by mouth Every 12 (Twelve) Hours., Disp: 60 tablet, Rfl: 3  •  atenolol (TENORMIN) 25 MG tablet, Take 1 tablet by mouth Daily., Disp: 30 tablet, Rfl: 3  •  atorvastatin (LIPITOR) 40 MG tablet, Take 1 tablet by mouth Every Night., Disp: 30 tablet, Rfl: 3  •  bumetanide (BUMEX) 0.5 MG tablet, Take 1 tablet by mouth Daily., Disp: 30 tablet, Rfl: 3  •  meloxicam (MOBIC) 15 MG tablet, Take 1 tablet by mouth Daily., Disp: 30 tablet, Rfl: 3  •  memantine (NAMENDA XR) 28 MG capsule sustained-release 24 hr extended release capsule, Take 1 capsule by mouth Daily., Disp: 30 capsule, Rfl: 3  •  metFORMIN (GLUCOPHAGE) 500 MG tablet, Take 0.5 tablets by mouth Daily With Breakfast., Disp: 180 tablet, Rfl: 3  •  Multiple Vitamins-Minerals (MULTIVITAMIN WITH MINERALS) tablet tablet, Take 1 tablet by mouth Daily., Disp: , Rfl:   •  omeprazole OTC (PriLOSEC OTC) 20 MG EC tablet, Take 1 tablet by mouth Daily., Disp: 30 tablet, Rfl: 3     Objective     History of Present Illness Zahra is here today with her daughter for hospital discharge follow-up.  She was admitted to [facility] on September 9 with speech difficulty and altered mental status.  Imaging revealed multiple cerebral infarcts in the left middle cerebral artery distribution involving the left temporal and frontal lobes.  She also had evidence of a prior cerebrovascular accident and chronic ischemic changes.  Neurology was consulted and her symptoms improved.  She was found to be in atrial fibrillation and was started on Eliquis.  She also was felt to be in heart failure and was given Bumex 0.5 mg daily.  BNP was 2611.   Chest x-ray showed some pulmonary vascular congestion and a small pleural effusion.  Daily weights have been stable since discharge.  She is doing physical therapy at home.    Review of Systems   Constitutional: Negative for activity change and unexpected weight change.   Respiratory: Negative for shortness of breath.    Cardiovascular: Negative for chest pain and leg swelling.   Gastrointestinal: Negative.    Genitourinary: Negative.    Neurological: Positive for speech difficulty.   Psychiatric/Behavioral: Negative for agitation and behavioral problems.       Physical Exam   Constitutional: She appears well-developed and well-nourished.   Elderly female who is alert pleasant and cooperative   Cardiovascular: S1 normal and S2 normal. An irregularly irregular rhythm present.   Pulmonary/Chest: Effort normal. She has no rales.   Musculoskeletal: She exhibits no edema.   Neurological: She is alert.   Psychiatric: She has a normal mood and affect. Her behavior is normal.   Vitals reviewed.      ASSESSMENT     Problem List Items Addressed This Visit        Cardiovascular and Mediastinum    Systolic hypertension    Acute ischemic left MCA stroke (CMS/HCC) - Primary       Other    Hospital discharge follow-up          Current outpatient and discharge medications have been reconciled for the patient.  Reviewed by: Everette Rubio MD      PLAN    Patient Instructions   Reviewed and discussed hospital records as well as hospital discharge medication list.  Continue daily weights and current medicines.  We will continue physical therapy and also start speech therapy if indicated.    Return in about 1 month (around 10/24/2019) for Recheck.

## 2019-09-30 ENCOUNTER — READMISSION MANAGEMENT (OUTPATIENT)
Dept: CALL CENTER | Facility: HOSPITAL | Age: 84
End: 2019-09-30

## 2019-09-30 NOTE — OUTREACH NOTE
Stroke Week 3 Survey      Responses   Facility patient discharged from?  [facility]   Does the patient have one of the following disease processes/diagnoses(primary or secondary)?  Stroke (TIA)   Week 3 attempt successful?  No   Unsuccessful attempts  Attempt 1          Anthony Walton RN

## 2019-10-02 ENCOUNTER — TELEPHONE (OUTPATIENT)
Dept: FAMILY MEDICINE CLINIC | Facility: CLINIC | Age: 84
End: 2019-10-02

## 2019-10-02 ENCOUNTER — READMISSION MANAGEMENT (OUTPATIENT)
Dept: CALL CENTER | Facility: HOSPITAL | Age: 84
End: 2019-10-02

## 2019-10-02 ENCOUNTER — EPISODE CHANGES (OUTPATIENT)
Dept: CASE MANAGEMENT | Facility: OTHER | Age: 84
End: 2019-10-02

## 2019-10-02 NOTE — OUTREACH NOTE
Stroke Week 3 Survey      Responses   Facility patient discharged from?  [facility]   Does the patient have one of the following disease processes/diagnoses(primary or secondary)?  Stroke (TIA)   Week 3 attempt successful?  No   Unsuccessful attempts  Attempt 2          Matt Nicholson RN

## 2019-10-02 NOTE — TELEPHONE ENCOUNTER
FIDENCIO Villafuerte called from [facility] stating that pt has been released from [facility]. She still has visiting nurse coming to see her.

## 2019-10-04 ENCOUNTER — PATIENT OUTREACH (OUTPATIENT)
Dept: CASE MANAGEMENT | Facility: OTHER | Age: 84
End: 2019-10-04

## 2019-10-04 NOTE — OUTREACH NOTE
Care Plan Note      Responses   Lifestyle Goals  Routine follow-up with doctor(s), Eat a healthy diet, Less shortness of air, Record weight daily, Other (See Comment) [Monitor blood pressure and blood sugar by family]   Barriers  Disease education   Self Management  Check Weight Daily, Medication Adherence, Other (See Comment), Home BP Monitoring, Home Glucose Monitoring [Use spirometer]   Annual Wellness Visit:   -- [Addressed and will discuss with PCP]   AWV Materials  Send Materials   Care Gaps Addressed  Flu Shot   Flu Shot Status  Up to Date   Flu Shot Completion at [facility] or Other  [facility]   Other Patient Education/Resources   24/7 [facility] Nurse Call Line, Advanced Care Planning, [facility]   24/7 Nurse Call Line Education Method  Verbal   ACP Education Method  Verbal [Completed]   [facility] Education Method  Verbal   Does patient have depression diagnosis?  No   Advanced Directives:  Patient Has   Ed Visits past 12 months:  None   Hospitalizations past 12 months  1   Discharged From:  [facility]   Discharged to:  Home    Admit Date:  09/09/19   Discharge Date:  09/11/19   Discharge destination:  Home Health   Agency:  [facility]    Medication Adherence  Medications understood        The main concerns and/or symptoms the patient would like to address are: Talked with patient's daughter.Discussed recent hospitalization of 9/9/19 to 9/1/19 due to stroke. Patient currently receiving [facility] services.  Patient lives alone; family (children) live next door; stay with her throughout the day and very supportive. Family assist patient with ADL's; meals; and transportation. Patient ambulates with walker.  Patient is compliant with medications; medical appointments; monitoring of daily weight; blood sugar and blood pressure. Daughter states values are WNL's. Daughter reports patient to have episodes of shallow breathing. Patient seen by Home Health; O2 SAT 94/% and using spirometer  with assistance of family. She reports patient has no difficulty with fever; productive cough; chest pain; appetite or sleeping. She may have episodes of SOB with exertion alleviated with rest.    Education/instruction provided by Care Coordinator: Reviewed with patient's daughter to contact health care provider regarding questions or concerns; HTN; BS;  daily weight; limit of sodium intake; use of spirometer; home health services; 24/7 Nurse Line Telephone number; [place] contact information; Advance Directives; My Chart; gaps in care; MWV and Care Advising program. Patient's daughter  verbalized understanding.She states to appreciate phone call.  No further questions or concerns voiced at this time.     Follow Up Outreach Due: Follow up as needed.     Soraya Michel RN  Community Care Coordinator    10/4/2019, 3:50 PM

## 2019-10-04 NOTE — OUTREACH NOTE
Care Coordination Note  Talked with Marie/ [facility] [phone]. She states patient receiving SN and PT home health services with start of care 9/13/19.     Soraya Michel RN  Community Care Coordinator    10/4/2019, 3:34 PM

## 2019-10-04 NOTE — OUTREACH NOTE
Care Coordination Assessment    Documented/Reviewed By:  Soraya Michel RN Date/time:  10/4/2019  3:39 PM   Assessment completed with:  family, children (Comment: Talked with POA patient's daughter Liat)  Enrolled in care management program:  Yes  Living arrangement:  alone (Comment: Lives alone; children very supportive and stay with patient and lves next door)  Support system:  family  Type of residence:  private residence  Home care services:  Yes  Equipment used at home:  bedside commode, cane, walker (Comment: rollator; glucometer; rollator; Medic Alert)  Bed or wheelchair confined:  No  Inadequate nutrition:  No  Medication adherence problem:  No  Experiencing side effects from current medications:  No  History of fall(s) in last 6 months:  No  Difficulty keeping appointments:  No

## 2019-10-10 ENCOUNTER — TELEPHONE (OUTPATIENT)
Dept: CARDIOLOGY | Facility: CLINIC | Age: 84
End: 2019-10-10

## 2019-10-10 NOTE — TELEPHONE ENCOUNTER
Pt's daughter called and states that pt missed a dose of Eliquis last night? She wants to know if pt need to take extra Eliquis today?    S/w Dr Mcmahan, pt don't need to take extra Eliquis and just resume normal dose.      Called and informed pt's daughter Liat. Verbally understood.      Thanks  Ernestina VIDAL

## 2019-10-18 ENCOUNTER — PATIENT OUTREACH (OUTPATIENT)
Dept: CASE MANAGEMENT | Facility: OTHER | Age: 84
End: 2019-10-18

## 2019-10-18 NOTE — OUTREACH NOTE
Patient Outreach Note  Talked with patient's daughter. She states patient continues with home health services. She states patient is compliant with medications; medical appointments; monitoring of blood pressure; blood sugar; daily weight and use of spirometer. She reports episodes of SOB when going outside in the colder weather but no difficulty with chest pain; appetite or sleeping. Reviewed with patient's daughter medications; blood pressure; blood sugars; weight;  24/7 Nurse Line Telephone number; [place] contact information; Advance Directives (has healthcare surrogate);  My Chart(has phone number);  gaps in care(daughter will check on flu vaccine with PCP); and MWV(addressed/ will check with PCP). Daughter verbalized understanding.Patient with assistance of family has met goals; has good sense of health self-management and adequate support system. Daughter verbalized appreciation of phone calls.  No further questions or concerns voiced at this time.     Sroaya Michel RN  Community Care Coordinator    10/18/2019, 4:39 PM

## 2019-10-21 ENCOUNTER — EPISODE CHANGES (OUTPATIENT)
Dept: CASE MANAGEMENT | Facility: OTHER | Age: 84
End: 2019-10-21

## 2019-10-24 ENCOUNTER — OFFICE VISIT (OUTPATIENT)
Dept: FAMILY MEDICINE CLINIC | Facility: CLINIC | Age: 84
End: 2019-10-24

## 2019-10-24 VITALS
SYSTOLIC BLOOD PRESSURE: 140 MMHG | BODY MASS INDEX: 26.13 KG/M2 | TEMPERATURE: 97.6 F | OXYGEN SATURATION: 98 % | HEART RATE: 72 BPM | RESPIRATION RATE: 18 BRPM | HEIGHT: 62 IN | WEIGHT: 142 LBS | DIASTOLIC BLOOD PRESSURE: 60 MMHG

## 2019-10-24 DIAGNOSIS — E11.59 TYPE 2 DIABETES MELLITUS WITH OTHER CIRCULATORY COMPLICATION, WITHOUT LONG-TERM CURRENT USE OF INSULIN (HCC): ICD-10-CM

## 2019-10-24 DIAGNOSIS — I48.0 PAROXYSMAL ATRIAL FIBRILLATION (HCC): ICD-10-CM

## 2019-10-24 DIAGNOSIS — Z23 NEED FOR VACCINATION: ICD-10-CM

## 2019-10-24 DIAGNOSIS — I10 SYSTOLIC HYPERTENSION: Primary | ICD-10-CM

## 2019-10-24 DIAGNOSIS — G63 POLYNEUROPATHY ASSOCIATED WITH UNDERLYING DISEASE (HCC): ICD-10-CM

## 2019-10-24 DIAGNOSIS — Z23 NEED FOR INFLUENZA VACCINATION: ICD-10-CM

## 2019-10-24 PROCEDURE — G0009 ADMIN PNEUMOCOCCAL VACCINE: HCPCS | Performed by: INTERNAL MEDICINE

## 2019-10-24 PROCEDURE — G0008 ADMIN INFLUENZA VIRUS VAC: HCPCS | Performed by: INTERNAL MEDICINE

## 2019-10-24 PROCEDURE — 90674 CCIIV4 VAC NO PRSV 0.5 ML IM: CPT | Performed by: INTERNAL MEDICINE

## 2019-10-24 PROCEDURE — 90670 PCV13 VACCINE IM: CPT | Performed by: INTERNAL MEDICINE

## 2019-10-24 PROCEDURE — 99214 OFFICE O/P EST MOD 30 MIN: CPT | Performed by: INTERNAL MEDICINE

## 2019-10-24 RX ORDER — ATENOLOL 25 MG/1
25 TABLET ORAL DAILY
Qty: 90 TABLET | Refills: 3 | Status: SHIPPED | OUTPATIENT
Start: 2019-10-24 | End: 2020-02-18

## 2019-10-24 RX ORDER — OMEPRAZOLE 20 MG/1
20 TABLET, DELAYED RELEASE ORAL DAILY
Qty: 90 TABLET | Refills: 3 | Status: SHIPPED | OUTPATIENT
Start: 2019-10-24 | End: 2020-10-21

## 2019-10-24 RX ORDER — MEMANTINE HYDROCHLORIDE 28 MG/1
28 CAPSULE, EXTENDED RELEASE ORAL DAILY
Qty: 90 CAPSULE | Refills: 3 | Status: SHIPPED | OUTPATIENT
Start: 2019-10-24 | End: 2021-01-14 | Stop reason: SDUPTHER

## 2019-10-24 RX ORDER — ATORVASTATIN CALCIUM 40 MG/1
40 TABLET, FILM COATED ORAL NIGHTLY
Qty: 90 TABLET | Refills: 3 | Status: SHIPPED | OUTPATIENT
Start: 2019-10-24 | End: 2019-12-06 | Stop reason: DRUGHIGH

## 2019-10-24 NOTE — PROGRESS NOTES
Subjective   Mary Shi is a 89 y.o. female. Patient is here today for   Chief Complaint   Patient presents with   • Follow-up     1 MONTH          Vitals:    10/24/19 1036   BP: 140/60   Pulse: 72   Resp: 18   Temp: 97.6 °F (36.4 °C)   SpO2: 98%     The following portions of the patient's history were reviewed and updated as appropriate: allergies, current medications, past family history, past medical history, past social history, past surgical history and problem list.    Past Medical History:   Diagnosis Date   • 'light-for-dates' infant with signs of fetal malnutrition    • Amaurosis fugax    • Anemia    • Aortic stenosis     s/p TAVR    • Carotid artery stenosis     Per duplex 12/16/2016-proximal right internal carotid artery mild stenosis and proximal left moderate stenosis   • Cognitive disorder    • Congestive heart failure (CMS/HCC)    • Dementia    • Diabetes mellitus (CMS/HCC)    • Diastolic dysfunction    • Dyspnea on exertion    • Generalized osteoarthritis of multiple sites    • GERD (gastroesophageal reflux disease)    • Gout    • Health care maintenance    • Hiatal hernia    • Hyperlipidemia    • Hypertension    • Mitral valve stenosis     Moderate per echocardiogram 2/2017; severe mitral annular calcification   • Nasal lesion    • Nasal stenosis    • Osteoarthritis     multiple sites   • PONV (postoperative nausea and vomiting)    • Systolic hypertension    • Varicose veins       Allergies   Allergen Reactions   • Codeine Itching and GI Intolerance   • Morphine And Related Itching      Social History     Socioeconomic History   • Marital status:      Spouse name: Not on file   • Number of children: Not on file   • Years of education: Not on file   • Highest education level: Not on file   Tobacco Use   • Smoking status: Never Smoker   • Smokeless tobacco: Never Used   Substance and Sexual Activity   • Alcohol use: No     Comment: caffeine use   • Drug use: No   • Sexual activity:  Defer        Current Outpatient Medications:   •  acetaminophen (TYLENOL) 650 MG 8 hr tablet, Take 650 mg by mouth Every 8 (Eight) Hours As Needed for Mild Pain ., Disp: , Rfl:   •  apixaban (ELIQUIS) 2.5 MG tablet tablet, Take 1 tablet by mouth Every 12 (Twelve) Hours., Disp: 180 tablet, Rfl: 3  •  atenolol (TENORMIN) 25 MG tablet, Take 1 tablet by mouth Daily., Disp: 90 tablet, Rfl: 3  •  atorvastatin (LIPITOR) 40 MG tablet, Take 1 tablet by mouth Every Night., Disp: 90 tablet, Rfl: 3  •  bumetanide (BUMEX) 0.5 MG tablet, Take 1 tablet by mouth Daily., Disp: 30 tablet, Rfl: 3  •  meloxicam (MOBIC) 15 MG tablet, Take 1 tablet by mouth Daily., Disp: 30 tablet, Rfl: 3  •  memantine (NAMENDA XR) 28 MG capsule sustained-release 24 hr extended release capsule, Take 1 capsule by mouth Daily., Disp: 90 capsule, Rfl: 3  •  metFORMIN (GLUCOPHAGE) 500 MG tablet, Take 0.5 tablets by mouth Daily With Breakfast., Disp: 180 tablet, Rfl: 3  •  Multiple Vitamins-Minerals (MULTIVITAMIN WITH MINERALS) tablet tablet, Take 1 tablet by mouth Daily., Disp: , Rfl:   •  omeprazole OTC (PriLOSEC OTC) 20 MG EC tablet, Take 1 tablet by mouth Daily., Disp: 90 tablet, Rfl: 3     Objective     History of Present Illness Zahra is here today with her daughter for one-month follow-up.  She was seen 1 month ago for hospital discharge follow-up after suffering a ischemic stroke.  She has no residual deficit.  She is doing well.  Home health is checking her blood pressure and reports variable readings.  Her appetite is good and weight is stable.  She has paroxysmal atrial fibrillation and is on Eliquis.  She was complaining of some upper abdominal discomfort 1 month ago and was started on omeprazole 20 mg daily.  She is on bumetanide 0.5 mg daily for diastolic heart failure.  She has well-controlled diabetes and also history of diabetic neuropathy.  She is taking Tylenol for pain.    Review of Systems   Constitutional: Negative for activity  change and unexpected weight change.   Respiratory: Positive for shortness of breath.    Cardiovascular: Negative for chest pain and leg swelling.   Genitourinary: Negative.    Neurological: Negative for dizziness and speech difficulty.   Psychiatric/Behavioral: Negative for agitation, behavioral problems and confusion.       Physical Exam   Constitutional: She appears well-developed and well-nourished.   Elderly female who is alert pleasant and cooperative   Cardiovascular: An irregularly irregular rhythm present.   128/50   Pulmonary/Chest: Effort normal and breath sounds normal.   Musculoskeletal: She exhibits no edema.   Neurological: She is alert.   Psychiatric: She has a normal mood and affect. Her behavior is normal. Judgment and thought content normal.   Vitals reviewed.      ASSESSMENT     Problem List Items Addressed This Visit        Cardiovascular and Mediastinum    Systolic hypertension - Primary    Paroxysmal atrial fibrillation (CMS/HCC)       Endocrine    Type 2 diabetes mellitus (CMS/HCC)       Nervous and Auditory    Polyneuropathy associated with underlying disease (CMS/HCC)      Other Visit Diagnoses     Need for vaccination        Relevant Orders    Pneumococcal Conjugate Vaccine 13-Valent All (PCV13) (Completed)    Need for influenza vaccination        Relevant Orders    FluZone High Dose 65YR+ (8870-5734) (Completed)          PLAN  Patient Instructions   Blood pressure is overall stable.  Continue Tylenol for diabetic neuropathy.  Discussed other options including Lyrica and gabapentin if symptoms worsen.  Continue omeprazole 20 mg daily and other medicines.    Return in about 3 months (around 1/24/2020) for Recheck.

## 2019-10-24 NOTE — PATIENT INSTRUCTIONS
Blood pressure is overall stable.  Continue Tylenol for diabetic neuropathy.  Discussed other options including Lyrica and gabapentin if symptoms worsen.  Continue omeprazole 20 mg daily and other medicines.

## 2019-11-01 ENCOUNTER — OFFICE VISIT (OUTPATIENT)
Dept: CARDIOLOGY | Facility: CLINIC | Age: 84
End: 2019-11-01

## 2019-11-01 VITALS
HEIGHT: 62 IN | DIASTOLIC BLOOD PRESSURE: 70 MMHG | WEIGHT: 141.8 LBS | SYSTOLIC BLOOD PRESSURE: 160 MMHG | BODY MASS INDEX: 26.09 KG/M2 | HEART RATE: 85 BPM

## 2019-11-01 DIAGNOSIS — I48.0 PAROXYSMAL ATRIAL FIBRILLATION (HCC): ICD-10-CM

## 2019-11-01 DIAGNOSIS — I50.33 ACUTE ON CHRONIC DIASTOLIC CONGESTIVE HEART FAILURE (HCC): ICD-10-CM

## 2019-11-01 DIAGNOSIS — I49.3 PVC (PREMATURE VENTRICULAR CONTRACTION): ICD-10-CM

## 2019-11-01 DIAGNOSIS — I05.0 RHEUMATIC MITRAL STENOSIS: ICD-10-CM

## 2019-11-01 DIAGNOSIS — I65.23 BILATERAL CAROTID ARTERY STENOSIS: ICD-10-CM

## 2019-11-01 DIAGNOSIS — Z95.2 S/P TAVR (TRANSCATHETER AORTIC VALVE REPLACEMENT): ICD-10-CM

## 2019-11-01 DIAGNOSIS — I10 SYSTOLIC HYPERTENSION: Primary | ICD-10-CM

## 2019-11-01 PROBLEM — Z09 HOSPITAL DISCHARGE FOLLOW-UP: Status: RESOLVED | Noted: 2019-09-24 | Resolved: 2019-11-01

## 2019-11-01 PROCEDURE — 99214 OFFICE O/P EST MOD 30 MIN: CPT | Performed by: NURSE PRACTITIONER

## 2019-11-01 PROCEDURE — 93000 ELECTROCARDIOGRAM COMPLETE: CPT | Performed by: NURSE PRACTITIONER

## 2019-11-01 NOTE — PROGRESS NOTES
Date of Office Visit: 2019  Encounter Provider: RAYRAY Carranza  Place of Service: [facility] CARDIOLOGY  Patient Name: Mary Shi  :3/3/1925      Chief Complaint   Patient presents with   • Hypertension   • Follow-up   :     Dear Dr. Rubio,     HPI: Mary Shi is a pleasant 89 y.o. female who presents today for cardiac follow up.     In , she was diagnosed with aortic stenosis and mitral stenosis per echocardiogram.  She continued to have echocardiograms throughout the years.  In 2016, she underwent TAVR with a #23 Rubina valve.     She was hospitalized in 2018 for shortness of breath due to acute on chronic diastolic heart failure and high sodium diet.  She had a repeat echocardiogram 2018 which revealed an EF of 60%, severe left atrial enlargement, normal functioning aortic valve, mild to moderate tricuspid insufficiency, RVSP elevated at 55 mmHg, severe mitral annular calcification, mild mitral valve regurgitation, and severe mitral valve stenosis. She underwent IV diuresis and was transitioned back to oral bumetanide.     In 2019, was hospitalized for an ischemic stroke per MRI with no residual deficits.  She was noted to have new onset atrial fibrillation and was started on apixaban.  She also had evidence of acute exacerbation of diastolic heart failure with pulmonary vascular congestion noted on chest x-ray.  She was started on bumetanide.  A 2D echocardiogram was obtained on 9/10/2019 which revealed a hyperdynamic EF of 71%, left atrium severely dilated, mild aortic valve regurgitation, prosthetic aortic valve, mild tricuspid valve regurgitation, mild mitral valve regurgitation, moderate mitral valve stenosis, and mild to moderate pulmonary hypertension.    She presents today for a six-month follow-up visit with her two daughters accompanying her.  Since her stroke her daughters feel that she is not talking as  much and she complains that her feet hurt when she walks.  They are thinking about getting her a wheelchair when they are transporting her longer distances.  She has had no paralysis and is living independently.  When she first wakes up in the morning she has dyspnea that eventually resolved and remains fatigued.  She denies chest pain, PND, orthopnea, cough, edema, palpitations, dizziness, syncope, or bleeding.  Her blood pressure is elevated today and has been at home.  Her daughters were concerned if we need to further treat her blood pressure.    ADDENDUM 11/22/2019: I reviewed her blood work from 11/11/2019.  CBC normal.  BMP normal except for glucose of 129.    Past Medical History:   Diagnosis Date   • 'light-for-dates' infant with signs of fetal malnutrition    • Amaurosis fugax    • Anemia    • Aortic stenosis     s/p TAVR    • Carotid artery stenosis     Per duplex 12/16/2016-proximal right internal carotid artery mild stenosis and proximal left moderate stenosis   • Cognitive disorder    • Congestive heart failure (CMS/HCC)    • Dementia (CMS/HCC)    • Diabetes mellitus (CMS/HCC)    • Diastolic dysfunction    • Dyspnea on exertion    • Generalized osteoarthritis of multiple sites    • GERD (gastroesophageal reflux disease)    • Gout    • Health care maintenance    • Hiatal hernia    • Hyperlipidemia    • Hypertension    • Mitral valve stenosis     Moderate per echocardiogram 2/2017; severe mitral annular calcification   • Nasal lesion    • Nasal stenosis    • Osteoarthritis     multiple sites   • PONV (postoperative nausea and vomiting)    • Systolic hypertension    • Varicose veins        Past Surgical History:   Procedure Laterality Date   • AORTIC VALVE REPAIR/REPLACEMENT N/A 12/27/2016    Procedure: Transfemoral LEFT Transcatheter Aortic Valve Replacement TRANSESOPHAGEAL ECHOCARDIOGRAM WITH ANESTHESIA;  Surgeon: Eduardo Brown MD;  Location: [facility] OR 18/19;  Service:    • BLADDER REPAIR     •  CARDIAC CATHETERIZATION N/A 12/14/2016    Procedure: Right Heart Cath;  Surgeon: Eduardo Brown MD;  Location: [facility] CATH INVASIVE LOCATION;  Service:    • CARDIAC CATHETERIZATION N/A 12/14/2016    Procedure: Coronary angiography;  Surgeon: Eduardo Brown MD;  Location: [facility] CATH INVASIVE LOCATION;  Service:    • HYSTERECTOMY     • KNEE SURGERY         Social History     Socioeconomic History   • Marital status:      Spouse name: Not on file   • Number of children: Not on file   • Years of education: Not on file   • Highest education level: Not on file   Tobacco Use   • Smoking status: Never Smoker   • Smokeless tobacco: Never Used   Substance and Sexual Activity   • Alcohol use: No     Comment: caffeine use   • Drug use: No   • Sexual activity: Defer       Family History   Problem Relation Age of Onset   • Heart disease Mother    • Coronary artery disease Mother    • Hyperlipidemia Mother    • Hypertension Mother    • Cancer Sister    • Cancer Brother    • Diabetes Daughter    • Diabetes Daughter    • Cancer Sister    • Cancer Sister    • Cancer Sister    • Cancer Other        The following portion of the patient's history were reviewed and updated as appropriate: past medical history, past surgical history, past social history, past family history, allergies, current medications, and problem list.    Review of Systems   Constitution: Positive for malaise/fatigue. Negative for chills, diaphoresis, fever, night sweats, weight gain and weight loss.   HENT: Positive for hearing loss. Negative for nosebleeds, sore throat and tinnitus.    Eyes: Positive for pain. Negative for blurred vision, double vision and visual disturbance.        Dry eyes   Cardiovascular: Positive for leg swelling. Negative for chest pain, claudication, cyanosis, dyspnea on exertion, irregular heartbeat, near-syncope, orthopnea, palpitations, paroxysmal nocturnal dyspnea and syncope.   Respiratory: Positive for shortness of breath and  snoring. Negative for cough, hemoptysis and wheezing.    Endocrine: Positive for cold intolerance. Negative for heat intolerance and polyuria.   Hematologic/Lymphatic: Negative for bleeding problem. Does not bruise/bleed easily.   Skin: Negative for color change, dry skin, flushing and itching.   Musculoskeletal: Negative for falls, joint pain, joint swelling, muscle cramps, muscle weakness and myalgias.   Gastrointestinal: Negative for abdominal pain, constipation, heartburn, melena, nausea and vomiting.   Genitourinary: Negative for dysuria and hematuria.   Neurological: Positive for excessive daytime sleepiness. Negative for dizziness, light-headedness, loss of balance, numbness, paresthesias, seizures and vertigo.   Psychiatric/Behavioral: Negative for altered mental status, depression, memory loss and substance abuse. The patient does not have insomnia and is not nervous/anxious.    Allergic/Immunologic: Negative for environmental allergies.       Allergies   Allergen Reactions   • Codeine Itching and GI Intolerance   • Morphine And Related Itching         Current Outpatient Medications:   •  acetaminophen (TYLENOL) 650 MG 8 hr tablet, Take 650 mg by mouth Every 8 (Eight) Hours As Needed for Mild Pain ., Disp: , Rfl:   •  apixaban (ELIQUIS) 2.5 MG tablet tablet, Take 1 tablet by mouth Every 12 (Twelve) Hours., Disp: 180 tablet, Rfl: 3  •  atenolol (TENORMIN) 25 MG tablet, Take 1 tablet by mouth Daily., Disp: 90 tablet, Rfl: 3  •  atorvastatin (LIPITOR) 40 MG tablet, Take 1 tablet by mouth Every Night., Disp: 90 tablet, Rfl: 3  •  bumetanide (BUMEX) 0.5 MG tablet, Take 1 tablet by mouth Daily., Disp: 30 tablet, Rfl: 3  •  memantine (NAMENDA XR) 28 MG capsule sustained-release 24 hr extended release capsule, Take 1 capsule by mouth Daily., Disp: 90 capsule, Rfl: 3  •  metFORMIN (GLUCOPHAGE) 500 MG tablet, Take 0.5 tablets by mouth Daily With Breakfast. (Patient taking differently: Take 250 mg by mouth 2 (Two)  "Times a Day With Meals.), Disp: 180 tablet, Rfl: 3  •  Multiple Vitamins-Minerals (MULTIVITAMIN WITH MINERALS) tablet tablet, Take 1 tablet by mouth Daily., Disp: , Rfl:   •  omeprazole OTC (PriLOSEC OTC) 20 MG EC tablet, Take 1 tablet by mouth Daily., Disp: 90 tablet, Rfl: 3        Objective:     Vitals:    11/01/19 1037 11/01/19 1047   BP: 150/70 160/70   BP Location: Left arm Right arm   Pulse: 85    Weight: 64.3 kg (141 lb 12.8 oz)    Height: 157.5 cm (62\")      Body mass index is 25.94 kg/m².    PHYSICAL EXAM:    Vitals Reviewed.   General Appearance: No acute distress, well developed and well nourished.   Eyes: Conjunctiva and lids: No erythema, swelling, or discharge. Sclera non-icteric.  Wears glasses  HENT: Atraumatic, normocephalic. External eyes, ears, and nose normal.  Hearing loss noted. Mucous membranes normal. Lips not cyanotic. Neck supple with no tenderness.  Respiratory: No signs of respiratory distress. Respiration rhythm and depth normal.   Clear to auscultation. No rales, crackles, rhonchi, or wheezing auscultated.   Cardiovascular:  Jugular Venous Pressure: Normal  Heart Rate and Rhythm: Irregularly, irregular. Heart Sounds: Normal S1 and S2. No S3 or S4 noted.  Murmurs: No murmurs noted. No rubs, thrills, or gallops.   Lower Extremities: No edema noted.  Gastrointestinal:  Abdomen soft, non-distended, non-tender. Normal bowel sounds. No hepatomegaly.   Musculoskeletal: Normal movement of extremities  Skin and Nails: General appearance normal. No pallor, cyanosis, diaphoresis. Skin temperature normal. No clubbing of fingernails.   Psychiatric: Patient alert and oriented to person, place, and time. Speech and behavior appropriate. Normal mood and affect.       ECG 12 Lead  Date/Time: 11/1/2019 11:39 AM  Performed by: Marylin Conway APRN  Authorized by: Marylin Conway APRN   Comparison: compared with previous ECG from 9/2/2019  Similar to previous ECG  Rhythm: atrial fibrillation  Rate: " normal  BPM: 85  Q waves: V1, V2 and V3    ST Segments: ST segments normal  T Waves: T waves normal  QRS axis: normal  Other: no other findings    Clinical impression: abnormal EKG            Assessment:       Diagnosis Plan   1. Systolic hypertension     2. Acute on chronic diastolic congestive heart failure (CMS/HCC)     3. S/P TAVR (transcatheter aortic valve replacement)     4. Rheumatic mitral stenosis     5. Paroxysmal atrial fibrillation (CMS/HCC)     6. PVC (premature ventricular contraction)     7. Bilateral carotid artery stenosis            Plan:       1.   Hypertension: Blood pressure is elevated today.  I recommend increasing her bumetanide to 1 mg/day to help her shortness of breath.  Her blood pressure needs to be lowered as well, but I only want to make one change at a time.  She is going to return to see Dr. Mcmahan in November and if her blood pressure still elevated, she may benefit from adding ARB therapy.    2.  Acute on Chronic diastolic Heart Failure: Increase Bumex to 1 mg daily for shortness of breath.  Repeat BMP in 7-10 days.    Heart Failure  Assessment  • NYHA class II - There is slight limitation of physical activity. The patient is comfortable at rest, but physical activity results in fatigue, palpitations or shortness of breath.  • Beta blocker prescribed  • Diuretics prescribed    Plan  • The patient has received heart failure education on the following topics: dietary sodium restriction, medication instructions, symptom management, physical activity and weight monitoring  • The heart failure care plan was discussed with the patient today including: continuing the current program    Subjective/Objective  • The patient reports dyspnea      3.  Aortic Valve: Status post TAVR and repeat echocardiogram in September 2019 showed stable aortic valve.    4.  Mitral Stenosis: Moderate per echocardiogram 9/2019.    5.  Paroxysmal Atrial Fibrillation: She remains in atrial fibrillation with  controlled ventricular response on atenolol.  Continue with apixaban for stroke prevention.    Atrial Fibrillation and Atrial Flutter  Assessment  • The patient has paroxysmal atrial fibrillation  • This is valvular in etiology  • The patient's CHADS2-VASc score is 8  • A [id number] score of 2 or more is considered a high risk for a thromboembolic event  • Apixaban prescribed    Plan  • Continue in atrial fibrillation with rate control  • Continue apixaban for antithrombotic therapy, bleeding issues discussed  • Continue beta blocker for rate control    6.  PVC: Asymptomatic.  Continue beta-blocker.    7.  Carotid Artery Stenosis: Per duplex 12/16/2016-proximal right internal carotid artery mild stenosis and proximal left moderate stenosis.    8.  She is scheduled to follow-up with Dr. Mcmahan in November and wishes to keep that appointment.    As always, it has been a pleasure to participate in your patient's care. Thank you.       Sincerely,         RAYRAY Ramos        **Dragon Disclaimer:**  Much of this encounter note is an electronic transcription/translation of spoken language to printed text. The electronic translation of spoken language may permit erroneous, or at times, nonsensical words or phrases to be inadvertently transcribed. Although I have reviewed the note for such errors, some may still exist.

## 2019-11-11 DIAGNOSIS — N39.0 URINARY TRACT INFECTION WITHOUT HEMATURIA, SITE UNSPECIFIED: Primary | ICD-10-CM

## 2019-11-12 LAB
AMBIG ABBREV BMP8 DEFAULT: NORMAL
BASOPHILS # BLD AUTO: 0 X10E3/UL (ref 0–0.2)
BASOPHILS NFR BLD AUTO: 0 %
BUN SERPL-MCNC: 21 MG/DL (ref 10–36)
BUN/CREAT SERPL: 28 (ref 12–28)
CALCIUM SERPL-MCNC: 9.8 MG/DL (ref 8.7–10.3)
CHLORIDE SERPL-SCNC: 96 MMOL/L (ref 96–106)
CO2 SERPL-SCNC: 26 MMOL/L (ref 20–29)
CREAT SERPL-MCNC: 0.74 MG/DL (ref 0.57–1)
EOSINOPHIL # BLD AUTO: 0 X10E3/UL (ref 0–0.4)
EOSINOPHIL NFR BLD AUTO: 0 %
ERYTHROCYTE [DISTWIDTH] IN BLOOD BY AUTOMATED COUNT: 14.5 % (ref 12.3–15.4)
GLUCOSE SERPL-MCNC: 129 MG/DL (ref 65–99)
HCT VFR BLD AUTO: 36.9 % (ref 34–46.6)
HGB BLD-MCNC: 12.4 G/DL (ref 11.1–15.9)
IMM GRANULOCYTES # BLD AUTO: 0 X10E3/UL (ref 0–0.1)
IMM GRANULOCYTES NFR BLD AUTO: 0 %
LYMPHOCYTES # BLD AUTO: 2.2 X10E3/UL (ref 0.7–3.1)
LYMPHOCYTES NFR BLD AUTO: 29 %
MCH RBC QN AUTO: 28.2 PG (ref 26.6–33)
MCHC RBC AUTO-ENTMCNC: 33.6 G/DL (ref 31.5–35.7)
MCV RBC AUTO: 84 FL (ref 79–97)
MONOCYTES # BLD AUTO: 0.7 X10E3/UL (ref 0.1–0.9)
MONOCYTES NFR BLD AUTO: 9 %
NEUTROPHILS # BLD AUTO: 4.7 X10E3/UL (ref 1.4–7)
NEUTROPHILS NFR BLD AUTO: 62 %
PLATELET # BLD AUTO: 222 X10E3/UL (ref 150–450)
POTASSIUM SERPL-SCNC: 4.2 MMOL/L (ref 3.5–5.2)
RBC # BLD AUTO: 4.4 X10E6/UL (ref 3.77–5.28)
SODIUM SERPL-SCNC: 142 MMOL/L (ref 134–144)
WBC # BLD AUTO: 7.6 X10E3/UL (ref 3.4–10.8)

## 2019-11-13 LAB
APPEARANCE UR: CLEAR
BACTERIA #/AREA URNS HPF: ABNORMAL /[HPF]
BACTERIA UR CULT: ABNORMAL
BACTERIA UR CULT: ABNORMAL
BILIRUB UR QL STRIP: NEGATIVE
CASTS URNS MICRO: ABNORMAL
CASTS URNS QL MICRO: PRESENT /LPF
COLOR UR: YELLOW
EPI CELLS #/AREA URNS HPF: ABNORMAL /HPF
GLUCOSE UR QL: NEGATIVE
HGB UR QL STRIP: NEGATIVE
KETONES UR QL STRIP: NEGATIVE
LEUKOCYTE ESTERASE UR QL STRIP: ABNORMAL
MICRO URNS: ABNORMAL
NITRITE UR QL STRIP: NEGATIVE
OTHER ANTIBIOTIC SUSC ISLT: ABNORMAL
PH UR STRIP: 6.5 [PH] (ref 5–7.5)
PROT UR QL STRIP: NEGATIVE
RBC #/AREA URNS HPF: ABNORMAL /HPF
SP GR UR: 1.01 (ref 1–1.03)
URINALYSIS REFLEX: ABNORMAL
UROBILINOGEN UR STRIP-MCNC: 0.2 MG/DL (ref 0.2–1)
WBC #/AREA URNS HPF: ABNORMAL /HPF

## 2019-11-26 ENCOUNTER — TELEPHONE (OUTPATIENT)
Dept: FAMILY MEDICINE CLINIC | Facility: CLINIC | Age: 84
End: 2019-11-26

## 2019-11-26 ENCOUNTER — OFFICE VISIT (OUTPATIENT)
Dept: CARDIOLOGY | Facility: CLINIC | Age: 84
End: 2019-11-26

## 2019-11-26 VITALS
SYSTOLIC BLOOD PRESSURE: 136 MMHG | HEIGHT: 62 IN | HEART RATE: 75 BPM | BODY MASS INDEX: 25.1 KG/M2 | DIASTOLIC BLOOD PRESSURE: 86 MMHG | WEIGHT: 136.4 LBS

## 2019-11-26 DIAGNOSIS — Z95.2 S/P TAVR (TRANSCATHETER AORTIC VALVE REPLACEMENT): Primary | ICD-10-CM

## 2019-11-26 DIAGNOSIS — I05.0 RHEUMATIC MITRAL STENOSIS: ICD-10-CM

## 2019-11-26 DIAGNOSIS — I48.0 PAROXYSMAL ATRIAL FIBRILLATION (HCC): ICD-10-CM

## 2019-11-26 DIAGNOSIS — E11.59 TYPE 2 DIABETES MELLITUS WITH OTHER CIRCULATORY COMPLICATION, WITHOUT LONG-TERM CURRENT USE OF INSULIN (HCC): ICD-10-CM

## 2019-11-26 DIAGNOSIS — I10 SYSTOLIC HYPERTENSION: ICD-10-CM

## 2019-11-26 PROCEDURE — 93000 ELECTROCARDIOGRAM COMPLETE: CPT | Performed by: INTERNAL MEDICINE

## 2019-11-26 PROCEDURE — 99214 OFFICE O/P EST MOD 30 MIN: CPT | Performed by: INTERNAL MEDICINE

## 2019-11-26 NOTE — TELEPHONE ENCOUNTER
----REQUEST WAS SENT TO [facility] AND PATIENTS DAUGHTER WAS INFORMED      - Message from Evita Larry sent at 11/26/2019 12:44 PM EST -----  Contact: MONROE (DAUGHTER)  PATIENTS DAUGHTER IS CALLING TO REQUEST A SCRIPT FOR PATIENT (SHAILA FIGUEROA) TO GET A WHEELCHAIR.  SHE STATED THAT THEY HAVE JUST LEFT DR. GUEVARA (CARDIOLOGIST) OFFICE TODAY AND DR. GUEVARA STATED SHE DID NEED A WHEELCHAIR. WHEN THEY ASKED IF SHE COULD WRITE FOR ONE SHE STATED THAT THE PCP HAD TO WRITE FOR IT.    PLEASE CALL MONROE (DAUGHTER) BACK [phone]    THANK YOU

## 2019-11-26 NOTE — PROGRESS NOTES
Date of Office Visit: 2019  Encounter Provider: Elissa Mcmahan MD  Place of Service: [facility] CARDIOLOGY  Patient Name: Mary Shi  :3/3/1925      Patient ID:  Mary Shi is a 89 y.o. female is here for  followup for         History of Present Illness    She had an echocardiogram done 2012. This showed moderate aortic stenosis,  mild mitral stenosis, moderate left atrial dilation, grade I-A diastolic dysfunction,  moderate concentric left ventricular hypertrophy, ejection fraction of 65-70%. She also  had a carotid duplex study showing 50-60% stenosis of the right internal carotid artery.   She feels somewhat short-winded when she bends over because of the hiatal hernia.      She was in the hospital with chest pain and difficulty breathing on 10/08/2014. She had had a prior stress nuclear perfusion study, which was done 2014 for chest pain and this showed no evidence of ischemia. During the hospitalization in October we felt the chest pain was probably mostly related to a hiatal hernia, somewhat related to her valvular heart disease, and possibly a small amount of heart failure as well, although her BNP was not markedly elevated. We did repeat her echocardiogram, which was done 2013, showing ejection fraction of 63%, moderate concentric left ventricular hypertrophy, grade 1A diastolic dysfunction, moderate mitral insufficiency, moderate mitral stenosis, moderate aortic stenosis. This is essentially unchanged from her echocardiograms both in 2013 and 2012. She had another echocardiogram done 10/2014, which showed a grade 2 diastolic dysfunction, ejection fraction of 67%, moderate concentric left ventricular hypertrophy, moderate mitral and moderate aortic stenosis, trivial aortic regurgitation. Again, this is unchanged from echoes dated 2012 and 2013.      She had a couple of children who wanted to get a second opinion at [facility]  so they did in 2014. They set her up to see a surgeon there thinking that she needed aortic valve replacement, specifically ERICKSON. The surgeon there said she was not a candidate for this, as I had already kind of told them. They put her through a stress echocardiogram because they were, it sounds like not quite in agreement with what he thought, but the stress echocardiogram did not show an increase in right ventricular systolic pressure nor a change in her aortic valve area. She had a workup for her hiatal hernia there as well and that has been stable, but obviously she cannot eat late at night or have spicy food or she has problems all night long.       She had a 2-D echocardiogram with Doppler done on 11/29/2016, showing severe aortic stenosis with a mean gradient of 42 mmHg.  Her peak velocity was 3.8 meters per second with an aortic valve area of 0.92 centimeters squared.  This also showed moderate mitral stenosis and mild mitral insufficiency.  Her ejection fraction was 60% with grade 2 diastolic dysfunction and moderate to severe left ventricular hypertrophy.  In addition, she had severe left atrial enlargement.       In December 2016, she underwent TAVR with a #23 S3 Rubina valve.     She had an echocardiogram done 6/24/18 showing LVEF 60%, severe left atrial enlargement, mild to moderate tricuspid insufficiency, RVSP elevated gradient of 55 mmHg, severe mitral stenosis, mild mitral insufficiency.  The mean mitral valve gradient was 10 mmHg.     She was hospitalized in June 2018 for shortness of breath felt to be secondary to acute on chronic diastolic heart failure.  She responded well to IV diuresis and was transitioned back to her oral bumetanide.  This exacerbation was felt to be secondary to eating a high sodium diet and going out to eat with family visiting her.       She was hospitalized September 2019 with an acute left MCA ischemic stroke.  she had an echocardiogram done 9/10/2019 showing ejection  fraction 71%, moderate mitral valvular stenosis, mild mitral valve insufficiency, mild aortic insufficiency but TAVR otherwise appeared normal and mild to moderate pulmonary hypertension.  She was started on Eliquis.  MRI of the brain showed multiple acute infarcts the left MCA territory as well as a remote left occipital infarct.  There was mild to moderate small vessel disease.  There were no focal high-grade stenoses or aneurysms.  Labs on 11/11/2019 show glucose 129, otherwise normal BMP normal CBC.    She has no chest pain, pressure, short windedness.  Her appetite is somewhat low and she is lost weight.  She does have fatigue.  She has difficulty walking due to foot pain.  She is taking her medications as directed and has remained on the Eliquis.  She has had no difficulty with it.    Past Medical History:   Diagnosis Date   • 'light-for-dates' infant with signs of fetal malnutrition    • Amaurosis fugax    • Anemia    • Aortic stenosis     s/p TAVR    • Carotid artery stenosis     Per duplex 12/16/2016-proximal right internal carotid artery mild stenosis and proximal left moderate stenosis   • Cognitive disorder    • Congestive heart failure (CMS/HCC)    • Dementia (CMS/HCC)    • Diabetes mellitus (CMS/HCC)    • Diastolic dysfunction    • Dyspnea on exertion    • Generalized osteoarthritis of multiple sites    • GERD (gastroesophageal reflux disease)    • Gout    • Health care maintenance    • Hiatal hernia    • Hyperlipidemia    • Hypertension    • Mitral valve stenosis     Moderate per echocardiogram 2/2017; severe mitral annular calcification   • Nasal lesion    • Nasal stenosis    • Osteoarthritis     multiple sites   • PONV (postoperative nausea and vomiting)    • Systolic hypertension    • Varicose veins          Past Surgical History:   Procedure Laterality Date   • AORTIC VALVE REPAIR/REPLACEMENT N/A 12/27/2016    Procedure: Transfemoral LEFT Transcatheter Aortic Valve Replacement TRANSESOPHAGEAL  ECHOCARDIOGRAM WITH ANESTHESIA;  Surgeon: Eduardo Brown MD;  Location: [facility] HYBRID OR 18/19;  Service:    • BLADDER REPAIR     • CARDIAC CATHETERIZATION N/A 12/14/2016    Procedure: Right Heart Cath;  Surgeon: Eduardo Brown MD;  Location: [facility] CATH INVASIVE LOCATION;  Service:    • CARDIAC CATHETERIZATION N/A 12/14/2016    Procedure: Coronary angiography;  Surgeon: Eduardo Brown MD;  Location: [facility] INVASIVE LOCATION;  Service:    • HYSTERECTOMY     • KNEE SURGERY         Current Outpatient Medications on File Prior to Visit   Medication Sig Dispense Refill   • acetaminophen (TYLENOL) 650 MG 8 hr tablet Take 650 mg by mouth Every 8 (Eight) Hours As Needed for Mild Pain .     • apixaban (ELIQUIS) 2.5 MG tablet tablet Take 1 tablet by mouth Every 12 (Twelve) Hours. 180 tablet 3   • atenolol (TENORMIN) 25 MG tablet Take 1 tablet by mouth Daily. 90 tablet 3   • atorvastatin (LIPITOR) 40 MG tablet Take 1 tablet by mouth Every Night. 90 tablet 3   • bumetanide (BUMEX) 0.5 MG tablet Take 1 tablet by mouth Daily. (Patient taking differently: Take 1 mg by mouth Daily.) 30 tablet 3   • memantine (NAMENDA XR) 28 MG capsule sustained-release 24 hr extended release capsule Take 1 capsule by mouth Daily. 90 capsule 3   • metFORMIN (GLUCOPHAGE) 500 MG tablet Take 0.5 tablets by mouth Daily With Breakfast. (Patient taking differently: Take 250 mg by mouth 2 (Two) Times a Day With Meals.) 180 tablet 3   • Multiple Vitamins-Minerals (MULTIVITAMIN WITH MINERALS) tablet tablet Take 1 tablet by mouth Daily.     • omeprazole OTC (PriLOSEC OTC) 20 MG EC tablet Take 1 tablet by mouth Daily. 90 tablet 3     No current facility-administered medications on file prior to visit.        Social History     Socioeconomic History   • Marital status:      Spouse name: Not on file   • Number of children: Not on file   • Years of education: Not on file   • Highest education level: Not on file   Tobacco Use   • Smoking status:  "Never Smoker   • Smokeless tobacco: Never Used   Substance and Sexual Activity   • Alcohol use: No     Comment: caffeine use   • Drug use: No   • Sexual activity: Defer           Review of Systems   Constitution: Negative.   HENT: Negative for congestion.    Eyes: Negative for vision loss in left eye and vision loss in right eye.   Respiratory: Negative.  Negative for cough, hemoptysis, shortness of breath, sleep disturbances due to breathing, snoring, sputum production and wheezing.    Endocrine: Negative.    Hematologic/Lymphatic: Negative.    Skin: Negative for poor wound healing and rash.   Musculoskeletal: Negative for falls, gout, muscle cramps and myalgias.   Gastrointestinal: Negative for abdominal pain, diarrhea, dysphagia, hematemesis, melena, nausea and vomiting.   Neurological: Negative for excessive daytime sleepiness, dizziness, headaches, light-headedness, loss of balance, seizures and vertigo.   Psychiatric/Behavioral: Negative for depression and substance abuse. The patient is not nervous/anxious.        Procedures    ECG 12 Lead  Date/Time: 11/26/2019 12:11 PM  Performed by: Elissa Mcmahan MD  Authorized by: Elissa Mcmahan MD   Comparison: compared with previous ECG   Similar to previous ECG  Rhythm: atrial fibrillation  Q waves: V2 and V3      Clinical impression: abnormal EKG                Objective:      Vitals:    11/26/19 1158   BP: 136/86   BP Location: Left arm   Patient Position: Sitting   Pulse: 75   Weight: 61.9 kg (136 lb 6.4 oz)   Height: 157.5 cm (62.01\")     Body mass index is 24.94 kg/m².    Physical Exam   Constitutional: She is oriented to person, place, and time. She appears well-developed and well-nourished. No distress.   HENT:   Head: Normocephalic and atraumatic.   Eyes: Conjunctivae are normal. No scleral icterus.   Neck: Neck supple. No JVD present. Carotid bruit is not present. No thyromegaly present.   Cardiovascular: Normal rate, S1 normal, S2 normal and " intact distal pulses. An irregularly irregular rhythm present.  No extrasystoles are present. PMI is not displaced. Exam reveals no gallop.   Murmur heard.   Low-pitched rumbling crescendo presystolic murmur is present with a grade of 2/6 at the apex.  Pulses:       Carotid pulses are 2+ on the right side, and 2+ on the left side.       Radial pulses are 2+ on the right side, and 2+ on the left side.        Dorsalis pedis pulses are 2+ on the right side, and 2+ on the left side.        Posterior tibial pulses are 2+ on the right side, and 2+ on the left side.   Pulmonary/Chest: Effort normal and breath sounds normal. No respiratory distress. She has no wheezes. She has no rhonchi. She has no rales. She exhibits no tenderness.   Abdominal: Soft. Bowel sounds are normal. She exhibits no distension, no abdominal bruit and no mass. There is no tenderness.   Musculoskeletal: She exhibits no edema or deformity.   Lymphadenopathy:     She has no cervical adenopathy.   Neurological: She is alert and oriented to person, place, and time. No cranial nerve deficit.   Skin: Skin is warm and dry. No rash noted. She is not diaphoretic. No cyanosis. No pallor. Nails show no clubbing.   Psychiatric: She has a normal mood and affect. Judgment normal.   Vitals reviewed.      Lab Review:       Assessment:      Diagnosis Plan   1. S/P TAVR (transcatheter aortic valve replacement)     2. Systolic hypertension     3. Paroxysmal atrial fibrillation (CMS/[facility])     4. Type 2 diabetes mellitus with other circulatory complication, without long-term current use of insulin (CMS/[facility])     5. Rheumatic mitral stenosis       1. Severe AS. S/p TAVR 12/2016.   2. Right carotid artery stenosis, 50-60%.   3. Hypertension. BP high.  Has been on steroids for gout.   4. Hyperlipidemia. Per  Dr. Huston.   5. Varicose veins; stable.   6. Diabetes mellitus type 2.   7. Dementia, fairly advanced.  8. Moderate MS.  9. Grade 2 diastolic dysfunction.  10. Hiatal  hernia which causes her dyspnea and chest pain.  11. Venous insufficiency, use compression stockings.  12. Gout.   13. CVA 9/2019, on eliquis as was due to atrial fibrillation.  She is not on warfarin as she is not a candidate for this given her frailty.     Plan:       See marino in 6 months, no med changes, doing well.

## 2019-12-06 ENCOUNTER — OFFICE VISIT (OUTPATIENT)
Dept: NEUROLOGY | Facility: CLINIC | Age: 84
End: 2019-12-06

## 2019-12-06 VITALS
BODY MASS INDEX: 24.95 KG/M2 | SYSTOLIC BLOOD PRESSURE: 136 MMHG | DIASTOLIC BLOOD PRESSURE: 70 MMHG | OXYGEN SATURATION: 93 % | HEART RATE: 88 BPM | HEIGHT: 62 IN

## 2019-12-06 DIAGNOSIS — E78.49 OTHER HYPERLIPIDEMIA: Primary | ICD-10-CM

## 2019-12-06 PROCEDURE — 99214 OFFICE O/P EST MOD 30 MIN: CPT | Performed by: NURSE PRACTITIONER

## 2019-12-06 RX ORDER — ATORVASTATIN CALCIUM 20 MG/1
20 TABLET, FILM COATED ORAL DAILY
Qty: 30 TABLET | Refills: 11 | Status: SHIPPED | OUTPATIENT
Start: 2019-12-06 | End: 2020-09-08

## 2019-12-06 NOTE — PROGRESS NOTES
DOS: 2019  NAME: Mary Shi   : 3/3/1925  PCP: Everette Rubio MD    Chief Complaint   Patient presents with   • Stroke        Neurological Problem and Interval History:  89 y.o. right-handed female with a Hx of Congestive heart failure, diabetes mellitus, dementia, hyperlipidemia, hypertension, status post TAVR who I am seen today in follow-up for multiple acute left MCA nfarctions etiology thought to be secondary to new onset A. fib; cardioembolic source.    Ms. Shi resented to [facility] on 2019 due to 2 episodes of difficulty with speech both of which lasted approximately 30 minutes.  Family states that patient was unable to answer questions.  EKG on arrival showed atrial fibrillation; new onset.  She will CT the head showed evidence of old left PCA infarct; no evidence of new acute findings.  MRI of the brain later revealed multiple acute left MCA infarctions etiology thought to be secondary to cardioembolic source/A. fib.  Also MRI revealed chronic left occipital infarction and evidence of mild to moderate small vessel disease.  MRA of the neck showed evidence of some atherosclerotic disease without evidence of high-grade stenosis.  MRA of the head showed no evidence of high-grade stenosis and/or aneurysm.  Surface echo showed ejection fraction 71%.  LV was normal.  LA severely dilated thought to be secondary to chronic A. fib just diagnosed this admission.  No evidence of PFO noted.  EEG showed no epileptiform discharges and/or seizure like activity.  However, it did show some left posterior temporal slowing.  Patient was discharged home on adjusted dose Eliquis 2.5 twice daily and high-dose statin which she remains on.  Patient is followed by Dr. Mcmahan, cardiology; recent appointment 2019 (notes reviewed).     Once discharge she denies any new signs and/or symptoms of stroke.  Family reports patient is less verbal than she previously was but has  "no actual issues with speech \"not as talkative\".  Patient does seem to be bothered by background noise according to family; not wanting to have the TV on or lots of people in her home as she previously did.  She denies any falls.  She uses 4 pronged cane to assist with mobility and uses walker for long distances due to increased fatigue and shortness of breath with walking long distances.  Systolic blood pressure is consistently 140-150; goal less than 140.  She denies any issues with mood (stop bang screening tool below).  SSRI discussed declined at this time.  P BA screening tool (7).  Stop bang screening tool revealed moderate risk for JAY.  Patient reports chronic fatigue, poor quality of sleep and waking up not feeling rested.  Discussed referral for JAY evaluation which patient and family are agreeable to.  She denies any other complaints and/or concerns on my exam.  Plan to see her back in 3 months.  Patient and family agreeable to plan    PHQ-9 Depression Screening  Little interest or pleasure in doing things?  1   Feeling down, depressed, or hopeless?  0   Trouble falling or staying asleep, or sleeping too much?  1   Feeling tired or having little energy?  3   Poor appetite or overeating?  2   Feeling bad about yourself - or that you are a failure or have let yourself or your family down?  0   Trouble concentrating on things, such as reading the newspaper or watching television?  2   Moving or speaking so slowly that other people could have noticed? Or the opposite - being so fidgety or restless that you have been moving around a lot more than usual?  0   Thoughts that you would be better off dead, or of hurting yourself in some way?  0   PHQ-9 Total Score  9   If you checked off any problems, how difficult have these problems made it for you to do your work, take care of things at home, or get along with other people?  Not difficult at all       Review of Systems:        Review of Systems   Constitutional: " Positive for activity change, appetite change, fatigue and unexpected weight change.   HENT: Positive for hearing loss. Negative for facial swelling, trouble swallowing and voice change.    Eyes: Negative for photophobia, pain and visual disturbance.   Respiratory: Positive for shortness of breath and wheezing. Negative for chest tightness.    Cardiovascular: Negative for chest pain, palpitations and leg swelling.   Gastrointestinal: Positive for diarrhea. Negative for abdominal pain, nausea and vomiting.   Endocrine: Negative for polydipsia and polyphagia.   Genitourinary: Positive for enuresis.   Musculoskeletal: Positive for arthralgias, back pain, gait problem, myalgias and neck pain. Negative for joint swelling and neck stiffness.   Neurological: Negative for dizziness, tremors, seizures, syncope, facial asymmetry, speech difficulty, weakness, light-headedness, numbness and headaches.   Hematological: Bruises/bleeds easily.   Psychiatric/Behavioral: Negative for agitation, behavioral problems, confusion, decreased concentration, dysphoric mood, hallucinations, self-injury, sleep disturbance and suicidal ideas. The patient is not nervous/anxious and is not hyperactive.          Current Outpatient Medications:   •  acetaminophen (TYLENOL) 650 MG 8 hr tablet, Take 650 mg by mouth Every 8 (Eight) Hours As Needed for Mild Pain ., Disp: , Rfl:   •  apixaban (ELIQUIS) 2.5 MG tablet tablet, Take 1 tablet by mouth Every 12 (Twelve) Hours., Disp: 180 tablet, Rfl: 3  •  atenolol (TENORMIN) 25 MG tablet, Take 1 tablet by mouth Daily., Disp: 90 tablet, Rfl: 3  •  bumetanide (BUMEX) 0.5 MG tablet, Take 1 tablet by mouth Daily., Disp: 30 tablet, Rfl: 3  •  memantine (NAMENDA XR) 28 MG capsule sustained-release 24 hr extended release capsule, Take 1 capsule by mouth Daily., Disp: 90 capsule, Rfl: 3  •  metFORMIN (GLUCOPHAGE) 500 MG tablet, Take 0.5 tablets by mouth Daily With Breakfast. (Patient taking differently: Take 250 mg  "by mouth 2 (Two) Times a Day With Meals.), Disp: 180 tablet, Rfl: 3  •  Multiple Vitamins-Minerals (MULTIVITAMIN WITH MINERALS) tablet tablet, Take 1 tablet by mouth Daily., Disp: , Rfl:   •  omeprazole OTC (PriLOSEC OTC) 20 MG EC tablet, Take 1 tablet by mouth Daily., Disp: 90 tablet, Rfl: 3  •  atorvastatin (LIPITOR) 20 MG tablet, Take 1 tablet by mouth Daily., Disp: 30 tablet, Rfl: 11    \"The following portions of the patient's history were reviewed and updated as appropriate: allergies, current medications, past family history, past medical history, past social history, past surgical history and problem list.\"  Review and Interpretation of Imaging:  Viewed images discussed above.  Laboratory Results:             Lab Results   Component Value Date    HGBA1C 6.40 (H) 09/10/2019         Lab Results   Component Value Date    CHOL 167 06/24/2018         Lab Results   Component Value Date    HDL 31 (L) 06/11/2019    HDL 31 (L) 12/07/2018    HDL 33 (L) 06/24/2018         Lab Results   Component Value Date    LDL 60 06/11/2019    LDL CANCELED 12/07/2018    LDL 71 06/24/2018         Lab Results   Component Value Date    TRIG 321 (H) 06/11/2019    TRIG 587 (H) 12/07/2018    TRIG 314 (H) 06/24/2018     Lab Results   Component Value Date    RPR Non-Reactive 09/10/2019     Lab Results   Component Value Date    TSH 2.220 09/10/2019     Lab Results   Component Value Date    [id number] 09/10/2019       Physical Examination: NIHSS: 1 mRS: 2  General Appearance:   Well developed, well nourished, well groomed, alert, and cooperative.  HEENT: Normocephalic.    Neck and Spine: Normal range of motion.  Normal alignment. No mass or tenderness. No bruits.  Cardiac: Regular rate and rhythm. No murmurs.  Peripheral Vasculature: Radial and pedal pulses are equal and symmetric.   Extremities:    No edema or deformities. Normal joint ROM.  Skin:    No rashes or birth marks.    Neurological examination:  Higher " Integrative  Function: Oriented to time, place and person. Normal registration, recall, attention span and concentration. Normal language including comprehension, spontaneous speech, repetition, reading, writing, naming and vocabulary. No neglect with normal visual-spatial function and construction. Normal fund of knowledge and higher integrative function.  CN II: Pupils are equal, round, and reactive to light. Normal visual acuity and visual fields except evidence of right hemianopsia noted on exam today: Not previously noted.  CN III IV VI: Extraocular movements are full without nystagmus.   CN V: Normal facial sensation and strength of muscles of mastication.  CN VII: Facial movements are symmetric. No weakness.  CN VIII:   Auditory acuity is normal.  CN IX & X:   Symmetric palatal movement.  CN XI: Sternocleidomastoid and trapezius are normal.  No weakness.  CN XII:   The tongue is midline.  No atrophy or fasciculations.  Motor: Normal muscle strength, bulk and tone in upper and lower extremities.  No fasciculations, rigidity, spasticity, or abnormal movements.  Reflexes: Plantar responses are flexor.  Sensation: Normal to light in arms and legs.   Coordination: Finger to nose test shows no dysmetria.        Diagnoses / Discussion: This is a 89-year-old female with Congestive heart failure, diabetes mellitus, dementia, hyperlipidemia, hypertension, status post TAVR who I saw today in follow-up for acute left MCA stroke in the setting of new onset atrial fibrillation from September 2019.  Since discharge patient has essentially returned to baseline minus need of walker/wheelchair to assist with mobility.  She does have some evidence of right hemianopsia on my exam today not previously noted by myself and/or consulting physicians.  Based on LDL in the hospital okay to decrease atorvastatin to 20 mg daily.  We will plan to repeat lipid panel/AST/ALT every 6 months while on statin.  Recommendations below.  Call with  questions.  Keep planned follow-up.    Plan:   Continue Eliquis; adjusted dose   Decrease atorvastatin to 20 mg daily   JAY evaluation; Dr. Oanh Buck   Recommend outpatient evaluation by neuro-ophthalmology; Dr. Amin   Blood pressure control to <130/80   Goal LDL <70-recommend high dose statins-    Serum glucose < 140   Call 911 for stroke any stroke symptoms   Follow-up with me in 3 months; sooner if indicated  Mary was seen today for stroke.    Diagnoses and all orders for this visit:    Other hyperlipidemia  -     atorvastatin (LIPITOR) 20 MG tablet; Take 1 tablet by mouth Daily.        MDM  Reviewed: previous chart  Reviewed previous: labs, MRI and CT scan  Interpretation: labs, MRI and CT scan  Consults: Sleep medicine.

## 2019-12-09 DIAGNOSIS — R53.82 CHRONIC FATIGUE: Primary | ICD-10-CM

## 2019-12-13 ENCOUNTER — HOSPITAL ENCOUNTER (EMERGENCY)
Facility: HOSPITAL | Age: 84
Discharge: HOME | End: 2019-12-13
Attending: EMERGENCY MEDICINE | Admitting: EMERGENCY MEDICINE

## 2019-12-13 ENCOUNTER — TELEPHONE (OUTPATIENT)
Dept: NEUROLOGY | Facility: CLINIC | Age: 84
End: 2019-12-13

## 2019-12-13 ENCOUNTER — APPOINTMENT (OUTPATIENT)
Dept: GENERAL RADIOLOGY | Facility: HOSPITAL | Age: 84
End: 2019-12-13

## 2019-12-13 VITALS
HEIGHT: 60 IN | BODY MASS INDEX: 27.48 KG/M2 | HEART RATE: 62 BPM | SYSTOLIC BLOOD PRESSURE: 120 MMHG | DIASTOLIC BLOOD PRESSURE: 74 MMHG | TEMPERATURE: 96.9 F | WEIGHT: 140 LBS | OXYGEN SATURATION: 98 % | RESPIRATION RATE: 14 BRPM

## 2019-12-13 DIAGNOSIS — R04.2 COUGH WITH HEMOPTYSIS: Primary | ICD-10-CM

## 2019-12-13 DIAGNOSIS — J90 PLEURAL EFFUSION ON RIGHT: ICD-10-CM

## 2019-12-13 LAB
ALBUMIN SERPL-MCNC: 4.1 G/DL (ref 3.5–5.2)
ALBUMIN/GLOB SERPL: 1.4 G/DL
ALP SERPL-CCNC: 94 U/L (ref 39–117)
ALT SERPL W P-5'-P-CCNC: 14 U/L (ref 1–33)
ANION GAP SERPL CALCULATED.3IONS-SCNC: 12.8 MMOL/L (ref 5–15)
AST SERPL-CCNC: 15 U/L (ref 1–32)
BASOPHILS # BLD AUTO: 0.04 10*3/MM3 (ref 0–0.2)
BASOPHILS NFR BLD AUTO: 0.4 % (ref 0–1.5)
BILIRUB SERPL-MCNC: 0.5 MG/DL (ref 0.2–1.2)
BILIRUB UR QL STRIP: NEGATIVE
BUN BLD-MCNC: 19 MG/DL (ref 8–23)
BUN/CREAT SERPL: 27.5 (ref 7–25)
CALCIUM SPEC-SCNC: 9 MG/DL (ref 8.2–9.6)
CHLORIDE SERPL-SCNC: 101 MMOL/L (ref 98–107)
CLARITY UR: CLEAR
CO2 SERPL-SCNC: 30.2 MMOL/L (ref 22–29)
COLOR UR: YELLOW
CREAT BLD-MCNC: 0.69 MG/DL (ref 0.57–1)
DEPRECATED RDW RBC AUTO: 44.2 FL (ref 37–54)
EOSINOPHIL # BLD AUTO: 0.02 10*3/MM3 (ref 0–0.4)
EOSINOPHIL NFR BLD AUTO: 0.2 % (ref 0.3–6.2)
ERYTHROCYTE [DISTWIDTH] IN BLOOD BY AUTOMATED COUNT: 14 % (ref 12.3–15.4)
GFR SERPL CREATININE-BSD FRML MDRD: 79 ML/MIN/1.73
GLOBULIN UR ELPH-MCNC: 3 GM/DL
GLUCOSE BLD-MCNC: 131 MG/DL (ref 65–99)
GLUCOSE UR STRIP-MCNC: NEGATIVE MG/DL
HCT VFR BLD AUTO: 34.8 % (ref 34–46.6)
HGB BLD-MCNC: 11.3 G/DL (ref 12–15.9)
HGB UR QL STRIP.AUTO: NEGATIVE
IMM GRANULOCYTES # BLD AUTO: 0.04 10*3/MM3 (ref 0–0.05)
IMM GRANULOCYTES NFR BLD AUTO: 0.4 % (ref 0–0.5)
KETONES UR QL STRIP: NEGATIVE
LEUKOCYTE ESTERASE UR QL STRIP.AUTO: NEGATIVE
LYMPHOCYTES # BLD AUTO: 2.25 10*3/MM3 (ref 0.7–3.1)
LYMPHOCYTES NFR BLD AUTO: 24.5 % (ref 19.6–45.3)
MCH RBC QN AUTO: 28.2 PG (ref 26.6–33)
MCHC RBC AUTO-ENTMCNC: 32.5 G/DL (ref 31.5–35.7)
MCV RBC AUTO: 86.8 FL (ref 79–97)
MONOCYTES # BLD AUTO: 0.6 10*3/MM3 (ref 0.1–0.9)
MONOCYTES NFR BLD AUTO: 6.5 % (ref 5–12)
NEUTROPHILS # BLD AUTO: 6.25 10*3/MM3 (ref 1.7–7)
NEUTROPHILS NFR BLD AUTO: 68 % (ref 42.7–76)
NITRITE UR QL STRIP: NEGATIVE
NRBC BLD AUTO-RTO: 0 /100 WBC (ref 0–0.2)
NT-PROBNP SERPL-MCNC: 2149 PG/ML (ref 5–1800)
PH UR STRIP.AUTO: 5.5 [PH] (ref 5–8)
PLATELET # BLD AUTO: 181 10*3/MM3 (ref 140–450)
PMV BLD AUTO: 10.6 FL (ref 6–12)
POTASSIUM BLD-SCNC: 3.9 MMOL/L (ref 3.5–5.2)
PROT SERPL-MCNC: 7.1 G/DL (ref 6–8.5)
PROT UR QL STRIP: NEGATIVE
RBC # BLD AUTO: 4.01 10*6/MM3 (ref 3.77–5.28)
SODIUM BLD-SCNC: 144 MMOL/L (ref 136–145)
SP GR UR STRIP: 1.01 (ref 1–1.03)
TROPONIN T SERPL-MCNC: <0.01 NG/ML (ref 0–0.03)
UROBILINOGEN UR QL STRIP: NORMAL
WBC NRBC COR # BLD: 9.2 10*3/MM3 (ref 3.4–10.8)

## 2019-12-13 PROCEDURE — 85025 COMPLETE CBC W/AUTO DIFF WBC: CPT | Performed by: EMERGENCY MEDICINE

## 2019-12-13 PROCEDURE — 99283 EMERGENCY DEPT VISIT LOW MDM: CPT

## 2019-12-13 PROCEDURE — 93010 ELECTROCARDIOGRAM REPORT: CPT | Performed by: INTERNAL MEDICINE

## 2019-12-13 PROCEDURE — 80053 COMPREHEN METABOLIC PANEL: CPT | Performed by: EMERGENCY MEDICINE

## 2019-12-13 PROCEDURE — 81003 URINALYSIS AUTO W/O SCOPE: CPT | Performed by: EMERGENCY MEDICINE

## 2019-12-13 PROCEDURE — 84484 ASSAY OF TROPONIN QUANT: CPT | Performed by: EMERGENCY MEDICINE

## 2019-12-13 PROCEDURE — 83880 ASSAY OF NATRIURETIC PEPTIDE: CPT | Performed by: EMERGENCY MEDICINE

## 2019-12-13 PROCEDURE — 71046 X-RAY EXAM CHEST 2 VIEWS: CPT

## 2019-12-13 PROCEDURE — 93005 ELECTROCARDIOGRAM TRACING: CPT | Performed by: EMERGENCY MEDICINE

## 2019-12-13 RX ORDER — SODIUM CHLORIDE 0.9 % (FLUSH) 0.9 %
10 SYRINGE (ML) INJECTION AS NEEDED
Status: DISCONTINUED | OUTPATIENT
Start: 2019-12-13 | End: 2019-12-13 | Stop reason: HOSPADM

## 2019-12-13 NOTE — DISCHARGE INSTRUCTIONS
Continue taking Eliquis.  Return to the emergency department for worsening symptoms, blood in your stool, blood in your urine, nosebleeds, shortness of breath, or other concern.  Follow-up with Kiley Aguillon and with Dr. Mcmahan.

## 2019-12-13 NOTE — ED PROVIDER NOTES
" EMERGENCY DEPARTMENT ENCOUNTER    CHIEF COMPLAINT  Chief Complaint: cough with bloody sputum  History given by: patient and family  History limited by: dementia  Room Number: 30/30  PMD: Everette Rubio MD      HPI:  Pt is a 89 y.o. female who presents with hx of dementia, stroke and aFib on eliquis complaining of cough with bloody sputum this AM. Family at bedside states that pt has \"betty sized blood clot.\" Family also reports \"blowing her nose and it was bloody.\" Family states that they held pt's elqiuis this AM. Family states that 2 days ago pt had a mild dry cough but has chronic constant rhinorrhea. Pt denies SOA, cp, hematuria, bloody BM, fever, and chills.       Duration:  This AM  Onset: gradual  Timing: constant  Location: chest  Quality: \"betty sized blood clot\" in sputum  Intensity/Severity: moderate  Progression: unchanged  Associated Symptoms: \"blood after blowing her nose\"  Previous Episodes: anticoagulated on eliquis for aFib and hx of stroke  Treatment before arrival: none    PAST MEDICAL HISTORY  Active Ambulatory Problems     Diagnosis Date Noted   • Cognitive disorder 05/24/2016   • Generalized osteoarthritis 05/24/2016   • Gastroesophageal reflux disease 05/24/2016   • Hyperlipidemia 05/24/2016   • Systolic hypertension 05/24/2016   • Carotid artery stenosis 06/06/2016   • Polyneuropathy associated with underlying disease (CMS/[facility]) 06/06/2016   • S/P TAVR (transcatheter aortic valve replacement) 02/03/2017   • Type 2 diabetes mellitus (CMS/[facility]) 04/06/2017   • Rheumatic mitral stenosis 10/19/2017   • PVC (premature ventricular contraction) 05/16/2018   • Acute on chronic diastolic congestive heart failure (CMS/[facility]) 06/23/2018   • Weakness 09/09/2019   • Altered mental status 09/09/2019   • Acute ischemic left MCA stroke (CMS/[facility]) 09/10/2019   • Paroxysmal atrial fibrillation (CMS/[facility]) 09/11/2019     Resolved Ambulatory Problems     Diagnosis Date Noted   • Amaurosis fugax 05/24/2016   • " Anemia 05/24/2016   • Disorder of aorta (CMS/[facility]) 05/24/2016   • Arteriosclerotic vascular disease 05/24/2016   • Diabetes mellitus (CMS/[facility]) 05/24/2016   • Diastolic heart failure (CMS/[facility]) 05/24/2016   • Diastolic dysfunction 05/24/2016   • Mitral valve stenosis 05/24/2016   • Lesion of nose 05/24/2016   • Nonrheumatic aortic valve stenosis 06/06/2016   • Angina at rest (CMS/[facility]) 12/13/2016   • VHD (valvular heart disease) 12/27/2016   • H/O aortic valvuloplasty 01/04/2017   • Acute confusion 04/04/2018   • Hypoxemia 06/23/2018   • Pain of left hand 11/14/2018   • Bronchitis 11/29/2018   • Nausea and vomiting 03/15/2019   • Hospital discharge follow-up 09/24/2019     Past Medical History:   Diagnosis Date   • 'light-for-dates' infant with signs of fetal malnutrition    • Aortic stenosis    • Atrial fibrillation (CMS/[facility])    • Cancer (CMS/[facility])    • Cervical spine disease    • Congestive heart failure (CMS/[facility])    • Dementia (CMS/[facility])    • Dyspnea on exertion    • Generalized osteoarthritis of multiple sites    • GERD (gastroesophageal reflux disease)    • Gout    • Health care maintenance    • Hiatal hernia    • HL (hearing loss)    • Hypertension    • Memory loss    • Nasal lesion    • Nasal stenosis    • 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[facility])    • Osteoarthritis    • Peripheral neuropathy    • Peripheral vascular disease (CMS/[facility])    • PONV (postoperative nausea and vomiting)    • Shingles    • Stroke (CMS/[facility])    • Syncope    • Varicose veins        PAST SURGICAL HISTORY  Past Surgical History:   Procedure Laterality Date   • AORTIC VALVE REPAIR/REPLACEMENT N/A 12/27/2016    Procedure: Transfemoral LEFT Transcatheter Aortic Valve Replacement TRANSESOPHAGEAL ECHOCARDIOGRAM WITH ANESTHESIA;  Surgeon: Eduardo Brown MD;  Location: [facility] HYBRID OR 18/19;  Service:    • BLADDER REPAIR     • CARDIAC CATHETERIZATION N/A 12/14/2016    Procedure: Right Heart Cath;  Surgeon: Eduardo Brown MD;  Location: [facility] CATH INVASIVE  "LOCATION;  Service:    • CARDIAC CATHETERIZATION N/A 12/14/2016    Procedure: Coronary angiography;  Surgeon: Eduardo Brown MD;  [facility] IN[facility]AS[facility];  Service:    • HYSTERECTOMY     • KNEE SURGERY         FAMILY HISTORY  Family History   Problem Relation Age of Onset   • Heart disease Mother    • Coronary artery disease Mother    • Hyperlipidemia Mother    • Hypertension Mother    • Cancer Sister    • Cancer Brother    • Diabetes Daughter    • Diabetes Daughter    • Cancer Sister    • Cancer Sister    • Cancer Sister    • Cancer Other    • Cancer Son        SOCIAL HISTORY  Social History     Socioeconomic History   • Marital status:      Spouse name: Not on file   • Number of children: 15   • Years of education: 8th grade   • Highest education level: Not on file   Occupational History   • Occupation: retired   Tobacco Use   • Smoking status: Never Smoker   • Smokeless tobacco: Never Used   Substance and Sexual Activity   • Alcohol use: No     Comment: caffeine use   • Drug use: No   • Sexual activity: Defer       ALLERGIES  Codeine and Morphine and related    REVIEW OF SYSTEMS  Review of Systems   Constitutional: Negative for fever.   HENT: Positive for rhinorrhea (\"bloody when she blows her nose\"). Negative for sore throat.    Eyes: Negative.    Respiratory: Positive for cough (with bloody sputum/\"betty sized\" blood clots). Negative for shortness of breath.    Cardiovascular: Negative for chest pain.   Gastrointestinal: Negative for abdominal pain, diarrhea and vomiting.   Genitourinary: Negative for dysuria.   Musculoskeletal: Negative for neck pain.   Skin: Negative for rash.   Allergic/Immunologic: Negative.    Neurological: Negative for weakness, numbness and headaches.   Hematological: Negative.    Psychiatric/Behavioral: Negative.    All other systems reviewed and are negative.      PHYSICAL EXAM  ED Triage Vitals   Temp Heart Rate Resp BP SpO2   12/13/19 1015 12/13/19 1015 " 12/13/19 1035 12/13/19 1035 12/13/19 1015   96.9 °F (36.1 °C) 85 16 154/67 93 %      Temp src Heart Rate Source Patient Position BP Location FiO2 (%)   12/13/19 1015 -- -- -- --   Tympanic           Physical Exam   Constitutional: No distress.   HENT:   Head: Normocephalic and atraumatic.   Mouth/Throat: Oropharynx is clear and moist.   No blood visualized in oropharynx or nares bilaterally   Eyes: Pupils are equal, round, and reactive to light. EOM are normal.   Neck: Normal range of motion. Neck supple.   Cardiovascular: Normal rate and normal heart sounds. An irregularly irregular rhythm present.   Pulmonary/Chest: Effort normal and breath sounds normal. No respiratory distress.   CTAB   Abdominal: Soft. Bowel sounds are normal. She exhibits no distension. There is no tenderness. There is no rebound and no guarding.   Musculoskeletal: Normal range of motion. She exhibits no edema (no BLE) or tenderness (calf).   Neurological: She is alert. She has normal sensation and normal strength.   Skin: Skin is warm and dry. No rash noted.   Psychiatric: Mood and affect normal.   Nursing note and vitals reviewed.      LAB RESULTS  Lab Results (last 24 hours)     Procedure Component Value Units Date/Time    CBC & Differential [[id number]] Collected:  12/13/19 1209    Specimen:  Blood Updated:  12/13/19 1222    Narrative:       The following orders were created for panel order CBC & Differential.  Procedure                               Abnormality         Status                     ---------                               -----------         ------                     CBC Auto Differential[[id number]]        Abnormal            Final result                 Please view results for these tests on the individual orders.    Comprehensive Metabolic Panel [[id number]]  (Abnormal) Collected:  12/13/19 1209    Specimen:  Blood Updated:  12/13/19 1246     Glucose 131 mg/dL      BUN 19 mg/dL      Creatinine 0.69 mg/dL      Sodium 144  mmol/L      Potassium 3.9 mmol/L      Chloride 101 mmol/L      CO2 30.2 mmol/L      Calcium 9.0 mg/dL      Total Protein 7.1 g/dL      Albumin 4.10 g/dL      ALT (SGPT) 14 U/L      AST (SGOT) 15 U/L      Alkaline Phosphatase 94 U/L      Total Bilirubin 0.5 mg/dL      eGFR Non African Amer 79 mL/min/1.73      Globulin 3.0 gm/dL      A/G Ratio 1.4 g/dL      BUN/Creatinine Ratio 27.5     Anion Gap 12.8 mmol/L     Narrative:       GFR Normal >60  Chronic Kidney Disease <60  Kidney Failure <15      BNP [[id number]]  (Abnormal) Collected:  12/13/19 1209    Specimen:  Blood Updated:  12/13/19 1257     proBNP 2,149.0 pg/mL     Narrative:       Among patients with dyspnea, NT-proBNP is highly sensitive for the detection of acute congestive heart failure. In addition NT-proBNP of <300 pg/ml effectively rules out acute congestive heart failure with 99% negative predictive value.      Troponin [[id number]]  (Normal) Collected:  12/13/19 1209    Specimen:  Blood Updated:  12/13/19 1257     Troponin T <0.010 ng/mL     Narrative:       Troponin T Reference Range:  <= 0.03 ng/mL-   Negative for AMI  >0.03 ng/mL-     Abnormal for myocardial necrosis.  Clinicians would have to utilize clinical acumen, EKG, Troponin and serial changes to determine if it is an Acute Myocardial Infarction or myocardial injury due to an underlying chronic condition.     CBC Auto Differential [[id number]]  (Abnormal) Collected:  12/13/19 1209    Specimen:  Blood Updated:  12/13/19 1222     WBC 9.20 10*3/mm3      RBC 4.01 10*6/mm3      Hemoglobin 11.3 g/dL      Hematocrit 34.8 %      MCV 86.8 fL      MCH 28.2 pg      MCHC 32.5 g/dL      RDW 14.0 %      RDW-SD 44.2 fl      MPV 10.6 fL      Platelets 181 10*3/mm3      Neutrophil % 68.0 %      Lymphocyte % 24.5 %      Monocyte % 6.5 %      Eosinophil % 0.2 %      Basophil % 0.4 %      Immature Grans % 0.4 %      Neutrophils, Absolute 6.25 10*3/mm3      Lymphocytes, Absolute 2.25 10*3/mm3      Monocytes, Absolute  0.60 10*3/mm3      Eosinophils, Absolute 0.02 10*3/mm3      Basophils, Absolute 0.04 10*3/mm3      Immature Grans, Absolute 0.04 10*3/mm3      nRBC 0.0 /100 WBC     Urinalysis With Microscopic If Indicated (No Culture) - Urine, Clean Catch [[id number]]  (Normal) Collected:  12/13/19 1210    Specimen:  Urine, Clean Catch Updated:  12/13/19 1236     Color, UA Yellow     Appearance, UA Clear     pH, UA 5.5     Specific Gravity, UA 1.012     Glucose, UA Negative     Ketones, UA Negative     Bilirubin, UA Negative     Blood, UA Negative     Protein, UA Negative     Leuk Esterase, UA Negative     Nitrite, UA Negative     Urobilinogen, UA 1.0 E.U./dL    Narrative:       Urine microscopic not indicated.          I ordered the above labs and reviewed the results    RADIOLOGY  XR Chest 2 View     Cardiomegaly  Right pleural effusion has increased in size sine 9/9/2019  Small left pleural effusion  Degree of vascular congestion is unchanged compared to prior        I ordered the above noted radiological studies. Interpreted by radiologist. Reviewed by me in PACS.       PROCEDURES  Procedures      PROGRESS AND CONSULTS  ED Course as of Dec 13 1708   Fri Dec 13, 2019   1121 Old records reviewed.  Patient was admitted here in September 2019 for an acute ischemic left MCA stroke and new onset atrial fibrillation.  She was started on Eliquis at that time.    []   1155 EKG          EKG time: 11:50 AM  Rhythm/Rate: Atrial fibrillation rate 82  P waves and AR: Irregular, varying  QRS, axis: Anterior and inferior Q waves, normal  ST and T waves: Nonspecific ST changes laterally    Interpreted Contemporaneously by me, independently viewed  EKG is not significantly changed compared to prior EKG done 9/9/2019      []   1318 2800 three months ago   proBNP(!): 2,149.0 []   1704 12.4 one month ago, 11.7 three months ago   Hemoglobin(!): 11.3 []      ED Course User Index  [] Eduardo Castrejon MD     1129: Discussed with pt and  family the plan to review lab work and CXR for further evaluation and care. The patient and family indicates understanding of these issues and agrees with the plan.    1331-Discussed pt case with RAYRAY Aguillon (neurology) who will consult with her attending and contact me back.     1335-Discussed with RAYRAY Aguillon (neurology) who discussed case with  (neurology). They state that pt should continue taking her eliquis at this time as risk of stopping the eliquis outweighs the benefits of continuing.     1404-Rechecked pt. Pt is resting comfortably with family at bedside. Vitals stable. No further episodes in the ED. Notified pt of CXR results mildly worse than prior in 9/2019 which pt is on diuretics for. Pt is followed by  (cardiology). Pt denies SOA at this time and states she has chronic dyspnea which I informed them to f/u with cardiology for as pt's breathing is even and unlabored at this time. Discussed with pt and family of unremarkable lab work and my consult with RAYRAY Aguillon (neurology). Discussed with them that the risk of stopping the eliquis outweighs the benefits of stopping it at this time as pt had such as small amount of blood in her sputum. Discussed the plan to discharge pt home with strict RTER warnings including abnormal bleeding or increase in amount/size of clots. Pt understands and agrees with the plan, all questions answered.      MEDICAL DECISION MAKING  Results were reviewed/discussed with the patient and they were also made aware of online access. Pt also made aware that some labs, such as cultures, will not be resulted during ER visit and follow up with PMD is necessary.     MDM  Number of Diagnoses or Management Options  Cough with hemoptysis:   Pleural effusion on right:   Diagnosis management comments: Patient presented to the ER after having coughed up two small blood clots earlier today.  Patient is on Eliquis for A. fib as well as history of a stroke.  She has chronic  dyspnea on exertion but denied any recent worsening of this.  Patient was asymptomatic in the ER.  Vital signs were normal.  Hemoglobin was 11.3.  Chest x-ray did show bilateral pleural effusions worse on the right,.  Patient's BNP was stable.  She was breathing comfortably and was not hypoxic.  She takes Bumex daily.  Case was discussed with EBONY Fierro.  It was felt that the benefits of continuing Eliquis outweigh the risk.  The patient and her family were comfortable with this.  I did discuss with them to return to the emergency department for worsening symptoms, dark stools, blood in urine, nosebleeds, etc.  Patient was advised to follow-up with her neurologist and cardiologist.       Amount and/or Complexity of Data Reviewed  Clinical lab tests: reviewed and ordered (UA-negative  troponin<0.010  BNP-2,149  WBC-WNL  hemoglobin-11.3)  Tests in the radiology section of CPT®: reviewed and ordered (CXR-increased right pleural effusion )  Tests in the medicine section of CPT®: reviewed and ordered (See EKG in ED course)  Decide to obtain previous medical records or to obtain history from someone other than the patient: yes  Obtain history from someone other than the patient: yes (family)  Review and summarize past medical records: yes (Please refer to ED course)  Discuss the patient with other providers: yes (RAYRAY Aguillon (neurology))  Independent visualization of images, tracings, or specimens: yes           DIAGNOSIS  Final diagnoses:   Cough with hemoptysis   Pleural effusion on right       DISPOSITION  DISCHARGE    Patient discharged in stable condition.    Reviewed implications of results, diagnosis, meds, responsibility to follow up, warning signs and symptoms of possible worsening, potential complications and reasons to return to ER.    Patient/Family voiced understanding of above instructions.    Discussed plan for discharge, as there is no emergent indication for admission. Patient referred to primary  care provider for BP management due to today's BP. Pt/family is agreeable and understands need for follow up and repeat testing.  Pt is aware that discharge does not mean that nothing is wrong but it indicates no emergency is present that requires admission and they must continue care with follow-up as given below or physician of their choice.     FOLLOW-UP  Kiley Aguillon, APRN  [address] [facility] [address] [facility] [facility] [address]  [facility] [id number] [phone]    Schedule an appointment as soon as possible for a visit       [facility]  [address] [facility] [id number] [phone]  Schedule an appointment as soon as possible for a visit            Medication List      Changed    metFORMIN 500 MG tablet  Commonly known as:  GLUCOPHAGE  Take 0.5 tablets by mouth Daily With Breakfast.  What changed:  when to take this              Latest Documented Vital Signs:  As of 5:08 PM  BP- 120/74 HR- 62 Temp- 96.9 °F (36.1 °C) (Tympanic) O2 sat- 98%    --  Documentation assistance provided by marino Orellana for MD Chandrakant.  Information recorded by the scribe was done at my direction and has been verified and validated by me.               Katelin Orellana  12/13/19 1419       Eduardo Castrejon MD  12/13/19 2969

## 2019-12-13 NOTE — TELEPHONE ENCOUNTER
----- Message from Trini Carmona sent at 12/13/2019  9:53 AM EST -----  Contact: MONROE DAUGHTER [phone]  Pt is spitting up blood about a daniela size clot they are on the way to [facility] er when she blows her nose she has some blood in there to. They didn't give her her eliquis this morning, is that okay? Until she gets checked out? Pt is a pt of [facility] but if you can ask one of the other  Np's? Please advise

## 2019-12-13 NOTE — TELEPHONE ENCOUNTER
Informed pt, per Keysha, make sure the ER knows she has not taken her morning dose of eliquis.  Per daughter, pt at ER now.

## 2019-12-16 ENCOUNTER — TELEPHONE (OUTPATIENT)
Dept: FAMILY MEDICINE CLINIC | Facility: CLINIC | Age: 84
End: 2019-12-16

## 2019-12-16 ENCOUNTER — TELEPHONE (OUTPATIENT)
Dept: NEUROLOGY | Facility: CLINIC | Age: 84
End: 2019-12-16

## 2019-12-16 NOTE — TELEPHONE ENCOUNTER
Katty, I just now noticed that this is a Dr. Rubio patient and that maybe that is why I do not have the paperwork? Has this been taken care of? I called the lady back but got no answer.

## 2019-12-16 NOTE — TELEPHONE ENCOUNTER
----- Message from Ange Palmer sent at 12/3/2019 10:52 AM EST -----  [facility] called and THEY RECEIVED AN ORDER FOR A WHEELCHAIR FROM US AND IT NEEDED ADDITIONAL INFORMATION. THIS WAS FAXED OVER TO US ON THE 27TH AND THEY ARE FOLLOWING UP    [facility] [phone] EXT [phone] IS THE     THANK YOU

## 2019-12-16 NOTE — TELEPHONE ENCOUNTER
Called and spoke with patient's daughter Liat regarding ED visit on Friday.  Daughter states patient has been doing fine since discharge with no new episodes of bleeding that she is aware of.

## 2019-12-17 ENCOUNTER — PATIENT OUTREACH (OUTPATIENT)
Dept: CASE MANAGEMENT | Facility: OTHER | Age: 84
End: 2019-12-17

## 2019-12-17 ENCOUNTER — EPISODE CHANGES (OUTPATIENT)
Dept: CASE MANAGEMENT | Facility: OTHER | Age: 84
End: 2019-12-17

## 2019-12-17 NOTE — OUTREACH NOTE
Patient Outreach Note  Talked with patient's daughter (caregiver). Discussed 12/13/19 ED visit regarding cough with hemoptysis .Daughter states patient  compliant with ED recommendations and states symptoms have improved. She has not had any difficulty with bleeding and taking Eliquis as directed. She reports patient has no difficulty with chest pain; SOB; appetite or sleeping. Patient lives with family and assistance as needed with ADL's; meals; transportation; and uses walker for ambulation. With assistance of family patient is compliant with medications and appointments. She is no longer receiving home health services. She recently had sleep study. Reviewed with patient's daughter  24/7 Nurse Line Telephone number; [place] contact information. Patient has participated in Care Advising in the past. Advance Directives(completed);  My Chart(declines); gaps in care(flu and pneumonia vaccine up to date). Patient;s daughter verbalized understanding. She states to appreciate phone and declines further phone calls at this time.  No further questions or concerns voiced at this time.     Soraya Mihcel RN  Ambulatory     12/17/2019, 1:42 PM

## 2019-12-18 ENCOUNTER — TELEPHONE (OUTPATIENT)
Dept: NEUROLOGY | Facility: CLINIC | Age: 84
End: 2019-12-18

## 2019-12-18 DIAGNOSIS — R04.0 BLEEDING FROM THE NOSE: Primary | ICD-10-CM

## 2019-12-18 NOTE — TELEPHONE ENCOUNTER
----- Message from Larisa Abdi sent at 12/18/2019 10:15 AM EST -----  Contact: [phone]  Mary was at  the hospital Saturday with a bled.  They did not find anything.  Today her nose is bleeding some.  Wanted to check with you about this.    Please call daughter, Aiyana.

## 2019-12-18 NOTE — TELEPHONE ENCOUNTER
----- Message from RAYRAY Jones sent at 12/18/2019  2:45 PM EST -----  Contact: [phone]  Let pt. Know that I recommend referral/follow-up to ENT. Could consider discontinuing Eliquis if needed but risk for recurrent stroke with this plan.     ##Referral ordered placed. If they have someone in mind closer to home I am ok with changing it.       ----- Message -----  From: Macario Cunningham MD  Sent: 12/18/2019   2:21 PM EST  To: RAYRAY Jones        ----- Message -----  From: Kiley Aguillon APRN  Sent: 12/18/2019   1:27 PM EST  To: Macario Cunningham MD    This is the lady that presented to the ED last Friday due to nosebleed.  Per note below apparently she is continued to have nosebleeds.  Do recommend continuing adjusted dose Eliquis and monitoring nosebleeds?       ----- Message -----  From: Lucila Kahn MA  Sent: 12/18/2019  11:50 AM EST  To: RAYRAY Jones    Last night profuse nose bleed. Today a little drip.  Daughter put some vaseline in her nose and it has stopped.  Is there anything she needs to do?  Daughter would like you to review hosp notes.  Per daughter, pt had sleep study done and they will  the machine from [facility].  Daughter asks if this could help her enough to to enable to pt come off of eliquis?    Thank you  ----- Message -----  From: Larisa Abdi  Sent: 12/18/2019  10:15 AM EST  To: Lucila Kahn MA    Mary was at  the hospital Saturday with a bled.  They did not find anything.  Today her nose is bleeding some.  Wanted to check with you about this.    Please call daughterAiyana.

## 2019-12-18 NOTE — TELEPHONE ENCOUNTER
Informed daughter, Aiyana, of ENT referral.  Daughter verbalized understanding of recurrent stroke risk that comes with d/c of Eliquis.  Daughter is not wanting to d/c at this time.

## 2019-12-19 ENCOUNTER — EPISODE CHANGES (OUTPATIENT)
Dept: CASE MANAGEMENT | Facility: OTHER | Age: 84
End: 2019-12-19

## 2020-01-06 ENCOUNTER — TELEPHONE (OUTPATIENT)
Dept: NEUROLOGY | Facility: CLINIC | Age: 85
End: 2020-01-06

## 2020-01-06 NOTE — TELEPHONE ENCOUNTER
----- Message from RAYRAY Jones sent at 1/5/2020  8:20 AM EST -----  Regarding: RE: Increased bumex dose  They need to discuss this w/ Cardiology. I am happy to speak w/ them if needed.     ----- Message -----  From: Lucila Kahn MA  Sent: 1/3/2020  11:42 AM EST  To: RAYRAY Jones  Subject: Increased bumex dose                             Pts daughter, Aiyana, called wanting us to send a new script for Bumex 1 mg.  Per daughter, you instructed patient to increase from .5 to 1 mg.  I do not see a note about that. Do you want to send new rx? They have enough of the .5 to last this weekend, as pt has been taking two daily.        Thank you

## 2020-01-06 NOTE — TELEPHONE ENCOUNTER
Left message with instructions to call cardiology for Bumex dosage increase request.  Call with any questions.

## 2020-01-07 ENCOUNTER — TELEPHONE (OUTPATIENT)
Dept: CARDIOLOGY | Facility: CLINIC | Age: 85
End: 2020-01-07

## 2020-01-07 RX ORDER — BUMETANIDE 1 MG/1
1 TABLET ORAL DAILY
Qty: 30 TABLET | Refills: 3 | Status: SHIPPED | OUTPATIENT
Start: 2020-01-07 | End: 2020-05-08

## 2020-01-07 NOTE — TELEPHONE ENCOUNTER
From last OV notes 11.01.2020    2.  Acute on Chronic diastolic Heart Failure: Increase Bumex to 1 mg daily for shortness of breath.

## 2020-01-07 NOTE — TELEPHONE ENCOUNTER
Informed Aiyana to call cardiology to discuss increase in bumex dose.  Aiyana verbalized understanding and agreement with plan.

## 2020-01-13 DIAGNOSIS — E11.9 TYPE 2 DIABETES MELLITUS WITHOUT COMPLICATION, WITHOUT LONG-TERM CURRENT USE OF INSULIN (HCC): ICD-10-CM

## 2020-02-18 ENCOUNTER — OFFICE VISIT (OUTPATIENT)
Dept: FAMILY MEDICINE CLINIC | Facility: CLINIC | Age: 85
End: 2020-02-18

## 2020-02-18 VITALS
BODY MASS INDEX: 26.31 KG/M2 | TEMPERATURE: 97.1 F | HEIGHT: 60 IN | RESPIRATION RATE: 17 BRPM | WEIGHT: 134 LBS | SYSTOLIC BLOOD PRESSURE: 110 MMHG | DIASTOLIC BLOOD PRESSURE: 60 MMHG | HEART RATE: 85 BPM | OXYGEN SATURATION: 93 %

## 2020-02-18 DIAGNOSIS — I48.0 PAROXYSMAL ATRIAL FIBRILLATION (HCC): ICD-10-CM

## 2020-02-18 DIAGNOSIS — I50.33 ACUTE ON CHRONIC DIASTOLIC CONGESTIVE HEART FAILURE (HCC): ICD-10-CM

## 2020-02-18 DIAGNOSIS — F09 COGNITIVE DISORDER: ICD-10-CM

## 2020-02-18 DIAGNOSIS — I10 SYSTOLIC HYPERTENSION: Primary | ICD-10-CM

## 2020-02-18 PROCEDURE — 99214 OFFICE O/P EST MOD 30 MIN: CPT | Performed by: INTERNAL MEDICINE

## 2020-02-18 NOTE — PATIENT INSTRUCTIONS
Blood pressure is stable.  Pulse appears stable off of atenolol.  Today there are no clinical signs of heart failure.  Will check labs today and make recommendations as to follow-up after reviewing results.

## 2020-02-19 DIAGNOSIS — E78.49 OTHER HYPERLIPIDEMIA: Primary | ICD-10-CM

## 2020-02-19 LAB
BASOPHILS # BLD AUTO: 0.04 10*3/MM3 (ref 0–0.2)
BASOPHILS NFR BLD AUTO: 0.4 % (ref 0–1.5)
BUN SERPL-MCNC: NORMAL MG/DL
CALCIUM SERPL-MCNC: NORMAL MG/DL
CHLORIDE SERPL-SCNC: NORMAL MMOL/L
CO2 SERPL-SCNC: NORMAL MMOL/L
CREAT SERPL-MCNC: NORMAL MG/DL
EOSINOPHIL # BLD AUTO: 0.01 10*3/MM3 (ref 0–0.4)
EOSINOPHIL NFR BLD AUTO: 0.1 % (ref 0.3–6.2)
ERYTHROCYTE [DISTWIDTH] IN BLOOD BY AUTOMATED COUNT: 13.9 % (ref 12.3–15.4)
GLUCOSE SERPL-MCNC: NORMAL MG/DL
HCT VFR BLD AUTO: 31.9 % (ref 34–46.6)
HGB BLD-MCNC: 10.6 G/DL (ref 12–15.9)
IMM GRANULOCYTES # BLD AUTO: 0.04 10*3/MM3 (ref 0–0.05)
IMM GRANULOCYTES NFR BLD AUTO: 0.4 % (ref 0–0.5)
LYMPHOCYTES # BLD AUTO: 1.66 10*3/MM3 (ref 0.7–3.1)
LYMPHOCYTES NFR BLD AUTO: 17.7 % (ref 19.6–45.3)
MCH RBC QN AUTO: 28 PG (ref 26.6–33)
MCHC RBC AUTO-ENTMCNC: 33.2 G/DL (ref 31.5–35.7)
MCV RBC AUTO: 84.2 FL (ref 79–97)
MONOCYTES # BLD AUTO: 0.71 10*3/MM3 (ref 0.1–0.9)
MONOCYTES NFR BLD AUTO: 7.6 % (ref 5–12)
NEUTROPHILS # BLD AUTO: 6.9 10*3/MM3 (ref 1.7–7)
NEUTROPHILS NFR BLD AUTO: 73.8 % (ref 42.7–76)
NRBC BLD AUTO-RTO: 0 /100 WBC (ref 0–0.2)
PLATELET # BLD AUTO: 216 10*3/MM3 (ref 140–450)
POTASSIUM SERPL-SCNC: NORMAL MMOL/L
RBC # BLD AUTO: 3.79 10*6/MM3 (ref 3.77–5.28)
SODIUM SERPL-SCNC: NORMAL MMOL/L
SPECIMEN STATUS: NORMAL
WBC # BLD AUTO: 9.36 10*3/MM3 (ref 3.4–10.8)

## 2020-02-20 ENCOUNTER — OFFICE VISIT (OUTPATIENT)
Dept: CARDIOLOGY | Facility: CLINIC | Age: 85
End: 2020-02-20

## 2020-02-20 VITALS
BODY MASS INDEX: 26.46 KG/M2 | HEART RATE: 81 BPM | SYSTOLIC BLOOD PRESSURE: 132 MMHG | DIASTOLIC BLOOD PRESSURE: 70 MMHG | HEIGHT: 60 IN | WEIGHT: 134.8 LBS

## 2020-02-20 DIAGNOSIS — R42 DIZZINESS: ICD-10-CM

## 2020-02-20 DIAGNOSIS — Z95.2 S/P TAVR (TRANSCATHETER AORTIC VALVE REPLACEMENT): ICD-10-CM

## 2020-02-20 DIAGNOSIS — I50.33 ACUTE ON CHRONIC DIASTOLIC CONGESTIVE HEART FAILURE (HCC): ICD-10-CM

## 2020-02-20 DIAGNOSIS — I10 SYSTOLIC HYPERTENSION: ICD-10-CM

## 2020-02-20 DIAGNOSIS — I05.0 RHEUMATIC MITRAL STENOSIS: ICD-10-CM

## 2020-02-20 DIAGNOSIS — R06.09 DYSPNEA ON EXERTION: Primary | ICD-10-CM

## 2020-02-20 DIAGNOSIS — I65.23 BILATERAL CAROTID ARTERY STENOSIS: ICD-10-CM

## 2020-02-20 DIAGNOSIS — I48.0 PAROXYSMAL ATRIAL FIBRILLATION (HCC): ICD-10-CM

## 2020-02-20 LAB
BASOPHILS # BLD AUTO: 0 X10E3/UL (ref 0–0.2)
BASOPHILS NFR BLD AUTO: 0 %
BUN SERPL-MCNC: 23 MG/DL (ref 10–36)
BUN/CREAT SERPL: 35 (ref 12–28)
CALCIUM SERPL-MCNC: 9.7 MG/DL (ref 8.7–10.3)
CHLORIDE SERPL-SCNC: 96 MMOL/L (ref 96–106)
CO2 SERPL-SCNC: 25 MMOL/L (ref 20–29)
CREAT SERPL-MCNC: 0.66 MG/DL (ref 0.57–1)
EOSINOPHIL # BLD AUTO: 0 X10E3/UL (ref 0–0.4)
EOSINOPHIL NFR BLD AUTO: 0 %
ERYTHROCYTE [DISTWIDTH] IN BLOOD BY AUTOMATED COUNT: 13.7 % (ref 11.7–15.4)
GLUCOSE SERPL-MCNC: 170 MG/DL (ref 65–99)
HCT VFR BLD AUTO: 33.5 % (ref 34–46.6)
HGB BLD-MCNC: 11 G/DL (ref 11.1–15.9)
IMM GRANULOCYTES # BLD AUTO: 0 X10E3/UL (ref 0–0.1)
IMM GRANULOCYTES NFR BLD AUTO: 0 %
LYMPHOCYTES # BLD AUTO: 1.7 X10E3/UL (ref 0.7–3.1)
LYMPHOCYTES NFR BLD AUTO: 19 %
MCH RBC QN AUTO: 28.3 PG (ref 26.6–33)
MCHC RBC AUTO-ENTMCNC: 32.8 G/DL (ref 31.5–35.7)
MCV RBC AUTO: 86 FL (ref 79–97)
MONOCYTES # BLD AUTO: 0.7 X10E3/UL (ref 0.1–0.9)
MONOCYTES NFR BLD AUTO: 8 %
NEUTROPHILS # BLD AUTO: 6.4 X10E3/UL (ref 1.4–7)
NEUTROPHILS NFR BLD AUTO: 73 %
PLATELET # BLD AUTO: 225 X10E3/UL (ref 150–450)
POTASSIUM SERPL-SCNC: 4.5 MMOL/L (ref 3.5–5.2)
RBC # BLD AUTO: 3.89 X10E6/UL (ref 3.77–5.28)
SODIUM SERPL-SCNC: 138 MMOL/L (ref 134–144)
WBC # BLD AUTO: 8.9 X10E3/UL (ref 3.4–10.8)

## 2020-02-20 PROCEDURE — 99214 OFFICE O/P EST MOD 30 MIN: CPT | Performed by: NURSE PRACTITIONER

## 2020-02-20 PROCEDURE — 93000 ELECTROCARDIOGRAM COMPLETE: CPT | Performed by: NURSE PRACTITIONER

## 2020-02-20 NOTE — PROGRESS NOTES
Date of Office Visit: 2020  Encounter Provider: RAYRAY Carranza  Place of Service: [facility] CARDIOLOGY  Patient Name: Mary Shi  :3/3/1925  Primary Cardiologist: Dr. Elissa Mcmahan    Chief Complaint   Patient presents with   • Shortness of Breath   :     Dear Dr. Rubio,     HPI: Mary Shi is a pleasant 89 y.o. female who presents today for cardiac follow up.     In , she was diagnosed with aortic stenosis and mitral stenosis per echocardiogram. In 2016, she underwent TAVR with a #23 Rubina valve.     In 2018, she was hospitalized for shortness of breath due to acute on chronic diastolic heart failure and high sodium diet. A repeat echocardiogram 2018 which revealed an EF of 60%, severe left atrial enlargement, normal functioning aortic valve, mild to moderate tricuspid insufficiency, RVSP elevated at 55 mmHg, severe mitral annular calcification, mild mitral valve regurgitation, and severe mitral valve stenosis. She underwent IV diuresis and was transitioned back to oral bumetanide.     In 2019, was hospitalized for an ischemic stroke per MRI with no residual deficits and new onset atrial fibrillation and was started on apixaban.  She also had evidence of acute exacerbation of diastolic heart failure with pulmonary vascular congestion noted on chest x-ray.  She was started on bumetanide.  A 2D echocardiogram was obtained on 9/10/2019 which revealed a hyperdynamic EF of 71%, left atrium severely dilated, mild aortic valve regurgitation, prosthetic aortic valve, mild tricuspid valve regurgitation, mild mitral valve regurgitation, moderate mitral valve stenosis, and mild to moderate pulmonary hypertension.    On 2020, her daughters contacted the office stating that the patient was fatigued and her blood pressure was low at 104/62.  She was recommended to stop her atenolol.    On 2020 the daughter reported  the patient complained of dizziness and her blood pressure was low.  Her bumetanide was decreased from 1 mg to 0.5 mg daily.  On 2/13/2020 her atenolol was discontinued.  On 2/19/2020 she became more short of breath so she took 1/2 tablet of the bumetanide.  On 2/20/2020 the patient was short of breath and anxious so her family gave her atenolol 25 mg.    She presents today for evaluation of dizziness and shortness of breath.  She has 2 daughters accompanying her.  Once the change of her medications her blood pressure has stabilized.  Her family started noticing that her heart rate was elevated so they restarted the atenolol.  She is now more short of breath and retaining fluid.  She denies chest pain, palpitations, dizziness, or syncope.      Past Medical History:   Diagnosis Date   • 'light-for-dates' infant with signs of fetal malnutrition    • Amaurosis fugax    • Anemia    • Aortic stenosis     s/p TAVR    • Atrial fibrillation (CMS/[facility])    • Cancer (CMS/[facility])     skin   • Carotid artery stenosis     Per duplex 12/16/2016-proximal right internal carotid artery mild stenosis and proximal left moderate stenosis   • Cervical spine disease    • Cognitive disorder    • Congestive heart failure (CMS/HCC)    • Dementia (CMS/HCC)    • Diabetes mellitus (CMS/HCC)    • Diastolic dysfunction    • Dyspnea on exertion    • Generalized osteoarthritis of multiple sites    • GERD (gastroesophageal reflux disease)    • Gout    • Health care maintenance    • Hiatal hernia    • HL (hearing loss)    • Hyperlipidemia    • Hypertension    • Memory loss    • Mitral valve stenosis     Moderate per echocardiogram 2/2017; severe mitral annular calcification   • Nasal lesion    • Nasal stenosis    • 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)    • Osteoarthritis     multiple sites   • Peripheral neuropathy    • Peripheral vascular disease (CMS/HCC)    • PONV (postoperative nausea and vomiting)    • Shingles    • Stroke (CMS/HCC)    • Syncope    •  Systolic hypertension    • Varicose veins        Past Surgical History:   Procedure Laterality Date   • AORTIC VALVE REPAIR/REPLACEMENT N/A 12/27/2016    Procedure: Transfemoral LEFT Transcatheter Aortic Valve Replacement TRANSESOPHAGEAL ECHOCARDIOGRAM WITH ANESTHESIA;  Surgeon: Eduardo Brown MD;  Location: [facility] OR 18/19;  Service:    • BLADDER REPAIR     • CARDIAC CATHETERIZATION N/A 12/14/2016    Procedure: Right Heart Cath;  Surgeon: Eduardo Brown MD;  Location: [facility] CATH INVASIVE LOCATION;  Service:    • CARDIAC CATHETERIZATION N/A 12/14/2016    Procedure: Coronary angiography;  Surgeon: Eduadro Brown MD;  Location: [facility] CATH INVASIVE LOCATION;  Service:    • HYSTERECTOMY     • KNEE SURGERY         Social History     Socioeconomic History   • Marital status:      Spouse name: Not on file   • Number of children: 15   • Years of education: 8th grade   • Highest education level: Not on file   Occupational History   • Occupation: retired   Tobacco Use   • Smoking status: Never Smoker   • Smokeless tobacco: Never Used   Substance and Sexual Activity   • Alcohol use: No     Comment: caffeine use: Half a cup daily   • Drug use: No   • Sexual activity: Defer       Family History   Problem Relation Age of Onset   • Heart disease Mother    • Coronary artery disease Mother    • Hyperlipidemia Mother    • Hypertension Mother    • Cancer Sister    • Cancer Brother    • Diabetes Daughter    • Diabetes Daughter    • Cancer Sister    • Cancer Sister    • Cancer Sister    • Cancer Other    • Cancer Son        The following portion of the patient's history were reviewed and updated as appropriate: past medical history, past surgical history, past social history, past family history, allergies, current medications, and problem list.    Review of Systems   Constitution: Positive for malaise/fatigue. Negative for chills, diaphoresis, fever, night sweats, weight gain and weight loss.   HENT: Negative for  hearing loss, nosebleeds, sore throat and tinnitus.    Eyes: Negative for blurred vision, double vision, pain and visual disturbance.        Dry eyes   Cardiovascular: Positive for dyspnea on exertion. Negative for chest pain, claudication, cyanosis, irregular heartbeat, leg swelling, near-syncope, orthopnea, palpitations, paroxysmal nocturnal dyspnea and syncope.   Respiratory: Positive for shortness of breath and snoring. Negative for cough, hemoptysis and wheezing.    Endocrine: Positive for cold intolerance. Negative for heat intolerance and polyuria.   Hematologic/Lymphatic: Negative for bleeding problem. Does not bruise/bleed easily.   Skin: Negative for color change, dry skin, flushing and itching.   Musculoskeletal: Positive for joint pain. Negative for falls, joint swelling, muscle cramps, muscle weakness and myalgias.   Gastrointestinal: Positive for constipation and nausea. Negative for abdominal pain, heartburn, melena and vomiting.   Genitourinary: Negative for dysuria and hematuria.   Neurological: Positive for dizziness. Negative for excessive daytime sleepiness, light-headedness, loss of balance, numbness, paresthesias, seizures and vertigo.   Psychiatric/Behavioral: Negative for altered mental status, depression, memory loss and substance abuse. The patient does not have insomnia and is not nervous/anxious.    Allergic/Immunologic: Negative for environmental allergies.       Allergies   Allergen Reactions   • Codeine Itching and GI Intolerance   • Morphine And Related Itching         Current Outpatient Medications:   •  acetaminophen (TYLENOL) 650 MG 8 hr tablet, Take 650 mg by mouth Every 8 (Eight) Hours As Needed for Mild Pain ., Disp: , Rfl:   •  apixaban (ELIQUIS) 2.5 MG tablet tablet, Take 1 tablet by mouth Every 12 (Twelve) Hours., Disp: 180 tablet, Rfl: 3  •  atorvastatin (LIPITOR) 20 MG tablet, Take 1 tablet by mouth Daily., Disp: 30 tablet, Rfl: 11  •  bumetanide (BUMEX) 1 MG tablet, Take 1  "tablet by mouth Daily., Disp: 30 tablet, Rfl: 3  •  glucose blood test strip, TEST TWO TIMES A DAY, Disp: 200 each, Rfl: 10  •  memantine (NAMENDA XR) 28 MG capsule sustained-release 24 hr extended release capsule, Take 1 capsule by mouth Daily., Disp: 90 capsule, Rfl: 3  •  metFORMIN (GLUCOPHAGE) 500 MG tablet, Take 0.5 tablets by mouth Daily With Breakfast. (Patient taking differently: Take 250 mg by mouth 2 (Two) Times a Day With Meals.), Disp: 180 tablet, Rfl: 3  •  Multiple Vitamins-Minerals (MULTIVITAMIN WITH MINERALS) tablet tablet, Take 1 tablet by mouth Daily., Disp: , Rfl:   •  omeprazole OTC (PriLOSEC OTC) 20 MG EC tablet, Take 1 tablet by mouth Daily., Disp: 90 tablet, Rfl: 3        Objective:     Vitals:    02/20/20 1138 02/20/20 1140   BP: 140/80 132/70   BP Location: Left arm Right arm   Pulse: 81    Weight: 61.1 kg (134 lb 12.8 oz)    Height: 152.4 cm (60\")      Body mass index is 26.33 kg/m².    PHYSICAL EXAM:    Vitals Reviewed.   General Appearance: No acute distress, well developed and well nourished.   Eyes: Conjunctiva and lids: No erythema, swelling, or discharge. Sclera non-icteric.  Wears glasses  HENT: Atraumatic, normocephalic. External eyes, ears, and nose normal.  Hearing loss noted. Mucous membranes normal. Lips not cyanotic. Neck supple with no tenderness.  Respiratory: No signs of respiratory distress. Respiration rhythm and depth normal.   Clear to auscultation. No rales, crackles, rhonchi, or wheezing auscultated.   Cardiovascular:  Jugular Venous Pressure: Normal  Heart Rate and Rhythm: Irregularly, irregular. Heart Sounds: Normal S1 and S2. No S3 or S4 noted.  Murmurs: Left lower sternal border murmur grade 2/6. No rubs, thrills, or gallops.   Lower Extremities: No edema noted.  Gastrointestinal:  Abdomen soft, non-distended, non-tender. Normal bowel sounds. No hepatomegaly.   Musculoskeletal: Normal movement of extremities  Skin and Nails: General appearance normal. No pallor, " cyanosis, diaphoresis. Skin temperature normal. No clubbing of fingernails.   Psychiatric: Patient alert and oriented to person, place, and time. Speech and behavior appropriate. Normal mood and affect.       ECG 12 Lead  Date/Time: 2/20/2020 11:42 AM  Performed by: Marylin Conway APRN  Authorized by: Marylin Conway APRN   Comparison: compared with previous ECG from 12/13/2019  Similar to previous ECG  Rhythm: atrial fibrillation  Rate: normal  BPM: 81  Q waves: V1, V2 and V3    ST Segments: ST segments normal  T Waves: T waves normal  QRS axis: normal  Other findings: non-specific ST-T wave changes    Clinical impression: abnormal EKG            Assessment:       Diagnosis Plan   1. Dyspnea on exertion  Adult Transthoracic Echo Complete W/ Cont if Necessary Per Protocol   2. Dizziness     3. Acute on chronic diastolic congestive heart failure (CMS/HCC)     4. S/P TAVR (transcatheter aortic valve replacement)  Adult Transthoracic Echo Complete W/ Cont if Necessary Per Protocol   5. Rheumatic mitral stenosis  Adult Transthoracic Echo Complete W/ Cont if Necessary Per Protocol   6. Paroxysmal atrial fibrillation (CMS/HCC)  ECG 12 Lead   7. Systolic hypertension     8. Bilateral carotid artery stenosis            Plan:       1.  Dyspnea: Her family reports new onset shortness of breath with a decrease of the bumetanide.  I recommended increasing the bumetanide back to 1 mg daily.    2.  Dizziness: Resolved.    3.  Acute on Chronic Diastolic Heart Failure: I recommended increasing her bumetanide back to 1 mg daily.      4.  Aortic Stenosis: Status post TAVR and repeat echocardiogram in September 2019 showed stable aortic valve.  Her daughters are curious to see if her valvular status has worsened and will repeat an echocardiogram in 2 weeks.    4.  Rheumatic Mitral Stenosis: Moderate per echocardiogram 9/2019.  Repeat echocardiogram.      5.  Paroxysmal Atrial Fibrillation: She remains in atrial fibrillation with  "controlled ventricular response on atenolol.  I have recommended restarting the atenolol at 12.5 mg daily.  Continue with apixaban for stroke prevention.    Atrial Fibrillation and Atrial Flutter  Assessment  • The patient has paroxysmal atrial fibrillation  • This is valvular in etiology  • The patient's CHADS2-VASc score is 8  • A [id number] score of 2 or more is considered a high risk for a thromboembolic event  • Apixaban prescribed    Plan  • Continue in atrial fibrillation with rate control  • Continue apixaban for antithrombotic therapy, bleeding issues discussed  • Continue beta blocker for rate control    7.  Hypertension: Blood pressure stable today.    8.  Carotid Artery Stenosis: Per duplex 12/16/2016-proximal right internal carotid artery mild stenosis and proximal left moderate stenosis.    10.  She will follow-up with me in 2 weeks and have the echocardiogram completed the same day.    ADDENDUM 2/25/2020:   From Melisa Ball MA - \"Liat called statingt hat pt was in fact taking Bumetanide 1mg so she increased her dose to 1.5 tablets for 1 day and her swelling went down, she lost 3 lbs and is doing much better.  Pt is back to taking Bumetanide 1 mg QD. /98 O2: 96%. She also realized that they had stopped giving her bananas due to constipation.\"     Noted.  I have asked him to check daily weights and call with a 2-3 pound weight gain overnight or 5 pounds in a week.  She will follow-up with me in March.    As always, it has been a pleasure to participate in your patient's care. Thank you.       Sincerely,         RAYRAY Ramos        **Dragon Disclaimer:**  Much of this encounter note is an electronic transcription/translation of spoken language to printed text. The electronic translation of spoken language may permit erroneous, or at times, nonsensical words or phrases to be inadvertently transcribed. Although I have reviewed the note for such errors, some may still exist.        "

## 2020-02-21 RX ORDER — ATENOLOL 25 MG/1
12.5 TABLET ORAL DAILY
Qty: 15 TABLET | Refills: 1 | Status: SHIPPED | OUTPATIENT
Start: 2020-02-21 | End: 2020-03-09 | Stop reason: SDUPTHER

## 2020-02-21 RX ORDER — LANCETS 28 GAUGE
EACH MISCELLANEOUS
Qty: 100 EACH | Refills: 1 | Status: SHIPPED | OUTPATIENT
Start: 2020-02-21 | End: 2020-05-12

## 2020-02-23 ENCOUNTER — RESULTS ENCOUNTER (OUTPATIENT)
Dept: FAMILY MEDICINE CLINIC | Facility: CLINIC | Age: 85
End: 2020-02-23

## 2020-02-23 DIAGNOSIS — I50.33 ACUTE ON CHRONIC DIASTOLIC CONGESTIVE HEART FAILURE (HCC): ICD-10-CM

## 2020-02-24 ENCOUNTER — TELEPHONE (OUTPATIENT)
Dept: CARDIOLOGY | Facility: CLINIC | Age: 85
End: 2020-02-24

## 2020-02-24 NOTE — TELEPHONE ENCOUNTER
Liat called statingt hat pt was in fact taking Bumetanide 1mg so she increased her dose to 1.5 tablets for 1 day and her swelling went down, she lost 3 lbs and is doing much better.  Pt is back to taking Bumetanide 1 mg QD.    /98 O2: 96%    She also realized that they had stopped giving her bananas due to constipation.

## 2020-02-25 ENCOUNTER — APPOINTMENT (OUTPATIENT)
Dept: GENERAL RADIOLOGY | Facility: HOSPITAL | Age: 85
End: 2020-02-25

## 2020-02-25 ENCOUNTER — TELEPHONE (OUTPATIENT)
Dept: FAMILY MEDICINE CLINIC | Facility: CLINIC | Age: 85
End: 2020-02-25

## 2020-02-25 ENCOUNTER — HOSPITAL ENCOUNTER (OUTPATIENT)
Facility: HOSPITAL | Age: 85
Setting detail: OBSERVATION
LOS: 1 days | Discharge: HOME HEALTH | End: 2020-02-26
Attending: EMERGENCY MEDICINE | Admitting: INTERNAL MEDICINE

## 2020-02-25 DIAGNOSIS — R09.02 HYPOXIA: ICD-10-CM

## 2020-02-25 DIAGNOSIS — D68.9 COAGULOPATHY (HCC): ICD-10-CM

## 2020-02-25 DIAGNOSIS — I48.20 CHRONIC A-FIB (HCC): ICD-10-CM

## 2020-02-25 DIAGNOSIS — J18.9 PNEUMONIA DUE TO INFECTIOUS ORGANISM, UNSPECIFIED LATERALITY, UNSPECIFIED PART OF LUNG: Primary | ICD-10-CM

## 2020-02-25 DIAGNOSIS — I50.9 ACUTE ON CHRONIC CONGESTIVE HEART FAILURE, UNSPECIFIED HEART FAILURE TYPE (HCC): ICD-10-CM

## 2020-02-25 PROBLEM — I63.9 STROKE (HCC): Status: ACTIVE | Noted: 2020-02-25

## 2020-02-25 LAB
ALBUMIN SERPL-MCNC: 3.9 G/DL (ref 3.5–5.2)
ALBUMIN/GLOB SERPL: 1.3 G/DL
ALP SERPL-CCNC: 95 U/L (ref 39–117)
ALT SERPL W P-5'-P-CCNC: 12 U/L (ref 1–33)
ANION GAP SERPL CALCULATED.3IONS-SCNC: 15.8 MMOL/L (ref 5–15)
AST SERPL-CCNC: 14 U/L (ref 1–32)
BACTERIA UR QL AUTO: ABNORMAL /HPF
BASOPHILS # BLD AUTO: 0.06 10*3/MM3 (ref 0–0.2)
BASOPHILS NFR BLD AUTO: 0.5 % (ref 0–1.5)
BILIRUB SERPL-MCNC: 0.6 MG/DL (ref 0.2–1.2)
BILIRUB UR QL STRIP: NEGATIVE
BUN BLD-MCNC: 27 MG/DL (ref 8–23)
BUN/CREAT SERPL: 37 (ref 7–25)
CALCIUM SPEC-SCNC: 9.5 MG/DL (ref 8.2–9.6)
CHLORIDE SERPL-SCNC: 96 MMOL/L (ref 98–107)
CLARITY UR: CLEAR
CO2 SERPL-SCNC: 24.2 MMOL/L (ref 22–29)
COLOR UR: YELLOW
CREAT BLD-MCNC: 0.73 MG/DL (ref 0.57–1)
D-LACTATE SERPL-SCNC: 1.9 MMOL/L (ref 0.5–2)
DEPRECATED RDW RBC AUTO: 44.1 FL (ref 37–54)
EOSINOPHIL # BLD AUTO: 0.07 10*3/MM3 (ref 0–0.4)
EOSINOPHIL NFR BLD AUTO: 0.6 % (ref 0.3–6.2)
ERYTHROCYTE [DISTWIDTH] IN BLOOD BY AUTOMATED COUNT: 14.2 % (ref 12.3–15.4)
GFR SERPL CREATININE-BSD FRML MDRD: 74 ML/MIN/1.73
GLOBULIN UR ELPH-MCNC: 3 GM/DL
GLUCOSE BLD-MCNC: 147 MG/DL (ref 65–99)
GLUCOSE BLDC GLUCOMTR-MCNC: 137 MG/DL (ref 70–130)
GLUCOSE BLDC GLUCOMTR-MCNC: 148 MG/DL (ref 70–130)
GLUCOSE UR STRIP-MCNC: NEGATIVE MG/DL
HCT VFR BLD AUTO: 33.3 % (ref 34–46.6)
HGB BLD-MCNC: 11.1 G/DL (ref 12–15.9)
HGB UR QL STRIP.AUTO: NEGATIVE
HOLD SPECIMEN: NORMAL
HOLD SPECIMEN: NORMAL
HYALINE CASTS UR QL AUTO: ABNORMAL /LPF
IMM GRANULOCYTES # BLD AUTO: 0.05 10*3/MM3 (ref 0–0.05)
IMM GRANULOCYTES NFR BLD AUTO: 0.4 % (ref 0–0.5)
INR PPP: 1.3 (ref 0.9–1.1)
KETONES UR QL STRIP: NEGATIVE
LEUKOCYTE ESTERASE UR QL STRIP.AUTO: ABNORMAL
LYMPHOCYTES # BLD AUTO: 2.26 10*3/MM3 (ref 0.7–3.1)
LYMPHOCYTES NFR BLD AUTO: 18.2 % (ref 19.6–45.3)
MAGNESIUM SERPL-MCNC: 1.6 MG/DL (ref 1.7–2.3)
MCH RBC QN AUTO: 28.2 PG (ref 26.6–33)
MCHC RBC AUTO-ENTMCNC: 33.3 G/DL (ref 31.5–35.7)
MCV RBC AUTO: 84.7 FL (ref 79–97)
MONOCYTES # BLD AUTO: 1.15 10*3/MM3 (ref 0.1–0.9)
MONOCYTES NFR BLD AUTO: 9.3 % (ref 5–12)
NEUTROPHILS # BLD AUTO: 8.84 10*3/MM3 (ref 1.7–7)
NEUTROPHILS NFR BLD AUTO: 71 % (ref 42.7–76)
NITRITE UR QL STRIP: NEGATIVE
NRBC BLD AUTO-RTO: 0 /100 WBC (ref 0–0.2)
NT-PROBNP SERPL-MCNC: 2662 PG/ML (ref 5–1800)
PH UR STRIP.AUTO: <=5 [PH] (ref 5–8)
PLATELET # BLD AUTO: 228 10*3/MM3 (ref 140–450)
PMV BLD AUTO: 10.8 FL (ref 6–12)
POTASSIUM BLD-SCNC: 4.3 MMOL/L (ref 3.5–5.2)
PROCALCITONIN SERPL-MCNC: 0.03 NG/ML (ref 0.1–0.25)
PROT SERPL-MCNC: 6.9 G/DL (ref 6–8.5)
PROT UR QL STRIP: NEGATIVE
PROTHROMBIN TIME: 15.9 SECONDS (ref 11.7–14.2)
RBC # BLD AUTO: 3.93 10*6/MM3 (ref 3.77–5.28)
RBC # UR: ABNORMAL /HPF
REF LAB TEST METHOD: ABNORMAL
SODIUM BLD-SCNC: 136 MMOL/L (ref 136–145)
SP GR UR STRIP: 1.02 (ref 1–1.03)
SQUAMOUS #/AREA URNS HPF: ABNORMAL /HPF
TROPONIN T SERPL-MCNC: <0.01 NG/ML (ref 0–0.03)
UROBILINOGEN UR QL STRIP: ABNORMAL
WBC NRBC COR # BLD: 12.43 10*3/MM3 (ref 3.4–10.8)
WBC UR QL AUTO: ABNORMAL /HPF
WHOLE BLOOD HOLD SPECIMEN: NORMAL
WHOLE BLOOD HOLD SPECIMEN: NORMAL

## 2020-02-25 PROCEDURE — 85610 PROTHROMBIN TIME: CPT | Performed by: EMERGENCY MEDICINE

## 2020-02-25 PROCEDURE — 25010000003 CEFTRIAXONE PER 250 MG: Performed by: EMERGENCY MEDICINE

## 2020-02-25 PROCEDURE — 83735 ASSAY OF MAGNESIUM: CPT | Performed by: EMERGENCY MEDICINE

## 2020-02-25 PROCEDURE — 71045 X-RAY EXAM CHEST 1 VIEW: CPT

## 2020-02-25 PROCEDURE — 87899 AGENT NOS ASSAY W/OPTIC: CPT | Performed by: INTERNAL MEDICINE

## 2020-02-25 PROCEDURE — 81001 URINALYSIS AUTO W/SCOPE: CPT | Performed by: EMERGENCY MEDICINE

## 2020-02-25 PROCEDURE — 83605 ASSAY OF LACTIC ACID: CPT | Performed by: EMERGENCY MEDICINE

## 2020-02-25 PROCEDURE — 36415 COLL VENOUS BLD VENIPUNCTURE: CPT

## 2020-02-25 PROCEDURE — 93010 ELECTROCARDIOGRAM REPORT: CPT | Performed by: INTERNAL MEDICINE

## 2020-02-25 PROCEDURE — 84145 PROCALCITONIN (PCT): CPT | Performed by: EMERGENCY MEDICINE

## 2020-02-25 PROCEDURE — 85025 COMPLETE CBC W/AUTO DIFF WBC: CPT

## 2020-02-25 PROCEDURE — 82962 GLUCOSE BLOOD TEST: CPT

## 2020-02-25 PROCEDURE — 83880 ASSAY OF NATRIURETIC PEPTIDE: CPT | Performed by: EMERGENCY MEDICINE

## 2020-02-25 PROCEDURE — 96365 THER/PROPH/DIAG IV INF INIT: CPT

## 2020-02-25 PROCEDURE — 96367 TX/PROPH/DG ADDL SEQ IV INF: CPT

## 2020-02-25 PROCEDURE — G0378 HOSPITAL OBSERVATION PER HR: HCPCS

## 2020-02-25 PROCEDURE — 80053 COMPREHEN METABOLIC PANEL: CPT | Performed by: EMERGENCY MEDICINE

## 2020-02-25 PROCEDURE — 25010000002 AZITHROMYCIN PER 500 MG: Performed by: EMERGENCY MEDICINE

## 2020-02-25 PROCEDURE — 87040 BLOOD CULTURE FOR BACTERIA: CPT | Performed by: EMERGENCY MEDICINE

## 2020-02-25 PROCEDURE — 84484 ASSAY OF TROPONIN QUANT: CPT | Performed by: EMERGENCY MEDICINE

## 2020-02-25 PROCEDURE — 99285 EMERGENCY DEPT VISIT HI MDM: CPT

## 2020-02-25 PROCEDURE — 93005 ELECTROCARDIOGRAM TRACING: CPT | Performed by: EMERGENCY MEDICINE

## 2020-02-25 RX ORDER — MEMANTINE HYDROCHLORIDE 10 MG/1
10 TABLET ORAL EVERY 12 HOURS SCHEDULED
Status: DISCONTINUED | OUTPATIENT
Start: 2020-02-25 | End: 2020-02-25

## 2020-02-25 RX ORDER — SODIUM CHLORIDE 0.9 % (FLUSH) 0.9 %
10 SYRINGE (ML) INJECTION EVERY 12 HOURS SCHEDULED
Status: DISCONTINUED | OUTPATIENT
Start: 2020-02-25 | End: 2020-02-26 | Stop reason: HOSPADM

## 2020-02-25 RX ORDER — SODIUM CHLORIDE 9 MG/ML
50 INJECTION, SOLUTION INTRAVENOUS CONTINUOUS
Status: DISCONTINUED | OUTPATIENT
Start: 2020-02-25 | End: 2020-02-26

## 2020-02-25 RX ORDER — ATENOLOL 25 MG/1
12.5 TABLET ORAL DAILY
Status: DISCONTINUED | OUTPATIENT
Start: 2020-02-26 | End: 2020-02-26

## 2020-02-25 RX ORDER — ACETAMINOPHEN 325 MG/1
650 TABLET ORAL EVERY 4 HOURS PRN
Status: DISCONTINUED | OUTPATIENT
Start: 2020-02-25 | End: 2020-02-26 | Stop reason: HOSPADM

## 2020-02-25 RX ORDER — NICOTINE POLACRILEX 4 MG
15 LOZENGE BUCCAL
Status: DISCONTINUED | OUTPATIENT
Start: 2020-02-25 | End: 2020-02-26 | Stop reason: HOSPADM

## 2020-02-25 RX ORDER — MEMANTINE HYDROCHLORIDE 10 MG/1
10 TABLET ORAL NIGHTLY
Status: DISCONTINUED | OUTPATIENT
Start: 2020-02-25 | End: 2020-02-26 | Stop reason: HOSPADM

## 2020-02-25 RX ORDER — CEFTRIAXONE SODIUM 1 G/50ML
1 INJECTION, SOLUTION INTRAVENOUS EVERY 24 HOURS
Status: DISCONTINUED | OUTPATIENT
Start: 2020-02-26 | End: 2020-02-26 | Stop reason: HOSPADM

## 2020-02-25 RX ORDER — ONDANSETRON 2 MG/ML
4 INJECTION INTRAMUSCULAR; INTRAVENOUS EVERY 6 HOURS PRN
Status: DISCONTINUED | OUTPATIENT
Start: 2020-02-25 | End: 2020-02-26 | Stop reason: HOSPADM

## 2020-02-25 RX ORDER — DEXTROSE MONOHYDRATE 25 G/50ML
25 INJECTION, SOLUTION INTRAVENOUS
Status: DISCONTINUED | OUTPATIENT
Start: 2020-02-25 | End: 2020-02-26 | Stop reason: HOSPADM

## 2020-02-25 RX ORDER — BUMETANIDE 1 MG/1
1 TABLET ORAL DAILY
Status: DISCONTINUED | OUTPATIENT
Start: 2020-02-26 | End: 2020-02-26 | Stop reason: HOSPADM

## 2020-02-25 RX ORDER — PANTOPRAZOLE SODIUM 40 MG/1
40 TABLET, DELAYED RELEASE ORAL EVERY MORNING
Status: DISCONTINUED | OUTPATIENT
Start: 2020-02-26 | End: 2020-02-26 | Stop reason: HOSPADM

## 2020-02-25 RX ORDER — CEFTRIAXONE SODIUM 2 G/50ML
2 INJECTION, SOLUTION INTRAVENOUS ONCE
Status: COMPLETED | OUTPATIENT
Start: 2020-02-25 | End: 2020-02-25

## 2020-02-25 RX ORDER — ACETAMINOPHEN 650 MG/1
650 SUPPOSITORY RECTAL EVERY 4 HOURS PRN
Status: DISCONTINUED | OUTPATIENT
Start: 2020-02-25 | End: 2020-02-26 | Stop reason: HOSPADM

## 2020-02-25 RX ORDER — MULTIPLE VITAMINS W/ MINERALS TAB 9MG-400MCG
1 TAB ORAL DAILY
Status: DISCONTINUED | OUTPATIENT
Start: 2020-02-26 | End: 2020-02-26 | Stop reason: HOSPADM

## 2020-02-25 RX ORDER — ACETAMINOPHEN 160 MG/5ML
650 SOLUTION ORAL EVERY 4 HOURS PRN
Status: DISCONTINUED | OUTPATIENT
Start: 2020-02-25 | End: 2020-02-26 | Stop reason: HOSPADM

## 2020-02-25 RX ORDER — SODIUM CHLORIDE 0.9 % (FLUSH) 0.9 %
10 SYRINGE (ML) INJECTION AS NEEDED
Status: DISCONTINUED | OUTPATIENT
Start: 2020-02-25 | End: 2020-02-26 | Stop reason: HOSPADM

## 2020-02-25 RX ORDER — NITROGLYCERIN 0.4 MG/1
0.4 TABLET SUBLINGUAL
Status: DISCONTINUED | OUTPATIENT
Start: 2020-02-25 | End: 2020-02-26 | Stop reason: HOSPADM

## 2020-02-25 RX ORDER — ATORVASTATIN CALCIUM 20 MG/1
20 TABLET, FILM COATED ORAL DAILY
Status: DISCONTINUED | OUTPATIENT
Start: 2020-02-26 | End: 2020-02-25

## 2020-02-25 RX ORDER — ATORVASTATIN CALCIUM 20 MG/1
20 TABLET, FILM COATED ORAL NIGHTLY
Status: DISCONTINUED | OUTPATIENT
Start: 2020-02-25 | End: 2020-02-26 | Stop reason: HOSPADM

## 2020-02-25 RX ADMIN — APIXABAN 2.5 MG: 2.5 TABLET, FILM COATED ORAL at 22:25

## 2020-02-25 RX ADMIN — ATORVASTATIN CALCIUM 20 MG: 20 TABLET, FILM COATED ORAL at 22:25

## 2020-02-25 RX ADMIN — SODIUM CHLORIDE 50 ML/HR: 9 INJECTION, SOLUTION INTRAVENOUS at 22:55

## 2020-02-25 RX ADMIN — AZITHROMYCIN 500 MG: 500 INJECTION, POWDER, LYOPHILIZED, FOR SOLUTION INTRAVENOUS at 17:06

## 2020-02-25 RX ADMIN — SODIUM CHLORIDE, PRESERVATIVE FREE 10 ML: 5 INJECTION INTRAVENOUS at 22:55

## 2020-02-25 RX ADMIN — MEMANTINE HYDROCHLORIDE 10 MG: 10 TABLET, FILM COATED ORAL at 22:26

## 2020-02-25 RX ADMIN — CEFTRIAXONE SODIUM 2 G: 2 INJECTION, SOLUTION INTRAVENOUS at 16:22

## 2020-02-25 NOTE — TELEPHONE ENCOUNTER
Spoke with Liat and she verbalized understanding  ______________________________________________________    5.  Paroxysmal Atrial Fibrillation: She remains in atrial fibrillation with controlled ventricular response on atenolol.  I have recommended restarting the atenolol at 12.5 mg daily.  Continue with apixaban for stroke prevention.    Atenolol half a tablet did not work pt reported having increased shortness or breath and irregular heart beat, so they have went back on a full tablet of Atenolol

## 2020-02-25 NOTE — TELEPHONE ENCOUNTER
PT IS HAVING WEAKNESS IN LOWER EXTRMITES AND THE DAUGHTER WANTS TO KNOW IF WE CAN ORDER SOME LABS TO TEST HER B12, VITAMIN D (SEE IF ITS LOW). THEY LIVE OUT IN [facility] AND WONDERING IF WE CAN FAX LABS TO [facility] OUT THERE.    [phone]    THANKS LIZETT

## 2020-02-25 NOTE — TELEPHONE ENCOUNTER
Noted.  Please let them know that I appreciate their phone call back and I am glad that she is doing better.  Continue daily weights and call with a weight gain of 2-3 pounds overnight or 5 pounds in a week.  Thank you

## 2020-02-26 VITALS
DIASTOLIC BLOOD PRESSURE: 67 MMHG | OXYGEN SATURATION: 94 % | TEMPERATURE: 97.6 F | HEIGHT: 62 IN | WEIGHT: 135.8 LBS | RESPIRATION RATE: 18 BRPM | BODY MASS INDEX: 24.99 KG/M2 | HEART RATE: 72 BPM | SYSTOLIC BLOOD PRESSURE: 138 MMHG

## 2020-02-26 PROBLEM — J18.9 PNEUMONIA DUE TO INFECTIOUS ORGANISM: Status: ACTIVE | Noted: 2020-02-26

## 2020-02-26 PROBLEM — I50.32 CHRONIC DIASTOLIC CHF (CONGESTIVE HEART FAILURE) (HCC): Status: ACTIVE | Noted: 2018-06-23

## 2020-02-26 LAB
ALBUMIN SERPL-MCNC: 3.5 G/DL (ref 3.5–5.2)
ALBUMIN/GLOB SERPL: 1.2 G/DL
ALP SERPL-CCNC: 80 U/L (ref 39–117)
ALT SERPL W P-5'-P-CCNC: 9 U/L (ref 1–33)
ANION GAP SERPL CALCULATED.3IONS-SCNC: 14.4 MMOL/L (ref 5–15)
AST SERPL-CCNC: 12 U/L (ref 1–32)
B PARAPERT DNA SPEC QL NAA+PROBE: NOT DETECTED
B PERT DNA SPEC QL NAA+PROBE: NOT DETECTED
BASOPHILS # BLD AUTO: 0.04 10*3/MM3 (ref 0–0.2)
BASOPHILS NFR BLD AUTO: 0.5 % (ref 0–1.5)
BILIRUB SERPL-MCNC: 0.5 MG/DL (ref 0.2–1.2)
BUN BLD-MCNC: 22 MG/DL (ref 8–23)
BUN/CREAT SERPL: 36.1 (ref 7–25)
C PNEUM DNA NPH QL NAA+NON-PROBE: NOT DETECTED
CALCIUM SPEC-SCNC: 9.2 MG/DL (ref 8.2–9.6)
CHLORIDE SERPL-SCNC: 98 MMOL/L (ref 98–107)
CO2 SERPL-SCNC: 24.6 MMOL/L (ref 22–29)
CREAT BLD-MCNC: 0.61 MG/DL (ref 0.57–1)
DEPRECATED RDW RBC AUTO: 43.2 FL (ref 37–54)
EOSINOPHIL # BLD AUTO: 0.1 10*3/MM3 (ref 0–0.4)
EOSINOPHIL NFR BLD AUTO: 1.2 % (ref 0.3–6.2)
ERYTHROCYTE [DISTWIDTH] IN BLOOD BY AUTOMATED COUNT: 14 % (ref 12.3–15.4)
FLUAV H1 2009 PAND RNA NPH QL NAA+PROBE: NOT DETECTED
FLUAV H1 HA GENE NPH QL NAA+PROBE: NOT DETECTED
FLUAV H3 RNA NPH QL NAA+PROBE: NOT DETECTED
FLUAV SUBTYP SPEC NAA+PROBE: NOT DETECTED
FLUBV RNA ISLT QL NAA+PROBE: NOT DETECTED
GFR SERPL CREATININE-BSD FRML MDRD: 91 ML/MIN/1.73
GLOBULIN UR ELPH-MCNC: 2.9 GM/DL
GLUCOSE BLD-MCNC: 128 MG/DL (ref 65–99)
GLUCOSE BLDC GLUCOMTR-MCNC: 116 MG/DL (ref 70–130)
GLUCOSE BLDC GLUCOMTR-MCNC: 119 MG/DL (ref 70–130)
GLUCOSE BLDC GLUCOMTR-MCNC: 219 MG/DL (ref 70–130)
HADV DNA SPEC NAA+PROBE: NOT DETECTED
HBA1C MFR BLD: 6.7 % (ref 4.8–5.6)
HCOV 229E RNA SPEC QL NAA+PROBE: NOT DETECTED
HCOV HKU1 RNA SPEC QL NAA+PROBE: NOT DETECTED
HCOV NL63 RNA SPEC QL NAA+PROBE: NOT DETECTED
HCOV OC43 RNA SPEC QL NAA+PROBE: NOT DETECTED
HCT VFR BLD AUTO: 30.6 % (ref 34–46.6)
HGB BLD-MCNC: 10 G/DL (ref 12–15.9)
HMPV RNA NPH QL NAA+NON-PROBE: NOT DETECTED
HPIV1 RNA SPEC QL NAA+PROBE: NOT DETECTED
HPIV2 RNA SPEC QL NAA+PROBE: NOT DETECTED
HPIV3 RNA NPH QL NAA+PROBE: NOT DETECTED
HPIV4 P GENE NPH QL NAA+PROBE: NOT DETECTED
IMM GRANULOCYTES # BLD AUTO: 0.03 10*3/MM3 (ref 0–0.05)
IMM GRANULOCYTES NFR BLD AUTO: 0.4 % (ref 0–0.5)
L PNEUMO1 AG UR QL IA: NEGATIVE
LYMPHOCYTES # BLD AUTO: 2.17 10*3/MM3 (ref 0.7–3.1)
LYMPHOCYTES NFR BLD AUTO: 25.5 % (ref 19.6–45.3)
M PNEUMO IGG SER IA-ACNC: NOT DETECTED
MCH RBC QN AUTO: 27.5 PG (ref 26.6–33)
MCHC RBC AUTO-ENTMCNC: 32.7 G/DL (ref 31.5–35.7)
MCV RBC AUTO: 84.3 FL (ref 79–97)
MONOCYTES # BLD AUTO: 0.81 10*3/MM3 (ref 0.1–0.9)
MONOCYTES NFR BLD AUTO: 9.5 % (ref 5–12)
NEUTROPHILS # BLD AUTO: 5.37 10*3/MM3 (ref 1.7–7)
NEUTROPHILS NFR BLD AUTO: 62.9 % (ref 42.7–76)
NRBC BLD AUTO-RTO: 0 /100 WBC (ref 0–0.2)
PLATELET # BLD AUTO: 216 10*3/MM3 (ref 140–450)
PMV BLD AUTO: 10.8 FL (ref 6–12)
POTASSIUM BLD-SCNC: 4 MMOL/L (ref 3.5–5.2)
PROT SERPL-MCNC: 6.4 G/DL (ref 6–8.5)
RBC # BLD AUTO: 3.63 10*6/MM3 (ref 3.77–5.28)
RHINOVIRUS RNA SPEC NAA+PROBE: NOT DETECTED
RSV RNA NPH QL NAA+NON-PROBE: NOT DETECTED
S PNEUM AG SPEC QL LA: NEGATIVE
SODIUM BLD-SCNC: 137 MMOL/L (ref 136–145)
WBC NRBC COR # BLD: 8.52 10*3/MM3 (ref 3.4–10.8)

## 2020-02-26 PROCEDURE — 0100U HC BIOFIRE FILMARRAY RESP PANEL 2: CPT | Performed by: INTERNAL MEDICINE

## 2020-02-26 PROCEDURE — 80053 COMPREHEN METABOLIC PANEL: CPT | Performed by: INTERNAL MEDICINE

## 2020-02-26 PROCEDURE — 85025 COMPLETE CBC W/AUTO DIFF WBC: CPT | Performed by: INTERNAL MEDICINE

## 2020-02-26 PROCEDURE — 82962 GLUCOSE BLOOD TEST: CPT

## 2020-02-26 PROCEDURE — G0378 HOSPITAL OBSERVATION PER HR: HCPCS

## 2020-02-26 PROCEDURE — 83036 HEMOGLOBIN GLYCOSYLATED A1C: CPT | Performed by: INTERNAL MEDICINE

## 2020-02-26 PROCEDURE — 63710000001 INSULIN LISPRO (HUMAN) PER 5 UNITS: Performed by: INTERNAL MEDICINE

## 2020-02-26 RX ORDER — CEFDINIR 300 MG/1
300 CAPSULE ORAL 2 TIMES DAILY
Qty: 10 CAPSULE | Refills: 0 | Status: SHIPPED | OUTPATIENT
Start: 2020-02-26 | End: 2020-03-02

## 2020-02-26 RX ORDER — ATENOLOL 25 MG/1
25 TABLET ORAL DAILY
Status: DISCONTINUED | OUTPATIENT
Start: 2020-02-27 | End: 2020-02-26 | Stop reason: HOSPADM

## 2020-02-26 RX ADMIN — APIXABAN 2.5 MG: 2.5 TABLET, FILM COATED ORAL at 09:34

## 2020-02-26 RX ADMIN — SODIUM CHLORIDE, PRESERVATIVE FREE 10 ML: 5 INJECTION INTRAVENOUS at 11:54

## 2020-02-26 RX ADMIN — PANTOPRAZOLE SODIUM 40 MG: 40 TABLET, DELAYED RELEASE ORAL at 09:32

## 2020-02-26 RX ADMIN — BUMETANIDE 1 MG: 1 TABLET ORAL at 09:33

## 2020-02-26 RX ADMIN — ATENOLOL 25 MG: 25 TABLET ORAL at 09:33

## 2020-02-26 RX ADMIN — MULTIPLE VITAMINS W/ MINERALS TAB 1 TABLET: TAB at 09:33

## 2020-02-26 RX ADMIN — INSULIN LISPRO 4 UNITS: 100 INJECTION, SOLUTION INTRAVENOUS; SUBCUTANEOUS at 11:53

## 2020-02-27 ENCOUNTER — TRANSITIONAL CARE MANAGEMENT TELEPHONE ENCOUNTER (OUTPATIENT)
Dept: FAMILY MEDICINE CLINIC | Facility: CLINIC | Age: 85
End: 2020-02-27

## 2020-02-27 ENCOUNTER — READMISSION MANAGEMENT (OUTPATIENT)
Dept: CALL CENTER | Facility: HOSPITAL | Age: 85
End: 2020-02-27

## 2020-02-27 NOTE — OUTREACH NOTE
Spoke with pt daughter, Aiyana. Pt is doing better, she is still very very tired and weak. Did sleep well at home last night. Her breathing is better overall, but pt did have to get up during night to sleep sitting up as she was too SOB in supine position. Her appetite is good, and dtr states pt has no fever, chills, chest pain. Confirms receipt and understanding of d/c orders and medications. Confirmed [facility] with Ernestina SEO on 03/06/20.

## 2020-02-27 NOTE — OUTREACH NOTE
Prep Survey      Responses   Facility patient discharged from?  [place]   Is patient eligible?  Yes   Discharge diagnosis  **CAP (community acquired  pneumonia   Does the patient have one of the following disease processes/diagnoses(primary or secondary)?  COPD/Pneumonia   Does the patient have Home health ordered?  Yes   What is the Home health agency?   [facility]    Is there a DME ordered?  No   Prep survey completed?  Yes          Justina Pierce RN

## 2020-02-28 ENCOUNTER — READMISSION MANAGEMENT (OUTPATIENT)
Dept: CALL CENTER | Facility: HOSPITAL | Age: 85
End: 2020-02-28

## 2020-02-28 NOTE — OUTREACH NOTE
COPD/PN Week 1 Survey      Responses   Facility patient discharged from?  [place]   Does the patient have one of the following disease processes/diagnoses(primary or secondary)?  COPD/Pneumonia   Is there a successful TCM telephone encounter documented?  Yes          Ange Beltran RN

## 2020-03-01 LAB
BACTERIA SPEC AEROBE CULT: NORMAL
BACTERIA SPEC AEROBE CULT: NORMAL

## 2020-03-04 ENCOUNTER — READMISSION MANAGEMENT (OUTPATIENT)
Dept: CALL CENTER | Facility: HOSPITAL | Age: 85
End: 2020-03-04

## 2020-03-09 ENCOUNTER — HOSPITAL ENCOUNTER (OUTPATIENT)
Dept: CARDIOLOGY | Facility: HOSPITAL | Age: 85
Discharge: HOME | End: 2020-03-09
Admitting: NURSE PRACTITIONER

## 2020-03-09 ENCOUNTER — OFFICE VISIT (OUTPATIENT)
Dept: NEUROLOGY | Facility: CLINIC | Age: 85
End: 2020-03-09

## 2020-03-09 ENCOUNTER — OFFICE VISIT (OUTPATIENT)
Dept: CARDIOLOGY | Facility: CLINIC | Age: 85
End: 2020-03-09

## 2020-03-09 VITALS
OXYGEN SATURATION: 97 % | HEIGHT: 62 IN | WEIGHT: 132.4 LBS | BODY MASS INDEX: 24.37 KG/M2 | HEART RATE: 83 BPM | SYSTOLIC BLOOD PRESSURE: 120 MMHG | DIASTOLIC BLOOD PRESSURE: 60 MMHG

## 2020-03-09 VITALS
SYSTOLIC BLOOD PRESSURE: 110 MMHG | BODY MASS INDEX: 24.84 KG/M2 | OXYGEN SATURATION: 92 % | HEART RATE: 96 BPM | DIASTOLIC BLOOD PRESSURE: 60 MMHG | HEIGHT: 62 IN | WEIGHT: 135 LBS

## 2020-03-09 VITALS
HEIGHT: 62 IN | SYSTOLIC BLOOD PRESSURE: 122 MMHG | OXYGEN SATURATION: 94 % | DIASTOLIC BLOOD PRESSURE: 78 MMHG | HEART RATE: 80 BPM | BODY MASS INDEX: 24.22 KG/M2

## 2020-03-09 DIAGNOSIS — Z95.2 S/P TAVR (TRANSCATHETER AORTIC VALVE REPLACEMENT): ICD-10-CM

## 2020-03-09 DIAGNOSIS — Z99.89 OBSTRUCTIVE SLEEP APNEA ON CPAP: ICD-10-CM

## 2020-03-09 DIAGNOSIS — Z79.01 ON ANTICOAGULANT THERAPY: ICD-10-CM

## 2020-03-09 DIAGNOSIS — Z78.9 PATIENT HAD NO FALLS IN PAST YEAR: ICD-10-CM

## 2020-03-09 DIAGNOSIS — I48.19 ATRIAL FIBRILLATION, PERSISTENT (HCC): ICD-10-CM

## 2020-03-09 DIAGNOSIS — R06.09 DYSPNEA ON EXERTION: ICD-10-CM

## 2020-03-09 DIAGNOSIS — E11.59 TYPE 2 DIABETES MELLITUS WITH OTHER CIRCULATORY COMPLICATION, WITHOUT LONG-TERM CURRENT USE OF INSULIN (HCC): ICD-10-CM

## 2020-03-09 DIAGNOSIS — I10 HYPERTENSION, UNSPECIFIED TYPE: ICD-10-CM

## 2020-03-09 DIAGNOSIS — I10 SYSTOLIC HYPERTENSION: ICD-10-CM

## 2020-03-09 DIAGNOSIS — I63.512 ACUTE ISCHEMIC LEFT MCA STROKE (HCC): Primary | ICD-10-CM

## 2020-03-09 DIAGNOSIS — E78.49 OTHER HYPERLIPIDEMIA: ICD-10-CM

## 2020-03-09 DIAGNOSIS — J18.9 COMMUNITY ACQUIRED PNEUMONIA, UNSPECIFIED LATERALITY: ICD-10-CM

## 2020-03-09 DIAGNOSIS — I05.0 RHEUMATIC MITRAL STENOSIS: ICD-10-CM

## 2020-03-09 DIAGNOSIS — I50.33 ACUTE ON CHRONIC DIASTOLIC CHF (CONGESTIVE HEART FAILURE) (HCC): Primary | ICD-10-CM

## 2020-03-09 DIAGNOSIS — G47.33 OBSTRUCTIVE SLEEP APNEA ON CPAP: ICD-10-CM

## 2020-03-09 DIAGNOSIS — I65.23 BILATERAL CAROTID ARTERY STENOSIS: ICD-10-CM

## 2020-03-09 PROBLEM — I48.0 PAROXYSMAL ATRIAL FIBRILLATION: Status: RESOLVED | Noted: 2019-09-11 | Resolved: 2020-03-09

## 2020-03-09 LAB
AORTIC ROOT ANNULUS: 1.8 CM
ASCENDING AORTA: 3.4 CM
AV HCM GRAD VALS: 35 MMHG
AV LVOT PEAK GRADIENT: 6 MMHG
BH CV ECHO MEAS - AO MAX PG (FULL): 15.8 MMHG
BH CV ECHO MEAS - AO MAX PG: 21.6 MMHG
BH CV ECHO MEAS - AO MEAN PG (FULL): 8.9 MMHG
BH CV ECHO MEAS - AO MEAN PG: 12.7 MMHG
BH CV ECHO MEAS - AO ROOT AREA (BSA CORRECTED): 2.1
BH CV ECHO MEAS - AO ROOT AREA: 9.5 CM^2
BH CV ECHO MEAS - AO ROOT DIAM: 3.5 CM
BH CV ECHO MEAS - AO V2 MAX: 232.4 CM/SEC
BH CV ECHO MEAS - AO V2 MEAN: 171.2 CM/SEC
BH CV ECHO MEAS - AO V2 VTI: 46.6 CM
BH CV ECHO MEAS - ASC AORTA: 3.4 CM
BH CV ECHO MEAS - AVA(I,A): 1.1 CM^2
BH CV ECHO MEAS - AVA(I,D): 1.1 CM^2
BH CV ECHO MEAS - AVA(V,A): 1.3 CM^2
BH CV ECHO MEAS - AVA(V,D): 1.3 CM^2
BH CV ECHO MEAS - BSA(HAYCOCK): 1.6 M^2
BH CV ECHO MEAS - BSA: 1.6 M^2
BH CV ECHO MEAS - BZI_BMI: 24.7 KILOGRAMS/M^2
BH CV ECHO MEAS - BZI_METRIC_HEIGHT: 157.5 CM
BH CV ECHO MEAS - BZI_METRIC_WEIGHT: 61.2 KG
BH CV ECHO MEAS - EDV(MOD-SP2): 47 ML
BH CV ECHO MEAS - EDV(MOD-SP4): 42 ML
BH CV ECHO MEAS - EDV(TEICH): 76.8 ML
BH CV ECHO MEAS - EF(CUBED): 60.5 %
BH CV ECHO MEAS - EF(MOD-BP): 66 %
BH CV ECHO MEAS - EF(MOD-SP2): 74.5 %
BH CV ECHO MEAS - EF(MOD-SP4): 54.8 %
BH CV ECHO MEAS - EF(TEICH): 52.5 %
BH CV ECHO MEAS - ESV(MOD-SP2): 12 ML
BH CV ECHO MEAS - ESV(MOD-SP4): 19 ML
BH CV ECHO MEAS - ESV(TEICH): 36.5 ML
BH CV ECHO MEAS - FS: 26.6 %
BH CV ECHO MEAS - IVS/LVPW: 0.97
BH CV ECHO MEAS - IVSD: 1.1 CM
BH CV ECHO MEAS - LV DIASTOLIC VOL/BSA (35-75): 26 ML/M^2
BH CV ECHO MEAS - LV MASS(C)D: 156.5 GRAMS
BH CV ECHO MEAS - LV MASS(C)DI: 96.8 GRAMS/M^2
BH CV ECHO MEAS - LV MAX PG: 5.8 MMHG
BH CV ECHO MEAS - LV MEAN PG: 3.8 MMHG
BH CV ECHO MEAS - LV SYSTOLIC VOL/BSA (12-30): 11.7 ML/M^2
BH CV ECHO MEAS - LV V1 MAX: 120.2 CM/SEC
BH CV ECHO MEAS - LV V1 MEAN: 90.9 CM/SEC
BH CV ECHO MEAS - LV V1 VTI: 20.4 CM
BH CV ECHO MEAS - LVIDD: 4.2 CM
BH CV ECHO MEAS - LVIDS: 3.1 CM
BH CV ECHO MEAS - LVLD AP2: 5.8 CM
BH CV ECHO MEAS - LVLD AP4: 5.1 CM
BH CV ECHO MEAS - LVLS AP2: 4.4 CM
BH CV ECHO MEAS - LVLS AP4: 5.2 CM
BH CV ECHO MEAS - LVOT AREA (M): 2.5 CM^2
BH CV ECHO MEAS - LVOT AREA: 2.6 CM^2
BH CV ECHO MEAS - LVOT DIAM: 1.8 CM
BH CV ECHO MEAS - LVPWD: 1.1 CM
BH CV ECHO MEAS - MV DEC SLOPE: 703.5 CM/SEC^2
BH CV ECHO MEAS - MV DEC TIME: 0.32 SEC
BH CV ECHO MEAS - MV E MAX VEL: 193 CM/SEC
BH CV ECHO MEAS - MV MAX PG: 19.5 MMHG
BH CV ECHO MEAS - MV MEAN PG: 8.8 MMHG
BH CV ECHO MEAS - MV P1/2T MAX VEL: 228.6 CM/SEC
BH CV ECHO MEAS - MV P1/2T: 95.2 MSEC
BH CV ECHO MEAS - MV V2 MAX: 221 CM/SEC
BH CV ECHO MEAS - MV V2 MEAN: 136.5 CM/SEC
BH CV ECHO MEAS - MV V2 VTI: 60.6 CM
BH CV ECHO MEAS - MVA P1/2T LCG: 0.96 CM^2
BH CV ECHO MEAS - MVA(P1/2T): 2.3 CM^2
BH CV ECHO MEAS - MVA(VTI): 0.87 CM^2
BH CV ECHO MEAS - PA ACC TIME: 0.07 SEC
BH CV ECHO MEAS - PA MAX PG (FULL): 1.3 MMHG
BH CV ECHO MEAS - PA MAX PG: 3.2 MMHG
BH CV ECHO MEAS - PA PR(ACCEL): 48.1 MMHG
BH CV ECHO MEAS - PA V2 MAX: 89.6 CM/SEC
BH CV ECHO MEAS - PULM A REVS DUR: 0.08 SEC
BH CV ECHO MEAS - PULM A REVS VEL: 28.7 CM/SEC
BH CV ECHO MEAS - PULM DIAS VEL: 38.6 CM/SEC
BH CV ECHO MEAS - PULM S/D: 0.8
BH CV ECHO MEAS - PULM SYS VEL: 30.8 CM/SEC
BH CV ECHO MEAS - PVA(V,A): 2.6 CM^2
BH CV ECHO MEAS - PVA(V,D): 2.6 CM^2
BH CV ECHO MEAS - QP/QS: 0.92
BH CV ECHO MEAS - RAP SYSTOLE: 3 MMHG
BH CV ECHO MEAS - RV MAX PG: 1.9 MMHG
BH CV ECHO MEAS - RV MEAN PG: 1.1 MMHG
BH CV ECHO MEAS - RV V1 MAX: 68.6 CM/SEC
BH CV ECHO MEAS - RV V1 MEAN: 49.9 CM/SEC
BH CV ECHO MEAS - RV V1 VTI: 14.2 CM
BH CV ECHO MEAS - RVOT AREA: 3.4 CM^2
BH CV ECHO MEAS - RVOT DIAM: 2.1 CM
BH CV ECHO MEAS - RVSP: 62 MMHG
BH CV ECHO MEAS - SI(AO): 273 ML/M^2
BH CV ECHO MEAS - SI(CUBED): 26.9 ML/M^2
BH CV ECHO MEAS - SI(LVOT): 32.7 ML/M^2
BH CV ECHO MEAS - SI(MOD-SP2): 21.6 ML/M^2
BH CV ECHO MEAS - SI(MOD-SP4): 14.2 ML/M^2
BH CV ECHO MEAS - SI(TEICH): 24.9 ML/M^2
BH CV ECHO MEAS - SV(AO): 441.6 ML
BH CV ECHO MEAS - SV(CUBED): 43.5 ML
BH CV ECHO MEAS - SV(LVOT): 52.9 ML
BH CV ECHO MEAS - SV(MOD-SP2): 35 ML
BH CV ECHO MEAS - SV(MOD-SP4): 23 ML
BH CV ECHO MEAS - SV(RVOT): 48.7 ML
BH CV ECHO MEAS - SV(TEICH): 40.3 ML
BH CV ECHO MEAS - TAPSE (>1.6): 1.7 CM2
BH CV ECHO MEAS - TR MAX VEL: 382.9 CM/SEC
BH CV VAS BP RIGHT ARM: NORMAL MMHG
BH CV XLRA - RV BASE: 3 CM
BH CV XLRA - RV LENGTH: 5.6 CM
BH CV XLRA - RV MID: 3.2 CM
BH CV XLRA - TDI S': 8 CM/SEC
LEFT ATRIUM VOLUME INDEX: 49 ML/M2
LV EF 2D ECHO EST: 66 %
MAXIMAL PREDICTED HEART RATE: 125 BPM
SINUS: 3.1 CM
STJ: 3.1 CM
STRESS TARGET HR: 106 BPM

## 2020-03-09 PROCEDURE — 99214 OFFICE O/P EST MOD 30 MIN: CPT | Performed by: NURSE PRACTITIONER

## 2020-03-09 PROCEDURE — 93000 ELECTROCARDIOGRAM COMPLETE: CPT | Performed by: NURSE PRACTITIONER

## 2020-03-09 PROCEDURE — 93306 TTE W/DOPPLER COMPLETE: CPT | Performed by: INTERNAL MEDICINE

## 2020-03-09 PROCEDURE — 93306 TTE W/DOPPLER COMPLETE: CPT

## 2020-03-09 RX ORDER — ATENOLOL 25 MG/1
25 TABLET ORAL DAILY
Qty: 30 TABLET | Refills: 0
Start: 2020-03-09 | End: 2020-11-22 | Stop reason: SDUPTHER

## 2020-03-09 NOTE — PROGRESS NOTES
DOS: 3/9/2020  NAME: Mary Shi   : 3/3/1925  PCP: Everette Rubio MD    Chief Complaint   Patient presents with   • Stroke      She is accompanied by her daughters who provides majority of history.      Neurological Problem and Interval History:  89 y.o. right-handed female with a Hx of Just of heart failure, diabetes mellitus, dementia, hyperlipidemia, hypertension, status post TAVR who I am seeing today in follow-up for multiple acute left MCA infarctions with etiology thought to be secondary to new onset A. fib/cardioembolic source.     Ms. Mcallister was last seen in follow-up by me on 2019 at that time, she denied any new signs and/or symptoms of stroke. Family reported that she was not as talkative as she previously had been and seemed more intolerant of background noise.  Since that time, she denies any new signs and/or symptoms of stroke.  She did have recent admission  for  Pneumonia; family reports decreased energy and activity level which they attributed to recent illness.  They also continue to report decreased verbalization/talking since initial CVA.  She denies any falls since last visit.  She continues to participate in physical therapy in the home.  She was evaluated by sleep medicine and since found to have obstructive sleep apnea; BiPAP ordered and patient 100% compliant with use according to daughters.  She continues to use cane/walker for majority of her ambulation and uses wheelchair for long trips.  She has not followed up with ophthalmology for visual deficits noted on last exam.  She continues to take adjusted dose Eliquis 2.5 mg twice daily and atorvastatin 20 mg daily.  She was seen by cardiology today and recommended to resume atenolol/prior Bumex dosing and continue Eliquis.  She had routine echo and EKG completed today family reports systolic blood pressures consistently less than 130-140 and diastolic is consistently less than 80.  She denies any  issues with mood specifically no concern for PBA and/or depression.  She reports improved quality of sleep since the implementation of CPAP at night.  Family also reports improved clarity/thinking process since CPAP initiated.     Family reports prolonged sitting with excoriated/reddened coccyx; requesting Roho cushion (Rx for cushion provided).  Discussed adequate diet/frequent turning and ambulation to assist with decreasing/improving pressure ulcers. She denies any other complaints and/or concerns.      Review of Systems:        Review of Systems   Constitutional: Positive for activity change, appetite change and unexpected weight change.   HENT: Negative for facial swelling, trouble swallowing and voice change.    Eyes: Negative for photophobia, pain and visual disturbance.   Respiratory: Positive for shortness of breath. Negative for chest tightness and wheezing.    Cardiovascular: Negative for chest pain, palpitations and leg swelling.   Gastrointestinal: Negative for abdominal pain, nausea and vomiting.   Endocrine: Negative for cold intolerance, heat intolerance, polydipsia and polyphagia.   Musculoskeletal: Positive for gait problem. Negative for arthralgias, back pain, joint swelling, myalgias, neck pain and neck stiffness.   Neurological: Positive for weakness. Negative for dizziness, tremors, seizures, syncope, facial asymmetry, speech difficulty, light-headedness, numbness and headaches.   Hematological: Bruises/bleeds easily.   Psychiatric/Behavioral: Positive for decreased concentration. Negative for agitation, behavioral problems, confusion, dysphoric mood, hallucinations, self-injury, sleep disturbance and suicidal ideas. The patient is not nervous/anxious and is not hyperactive.          Current Outpatient Medications:   •  acetaminophen (TYLENOL) 650 MG 8 hr tablet, Take 650 mg by mouth Every 8 (Eight) Hours As Needed for Mild Pain ., Disp: , Rfl:   •  apixaban (ELIQUIS) 2.5 MG tablet tablet, Take  "1 tablet by mouth Every 12 (Twelve) Hours., Disp: 180 tablet, Rfl: 3  •  atenolol (TENORMIN) 25 MG tablet, Take 1 tablet by mouth Daily., Disp: 30 tablet, Rfl: 0  •  atorvastatin (LIPITOR) 20 MG tablet, Take 1 tablet by mouth Daily. (Patient taking differently: Take 20 mg by mouth Every Night.), Disp: 30 tablet, Rfl: 11  •  bumetanide (BUMEX) 1 MG tablet, Take 1 tablet by mouth Daily., Disp: 30 tablet, Rfl: 3  •  glucose blood test strip, TEST TWO TIMES A DAY, Disp: 200 each, Rfl: 10  •  Lancets (FREESTYLE) lancets, USE TO TEST BLOOD SUGAR ONCE DAILY, Disp: 100 each, Rfl: 1  •  memantine (NAMENDA XR) 28 MG capsule sustained-release 24 hr extended release capsule, Take 1 capsule by mouth Daily., Disp: 90 capsule, Rfl: 3  •  metFORMIN (GLUCOPHAGE) 500 MG tablet, Take 0.5 tablets by mouth Daily With Breakfast. (Patient taking differently: Take 250 mg by mouth 2 (Two) Times a Day With Meals.), Disp: 180 tablet, Rfl: 3  •  Multiple Vitamins-Minerals (MULTIVITAMIN WITH MINERALS) tablet tablet, Take 1 tablet by mouth Daily., Disp: , Rfl:   •  omeprazole OTC (PriLOSEC OTC) 20 MG EC tablet, Take 1 tablet by mouth Daily., Disp: 90 tablet, Rfl: 3    \"The following portions of the patient's history were reviewed and updated as appropriate: allergies, current medications, past family history, past medical history, past social history, past surgical history and problem list.\"  Review and Interpretation of Imaging:  Reviewed emergency room visit from February 2009  Laboratory Results:             Lab Results   Component Value Date    HGBA1C 6.70 (H) 02/26/2020         Lab Results   Component Value Date    CHOL 167 06/24/2018         Lab Results   Component Value Date    HDL 31 (L) 06/11/2019    HDL 31 (L) 12/07/2018    HDL 33 (L) 06/24/2018         Lab Results   Component Value Date    LDL 60 06/11/2019    LDL CANCELED 12/07/2018    LDL 71 06/24/2018         Lab Results   Component Value Date    TRIG 321 (H) 06/11/2019    TRIG 587 " (H) 12/07/2018    TRIG 314 (H) 06/24/2018     Lab Results   Component Value Date    RPR Non-Reactive 09/10/2019     Lab Results   Component Value Date    TSH 2.220 09/10/2019     Lab Results   Component Value Date    [id number] 648 09/10/2019       Physical Examination: NIHSS: 1     mRS: 2  General Appearance:           Well developed, well nourished, well groomed, alert, and cooperative.  HEENT:            Normocephalic.    Neck and Spine:         Normal range of motion.  Normal alignment. No mass or tenderness. No bruits.  Cardiac:                                 Regular rate and rhythm. No murmurs.  Peripheral Vasculature:       Radial and pedal pulses are equal and symmetric.   Extremities:                           No edema or deformities. Normal joint ROM.  Skin:                                       No rashes or birth marks.     Neurological examination:  Higher Integrative  Function:         Oriented to time, place and person. Normal registration, recall, attention span and concentration. Normal language including comprehension, spontaneous speech, repetition, reading, writing, naming and vocabulary. No neglect with normal visual-spatial function and construction. Normal fund of knowledge and higher integrative function.  CN II:    Pupils are equal, round, and reactive to light. Normal visual acuity and visual fields; prior RHH not noted on my exam today.   CN III IV VI:      Extraocular movements are full without nystagmus.   CN V:   Normal facial sensation and strength of muscles of mastication.  CN VII:             Facial movements are symmetric. No weakness.  CN VIII:                                   Auditory acuity is normal.  CN IX & X:                              Symmetric palatal movement.  CN XI:  Sternocleidomastoid and trapezius are normal.  No weakness.  CN XII:                                    The tongue is midline.  No atrophy or fasciculations.  Motor:  Normal muscle strength, bulk and  tone in upper and lower extremities except RUE 4-/5 RLE 4-/5.  No fasciculations, rigidity, spasticity, or abnormal movements.  Reflexes:         Plantar responses are flexor.  Sensation:       Normal to light in arms and legs.   Coordination:             Finger to nose test shows no dysmetria.      Diagnoses / Discussion: This is a 89-year-old female with congestive heart failure, diabetes mellitus, dementia, hyperlipidemia, hypertension, status post TAVR who I saw today in follow-up for acute left MCA stroke in the setting of new onset atrial fibrillation from September 2019.  Patient had recent admission February 2020 due to pneumonia; since returned home patient at appetite and energy level not yet back to baseline.  Family continues to report that patient is less tolerant of background noise and not as talkative as she was prior to the CVA.  Prior exam noted some right hemianopsia which is not seen on my exam today.  Patient will plan to follow-up with neuro-ophthalmology in the near future.  Lastly, patient referred to sleep medicine for JAY evaluation; confirmed JAY since last appointment patient now using CPAP nightly.  Other recommendations below.  Call with any questions.  Keep planned follow-up.    Plan:   Consider wound nurse referral in future   Rx for Roho wheelchair pad written   Recommend adequate p.o. intake and hydration discuss meat/protein alternatives to assist with wound healing   Continue Eliquis and atorvastatin as written: 2/26 AST 12 ALT 9; recommend lipid panel every 6 months while on statin therapy   Follow-up with Dr. Novak as previously recommend   Continue to use CPAP as directed   Blood pressure control to <130/80   Goal LDL <70-recommend high dose statins-    Serum glucose < 140   Call 911 for stroke any stroke symptoms   Follow-up September 2020  Mary was seen today for stroke.    Diagnoses and all orders for this visit:    Acute ischemic left MCA stroke (CMS/[facility])    Atrial  fibrillation, persistent    Other hyperlipidemia    Hypertension, unspecified type    On anticoagulant therapy    Patient had no falls in past year    Obstructive sleep apnea on CPAP    Type 2 diabetes mellitus with other circulatory complication, without long-term current use of insulin (CMS/[facility])        MDM  Reviewed: previous chart, nursing note and vitals  Reviewed previous: labs  Interpretation: labs and x-ray

## 2020-03-09 NOTE — PROGRESS NOTES
Date of Office Visit: 2020  Encounter Provider: RAYRAY Carranza  Place of Service: [facility] CARDIOLOGY  Patient Name: Mary Shi  :3/3/1925  Primary Cardiologist: Dr. Elissa Mcmahan    Chief Complaint   Patient presents with   • Follow-up   :     Dear Dr. Rubio,     HPI: Mary Shi is a pleasant 89 y.o. female who presents today for cardiac follow up.     In , she was diagnosed with aortic stenosis and mitral stenosis per echocardiogram. In 2016, she underwent TAVR with a #23 Rubina valve.     In 2018, she was hospitalized for shortness of breath due to acute on chronic diastolic heart failure and high sodium diet. A repeat echocardiogram 2018 which revealed an EF of 60%, severe left atrial enlargement, normal functioning aortic valve, mild to moderate tricuspid insufficiency, RVSP elevated at 55 mmHg, severe mitral annular calcification, mild mitral valve regurgitation, and severe mitral valve stenosis. She underwent IV diuresis and was transitioned back to oral bumetanide.     In 2019, was hospitalized for an ischemic stroke per MRI with no residual deficits and new onset atrial fibrillation and was started on apixaban.  She also had evidence of acute exacerbation of diastolic heart failure with pulmonary vascular congestion noted on chest x-ray.  She was started on bumetanide.  A 2D echocardiogram was obtained on 9/10/2019 which revealed a hyperdynamic EF of 71%, left atrium severely dilated, mild aortic valve regurgitation, prosthetic aortic valve, mild tricuspid valve regurgitation, mild mitral valve regurgitation, moderate mitral valve stenosis, and mild to moderate pulmonary hypertension.    On 2020, her daughters contacted the office stating that the patient was fatigued and her blood pressure was low at 104/62.  She was recommended to stop her atenolol.    On 2020 the daughter reported the  patient complained of dizziness and her blood pressure was low.  Her bumetanide was decreased from 1 mg to 0.5 mg daily.  On 2/13/2020 her atenolol was discontinued.  On 2/19/2020 she became more short of breath so she took 1/2 tablet of the bumetanide.  On 2/20/2020 the patient was short of breath and anxious so her family gave her atenolol 25 mg.    On 2/20/2020, she saw me in the office for evaluation of dizziness, shortness of breath, and fluid retention. Her bumetanide had recently been decreased and I recommended resuming her previous dosage of bumetanide 1 mg daily.  Her daughters were curious to see if her valvular status had worsened and we scheduled a repeat echocardiogram today (results pending).  I also recommended restarting her atenolol at 12.5 mg a day.    On 2/25/2020, Liat her daughter called stating that the patient was already taking bumetanide 1 mg and she increased the patient's dose to 1.5 mg for 1 day.  Her swelling improved, she lost 3 pounds, and was feeling better.  She resumed the bumetanide at 1 mg daily.    On 2/25/2020 she presented to the emergency department with progressive weakness and fatigue.  She was diagnosed with pneumonia and treated with IV antibiotics.  She was discharged 2 days later.    She presents today for follow-up visit with 2 of her daughters accompanying her.  Overall she is feeling much better.  She denies shortness of breath, coughing, wheezing, or fevers.  She further denies chest pain, palpitations, or dizziness.  Her weight is down 2 pounds from her last visit.  She is now taking atenolol 25 mg daily and bumetanide 1 mg a day.  Her weights and blood pressures have been stable at home.    Past Medical History:   Diagnosis Date   • 'light-for-dates' infant with signs of fetal malnutrition    • Amaurosis fugax    • Anemia    • Aortic stenosis     s/p TAVR    • Atrial fibrillation (CMS/HCC)    • Cancer (CMS/HCC)     skin   • CAP (community acquired pneumonia)     • Carotid artery stenosis     Per duplex 12/16/2016-proximal right internal carotid artery mild stenosis and proximal left moderate stenosis   • Cervical spine disease    • Cognitive disorder    • Congestive heart failure (CMS/[facility])    • Dementia (CMS/[facility])    • Diabetes mellitus (CMS/[facility])    • Diastolic dysfunction    • Dyspnea on exertion    • Generalized osteoarthritis of multiple sites    • GERD (gastroesophageal reflux disease)    • Gout    • Health care maintenance    • Hiatal hernia    • HL (hearing loss)    • Hyperlipidemia    • Hypertension    • Memory loss    • Mitral valve stenosis     Moderate per echocardiogram 2/2017; severe mitral annular calcification   • Nasal lesion    • Nasal stenosis    • 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[facility])    • Osteoarthritis     multiple sites   • PAF (paroxysmal atrial fibrillation) (CMS/[facility])    • Paroxysmal atrial fibrillation (CMS/[facility]) 9/11/2019   • Peripheral neuropathy    • Peripheral vascular disease (CMS/[facility])    • PONV (postoperative nausea and vomiting)    • Rheumatic mitral stenosis    • Shingles    • Stroke (CMS/[facility])    • Syncope    • Systolic hypertension    • Varicose veins        Past Surgical History:   Procedure Laterality Date   • AORTIC VALVE REPAIR/REPLACEMENT N/A 12/27/2016    Procedure: Transfemoral LEFT Transcatheter Aortic Valve Replacement TRANSESOPHAGEAL ECHOCARDIOGRAM WITH ANESTHESIA;  Surgeon: Eduardo Brown MD;  Location: [facility] OR 18/19;  Service:    • BLADDER REPAIR     • CARDIAC CATHETERIZATION N/A 12/14/2016    Procedure: Right Heart Cath;  Surgeon: Eduardo Brown MD;  Location: [facility] CATH INVASIVE LOCATION;  Service:    • CARDIAC CATHETERIZATION N/A 12/14/2016    Procedure: Coronary angiography;  Surgeon: Eduardo Brown MD;  Location: [facility] INVASIVE LOCATION;  Service:    • HYSTERECTOMY     • KNEE SURGERY         Social History     Socioeconomic History   • Marital status:      Spouse name: Not on file   • Number of children:  15   • Years of education: 8th grade   • Highest education level: Not on file   Occupational History   • Occupation: retired   Tobacco Use   • Smoking status: Never Smoker   • Smokeless tobacco: Never Used   Substance and Sexual Activity   • Alcohol use: No     Comment: caffeine use: Half a cup daily   • Drug use: No   • Sexual activity: Defer       Family History   Problem Relation Age of Onset   • Heart disease Mother    • Coronary artery disease Mother    • Hyperlipidemia Mother    • Hypertension Mother    • Cancer Sister    • Cancer Brother    • Diabetes Daughter    • Diabetes Daughter    • Cancer Sister    • Cancer Sister    • Cancer Sister    • Cancer Other    • Cancer Son        The following portion of the patient's history were reviewed and updated as appropriate: past medical history, past surgical history, past social history, past family history, allergies, current medications, and problem list.    Review of Systems   Constitution: Positive for malaise/fatigue. Negative for chills, diaphoresis, fever, night sweats, weight gain and weight loss.   HENT: Negative for hearing loss, nosebleeds, sore throat and tinnitus.    Eyes: Negative for blurred vision, double vision, pain and visual disturbance.        Dry eyes   Cardiovascular: Negative for chest pain, claudication, cyanosis, dyspnea on exertion, irregular heartbeat, leg swelling, near-syncope, orthopnea, palpitations, paroxysmal nocturnal dyspnea and syncope.   Respiratory: Negative for cough, hemoptysis, shortness of breath, snoring and wheezing.    Endocrine: Positive for cold intolerance. Negative for heat intolerance and polyuria.   Hematologic/Lymphatic: Negative for bleeding problem. Does not bruise/bleed easily.   Skin: Negative for color change, dry skin, flushing and itching.   Musculoskeletal: Positive for joint pain. Negative for falls, joint swelling, muscle cramps, muscle weakness and myalgias.   Gastrointestinal: Positive for nausea.  Negative for abdominal pain, constipation, heartburn, melena and vomiting.   Genitourinary: Negative for dysuria and hematuria.   Neurological: Negative for excessive daytime sleepiness, dizziness, light-headedness, loss of balance, numbness, paresthesias, seizures and vertigo.   Psychiatric/Behavioral: Negative for altered mental status, depression, memory loss and substance abuse. The patient does not have insomnia and is not nervous/anxious.    Allergic/Immunologic: Negative for environmental allergies.       Allergies   Allergen Reactions   • Codeine Itching and GI Intolerance   • Morphine And Related Itching         Current Outpatient Medications:   •  acetaminophen (TYLENOL) 650 MG 8 hr tablet, Take 650 mg by mouth Every 8 (Eight) Hours As Needed for Mild Pain ., Disp: , Rfl:   •  apixaban (ELIQUIS) 2.5 MG tablet tablet, Take 1 tablet by mouth Every 12 (Twelve) Hours., Disp: 180 tablet, Rfl: 3  •  atenolol (TENORMIN) 25 MG tablet, Take 1 tablet by mouth Daily., Disp: 30 tablet, Rfl: 0  •  atorvastatin (LIPITOR) 20 MG tablet, Take 1 tablet by mouth Daily. (Patient taking differently: Take 20 mg by mouth Every Night.), Disp: 30 tablet, Rfl: 11  •  bumetanide (BUMEX) 1 MG tablet, Take 1 tablet by mouth Daily., Disp: 30 tablet, Rfl: 3  •  glucose blood test strip, TEST TWO TIMES A DAY, Disp: 200 each, Rfl: 10  •  Lancets (FREESTYLE) lancets, USE TO TEST BLOOD SUGAR ONCE DAILY, Disp: 100 each, Rfl: 1  •  memantine (NAMENDA XR) 28 MG capsule sustained-release 24 hr extended release capsule, Take 1 capsule by mouth Daily., Disp: 90 capsule, Rfl: 3  •  metFORMIN (GLUCOPHAGE) 500 MG tablet, Take 0.5 tablets by mouth Daily With Breakfast. (Patient taking differently: Take 250 mg by mouth 2 (Two) Times a Day With Meals.), Disp: 180 tablet, Rfl: 3  •  Multiple Vitamins-Minerals (MULTIVITAMIN WITH MINERALS) tablet tablet, Take 1 tablet by mouth Daily., Disp: , Rfl:   •  omeprazole OTC (PriLOSEC OTC) 20 MG EC tablet, Take 1  "tablet by mouth Daily., Disp: 90 tablet, Rfl: 3        Objective:     Vitals:    03/09/20 1139 03/09/20 1149   BP: 110/60 120/60   BP Location: Left arm Right arm   Pulse: 83    SpO2: 97%    Weight: 60.1 kg (132 lb 6.4 oz)    Height: 157.5 cm (62\")      Body mass index is 24.22 kg/m².    PHYSICAL EXAM:    Vitals Reviewed.   General Appearance: No acute distress, well developed and well nourished.  In a wheelchair today.  Eyes: Conjunctiva and lids: No erythema, swelling, or discharge. Sclera non-icteric.  Wears glasses  HENT: Atraumatic, normocephalic. External eyes, ears, and nose normal.  Hearing loss noted. Mucous membranes normal. Lips not cyanotic. Neck supple with no tenderness.  Respiratory: No signs of respiratory distress. Respiration rhythm and depth normal.   Clear to auscultation. No rales, crackles, rhonchi, or wheezing auscultated.   Cardiovascular:  Jugular Venous Pressure: Normal  Heart Rate and Rhythm: Irregularly, irregular. Heart Sounds: Normal S1 and S2. No S3 or S4 noted.  Murmurs: Left lower sternal border murmur grade 2/6. No rubs, thrills, or gallops.   Lower Extremities: No edema noted.  Gastrointestinal:  Abdomen soft, non-distended, non-tender. Normal bowel sounds. No hepatomegaly.   Musculoskeletal: Normal movement of extremities  Skin and Nails: General appearance normal. No pallor, cyanosis, diaphoresis. Skin temperature normal. No clubbing of fingernails.   Psychiatric: Patient alert and oriented to person, place, and time. Speech and behavior appropriate. Normal mood and affect.       ECG 12 Lead  Date/Time: 3/9/2020 10:42 AM  Performed by: Marylin Conway APRN  Authorized by: Marylin Conway APRN   Comparison: compared with previous ECG from 2/20/2020  Similar to previous ECG  Rhythm: atrial fibrillation  Rate: normal  BPM: 83  Q waves: V1, V2 and V3    Other findings: non-specific ST-T wave changes    Clinical impression: abnormal EKG            Assessment:       Diagnosis Plan "   1. Acute on chronic diastolic CHF (congestive heart failure) (CMS/[facility])     2. S/P TAVR (transcatheter aortic valve replacement)     3. Rheumatic mitral stenosis     4. Atrial fibrillation, persistent     5. Systolic hypertension     6. Bilateral carotid artery stenosis     7. Community acquired pneumonia, unspecified laterality            Plan:       1.  Acute on Chronic Diastolic Heart Failure: Euvolemic today.  Currently taking bumetanide 1 mg daily.  Continue current dosage.  Follow low-sodium diet and call with weight gain of 2-3 pounds overnight or 4-5 pounds in 1 week.    2.  Aortic Stenosis: Status post TAVR and repeat echocardiogram in September 2019 showed stable aortic valve.  Her daughters are curious to see if her valvular status has worsened and an echocardiogram was repeated today.  Results pending.    3.  Rheumatic Mitral Stenosis: Moderate per echocardiogram 9/2019.  Repeat echocardiogram.      5.  Persistent atrial Fibrillation: She remains in atrial fibrillation with controlled ventricular response on atenolol.  25 mg daily.  Continue apixaban for stroke prevention.    Atrial Fibrillation and Atrial Flutter  Assessment  • The patient has paroxysmal atrial fibrillation  • This is valvular in etiology  • The patient's CHADS2-VASc score is 8  • A [id number] score of 2 or more is considered a high risk for a thromboembolic event  • Apixaban prescribed    Plan  • Continue in atrial fibrillation with rate control  • Continue apixaban for antithrombotic therapy, bleeding issues discussed  • Continue beta blocker for rate control    6.  Hypertension: Blood pressure stable today.    7.  Carotid Artery Stenosis: Per duplex 12/16/2016-proximal right internal carotid artery mild stenosis and proximal left moderate stenosis.    8.  Pneumonia: She is just recovering from pneumonia.  She will follow-up with her PCP next week for repeat chest x-ray per her daughters.    9.  I have recommended a follow-up visit  with Dr. Elissa Mcmahan in 3-4 months, unless otherwise needed sooner.    ADDENDUM 3/11/2020:    Echocardiogram Result Text 3/2020     · Left ventricular systolic function is normal. Calculated EF = 66%. Estimated EF was in agreement with the calculated EF. Estimated EF = 66%. Normal left ventricular cavity size noted. All left ventricular wall segments contract normally. Left ventricular wall thickness is consistent with moderate concentric hypertrophy. Left ventricular diastolic function was indeterminate.  · There is a 23 mm TAVR valve present. The aortic valve peak and mean gradients are within defined limits. There is trivial paravalvular regurgitation and there is no significant prosthetic valve stenosis  · Severe MAC is present. There is severe bileaflet mitral valve thickening present. Mild mitral valve regurgitation is present. Moderate-to-severe mitral valve stenosis is present. The mean gradient is 9 mmHg at a rate of 89 bpm.  · Mild tricuspid valve regurgitation is present. Estimated right ventricular systolic pressure from tricuspid regurgitation is markedly elevated (>55 mmHg). Calculated right ventricular systolic pressure from tricuspid regurgitation is 62 mmHg.     I discussed with Dr. Mcmahan.  She agrees that she needs diuresis and will continue with her current dose of bumetanide.  Dr. Mcmahan noted that she may have dyspnea due to mitral disease and elevated RVSP.  Patient currently denies shortness of breath.  Follow-up as scheduled.    As always, it has been a pleasure to participate in your patient's care. Thank you.       Sincerely,         RAYRAY Ramos        **Dragon Disclaimer:**  Much of this encounter note is an electronic transcription/translation of spoken language to printed text. The electronic translation of spoken language may permit erroneous, or at times, nonsensical words or phrases to be inadvertently transcribed. Although I have reviewed the note for such  errors, some may still exist.

## 2020-03-11 ENCOUNTER — READMISSION MANAGEMENT (OUTPATIENT)
Dept: CALL CENTER | Facility: HOSPITAL | Age: 85
End: 2020-03-11

## 2020-03-11 NOTE — OUTREACH NOTE
COPD/PN Week 3 Survey      Responses   [facility] patient discharged from?  [place]   Does the patient have one of the following disease processes/diagnoses(primary or secondary)?  COPD/Pneumonia   Was the primary reason for admission:  Pneumonia   Week 3 attempt successful?  Yes   Call start time  1309   Rescheduled  Rescheduled-pt requested [Jacek Josue requests callback to Aiyana. Unable to reach Aiyana today. ]   Call end time  1310   General alerts for this patient  Call Jacek Bronson for follow up calls. [phone]. Do not call Jacek Josue.           Rosalee Ritchie RN

## 2020-03-15 ENCOUNTER — READMISSION MANAGEMENT (OUTPATIENT)
Dept: CALL CENTER | Facility: HOSPITAL | Age: 85
End: 2020-03-15

## 2020-03-15 NOTE — OUTREACH NOTE
COPD/PN Week 3 Survey      Responses   [facility] patient discharged from?  [place]   Does the patient have one of the following disease processes/diagnoses(primary or secondary)?  COPD/Pneumonia   Was the primary reason for admission:  Pneumonia   Week 3 attempt successful?  No   Rescheduled  Revoked          Yanna Graham RN

## 2020-03-18 ENCOUNTER — PATIENT OUTREACH (OUTPATIENT)
Dept: CASE MANAGEMENT | Facility: OTHER | Age: 85
End: 2020-03-18

## 2020-03-18 NOTE — OUTREACH NOTE
Care Plan Note      Responses   Lifestyle Goals  Routine follow-up with doctor(s), Exercise 150 min/wk - moderate activity, Have more energy   Barriers  Other (See Comment) [social distancing. [facility] PT and SN stopped]   Self Management  Medication Adherence, Home Glucose Monitoring, Home BP Monitoring, Increase Physical Activities, Check Weight Daily   Suggested Appointments  Other (See Comment) [Patient scheduled for chest XRAY. Family not completing at this time due to risk of exposure. ]   Specific Disease Process Teaching  -- [Rest. Continue mobility and strength expercises provided by PT. Continue social distancing and perform hand hygiene. ]   Does patient have depression diagnosis?  No   Advanced Directives:  Patient Has   Ed Visits past 12 months:  2 or 3   Hospitalizations past 12 months  2 or 3   Discharged From:  [facility]   Discharged to:  HOME   Agency:  [facility] [on hold]   Medication Adherence  Medications understood   Goal Progress  Making Progress Toward Goal(s)   Readiness Scale  7   Confidence Scale  7   Health Literacy  Good        The main concerns and/or symptoms the patient would like to address are: Spoke with patient's daughter, Aiyana, regarding patients continued care post pneumonia admission. Aiyana is concerned of exposing patient for a scheduled chest Xray due to the coronavirus quarantine. Aiyana also stated VNA SN and PT have been stopped to decrease patient's risk of exposure to illness.    Education/instruction provided by Care Coordinator: Introduced self, explained ACM RN role and provided contact information. Reviewed appointments (family has chosen to cancel patient's scheduled Chest xray to prevent exposure). Patient is tired and weak. VNA SN and PT have stopped visiting patient at this time to also decrease outside exposure. Aiyana states PT left some exercises family can complete with patient to ensure she continues to build strength. Aiyana states patient is tired and weak.  Patient get up to eat breakfast and then returns to bed. Family checks on patient frequently. Family does not plan to resume doctors appointments or Home Health until the quarantine has been lifted. No further concerns at this time. Recommended family continue to check on patient frequently and to assist patient with maintaining strength with diet and exercise. Aiyana appreciative of the [facility] outreach.     Follow Up Outreach Due: 1 month    Brie Albrecht RN  Ambulatory     3/18/2020, 13:21

## 2020-03-18 NOTE — OUTREACH NOTE
Care Coordination Assessment    Documented/Reviewed By:  Brie Albrecht RN Date/time:  3/18/2020  1:12 PM   Assessment completed with:  family, children (Comment: Talked with POA patient's daughter Liat)  Enrolled in care management program:  Yes  Living arrangement:  alone (Comment: Lives alone; children very supportive and stay with patient and lves next door)  Support system:  family  Type of residence:  private residence  Home care services:  Yes (Comment: [facility] on hold due to Quarantine. SN and PT no longer visiting patient. )  Equipment used at home:  bedside commode, cane, walker (Comment: rollator; glucometer; rollator; Medic Alert)  Bed or wheelchair confined:  No  Inadequate nutrition:  No  Medication adherence problem:  No  Experiencing side effects from current medications:  No  History of fall(s) in last 6 months:  No  Difficulty keeping appointments:  No

## 2020-03-24 ENCOUNTER — TELEPHONE (OUTPATIENT)
Dept: FAMILY MEDICINE CLINIC | Facility: CLINIC | Age: 85
End: 2020-03-24

## 2020-03-24 NOTE — TELEPHONE ENCOUNTER
FIDENCIO KLINE  [place] [phone]    Discharged early for PT  Due to epidemic but pt is doing  well

## 2020-04-16 ENCOUNTER — PATIENT OUTREACH (OUTPATIENT)
Dept: CASE MANAGEMENT | Facility: OTHER | Age: 85
End: 2020-04-16

## 2020-04-16 NOTE — OUTREACH NOTE
Patient Outreach Note  Talked with patient's daughter and caregiver. She states patient is doing well and is no longer receiving home health services due coronavirus 19 precautions. Patient continues with home exercise program from home health and ambulates with cane. She states patient has decreased appetite but is eating and drinking fluids without difficulty. She reports patient is compliant with medications; has no difficulty with fever; chest pain; or SOB. Reviewed with daughter 24/7 Nurse Line Telephone number; information regarding My Chart and tele health physician appointments and COVID19 precautions. Daughter states to appreciate phone call. No further questions or concerns voiced at this time.      Soraya Michel RN  Ambulatory     4/16/2020, 15:57

## 2020-05-08 RX ORDER — BUMETANIDE 1 MG/1
TABLET ORAL
Qty: 30 TABLET | Refills: 2 | Status: SHIPPED | OUTPATIENT
Start: 2020-05-08 | End: 2020-07-31

## 2020-05-12 RX ORDER — LANCETS 28 GAUGE
EACH MISCELLANEOUS
Qty: 100 EACH | Refills: 4 | Status: SHIPPED | OUTPATIENT
Start: 2020-05-12 | End: 2021-08-02

## 2020-06-05 ENCOUNTER — TELEPHONE (OUTPATIENT)
Dept: NEUROLOGY | Facility: CLINIC | Age: 85
End: 2020-06-05

## 2020-06-05 NOTE — TELEPHONE ENCOUNTER
"PT'S DAUGHTER MONROE CALLED SAYING PT IS ON THE ATORVASTATIN AND SHE SAID AT PT'S LAST VISIT, RAYRAY PENNY HAD ASKED IF PT WAS HAVING ANY WEIRD DREAMS AND THEY ANSWERED NO BUT MONROE SAID AFTER THINKING ABOUT IT, SHE HAS WITHIN THE LAST 6 WEEKS NOTICED HER \"MOM TALKING OUT OF HER HEAD\". SHE SAID THAT HER MOM WOKE UP THIS MORNING ASKING WHO LIVED IN THE HOUSE AND MONROE IS WONDERING IF WHAT'S GOING ON WITH HER COULD BE A SIDE EFFECT OF THE MEDICATION.        BEST CALL BACK- [phone]  "

## 2020-07-02 NOTE — PROGRESS NOTES
S/w pharm and confirmed pt's ins rx benefit info for PA requested on Celecoxib 200mg caps rx:    BIN [id number]  PCN [id number]  Grp [id number]  ID# [id number]   Subjective   Mary Shi is a 89 y.o. female. Patient is here today for   Chief Complaint   Patient presents with   • Shortness of Breath          Vitals:    02/18/20 1126   BP: 110/60   Pulse: 85   Resp: 17   Temp: 97.1 °F (36.2 °C)   SpO2: 93%     Body mass index is 26.17 kg/m².    The following portions of the patient's history were reviewed and updated as appropriate: allergies, current medications, past family history, past medical history, past social history, past surgical history and problem list.    Past Medical History:   Diagnosis Date   • 'light-for-dates' infant with signs of fetal malnutrition    • Amaurosis fugax    • Anemia    • Aortic stenosis     s/p TAVR    • Atrial fibrillation (CMS/HCC)    • Cancer (CMS/HCC)     skin   • Carotid artery stenosis     Per duplex 12/16/2016-proximal right internal carotid artery mild stenosis and proximal left moderate stenosis   • Cervical spine disease    • Cognitive disorder    • Congestive heart failure (CMS/HCC)    • Dementia (CMS/HCC)    • Diabetes mellitus (CMS/HCC)    • Diastolic dysfunction    • Dyspnea on exertion    • Generalized osteoarthritis of multiple sites    • GERD (gastroesophageal reflux disease)    • Gout    • Health care maintenance    • Hiatal hernia    • HL (hearing loss)    • Hyperlipidemia    • Hypertension    • Memory loss    • Mitral valve stenosis     Moderate per echocardiogram 2/2017; severe mitral annular calcification   • Nasal lesion    • Nasal stenosis    • 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)    • Osteoarthritis     multiple sites   • Peripheral neuropathy    • Peripheral vascular disease (CMS/HCC)    • PONV (postoperative nausea and vomiting)    • Shingles    • Stroke (CMS/HCC)    • Syncope    • Systolic hypertension    • Varicose veins       Allergies   Allergen Reactions   • Codeine Itching and GI Intolerance   • Morphine And Related Itching      Social History     Socioeconomic History   • Marital status:       Spouse name: Not on file   • Number of children: 15   • Years of education: 8th grade   • Highest education level: Not on file   Occupational History   • Occupation: retired   Tobacco Use   • Smoking status: Never Smoker   • Smokeless tobacco: Never Used   Substance and Sexual Activity   • Alcohol use: No     Comment: caffeine use   • Drug use: No   • Sexual activity: Defer        Current Outpatient Medications:   •  acetaminophen (TYLENOL) 650 MG 8 hr tablet, Take 650 mg by mouth Every 8 (Eight) Hours As Needed for Mild Pain ., Disp: , Rfl:   •  apixaban (ELIQUIS) 2.5 MG tablet tablet, Take 1 tablet by mouth Every 12 (Twelve) Hours., Disp: 180 tablet, Rfl: 3  •  atorvastatin (LIPITOR) 20 MG tablet, Take 1 tablet by mouth Daily., Disp: 30 tablet, Rfl: 11  •  bumetanide (BUMEX) 1 MG tablet, Take 1 tablet by mouth Daily., Disp: 30 tablet, Rfl: 3  •  glucose blood test strip, TEST TWO TIMES A DAY, Disp: 200 each, Rfl: 10  •  memantine (NAMENDA XR) 28 MG capsule sustained-release 24 hr extended release capsule, Take 1 capsule by mouth Daily., Disp: 90 capsule, Rfl: 3  •  metFORMIN (GLUCOPHAGE) 500 MG tablet, Take 0.5 tablets by mouth Daily With Breakfast. (Patient taking differently: Take 250 mg by mouth 2 (Two) Times a Day With Meals.), Disp: 180 tablet, Rfl: 3  •  Multiple Vitamins-Minerals (MULTIVITAMIN WITH MINERALS) tablet tablet, Take 1 tablet by mouth Daily., Disp: , Rfl:   •  omeprazole OTC (PriLOSEC OTC) 20 MG EC tablet, Take 1 tablet by mouth Daily., Disp: 90 tablet, Rfl: 3     Objective     History of Present Illness Zahra is here today with her daughter for checkup.  She has atrial fibrillation, diabetes mellitus, diastolic dysfunction history of heart failure, history of cerebrovascular accident, and dementia.  She complained of some shortness of breath this morning.  She was recently taken off of atenolol and Bumex dose was cut in half as her blood pressure was low.  Family members monitor her pulse  which varies from 85 to 120 bpm.  Her blood pressure is usually in the low 100s.  She is in a wheelchair today.    Review of Systems   Constitutional:        Weight loss 6 pounds   Respiratory: Positive for shortness of breath.    Cardiovascular: Negative for leg swelling.   Gastrointestinal: Negative.    Genitourinary: Negative.    Neurological: Negative.    Psychiatric/Behavioral: Negative for behavioral problems.       Physical Exam   Constitutional: She appears well-developed and well-nourished.   Elderly female who is alert pleasant and cooperative   Neck: No JVD present.   Cardiovascular: An irregularly irregular rhythm present. Exam reveals no S3.   Pulmonary/Chest: Effort normal. She has no wheezes. She has no rales.   Musculoskeletal: She exhibits no edema.   Neurological: She is alert.   Psychiatric: She has a normal mood and affect. Her behavior is normal. Judgment and thought content normal.   Vitals reviewed.      ASSESSMENT     Problem List Items Addressed This Visit        Cardiovascular and Mediastinum    Systolic hypertension - Primary    Relevant Orders    CBC & Differential    Basic Metabolic Panel    Acute on chronic diastolic congestive heart failure (CMS/HCC)    Relevant Orders    proBNP    Paroxysmal atrial fibrillation (CMS/HCC)       Nervous and Auditory    Cognitive disorder          PLAN  Patient Instructions   Blood pressure is stable.  Pulse appears stable off of atenolol.  Today there are no clinical signs of heart failure.  Will check labs today and make recommendations as to follow-up after reviewing results.    No follow-ups on file.

## 2020-07-06 ENCOUNTER — EPISODE CHANGES (OUTPATIENT)
Dept: CASE MANAGEMENT | Facility: OTHER | Age: 85
End: 2020-07-06

## 2020-07-14 ENCOUNTER — OFFICE VISIT (OUTPATIENT)
Dept: CARDIOLOGY | Facility: CLINIC | Age: 85
End: 2020-07-14

## 2020-07-14 VITALS
HEART RATE: 79 BPM | DIASTOLIC BLOOD PRESSURE: 60 MMHG | SYSTOLIC BLOOD PRESSURE: 110 MMHG | HEIGHT: 61 IN | WEIGHT: 131.4 LBS | BODY MASS INDEX: 24.81 KG/M2

## 2020-07-14 DIAGNOSIS — I48.19 ATRIAL FIBRILLATION, PERSISTENT (HCC): ICD-10-CM

## 2020-07-14 DIAGNOSIS — E78.49 OTHER HYPERLIPIDEMIA: ICD-10-CM

## 2020-07-14 DIAGNOSIS — Z95.2 S/P TAVR (TRANSCATHETER AORTIC VALVE REPLACEMENT): Primary | ICD-10-CM

## 2020-07-14 DIAGNOSIS — I05.0 RHEUMATIC MITRAL STENOSIS: ICD-10-CM

## 2020-07-14 DIAGNOSIS — E11.59 TYPE 2 DIABETES MELLITUS WITH OTHER CIRCULATORY COMPLICATION, WITHOUT LONG-TERM CURRENT USE OF INSULIN (HCC): ICD-10-CM

## 2020-07-14 DIAGNOSIS — I10 ESSENTIAL HYPERTENSION: ICD-10-CM

## 2020-07-14 DIAGNOSIS — I63.512 ACUTE ISCHEMIC LEFT MCA STROKE (HCC): ICD-10-CM

## 2020-07-14 PROCEDURE — 99214 OFFICE O/P EST MOD 30 MIN: CPT | Performed by: INTERNAL MEDICINE

## 2020-07-14 PROCEDURE — 93000 ELECTROCARDIOGRAM COMPLETE: CPT | Performed by: INTERNAL MEDICINE

## 2020-07-14 NOTE — PROGRESS NOTES
Date of Office Visit: 2020  Encounter Provider: Elissa Mcmahan MD  Place of Service: [facility] CARDIOLOGY  Patient Name: Mary Shi  :3/3/1925      Patient ID:  Mary Shi is a 89 y.o. female is here for  followup for Valve disease, a fib.         History of Present Illness    She had an echocardiogram done 2012. This showed moderate aortic stenosis,  mild mitral stenosis, moderate left atrial dilation, grade I-A diastolic dysfunction,  moderate concentric left ventricular hypertrophy, ejection fraction of 65-70%. She also  had a carotid duplex study showing 50-60% stenosis of the right internal carotid artery.   She feels somewhat short-winded when she bends over because of the hiatal hernia.      She was in the hospital with chest pain and difficulty breathing on 10/08/2014. She had had a prior stress nuclear perfusion study, which was done 2014 for chest pain and this showed no evidence of ischemia. During the hospitalization in October we felt the chest pain was probably mostly related to a hiatal hernia, somewhat related to her valvular heart disease, and possibly a small amount of heart failure as well, although her BNP was not markedly elevated. We did repeat her echocardiogram, which was done 2013, showing ejection fraction of 63%, moderate concentric left ventricular hypertrophy, grade 1A diastolic dysfunction, moderate mitral insufficiency, moderate mitral stenosis, moderate aortic stenosis. This is essentially unchanged from her echocardiograms both in 2013 and 2012. She had another echocardiogram done 10/2014, which showed a grade 2 diastolic dysfunction, ejection fraction of 67%, moderate concentric left ventricular hypertrophy, moderate mitral and moderate aortic stenosis, trivial aortic regurgitation. Again, this is unchanged from echoes dated 2012 and 2013.      She had a couple of children who wanted to get a  second opinion at  so they did in 2014. They set her up to see a surgeon there thinking that she needed aortic valve replacement, specifically ERICKSON. The surgeon there said she was not a candidate for this, as I had already kind of told them. They put her through a stress echocardiogram because they were, it sounds like not quite in agreement with what he thought, but the stress echocardiogram did not show an increase in right ventricular systolic pressure nor a change in her aortic valve area. She had a workup for her hiatal hernia there as well and that has been stable, but obviously she cannot eat late at night or have spicy food or she has problems all night long.       She had a 2-D echocardiogram with Doppler done on 11/29/2016, showing severe aortic stenosis with a mean gradient of 42 mmHg.  Her peak velocity was 3.8 meters per second with an aortic valve area of 0.92 centimeters squared.  This also showed moderate mitral stenosis and mild mitral insufficiency.  Her ejection fraction was 60% with grade 2 diastolic dysfunction and moderate to severe left ventricular hypertrophy.  In addition, she had severe left atrial enlargement.       In December 2016, she underwent TAVR with a #23 S3 Rubina valve.     She had an echocardiogram done 6/24/18 showing LVEF 60%, severe left atrial enlargement, mild to moderate tricuspid insufficiency, RVSP elevated gradient of 55 mmHg, severe mitral stenosis, mild mitral insufficiency.  The mean mitral valve gradient was 10 mmHg.     She was hospitalized in June 2018 for shortness of breath felt to be secondary to acute on chronic diastolic heart failure.  She responded well to IV diuresis and was transitioned back to her oral bumetanide.  This exacerbation was felt to be secondary to eating a high sodium diet and going out to eat with family visiting her.       She was hospitalized September 2019 with an acute left MCA ischemic stroke.  she had an echocardiogram done  9/10/2019 showing ejection fraction 71%, moderate mitral valvular stenosis, mild mitral valve insufficiency, mild aortic insufficiency but TAVR otherwise appeared normal and mild to moderate pulmonary hypertension.  She was started on Eliquis.  MRI of the brain showed multiple acute infarcts the left MCA territory as well as a remote left occipital infarct.  There was mild to moderate small vessel disease.  There were no focal high-grade stenoses or aneurysms.  Labs on 11/11/2019 show glucose 129, otherwise normal BMP normal CBC.    Done 2/26/2020 show glucose 128, otherwise normal CMP, hemoglobin A1c 6.7, hemoglobin 10, normal platelets and white blood cell count.  Echo done 3/20/2020 showed ejection fraction of 66% with normal appearing TAVR, moderate severe mitral stenosis, severe MAC, mild tricuspid insufficiency.  RVSP was 62 mmHg.  This was done for heart failure.    She lives with her daughter in [place].  Appetite has been somewhat low but she has been eating well.  Her weights been stable.  She is had no orthopnea or PND.  She does not feel her heart racing or skipping.  She had no dizziness.  She spends a lot of time in her recliner.  She does not have fevers, chills or cough.  She had no nausea or vomiting.  Overall she is doing well.  Do check her vitals at home including blood sugar, pulse rate, blood pressure and temperature.  Her blood pressure usually runs 1 20-1 50 over 70s.  Her heart rates been 60s to 70s in atrial fibrillation.    Past Medical History:   Diagnosis Date   • 'light-for-dates' infant with signs of fetal malnutrition    • Amaurosis fugax    • Anemia    • Aortic stenosis     s/p TAVR    • Atrial fibrillation (CMS/HCC)    • Cancer (CMS/HCC)     skin   • CAP (community acquired pneumonia)    • Carotid artery stenosis     Per duplex 12/16/2016-proximal right internal carotid artery mild stenosis and proximal left moderate stenosis   • Cervical spine disease    • Cognitive disorder    •  Congestive heart failure (CMS/[facility])    • Dementia (CMS/[facility])    • Diabetes mellitus (CMS/[facility])    • Diastolic dysfunction    • Dyspnea on exertion    • Generalized osteoarthritis of multiple sites    • GERD (gastroesophageal reflux disease)    • Gout    • Health care maintenance    • Hiatal hernia    • HL (hearing loss)    • Hyperlipidemia    • Hypertension    • Memory loss    • Mitral valve stenosis     Moderate per echocardiogram 2/2017; severe mitral annular calcification   • Nasal lesion    • Nasal stenosis    • 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[facility])    • Osteoarthritis     multiple sites   • PAF (paroxysmal atrial fibrillation) (CMS/[facility])    • Paroxysmal atrial fibrillation (CMS/[facility]) 9/11/2019   • Patient had no falls in past year    • Peripheral neuropathy    • Peripheral vascular disease (CMS/[facility])    • PONV (postoperative nausea and vomiting)    • Rheumatic mitral stenosis    • Shingles    • Stroke (CMS/[facility])    • Syncope    • Systolic hypertension    • Varicose veins          Past Surgical History:   Procedure Laterality Date   • AORTIC VALVE REPAIR/REPLACEMENT N/A 12/27/2016    Procedure: Transfemoral LEFT Transcatheter Aortic Valve Replacement TRANSESOPHAGEAL ECHOCARDIOGRAM WITH ANESTHESIA;  Surgeon: Eduardo Brown MD;  Location: [facility] OR 18/19;  Service:    • BLADDER REPAIR     • CARDIAC CATHETERIZATION N/A 12/14/2016    Procedure: Right Heart Cath;  Surgeon: Eduardo Brown MD;  Location: [facility] INVASIVE LOCATION;  Service:    • CARDIAC CATHETERIZATION N/A 12/14/2016    Procedure: Coronary angiography;  Surgeon: Eduardo Brown MD;  Location: [facility] INVASIVE LOCATION;  Service:    • HYSTERECTOMY     • KNEE SURGERY         Current Outpatient Medications on File Prior to Visit   Medication Sig Dispense Refill   • acetaminophen (TYLENOL) 650 MG 8 hr tablet Take 650 mg by mouth Every 8 (Eight) Hours As Needed for Mild Pain .     • apixaban (ELIQUIS) 2.5 MG tablet tablet Take 1 tablet by mouth Every  12 (Twelve) Hours. 180 tablet 3   • atenolol (TENORMIN) 25 MG tablet Take 1 tablet by mouth Daily. 30 tablet 0   • atorvastatin (LIPITOR) 20 MG tablet Take 1 tablet by mouth Daily. (Patient taking differently: Take 20 mg by mouth Every Night.) 30 tablet 11   • bumetanide (BUMEX) 1 MG tablet TAKE ONE TABLET BY MOUTH DAILY 30 tablet 2   • glucose blood test strip TEST TWO TIMES A  each 10   • Lancets (FREESTYLE) lancets USE TO TEST BLOOD SUGAR ONCE DAILY 100 each 4   • memantine (NAMENDA XR) 28 MG capsule sustained-release 24 hr extended release capsule Take 1 capsule by mouth Daily. 90 capsule 3   • metFORMIN (GLUCOPHAGE) 500 MG tablet TAKE ONE-HALF TABLET BY MOUTH TWICE A DAY WITH MEALS 90 tablet 0   • Multiple Vitamins-Minerals (MULTIVITAMIN WITH MINERALS) tablet tablet Take 1 tablet by mouth Daily.     • omeprazole OTC (PriLOSEC OTC) 20 MG EC tablet Take 1 tablet by mouth Daily. 90 tablet 3     No current facility-administered medications on file prior to visit.        Social History     Socioeconomic History   • Marital status:      Spouse name: Not on file   • Number of children: 15   • Years of education: 8th grade   • Highest education level: Not on file   Occupational History   • Occupation: retired   Tobacco Use   • Smoking status: Never Smoker   • Smokeless tobacco: Never Used   Substance and Sexual Activity   • Alcohol use: No     Comment: caffeine use: Half a cup daily   • Drug use: No   • Sexual activity: Defer           Review of Systems   Constitution: Positive for malaise/fatigue.   HENT: Negative for congestion.    Eyes: Positive for pain. Negative for vision loss in left eye and vision loss in right eye.   Respiratory: Negative.  Negative for cough, hemoptysis, shortness of breath, sleep disturbances due to breathing, snoring, sputum production and wheezing.    Endocrine: Negative.    Hematologic/Lymphatic: Negative.    Skin: Negative for poor wound healing and rash.   Musculoskeletal:  "Positive for joint pain. Negative for falls, gout, muscle cramps and myalgias.   Gastrointestinal: Negative for abdominal pain, diarrhea, dysphagia, hematemesis, melena, nausea and vomiting.   Neurological: Negative for excessive daytime sleepiness, dizziness, headaches, light-headedness, loss of balance, seizures and vertigo.   Psychiatric/Behavioral: Negative for depression and substance abuse. The patient is not nervous/anxious.        Procedures    ECG 12 Lead  Date/Time: 7/14/2020 11:49 AM  Performed by: Elissa Mcmahan MD  Authorized by: Elissa Mcmahan MD   Comparison: compared with previous ECG   Similar to previous ECG  Rhythm: atrial fibrillation  Q waves: V2, V3 and V4      Clinical impression: abnormal EKG                Objective:      Vitals:    07/14/20 1140   BP: 110/60   BP Location: Left arm   Patient Position: Sitting   Pulse: 79   Weight: 59.6 kg (131 lb 6.4 oz)   Height: 154.9 cm (61\")     Body mass index is 24.83 kg/m².    Physical Exam   Constitutional: She is oriented to person, place, and time. She appears well-developed and well-nourished. No distress.   HENT:   Head: Normocephalic and atraumatic.   Eyes: Conjunctivae are normal. No scleral icterus.   Neck: Neck supple. No JVD present. Carotid bruit is not present. No thyromegaly present.   Cardiovascular: Normal rate, S1 normal, S2 normal and intact distal pulses. An irregularly irregular rhythm present.  No extrasystoles are present. PMI is not displaced. Exam reveals no gallop.   Murmur heard.   Midsystolic murmur is present with a grade of 2/6 at the upper right sternal border and upper left sternal border.  Pulses:       Carotid pulses are 2+ on the right side, and 2+ on the left side.       Radial pulses are 2+ on the right side, and 2+ on the left side.        Dorsalis pedis pulses are 2+ on the right side, and 2+ on the left side.        Posterior tibial pulses are 2+ on the right side, and 2+ on the left side. "   Pulmonary/Chest: Effort normal and breath sounds normal. No respiratory distress. She has no wheezes. She has no rhonchi. She has no rales. She exhibits no tenderness.   Abdominal: Soft. Bowel sounds are normal. She exhibits no distension, no abdominal bruit and no mass. There is no tenderness.   Musculoskeletal: She exhibits no edema or deformity.   Lymphadenopathy:     She has no cervical adenopathy.   Neurological: She is alert and oriented to person, place, and time. No cranial nerve deficit.   Skin: Skin is warm and dry. No rash noted. She is not diaphoretic. No cyanosis. No pallor. Nails show no clubbing.   Psychiatric: She has a normal mood and affect. Judgment normal.   Vitals reviewed.      Lab Review:       Assessment:      Diagnosis Plan   1. S/P TAVR (transcatheter aortic valve replacement)     2. Atrial fibrillation, persistent (CMS/[facility])     3. Acute ischemic left MCA stroke (CMS/[facility])     4. Rheumatic mitral stenosis     5. Essential hypertension     6. Other hyperlipidemia     7. Type 2 diabetes mellitus with other circulatory complication, without long-term current use of insulin (CMS/[facility])       1. Severe AS. S/p TAVR 12/2016.   2. Right carotid artery stenosis, 50-60%.   3. Hypertension. BP controlled.  4. Hyperlipidemia.  On atorvastatin  5. Varicose veins; stable.   6. Diabetes mellitus type 2.   7. Dementia, fairly advanced.  8. Moderate to severe MS.  9. Grade 2 diastolic dysfunction.  10. Hiatal hernia which causes her dyspnea and chest pain.  11. Venous insufficiency, use compression stockings.  12. Gout.   13. CVA 9/2019, on eliquis as was due to atrial fibrillation.  She is not on warfarin as she is not a candidate for this given her frailty.     Plan:       See back in 4 months, no medication changes this time, overall I think she stable.

## 2020-07-27 ENCOUNTER — TELEPHONE (OUTPATIENT)
Dept: FAMILY MEDICINE CLINIC | Facility: CLINIC | Age: 85
End: 2020-07-27

## 2020-07-27 DIAGNOSIS — L89.309 PRESSURE INJURY OF SKIN OF BUTTOCK, UNSPECIFIED INJURY STAGE, UNSPECIFIED LATERALITY: Primary | ICD-10-CM

## 2020-07-27 NOTE — TELEPHONE ENCOUNTER
Patients daughter, Aiyana , calling to see if something can be called in to help the sores on her bottom. Has been putting OTC cream on it.    Please call and advise at [phone].

## 2020-07-31 RX ORDER — BUMETANIDE 1 MG/1
TABLET ORAL
Qty: 30 TABLET | Refills: 3 | Status: SHIPPED | OUTPATIENT
Start: 2020-07-31 | End: 2020-11-22 | Stop reason: SDUPTHER

## 2020-08-10 ENCOUNTER — TELEPHONE (OUTPATIENT)
Dept: FAMILY MEDICINE CLINIC | Facility: CLINIC | Age: 85
End: 2020-08-10

## 2020-08-10 DIAGNOSIS — N39.0 URINARY TRACT INFECTION WITHOUT HEMATURIA, SITE UNSPECIFIED: Primary | ICD-10-CM

## 2020-08-10 NOTE — TELEPHONE ENCOUNTER
PT IS HAVING BACK PAIN.  BELIEVES TO BE A UTI.    PT DAUGHTER CALLED [facility].        PLEASE CONTACT DAUGHTER   MONROE [phone]  TO ADVISE IF ORDER WAS SENT.

## 2020-08-13 LAB
BACTERIA UR CULT: ABNORMAL
OTHER ANTIBIOTIC SUSC ISLT: ABNORMAL

## 2020-08-14 ENCOUNTER — TELEPHONE (OUTPATIENT)
Dept: FAMILY MEDICINE CLINIC | Facility: CLINIC | Age: 85
End: 2020-08-14

## 2020-08-14 RX ORDER — CEFUROXIME AXETIL 250 MG/1
250 TABLET ORAL 2 TIMES DAILY
Qty: 14 TABLET | Refills: 0 | Status: SHIPPED | OUTPATIENT
Start: 2020-08-14 | End: 2020-08-24

## 2020-08-14 NOTE — TELEPHONE ENCOUNTER
Caller: Aiyana Donis    Relationship: Emergency Contact    Best call back number: [phone]    What test was performed: URINALYSIS    When was the test performed: WEDNESDAY    [facility]

## 2020-08-24 RX ORDER — CEFUROXIME AXETIL 250 MG/1
TABLET ORAL
Qty: 14 TABLET | Refills: 0 | Status: SHIPPED | OUTPATIENT
Start: 2020-08-24 | End: 2020-11-20

## 2020-09-03 ENCOUNTER — TELEPHONE (OUTPATIENT)
Dept: CARDIOLOGY | Facility: CLINIC | Age: 85
End: 2020-09-03

## 2020-09-03 NOTE — TELEPHONE ENCOUNTER
Pt daughter called and states that she is going to have dental procedure. She want to know does pt need to hold her blood thinner prior to her procedure. If its ok for her to have it done due to Covid 19.        Thanks  Ernestina

## 2020-09-08 ENCOUNTER — OFFICE VISIT (OUTPATIENT)
Dept: NEUROLOGY | Facility: CLINIC | Age: 85
End: 2020-09-08

## 2020-09-08 DIAGNOSIS — I10 ESSENTIAL HYPERTENSION: ICD-10-CM

## 2020-09-08 DIAGNOSIS — I48.19 ATRIAL FIBRILLATION, PERSISTENT (HCC): ICD-10-CM

## 2020-09-08 DIAGNOSIS — E78.49 OTHER HYPERLIPIDEMIA: ICD-10-CM

## 2020-09-08 DIAGNOSIS — I65.23 BILATERAL CAROTID ARTERY STENOSIS: ICD-10-CM

## 2020-09-08 DIAGNOSIS — E11.9 TYPE 2 DIABETES MELLITUS WITHOUT COMPLICATION, WITHOUT LONG-TERM CURRENT USE OF INSULIN (HCC): ICD-10-CM

## 2020-09-08 DIAGNOSIS — I63.512 ACUTE ISCHEMIC LEFT MCA STROKE (HCC): Primary | ICD-10-CM

## 2020-09-08 PROCEDURE — 99213 OFFICE O/P EST LOW 20 MIN: CPT | Performed by: NURSE PRACTITIONER

## 2020-09-08 RX ORDER — ATORVASTATIN CALCIUM 20 MG/1
20 TABLET, FILM COATED ORAL NIGHTLY
Qty: 90 TABLET | Refills: 2 | Status: SHIPPED | OUTPATIENT
Start: 2020-09-08 | End: 2020-11-20

## 2020-09-08 NOTE — PROGRESS NOTES
DOS: 2020  NAME: Mary Shi   : 3/3/1925  PCP: Everette Rubio MD    Chief Complaint   Patient presents with   • Stroke   Visit conducted via telephone; patient is company by her daughter who assist with providing some portion of history.      Neurological Problem and Interval History:  89 y.o. right-handed female with a Hx of Congestive heart failure, diabetes mellitus, dementia, hyperlipidemia, hypertension, status post TAVR, obstructive sleep apnea (recent diagnosis; uses CPAP) who I am evaluating via telephone visit/video visit today in follow-up for multiple acute right MCA infarcts with etiology thought to be secondary to new onset A. fib/cardioembolic source.     Patient was last seen in follow-up by me on 2020 and at that time she denied any new signs and/or symptoms of stroke.  She had a recent admission 2020 with pneumonia and had reported decreased energy and activity level which was improving.  Family also reported that patient had decreased verbalization since initial CVA.  Since that time, patient has continued to do well.  She has not had any rehospitalizations since 2020.  She has had 1 noninjury fall in the bathroom due to instability.  Her talking and speech have improved; patient is much more verbal.  She continues to use assistive device; cane as needed.  She previously reported skin breakdown and family has since purchased Roho cushions for chairs and mattress and patient skin down has subsided.  Systolic blood pressure is consistently less than 140 and diastolic is consistently less than 70; highest reported blood pressure in 3 months 154/64.  Patient continues to report fatigue and sleeps frequently.  I have encouraged family to place CPAP with all naps which they are agreeable to.  Patient denies any issues with mood specifically no concern for depression and/or PBA she remains on adjusted dose Eliquis, atorvastatin 20 mg daily.  She is also on  extended release Namenda which I am not following patient for currently.  I will plan to see the patient back annually moving forward; and instructed patient and family to call back should they need me sooner.       Review of Systems:        Review of Systems   Constitutional: Negative for activity change, appetite change and unexpected weight change.   HENT: Negative for facial swelling, trouble swallowing and voice change.    Eyes: Negative for photophobia, pain and visual disturbance.   Respiratory: Negative for chest tightness, shortness of breath and wheezing.    Cardiovascular: Negative for chest pain, palpitations and leg swelling.   Gastrointestinal: Negative for abdominal pain, nausea and vomiting.   Endocrine: Negative for cold intolerance and heat intolerance.   Musculoskeletal: Negative for arthralgias, back pain, gait problem, joint swelling, myalgias, neck pain and neck stiffness.   Neurological: Positive for headaches. Negative for dizziness, tremors, seizures, syncope, facial asymmetry, speech difficulty, weakness, light-headedness and numbness.   Hematological: Does not bruise/bleed easily.   Psychiatric/Behavioral: Negative for agitation, behavioral problems, confusion, decreased concentration, dysphoric mood, hallucinations, self-injury, sleep disturbance and suicidal ideas. The patient is not nervous/anxious and is not hyperactive.          Current Outpatient Medications:   •  acetaminophen (TYLENOL) 650 MG 8 hr tablet, Take 650 mg by mouth Every 8 (Eight) Hours As Needed for Mild Pain ., Disp: , Rfl:   •  apixaban (ELIQUIS) 2.5 MG tablet tablet, Take 1 tablet by mouth Every 12 (Twelve) Hours., Disp: 180 tablet, Rfl: 3  •  atenolol (TENORMIN) 25 MG tablet, Take 1 tablet by mouth Daily., Disp: 30 tablet, Rfl: 0  •  atorvastatin (Lipitor) 20 MG tablet, Take 1 tablet by mouth Every Night for 90 days., Disp: 90 tablet, Rfl: 2  •  bumetanide (BUMEX) 1 MG tablet, TAKE ONE TABLET BY MOUTH DAILY, Disp:  "30 tablet, Rfl: 3  •  glucose blood test strip, TEST TWO TIMES A DAY, Disp: 200 each, Rfl: 10  •  Lancets (FREESTYLE) lancets, USE TO TEST BLOOD SUGAR ONCE DAILY, Disp: 100 each, Rfl: 4  •  memantine (NAMENDA XR) 28 MG capsule sustained-release 24 hr extended release capsule, Take 1 capsule by mouth Daily., Disp: 90 capsule, Rfl: 3  •  metFORMIN (GLUCOPHAGE) 500 MG tablet, TAKE ONE-HALF TABLET BY MOUTH TWO TIMES A DAY WITH MEALS, Disp: 90 tablet, Rfl: 0  •  Multiple Vitamins-Minerals (MULTIVITAMIN WITH MINERALS) tablet tablet, Take 1 tablet by mouth Daily., Disp: , Rfl:   •  omeprazole OTC (PriLOSEC OTC) 20 MG EC tablet, Take 1 tablet by mouth Daily., Disp: 90 tablet, Rfl: 3  •  silver sulfadiazine (Silvadene) 1 % cream, Apply  topically to the appropriate area as directed Daily., Disp: 25 g, Rfl: 0  •  cefuroxime (CEFTIN) 250 MG tablet, TAKE ONE TABLET BY MOUTH TWICE A DAY, Disp: 14 tablet, Rfl: 0    \"The following portions of the patient's history were reviewed and updated as appropriate: allergies, current medications, past family history, past medical history, past social history, past surgical history and problem list.\"  Review and Interpretation of Imaging:  No new images reviewed     laboratory Results:             Lab Results   Component Value Date    HGBA1C 6.70 (H) 02/26/2020         Lab Results   Component Value Date    CHOL 167 06/24/2018         Lab Results   Component Value Date    HDL 31 (L) 06/11/2019    HDL 31 (L) 12/07/2018    HDL 33 (L) 06/24/2018         Lab Results   Component Value Date    LDL 60 06/11/2019    LDL CANCELED 12/07/2018    LDL 71 06/24/2018         Lab Results   Component Value Date    TRIG 321 (H) 06/11/2019    TRIG 587 (H) 12/07/2018    TRIG 314 (H) 06/24/2018     Lab Results   Component Value Date    RPR Non-Reactive 09/10/2019     Lab Results   Component Value Date    TSH 2.220 09/10/2019     Lab Results   Component Value Date    RJMLXBKT40 648 09/10/2019         Exam limited " by telephone visit  Constitutional:Patient appears well kempt, well-developed and well-nourished.     Last BMI 24.8  Neurological: Patient  is alert and oriented to person, place, and time.   Skin: Skin is warm and dry.   Psychiatric: Patient has a normal mood and affect. Patients behavior is normal.        Impression:  1.  Multiple left MCA infarctions; etiology thought to be secondary to new onset A. fib/cardioembolic source with residual right hemianopsia  2.  A. fib; on adjusted dose Eliquis  3.  Hypertension  4.  Hyperlipidemia  5.  Obstructive sleep apnea; CPAP  6.  Dementia; on Namenda    Plan:   Continue adjusted dose Eliquis and statin at 20 mg daily   Consider neuro-ophthalmology follow-up due to right hemianopsia   Blood pressure control to <130/80   Goal LDL <70-recommend high dose statins-    Serum glucose < 140   Call 911 for stroke any stroke symptoms   Follow-up annually  Mary was seen today for stroke.    Diagnoses and all orders for this visit:    Acute ischemic left MCA stroke (CMS/HCC)    Type 2 diabetes mellitus without complication, without long-term current use of insulin (CMS/HCC)    Other hyperlipidemia  -     atorvastatin (Lipitor) 20 MG tablet; Take 1 tablet by mouth Every Night for 90 days.    Essential hypertension    Bilateral carotid artery stenosis    Atrial fibrillation, persistent (CMS/HCC)

## 2020-09-28 ENCOUNTER — TELEPHONE (OUTPATIENT)
Dept: FAMILY MEDICINE CLINIC | Facility: CLINIC | Age: 85
End: 2020-09-28

## 2020-09-28 DIAGNOSIS — R19.7 DIARRHEA, UNSPECIFIED TYPE: Primary | ICD-10-CM

## 2020-09-28 NOTE — TELEPHONE ENCOUNTER
Patient daughter called and for the last 10 days pt has been having diarrhea. Would like to know if an order for labs can be sent to [facility] in [place]. They are concerned she may have C Dif. Please call back and advise if this can be done or what you recommend.

## 2020-09-29 DIAGNOSIS — R19.7 DIARRHEA, UNSPECIFIED TYPE: Primary | ICD-10-CM

## 2020-09-30 ENCOUNTER — TELEPHONE (OUTPATIENT)
Dept: FAMILY MEDICINE CLINIC | Facility: CLINIC | Age: 85
End: 2020-09-30

## 2020-09-30 LAB — C DIFF TOX A+B STL QL IA: NEGATIVE

## 2020-09-30 NOTE — TELEPHONE ENCOUNTER
Patient's daughter called on behalf of patient and stated she dropped off a stool sample to [facility] yesterday to test for C. DIff and would like to know if the results as soon as possible     Please advise     [phone]     Can leave  if needed

## 2020-09-30 NOTE — TELEPHONE ENCOUNTER
Patients [facility] called back [facility] ready this afternoon.  They are requesting results as soon as possible due to new caretaker coming in and does not wish to be exposed.    Please advise  [phone]

## 2020-10-19 ENCOUNTER — TELEPHONE (OUTPATIENT)
Dept: CARDIOLOGY | Facility: CLINIC | Age: 85
End: 2020-10-19

## 2020-10-21 RX ORDER — OMEPRAZOLE 20 MG/1
CAPSULE, DELAYED RELEASE ORAL
Qty: 90 CAPSULE | Refills: 2 | Status: SHIPPED | OUTPATIENT
Start: 2020-10-21 | End: 2021-01-14 | Stop reason: SDUPTHER

## 2020-10-26 RX ORDER — OMEPRAZOLE 20 MG/1
CAPSULE, DELAYED RELEASE ORAL
Qty: 90 CAPSULE | Refills: 2 | OUTPATIENT
Start: 2020-10-26

## 2020-11-20 ENCOUNTER — OFFICE VISIT (OUTPATIENT)
Dept: CARDIOLOGY | Facility: CLINIC | Age: 85
End: 2020-11-20

## 2020-11-20 VITALS
BODY MASS INDEX: 23.74 KG/M2 | HEIGHT: 62 IN | WEIGHT: 129 LBS | OXYGEN SATURATION: 97 % | DIASTOLIC BLOOD PRESSURE: 63 MMHG | SYSTOLIC BLOOD PRESSURE: 129 MMHG | HEART RATE: 75 BPM

## 2020-11-20 DIAGNOSIS — I05.0 RHEUMATIC MITRAL STENOSIS: ICD-10-CM

## 2020-11-20 DIAGNOSIS — I10 ESSENTIAL HYPERTENSION: ICD-10-CM

## 2020-11-20 DIAGNOSIS — I50.33 ACUTE ON CHRONIC DIASTOLIC CHF (CONGESTIVE HEART FAILURE) (HCC): Primary | ICD-10-CM

## 2020-11-20 DIAGNOSIS — I65.23 BILATERAL CAROTID ARTERY STENOSIS: ICD-10-CM

## 2020-11-20 DIAGNOSIS — I27.20 PULMONARY HYPERTENSION (HCC): ICD-10-CM

## 2020-11-20 DIAGNOSIS — I06.0 RHEUMATIC AORTIC STENOSIS: ICD-10-CM

## 2020-11-20 DIAGNOSIS — I48.11 LONGSTANDING PERSISTENT ATRIAL FIBRILLATION (HCC): ICD-10-CM

## 2020-11-20 PROCEDURE — 99443 PR PHYS/QHP TELEPHONE EVALUATION 21-30 MIN: CPT | Performed by: NURSE PRACTITIONER

## 2020-11-20 RX ORDER — ATORVASTATIN CALCIUM 40 MG/1
TABLET, FILM COATED ORAL
COMMUNITY
Start: 2020-09-25 | End: 2020-11-22 | Stop reason: SDUPTHER

## 2020-11-20 NOTE — PROGRESS NOTES
Telehealth Visit    Date of Visit: 2020  Encounter Provider: RAYRAY Carranza  Place of Service: [facility]  Patient Name: Mary Shi  :3/3/1925  Primary Cardiologist: Dr. Elissa Mcmahan    Chief Complaint   Patient presents with   • Follow-up   :     Dear Dr. Everette Rubio,     HPI: Mary Shi is a pleasant 89 y.o. female who is an established patient of our practice. Due to COVID-19 virus, I am conducting a telehealth visit via video/audio with patient and she has consented to this visit today. Her daughter Aiyana is accompanying her.     She has a known history of aortic stenosis and mitral stenosis.  In 2016, she underwent TAVR with #23 JOSIE valve.  She did experience acute on chronic diastolic heart failure over the years.      In 2019, she suffered a stroke and was diagnosed with new onset atrial fibrillation.    On 3/9/2020 repeat echocardiogram showed the following: EF 66%, diastolic function indeterminate, trivial paravalvular aortic regurgitation, severe mitral annular calcification, severe bileaflet mitral valve thickening, mild mitral regurgitation, and moderate to severe mitral valve stenosis with mean gradient of 9 mmHg, mild tricuspid regurgitation, and severe pulmonary hypertension with RVSP of 62 mmHg.    In 2020, she followed up in the office with Dr. Mcmahan.  She reported that she was living with her daughter in [place] and spending a lot of time in her recliner.  She denied any symptoms.    Today is her follow-up video/audio visit.  She denies chest pain, shortness of breath, palpitations, edema, dizziness, syncope, or bleeding.  Her daughter says she has been having frequent urinary tract infections.    Past Medical History:   Diagnosis Date   • 'light-for-dates' infant with signs of fetal malnutrition    • Amaurosis fugax    • Anemia    • Aortic stenosis     s/p TAVR    • Atrial  fibrillation (CMS/[facility])    • Cancer (CMS/[facility])     skin   • CAP (community acquired pneumonia)    • Carotid artery stenosis     Per duplex 12/16/2016-proximal right internal carotid artery mild stenosis and proximal left moderate stenosis   • Cervical spine disease    • Cognitive disorder    • Congestive heart failure (CMS/HCC)    • Dementia (CMS/[facility])    • Diabetes mellitus (CMS/[facility])    • Diastolic dysfunction    • Dyspnea on exertion    • Generalized osteoarthritis of multiple sites    • GERD (gastroesophageal reflux disease)    • Gout    • Health care maintenance    • Hiatal hernia    • HL (hearing loss)    • Hyperlipidemia    • Hypertension    • Memory loss    • Mitral valve stenosis     Moderate per echocardiogram 2/2017; severe mitral annular calcification   • Nasal lesion    • Nasal stenosis    • 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[facility])    • Osteoarthritis     multiple sites   • PAF (paroxysmal atrial fibrillation) (CMS/[facility])    • Paroxysmal atrial fibrillation (CMS/[facility]) 9/11/2019   • Patient had no falls in past year    • Peripheral neuropathy    • Peripheral vascular disease (CMS/[facility])    • PONV (postoperative nausea and vomiting)    • Rheumatic mitral stenosis    • Shingles    • Stroke (CMS/[facility])    • Syncope    • Systolic hypertension    • Varicose veins        Past Surgical History:   Procedure Laterality Date   • AORTIC VALVE REPAIR/REPLACEMENT N/A 12/27/2016    Procedure: Transfemoral LEFT Transcatheter Aortic Valve Replacement TRANSESOPHAGEAL ECHOCARDIOGRAM WITH ANESTHESIA;  Surgeon: Eduardo Brown MD;  Location: [facility] OR 18/19;  Service:    • BLADDER REPAIR     • CARDIAC CATHETERIZATION N/A 12/14/2016    Procedure: Right Heart Cath;  Surgeon: Eduardo Brown MD;  Location: [facility] CATH INVASIVE LOCATION;  Service:    • CARDIAC CATHETERIZATION N/A 12/14/2016    Procedure: Coronary angiography;  Surgeon: Eduardo Brown MD;  Location: [facility] CATH INVASIVE LOCATION;  Service:    • HYSTERECTOMY     • KNEE  SURGERY         Social History     Socioeconomic History   • Marital status:      Spouse name: Not on file   • Number of children: 15   • Years of education: 8th grade   • Highest education level: Not on file   Occupational History   • Occupation: retired   Tobacco Use   • Smoking status: Never Smoker   • Smokeless tobacco: Never Used   Substance and Sexual Activity   • Alcohol use: No     Comment: caffeine use: Half a cup daily   • Drug use: No   • Sexual activity: Defer       Family History   Problem Relation Age of Onset   • Heart disease Mother    • Coronary artery disease Mother    • Hyperlipidemia Mother    • Hypertension Mother    • Cancer Sister    • Cancer Brother    • Diabetes Daughter    • Diabetes Daughter    • Cancer Sister    • Cancer Sister    • Cancer Sister    • Diabetes Son        The following portion of the patient's history were reviewed and updated as appropriate: past medical history, past surgical history, past social history, past family history, allergies, current medications, and problem list.    Review of Systems   Constitution: Positive for malaise/fatigue.   Cardiovascular: Negative.    Respiratory: Negative.    Hematologic/Lymphatic: Negative.    Neurological: Negative.        Allergies   Allergen Reactions   • Codeine Itching and GI Intolerance   • Morphine And Related Itching         Current Outpatient Medications:   •  acetaminophen (TYLENOL) 650 MG 8 hr tablet, Take 650 mg by mouth Every 8 (Eight) Hours As Needed for Mild Pain ., Disp: , Rfl:   •  apixaban (ELIQUIS) 2.5 MG tablet tablet, Take 1 tablet by mouth Every 12 (Twelve) Hours., Disp: 180 tablet, Rfl: 3  •  atenolol (TENORMIN) 25 MG tablet, Take 1 tablet by mouth Daily., Disp: 30 tablet, Rfl: 0  •  atorvastatin (LIPITOR) 40 MG tablet, , Disp: , Rfl:   •  bumetanide (BUMEX) 1 MG tablet, TAKE ONE TABLET BY MOUTH DAILY, Disp: 30 tablet, Rfl: 3  •  glucose blood test strip, TEST TWO TIMES A DAY, Disp: 200 each, Rfl: 10  •  " Lancets (FREESTYLE) lancets, USE TO TEST BLOOD SUGAR ONCE DAILY, Disp: 100 each, Rfl: 4  •  memantine (NAMENDA XR) 28 MG capsule sustained-release 24 hr extended release capsule, Take 1 capsule by mouth Daily., Disp: 90 capsule, Rfl: 3  •  metFORMIN (GLUCOPHAGE) 500 MG tablet, TAKE ONE-HALF TABLET BY MOUTH TWO TIMES A DAY WITH MEALS, Disp: 90 tablet, Rfl: 0  •  Multiple Vitamins-Minerals (MULTIVITAMIN WITH MINERALS) tablet tablet, Take 1 tablet by mouth Daily., Disp: , Rfl:   •  omeprazole (priLOSEC) 20 MG capsule, TAKE ONE CAPSULE BY MOUTH DAILY, Disp: 90 capsule, Rfl: 2  •  silver sulfadiazine (Silvadene) 1 % cream, Apply  topically to the appropriate area as directed Daily., Disp: 25 g, Rfl: 0        Objective:     Vitals:    11/20/20 1429   BP: 129/63   BP Location: Left arm   Patient Position: Sitting   Pulse: 75   SpO2: 97%   Weight: 58.5 kg (129 lb)   Height: 157.5 cm (62\")     Body mass index is 23.59 kg/m².    Due to telehealth visit, there was no EKG, vitals, or weight performed in our office.  Vitals/Weight were reported by the patient and conducted at home.     PHYSICAL EXAM:    Vitals Reviewed  General Appearance: No acute distress, well developed and well nourished.   Eyes: Conjunctivae and lids: No erythema, swelling, or discharge. Sclerae anicteric.   HENT: Atraumatic, normocephalic. External eyes, ears, and nose normal. No hearing loss noted. Mucous membranes normal. Lips not cyanotic.   Neck: Symmetrical and no evidence of mass.  Respiratory: No signs of respiratory distress. Respiration rhythm and depth normal.   Musculoskeletal: Normal movement of extremities.  Skin: General appearance normal. No pallor, cyanosis, diaphoresis.   Psychiatric: Patient alert and oriented to person, place, and time. Speech and behavior appropriate. Normal mood and affect.        Assessment:       Diagnosis Plan   1. Acute on chronic diastolic CHF (congestive heart failure) (CMS/[facility])     2. Rheumatic mitral stenosis  "    3. Rheumatic aortic stenosis     4. Pulmonary hypertension (CMS/[facility])     5. Longstanding persistent atrial fibrillation (CMS/[facility])     6. Essential hypertension     7. Bilateral carotid artery stenosis            Plan:       1.  Acute on Chronic Diastolic heart Failure: Denies shortness of breath or lower extremity edema.  Continue current medications and call with any concerns.    2.  Rheumatic Mitral Stenosis: Moderate to severe per echocardiogram 3/2020.    3.  Aortic Stenosis: Status post TAVR.  Stable valvular function per echocardiogram 3/2020.    4.  Pulmonary Hypertension: Severe per echocardiogram 3/2020.  Continue diuretic therapy and she denies shortness of breath today.    5.  Persistent Atrial Fibrillation: Rate control therapy recommended and she will continue on apixaban for stroke prevention. Her CHADS2-VASc score is 8, putting her at high risk for thromboembolic event.      6.  Hypertension: Blood pressure has been stable.    7.  Carotid Artery Stenosis: Per carotid duplex on  12/16/2016-proximal right internal carotid artery mild stenosis and proximal left moderate stenosis.    8.  I recommended follow-up with Dr. Mcmahan in 3 to 4 months possibly via telehealth.  We will determine if she needs to have a repeat echocardiogram if she comes to the office.    This patient has consented to a telehealth visit via video/audio. The visit was scheduled as a video/audio visit to comply with patient safety concerns in accordance with CDC recommendations.  All vitals recorded within this visit are reported by the patient.  I spent 25minutes in total including but not limited to the 12 minutes spent in direct conversation with this patient.      As always, it has been a pleasure to participate in your patient's care. Thank you.       Sincerely,         RAYRAY Ramos        Dictated utilizing Dragon dictation

## 2020-11-22 PROBLEM — I06.0 RHEUMATIC AORTIC STENOSIS: Status: ACTIVE | Noted: 2020-11-22

## 2020-11-22 PROBLEM — I27.20 PULMONARY HYPERTENSION (HCC): Status: ACTIVE | Noted: 2020-11-22

## 2020-11-22 PROBLEM — I48.11 LONGSTANDING PERSISTENT ATRIAL FIBRILLATION: Status: ACTIVE | Noted: 2020-03-09

## 2020-11-22 RX ORDER — ATENOLOL 25 MG/1
25 TABLET ORAL DAILY
Qty: 90 TABLET | Refills: 3
Start: 2020-11-22 | End: 2021-01-14 | Stop reason: SDUPTHER

## 2020-11-22 RX ORDER — BUMETANIDE 1 MG/1
1 TABLET ORAL DAILY
Qty: 90 TABLET | Refills: 3 | Status: SHIPPED | OUTPATIENT
Start: 2020-11-22 | End: 2020-12-03

## 2020-11-22 RX ORDER — ATORVASTATIN CALCIUM 20 MG/1
20 TABLET, FILM COATED ORAL DAILY
Qty: 90 TABLET | Refills: 3 | Status: SHIPPED | OUTPATIENT
Start: 2020-11-22 | End: 2021-01-14 | Stop reason: SDUPTHER

## 2020-11-23 ENCOUNTER — TELEPHONE (OUTPATIENT)
Dept: FAMILY MEDICINE CLINIC | Facility: CLINIC | Age: 85
End: 2020-11-23

## 2020-11-23 NOTE — TELEPHONE ENCOUNTER
PATIENTS DAUGHTER IS ASKING FOR AN ORDER FOR HER MOM TO HAVE ANOTHER URINALYSIS. HER BACK HAS BEEN HURTING AGAIN. [facility]   [facility] [place]     PLEASE ADVISE  [phone]

## 2020-11-24 RX ORDER — BUMETANIDE 1 MG/1
TABLET ORAL
Qty: 30 TABLET | Refills: 2 | OUTPATIENT
Start: 2020-11-24

## 2020-12-03 ENCOUNTER — TELEPHONE (OUTPATIENT)
Dept: NEUROLOGY | Facility: CLINIC | Age: 85
End: 2020-12-03

## 2020-12-03 RX ORDER — BUMETANIDE 1 MG/1
TABLET ORAL
Qty: 90 TABLET | Refills: 3 | Status: SHIPPED | OUTPATIENT
Start: 2020-12-03 | End: 2021-01-14 | Stop reason: SDUPTHER

## 2020-12-03 RX ORDER — APIXABAN 2.5 MG/1
TABLET, FILM COATED ORAL
Qty: 180 TABLET | Refills: 2 | Status: SHIPPED | OUTPATIENT
Start: 2020-12-03 | End: 2020-12-03 | Stop reason: SDUPTHER

## 2020-12-17 NOTE — TELEPHONE ENCOUNTER
MONROE IS CALLING TO FOLLOW UP ON THE PATIENT'S METFORMIN.  SHE STATES THAT THE PHARMACY HAS BEEN ATTEMPTING TO CONTACT THE OFFICE AS WELL.    SHE ALSO IS ASKING IF THERE IS A 250MG AVAILABLE SO THAT THE TABLET DOESN'T HAVE TO BE HALVED.

## 2020-12-30 RX ORDER — MEMANTINE HYDROCHLORIDE 28 MG/1
CAPSULE, EXTENDED RELEASE ORAL
Qty: 90 CAPSULE | Refills: 2 | OUTPATIENT
Start: 2020-12-30

## 2020-12-31 ENCOUNTER — TELEPHONE (OUTPATIENT)
Dept: FAMILY MEDICINE CLINIC | Facility: CLINIC | Age: 85
End: 2020-12-31

## 2020-12-31 NOTE — TELEPHONE ENCOUNTER
Caller: Liat Patel    Relationship to patient: Emergency Contact    Best call back number: [phone]       Patient is needing: Liat called in and patient was put on generic for Bactrium  Ds   For a uti . Liat stated patient has no fever but is talking out of her head and confusion last night  And acting very confused . She has been on medication on Sunday . Liat wants to know if their is something else she can take . Please call Liat and advise .

## 2021-01-02 DIAGNOSIS — R30.0 DYSURIA: Primary | ICD-10-CM

## 2021-01-02 RX ORDER — AMOXICILLIN AND CLAVULANATE POTASSIUM 500; 125 MG/1; MG/1
1 TABLET, FILM COATED ORAL 2 TIMES DAILY
Qty: 10 TABLET | Refills: 0 | Status: SHIPPED | OUTPATIENT
Start: 2021-01-02 | End: 2021-01-07

## 2021-01-02 NOTE — PROGRESS NOTES
Pt's daughter calling and stating pt was recently tx'd for UTI with Bactrim; pt improved for 2-3 days but now starting to have complaints again. Pt's daughter stating pt is having dysuria, back pain, confusion and lethargic. Pt's daughter denies fevers, n/v. Pt's daughter informed would send in another abx, to stop Bactrim and if no improvement in 24-48 hours to have pt seen at medical facility. Pt's daughter verb. Understanding.

## 2021-01-11 RX ORDER — MEMANTINE HYDROCHLORIDE 28 MG/1
CAPSULE, EXTENDED RELEASE ORAL
Qty: 90 CAPSULE | Refills: 2 | OUTPATIENT
Start: 2021-01-11

## 2021-01-13 ENCOUNTER — OFFICE VISIT (OUTPATIENT)
Dept: FAMILY MEDICINE CLINIC | Facility: CLINIC | Age: 86
End: 2021-01-13

## 2021-01-13 VITALS
RESPIRATION RATE: 18 BRPM | WEIGHT: 132 LBS | BODY MASS INDEX: 24.92 KG/M2 | HEART RATE: 54 BPM | SYSTOLIC BLOOD PRESSURE: 132 MMHG | DIASTOLIC BLOOD PRESSURE: 74 MMHG | HEIGHT: 61 IN | OXYGEN SATURATION: 98 % | TEMPERATURE: 96.9 F

## 2021-01-13 DIAGNOSIS — R41.82 ALTERED MENTAL STATUS, UNSPECIFIED ALTERED MENTAL STATUS TYPE: Primary | ICD-10-CM

## 2021-01-13 DIAGNOSIS — I10 ESSENTIAL HYPERTENSION: ICD-10-CM

## 2021-01-13 DIAGNOSIS — F09 COGNITIVE DISORDER: ICD-10-CM

## 2021-01-13 DIAGNOSIS — E11.59 TYPE 2 DIABETES MELLITUS WITH OTHER CIRCULATORY COMPLICATION, WITHOUT LONG-TERM CURRENT USE OF INSULIN (HCC): ICD-10-CM

## 2021-01-13 LAB
BILIRUB BLD-MCNC: NEGATIVE MG/DL
CLARITY, POC: CLEAR
COLOR UR: YELLOW
GLUCOSE UR STRIP-MCNC: NEGATIVE MG/DL
KETONES UR QL: NEGATIVE
LEUKOCYTE EST, POC: ABNORMAL
NITRITE UR-MCNC: NEGATIVE MG/ML
PH UR: 5.5 [PH] (ref 5–8)
PROT UR STRIP-MCNC: NEGATIVE MG/DL
RBC # UR STRIP: ABNORMAL /UL
SP GR UR: 1.02 (ref 1–1.03)
UROBILINOGEN UR QL: NORMAL

## 2021-01-13 PROCEDURE — 81003 URINALYSIS AUTO W/O SCOPE: CPT | Performed by: INTERNAL MEDICINE

## 2021-01-13 PROCEDURE — 99213 OFFICE O/P EST LOW 20 MIN: CPT | Performed by: INTERNAL MEDICINE

## 2021-01-14 LAB
ALBUMIN SERPL-MCNC: 3.9 G/DL (ref 3.5–4.6)
ALBUMIN/GLOB SERPL: 1.3 {RATIO} (ref 1.2–2.2)
ALP SERPL-CCNC: 119 IU/L (ref 39–117)
ALT SERPL-CCNC: 15 IU/L (ref 0–32)
AST SERPL-CCNC: 21 IU/L (ref 0–40)
BASOPHILS # BLD AUTO: 0 X10E3/UL (ref 0–0.2)
BASOPHILS NFR BLD AUTO: 0 %
BILIRUB SERPL-MCNC: 0.6 MG/DL (ref 0–1.2)
BUN SERPL-MCNC: 22 MG/DL (ref 10–36)
BUN/CREAT SERPL: 31 (ref 12–28)
CALCIUM SERPL-MCNC: 9.4 MG/DL (ref 8.7–10.3)
CHLORIDE SERPL-SCNC: 96 MMOL/L (ref 96–106)
CO2 SERPL-SCNC: 22 MMOL/L (ref 20–29)
CREAT SERPL-MCNC: 0.71 MG/DL (ref 0.57–1)
EOSINOPHIL # BLD AUTO: 0 X10E3/UL (ref 0–0.4)
EOSINOPHIL NFR BLD AUTO: 0 %
ERYTHROCYTE [DISTWIDTH] IN BLOOD BY AUTOMATED COUNT: 13.7 % (ref 11.7–15.4)
GLOBULIN SER CALC-MCNC: 3 G/DL (ref 1.5–4.5)
GLUCOSE SERPL-MCNC: 137 MG/DL (ref 65–99)
HBA1C MFR BLD: 7 % (ref 4.8–5.6)
HCT VFR BLD AUTO: 32.4 % (ref 34–46.6)
HGB BLD-MCNC: 10.9 G/DL (ref 11.1–15.9)
IMM GRANULOCYTES # BLD AUTO: 0 X10E3/UL (ref 0–0.1)
IMM GRANULOCYTES NFR BLD AUTO: 0 %
LYMPHOCYTES # BLD AUTO: 1.5 X10E3/UL (ref 0.7–3.1)
LYMPHOCYTES NFR BLD AUTO: 20 %
MCH RBC QN AUTO: 28.8 PG (ref 26.6–33)
MCHC RBC AUTO-ENTMCNC: 33.6 G/DL (ref 31.5–35.7)
MCV RBC AUTO: 86 FL (ref 79–97)
MONOCYTES # BLD AUTO: 0.6 X10E3/UL (ref 0.1–0.9)
MONOCYTES NFR BLD AUTO: 7 %
NEUTROPHILS # BLD AUTO: 5.4 X10E3/UL (ref 1.4–7)
NEUTROPHILS NFR BLD AUTO: 73 %
PLATELET # BLD AUTO: 196 X10E3/UL (ref 150–450)
POTASSIUM SERPL-SCNC: 4.1 MMOL/L (ref 3.5–5.2)
PROT SERPL-MCNC: 6.9 G/DL (ref 6–8.5)
RBC # BLD AUTO: 3.79 X10E6/UL (ref 3.77–5.28)
SODIUM SERPL-SCNC: 133 MMOL/L (ref 134–144)
TSH SERPL DL<=0.005 MIU/L-ACNC: 3.51 UIU/ML (ref 0.45–4.5)
VIT B12 SERPL-MCNC: 633 PG/ML (ref 232–1245)
WBC # BLD AUTO: 7.6 X10E3/UL (ref 3.4–10.8)

## 2021-01-14 RX ORDER — MEMANTINE HYDROCHLORIDE 28 MG/1
28 CAPSULE, EXTENDED RELEASE ORAL DAILY
Qty: 90 CAPSULE | Refills: 3 | Status: SHIPPED | OUTPATIENT
Start: 2021-01-14 | End: 2021-11-08

## 2021-01-14 RX ORDER — BUMETANIDE 1 MG/1
1 TABLET ORAL DAILY
Qty: 90 TABLET | Refills: 3 | Status: SHIPPED | OUTPATIENT
Start: 2021-01-14 | End: 2021-11-08

## 2021-01-14 RX ORDER — OMEPRAZOLE 20 MG/1
20 CAPSULE, DELAYED RELEASE ORAL DAILY
Qty: 90 CAPSULE | Refills: 3 | Status: SHIPPED | OUTPATIENT
Start: 2021-01-14 | End: 2021-11-08

## 2021-01-14 RX ORDER — ATORVASTATIN CALCIUM 20 MG/1
20 TABLET, FILM COATED ORAL DAILY
Qty: 90 TABLET | Refills: 3 | Status: SHIPPED | OUTPATIENT
Start: 2021-01-14 | End: 2021-11-08

## 2021-01-14 RX ORDER — ATENOLOL 25 MG/1
25 TABLET ORAL DAILY
Qty: 90 TABLET | Refills: 3
Start: 2021-01-14 | End: 2021-02-02 | Stop reason: SDUPTHER

## 2021-01-21 ENCOUNTER — TELEPHONE (OUTPATIENT)
Dept: FAMILY MEDICINE CLINIC | Facility: CLINIC | Age: 86
End: 2021-01-21

## 2021-01-21 RX ORDER — CEFUROXIME AXETIL 250 MG/1
250 TABLET ORAL 2 TIMES DAILY
Qty: 14 TABLET | Refills: 1 | Status: SHIPPED | OUTPATIENT
Start: 2021-01-21 | End: 2021-04-06

## 2021-01-21 NOTE — TELEPHONE ENCOUNTER
Caller: Aiyana Donis    Relationship: Emergency Contact-DAUGHTER    Best call back number: [phone]    What medication are you requesting: SOMETHING TO TREAT UTI.     What are your current symptoms: PATIENT'S DAUGHTER SAID SHE COMPLETED AN AT HOME URINARY TRACT INFECTION TEST AND HER MOTHER TESTED FOR VERY HIGH LEUKOCYTES    Have you had these symptoms before:    [x] Yes  [] No    Have you been treated for these symptoms before:   [x] Yes  [] No    If a prescription is needed, what is your preferred pharmacy and phone number: [facility] [address] [place] [address], [place] [address]  - [phone] [facility] [phone] FX     Additional notes: PLEASE CALL PATIENT'S DAUGHTER REGARDING TREATMENT AND NEXT STEPS IN CARE.

## 2021-02-02 RX ORDER — ATENOLOL 25 MG/1
25 TABLET ORAL DAILY
Qty: 90 TABLET | Refills: 3
Start: 2021-02-02 | End: 2021-02-08 | Stop reason: SDUPTHER

## 2021-02-02 NOTE — TELEPHONE ENCOUNTER
Caller: Aiyana Donis    Relationship: Emergency Contact    Best call back number: [phone]    Medication needed:   Requested Prescriptions     Pending Prescriptions Disp Refills   • atenolol (TENORMIN) 25 MG tablet 90 tablet 3     Sig: Take 1 tablet by mouth Daily.       When do you need the refill by: ASAP    What details did the patient provide when requesting the medication: PATIENT DAUGHTER CALLING STATING THAT PATIENT WILL BE OUT OF THE THE ATENOLOL 25 MG TABLES ON 2/5/2021 AND REQUESTING REFILL SOON. PATIENT [place] [address]. PATIENT WOULD LIKE GOING FORWARD THAT ALL OTHER RX BE SENT TO THE MAIL ORDER PHARMACY RIGHT AWAY.    Does the patient have less than a 3 day supply:  [x] Yes  [] No    What is the patient's preferred pharmacy: [facility] - [address] [phone] [facility] [phone] FX

## 2021-02-05 ENCOUNTER — TELEPHONE (OUTPATIENT)
Dept: FAMILY MEDICINE CLINIC | Facility: CLINIC | Age: 86
End: 2021-02-05

## 2021-02-05 NOTE — TELEPHONE ENCOUNTER
Caller: Aiyana Donis    Relationship: Emergency Contact    Best call back number: [phone]     Medication needed:  atenolol (TENORMIN) 25 MG tablet      When do you need the refill by: 02/05/2021    What details did the patient provide when requesting the medication: PATIENTS DAUGHTER CALLING NEEDING THIS MEDICATION REFILLED FOR A WEEKS WORTH AND THEN NEEDING THIS EMAILED  TO [facility] FOR 90 DAYS SUPPLY. SHE WOULD LIKE A CALL BACK SHE HAS SOME MEDICAL CONCERNS.   PLEASE CALL THE PATIENTS DAUGHTER.  Does the patient have less than a 3 day supply:  [x] Yes  [] No    What is the patient's preferred pharmacy: [facility] [address] [place] [address], [place] [address]  [address]  - [phone]  - [phone] FX

## 2021-02-08 RX ORDER — ATENOLOL 25 MG/1
25 TABLET ORAL DAILY
Qty: 90 TABLET | Refills: 3
Start: 2021-02-08 | End: 2021-02-11 | Stop reason: SDUPTHER

## 2021-02-08 NOTE — TELEPHONE ENCOUNTER
PT'S DAUGHTER CALLED STATING THE ORDER FOR THE 90 DAY MAIL ORDER SUPPLY WAS NEVER RECEIVED BY [facility], AND THEY NEED THIS SENT THROUGH TODAY. THEY RECEIVED EVERYTHING EXCEPT THE ATENOLOL.    SHE CAN BE REACHED [phone]

## 2021-02-11 RX ORDER — ATENOLOL 25 MG/1
25 TABLET ORAL DAILY
Qty: 90 TABLET | Refills: 3
Start: 2021-02-11 | End: 2021-02-15 | Stop reason: SDUPTHER

## 2021-02-12 RX ORDER — ATENOLOL 25 MG/1
25 TABLET ORAL DAILY
Qty: 90 TABLET | Refills: 3 | Status: CANCELLED
Start: 2021-02-12

## 2021-02-15 DIAGNOSIS — R53.1 WEAKNESS: Primary | ICD-10-CM

## 2021-02-15 RX ORDER — ATENOLOL 25 MG/1
25 TABLET ORAL DAILY
Qty: 90 TABLET | Refills: 3
Start: 2021-02-15 | End: 2021-02-16 | Stop reason: SDUPTHER

## 2021-02-16 RX ORDER — ATENOLOL 25 MG/1
25 TABLET ORAL DAILY
Qty: 90 TABLET | Refills: 3 | Status: SHIPPED | OUTPATIENT
Start: 2021-02-16 | End: 2021-02-16 | Stop reason: SDUPTHER

## 2021-02-16 RX ORDER — ATENOLOL 25 MG/1
25 TABLET ORAL DAILY
Qty: 90 TABLET | Refills: 3 | Status: CANCELLED | OUTPATIENT
Start: 2021-02-16

## 2021-02-16 RX ORDER — ATENOLOL 25 MG/1
25 TABLET ORAL DAILY
Qty: 90 TABLET | Refills: 3 | Status: SHIPPED | OUTPATIENT
Start: 2021-02-16 | End: 2021-11-30

## 2021-02-19 ENCOUNTER — TELEPHONE (OUTPATIENT)
Dept: FAMILY MEDICINE CLINIC | Facility: CLINIC | Age: 86
End: 2021-02-19

## 2021-02-19 NOTE — TELEPHONE ENCOUNTER
SPOKE TO PT'S DAUGHTER SEBAS ABOUT THE ORDER DR BAXTER PUT IN FOR PATIENT TO HAVE PHYSICAL THERAPY. PT'S DAUGHTER NEEDS A ORDER PUT IN FOR HOME HEALTH ([facility]) TO COME OUT TO SEE PATIENT BECAUSE SHE IS 89 YEARS OLD AND IT'S TO HARD TO GET HER OUT. DAUGHTER SAID SHE NEEDS PT FOR STRENGTHENING IN LEGS AND ARMS BECAUSE PT IS VERY WEAK.     ALSO DAUGHTER IS ASKING IF DR BAXTER WOULD PUT PT ON A LOW DOSE ANTIBIOTIC BECAUSE OF HER FREQUENT UTI'S AND SAID ONCE PT COMPLETES HER ROUND OF ANTIBIOTIC'S THAT 2 WEEKS LATER PT WILL HAVE ANOTHER UTI.    [place], [place]   [phone]    PT'S DAUGHTER  SEBAS MOHAMUD  [phone]

## 2021-02-24 ENCOUNTER — OFFICE VISIT (OUTPATIENT)
Dept: FAMILY MEDICINE CLINIC | Facility: CLINIC | Age: 86
End: 2021-02-24

## 2021-02-24 ENCOUNTER — TELEPHONE (OUTPATIENT)
Dept: FAMILY MEDICINE CLINIC | Facility: CLINIC | Age: 86
End: 2021-02-24

## 2021-02-24 VITALS
HEART RATE: 63 BPM | RESPIRATION RATE: 18 BRPM | BODY MASS INDEX: 24.7 KG/M2 | SYSTOLIC BLOOD PRESSURE: 142 MMHG | DIASTOLIC BLOOD PRESSURE: 64 MMHG | WEIGHT: 130.8 LBS | HEIGHT: 61 IN | TEMPERATURE: 97.5 F | OXYGEN SATURATION: 97 %

## 2021-02-24 DIAGNOSIS — R39.198 DIFFICULTY URINATING: Primary | ICD-10-CM

## 2021-02-24 DIAGNOSIS — N30.91 CYSTITIS WITH HEMATURIA: ICD-10-CM

## 2021-02-24 LAB
BILIRUB BLD-MCNC: NEGATIVE MG/DL
CLARITY, POC: CLEAR
COLOR UR: YELLOW
GLUCOSE UR STRIP-MCNC: NEGATIVE MG/DL
KETONES UR QL: NEGATIVE
LEUKOCYTE EST, POC: ABNORMAL
NITRITE UR-MCNC: NEGATIVE MG/ML
PH UR: 5 [PH] (ref 5–8)
PROT UR STRIP-MCNC: NEGATIVE MG/DL
RBC # UR STRIP: ABNORMAL /UL
SP GR UR: 1.01 (ref 1–1.03)
UROBILINOGEN UR QL: NORMAL

## 2021-02-24 PROCEDURE — 99213 OFFICE O/P EST LOW 20 MIN: CPT | Performed by: NURSE PRACTITIONER

## 2021-02-24 PROCEDURE — 81003 URINALYSIS AUTO W/O SCOPE: CPT | Performed by: NURSE PRACTITIONER

## 2021-02-24 RX ORDER — CIPROFLOXACIN 250 MG/1
250 TABLET, FILM COATED ORAL 2 TIMES DAILY
Qty: 10 TABLET | Refills: 0 | Status: SHIPPED | OUTPATIENT
Start: 2021-02-24 | End: 2021-03-01

## 2021-02-24 NOTE — TELEPHONE ENCOUNTER
Caller: Monroe Donis    Relationship: Emergency Contact    Best call back number: [phone] (H)    What orders are you requesting (i.e. lab or imaging): REFERRAL TO [facility]    In what timeframe would the patient need to come in: ASAP    Where will you receive your lab/imaging services: [facility]    Additional notes: MONROE CALLED TO ADVISE THAT [facility] WOULD NOT SCHEDULE PATIENT AN APPOINTMENT UNTIL THEY HAVE RECEIVED A REFERRAL, TEST RESULT, INSURANCE INFO & MEDICAL RECORDS ON PATIENT.     PLEASE CONTACT MONROE ONCE ALL INFO NEEDED FOR APPOINTMENT HAS BEEN SENT OVER TO [facility]S OFFICE.    THANKS

## 2021-02-24 NOTE — PROGRESS NOTES
Subjective     Mary Shi is a 89 y.o.. female.     Pt's daughter stating pt has had multiple UTI's in recent past. Pt's daughter stating pt was prescribed bactrim, then augmentin, then Cefdinir. Pt's daughter stating pt would be off abx for 2-14 days then have re-occurring symptoms. Pt's daughter stating pt would take full script.     Urinary Tract Infection   This is a new problem. The current episode started yesterday. The problem has been unchanged. There has been no fever. Pertinent negatives include no chills, frequency, hematuria, nausea, urgency or vomiting. Her past medical history is significant for recurrent UTIs.       The following portions of the patient's history were reviewed and updated as appropriate: allergies, current medications, past family history, past medical history, past social history, past surgical history and problem list.    Past Medical History:   Diagnosis Date   • 'light-for-dates' infant with signs of fetal malnutrition    • Amaurosis fugax    • Anemia    • Aortic stenosis     s/p TAVR    • Atrial fibrillation (CMS/[facility])    • Cancer (CMS/[facility], located within [facility])     skin   • CAP (community acquired pneumonia)    • Carotid artery stenosis     Per duplex 12/16/2016-proximal right internal carotid artery mild stenosis and proximal left moderate stenosis   • Cervical spine disease    • Cognitive disorder    • Congestive heart failure (CMS/HCC)    • Dementia (CMS/HCC)    • Diabetes mellitus (CMS/HCC)    • Diastolic dysfunction    • Dyspnea on exertion    • Generalized osteoarthritis of multiple sites    • GERD (gastroesophageal reflux disease)    • Gout    • Health care maintenance    • Hiatal hernia    • HL (hearing loss)    • Hyperlipidemia    • Hypertension    • Memory loss    • Mitral valve stenosis     Moderate per echocardiogram 2/2017; severe mitral annular calcification   • Nasal lesion    • Nasal stenosis    • 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)    • Osteoarthritis     multiple sites   • PAF  "(paroxysmal atrial fibrillation) (CMS/[facility])    • Paroxysmal atrial fibrillation (CMS/[facility]) 9/11/2019   • Patient had no falls in past year    • Peripheral neuropathy    • Peripheral vascular disease (CMS/[facility])    • PONV (postoperative nausea and vomiting)    • Rheumatic mitral stenosis    • Shingles    • Stroke (CMS/[facility])    • Syncope    • Systolic hypertension    • Varicose veins        Past Surgical History:   Procedure Laterality Date   • AORTIC VALVE REPAIR/REPLACEMENT N/A 12/27/2016    Procedure: Transfemoral LEFT Transcatheter Aortic Valve Replacement TRANSESOPHAGEAL ECHOCARDIOGRAM WITH ANESTHESIA;  Surgeon: Eduardo Brown MD;  Location: [facility] OR 18/19;  Service:    • BLADDER REPAIR     • CARDIAC CATHETERIZATION N/A 12/14/2016    Procedure: Right Heart Cath;  Surgeon: Eduardo Brown MD;  Location: [facility] CATH INVASIVE LOCATION;  Service:    • CARDIAC CATHETERIZATION N/A 12/14/2016    Procedure: Coronary angiography;  Surgeon: Eduardo Brown MD;  Location: [facility] CATH INVASIVE LOCATION;  Service:    • HYSTERECTOMY     • KNEE SURGERY         Review of Systems   Constitutional: Negative for chills and fever.   Gastrointestinal: Negative for nausea and vomiting.   Genitourinary: Positive for difficulty urinating. Negative for dysuria, frequency, hematuria and urgency.   Psychiatric/Behavioral: Positive for confusion.       Allergies   Allergen Reactions   • Codeine Itching and GI Intolerance   • Morphine And Related Itching       Objective     Vitals:    02/24/21 1350   BP: 142/64   BP Location: Left arm   Patient Position: Sitting   Pulse: 63   Resp: 18   Temp: 97.5 °F (36.4 °C)   TempSrc: Oral   SpO2: 97%   Weight: 59.3 kg (130 lb 12.8 oz)   Height: 154.9 cm (60.98\")     Body mass index is 24.73 kg/m².    Physical Exam  Vitals signs reviewed.   HENT:      Head: Normocephalic.   Eyes:      Pupils: Pupils are equal, round, and reactive to light.   Cardiovascular:      Rate and Rhythm: Normal rate and " regular rhythm.   Pulmonary:      Effort: Pulmonary effort is normal.      Breath sounds: Normal breath sounds.   Musculoskeletal:      Comments: In wheelchair   Neurological:      Mental Status: She is alert.   Psychiatric:         Behavior: Behavior normal.           Current Outpatient Medications:   •  acetaminophen (TYLENOL) 650 MG 8 hr tablet, Take 650 mg by mouth Every 8 (Eight) Hours As Needed for Mild Pain ., Disp: , Rfl:   •  apixaban (Eliquis) 2.5 MG tablet tablet, Take 1 tablet by mouth Every 12 (Twelve) Hours., Disp: 180 tablet, Rfl: 3  •  atenolol (TENORMIN) 25 MG tablet, Take 1 tablet by mouth Daily., Disp: 90 tablet, Rfl: 3  •  atorvastatin (LIPITOR) 20 MG tablet, Take 1 tablet by mouth Daily., Disp: 90 tablet, Rfl: 3  •  bumetanide (BUMEX) 1 MG tablet, Take 1 tablet by mouth Daily., Disp: 90 tablet, Rfl: 3  •  cefuroxime (CEFTIN) 250 MG tablet, Take 1 tablet by mouth 2 (Two) Times a Day., Disp: 14 tablet, Rfl: 1  •  ciprofloxacin (Cipro) 250 MG tablet, Take 1 tablet by mouth 2 (Two) Times a Day for 5 days., Disp: 10 tablet, Rfl: 0  •  glucose blood test strip, TEST TWO TIMES A DAY, Disp: 200 each, Rfl: 10  •  Lancets (FREESTYLE) lancets, USE TO TEST BLOOD SUGAR ONCE DAILY, Disp: 100 each, Rfl: 4  •  memantine (Namenda XR) 28 MG capsule sustained-release 24 hr extended release capsule, Take 1 capsule by mouth Daily., Disp: 90 capsule, Rfl: 3  •  metFORMIN (GLUCOPHAGE) 500 MG tablet, Take one half tablet by mouth twice daily, Disp: 90 tablet, Rfl: 3  •  Multiple Vitamins-Minerals (MULTIVITAMIN WITH MINERALS) tablet tablet, Take 1 tablet by mouth Daily., Disp: , Rfl:   •  omeprazole (priLOSEC) 20 MG capsule, Take 1 capsule by mouth Daily., Disp: 90 capsule, Rfl: 3  •  silver sulfadiazine (Silvadene) 1 % cream, Apply  topically to the appropriate area as directed Daily., Disp: 25 g, Rfl: 0    Recent Results (from the past 168 hour(s))   POC Urinalysis Dipstick, Automated    Collection Time: 02/24/21   2:28 PM    Specimen: Urine   Result Value Ref Range    Color Yellow Yellow, Straw, Dark Yellow, [place]    Clarity, UA Clear Clear    Specific Gravity  1.015 1.005 - 1.030    pH, Urine 5.0 5.0 - 8.0    Leukocytes Moderate (2+) (A) Negative    Nitrite, UA Negative Negative    Protein, POC Negative Negative mg/dL    Glucose, UA Negative Negative, 1000 mg/dL (3+) mg/dL    Ketones, UA Negative Negative    Urobilinogen, UA Normal Normal    Bilirubin Negative Negative    Blood, UA Trace (A) Negative   Urine Culture - Urine, Urine, Clean Catch    Collection Time: 02/24/21  2:29 PM    Specimen: Urine, Clean Catch    UC   Result Value Ref Range    Urine Culture Final report     Result 1 Comment        Assessment/Plan   Diagnoses and all orders for this visit:    1. Difficulty urinating (Primary)  -     POC Urinalysis Dipstick, Automated    2. Cystitis with hematuria  -     Urine Culture - Urine, Urine, Clean Catch  -     ciprofloxacin (Cipro) 250 MG tablet; Take 1 tablet by mouth 2 (Two) Times a Day for 5 days.  Dispense: 10 tablet; Refill: 0  -     Ambulatory Referral to Gynecology        Patient Instructions   Pt and pt's daughter informed pt needs to increase her water intake.       Return for recommend following up with Dr. Sanders, Uro./GYN for re-occuring UTI's and possible prolapse.

## 2021-02-24 NOTE — TELEPHONE ENCOUNTER
PATIENTS DAUGHTER MONROE CALLING BECAUSE PATIENT WOKE UP IN THE MIDDLE OF THE NIGHT TALKING VERY CRAZY AND OUT OF HER MIND. SHE USED A HOME UTI TEST KIT AND IT WAS VERY POSITIVE. PATIENT HAS BEEN BATTLING UTI'S FOR MONTHS NOW, AND ANTIBIOTICS CLEAR IT UP FOR 2WKS OR SO THEN ITS COMES RIGHT BACK.   WE SCHEDULED AN APPT WITH JORDON GARRETT TODAY FOR 2:15 BECAUSE DR. BAXTER HAS NOTHING AVAILABLE, HOWEVER MONROE WOULD RATHER NOT BRING HER IN IF THERES SOMETHING DR. BAXTER COULD CALL IN TO GET RID OF IT THEN GET HER IN WITH URINOLOGIST THAT'D BE BETTER THAN HAVING TO DRAG PATIENT OUT. HOWEVER MONROE HAS KEPT THE 2:15 APPT INCASE DR. BAXTER REALLY WANTS HER TO COME IN...    PLEASE CALL MONROE AND DISCUSS PATIENTS OPTIONS, ON RATHER WE WANT HER TO COME IN, IF WE THINK ITS BEST TO JUST SEND ANOTHER ANTIBIOTIC (VERIFIED PHARMACY BELOW, JUST INCASE) OR IF SHE NEEDS TO GO TO THE ER. MONROE JUST WANTS A CALL BACK BEFORE THE APPT TODAY IF AT ALL POSSIBLE.     INGE OLIVARES [facility] - [place], [place] - [address]  - [phone] [facility] [phone] LEI      MONROE CAN BE REACHED AT: [phone]

## 2021-02-25 ENCOUNTER — TELEPHONE (OUTPATIENT)
Dept: FAMILY MEDICINE CLINIC | Facility: CLINIC | Age: 86
End: 2021-02-25

## 2021-02-25 NOTE — TELEPHONE ENCOUNTER
LIZETT,    PT'S DAUGHTER IS WANTING A HOME HEALTH ORDER FOR PHYSICAL THERAPY FOR PT WHO IS 89 YEARS OLD AND BECAUSE IT'S TO HARD TO GET PT OUT OF HOME.     IF DR BAXTER AGREES TO DO SO HE WILL HAVE TO DO A ADDENDUM TO PT'S LAST OFFICE VISIT ON 1-13-21 STATING PT WOULD BENEFIT FROM HOME HEALTH FOR STRENGTHEN IN BILATERAL LEGS AND ARMS BECAUSE OF WEAKNESS.     PT LIVES IN [place], [place] AND PT HAS HAD [facility] IN [place] COME OUT TO SEE HER IN THE PT.     PLEASE ADVISE.     THANKS

## 2021-02-26 DIAGNOSIS — R53.1 WEAKNESS: Primary | ICD-10-CM

## 2021-02-26 LAB
BACTERIA UR CULT: NORMAL
BACTERIA UR CULT: NORMAL

## 2021-03-15 ENCOUNTER — TELEPHONE (OUTPATIENT)
Dept: FAMILY MEDICINE CLINIC | Facility: CLINIC | Age: 86
End: 2021-03-15

## 2021-03-15 NOTE — TELEPHONE ENCOUNTER
I STILL DON'T SHOW THAT DR BAXTER EVER DID A ADDENDUM TO 1- OFFICE NOTE STATING PT NEEDS HOME HEALTH AND FOR WHAT HOME HEALTH SERVICES PT WILL NEED.     [facility] IN [place], [place] WILL BE ABLE TO GO SEE PT ONCE A ADDENDUM HAS BEEN DONE.     THANKS

## 2021-03-24 ENCOUNTER — TELEPHONE (OUTPATIENT)
Dept: FAMILY MEDICINE CLINIC | Facility: CLINIC | Age: 86
End: 2021-03-24

## 2021-03-24 NOTE — TELEPHONE ENCOUNTER
Caller: JORDON WITH [facility]    Relationship:     Best call back number: [phone]    What orders are you requesting (i.e. lab or imaging): TO START CARE    In what timeframe would the patient need to come in: ASAP    Where will you receive your lab/imaging services: [facility]    Additional notes: JORDON CALLED TO REQUEST AN ORDER TO START CARE FOR PATIENT DUE TO HER HAVING A SPOT ON HER BOTTOM THAT JORDON IS WORRIED IT WILL START BREAK DOWN.     PLEASE CONTACT JORDON TO ADVISE.         THANKS

## 2021-03-31 ENCOUNTER — TELEPHONE (OUTPATIENT)
Dept: FAMILY MEDICINE CLINIC | Facility: CLINIC | Age: 86
End: 2021-03-31

## 2021-04-01 NOTE — TELEPHONE ENCOUNTER
Called and s/w popeye from A and explained to her pt will need to go to [facility] to get xrays done. She is going to call the family and let them know.     -brendan

## 2021-04-05 ENCOUNTER — TELEPHONE (OUTPATIENT)
Dept: CARDIOLOGY | Facility: CLINIC | Age: 86
End: 2021-04-05

## 2021-04-05 NOTE — TELEPHONE ENCOUNTER
Pt's daughter is calling to ask about medications. Pt is retaining fluids but is coming in to see Aimee first thing tomorrow morning

## 2021-04-06 ENCOUNTER — HOSPITAL ENCOUNTER (OUTPATIENT)
Dept: GENERAL RADIOLOGY | Facility: HOSPITAL | Age: 86
Discharge: HOME | End: 2021-04-06
Admitting: NURSE PRACTITIONER

## 2021-04-06 ENCOUNTER — TELEPHONE (OUTPATIENT)
Dept: FAMILY MEDICINE CLINIC | Facility: CLINIC | Age: 86
End: 2021-04-06

## 2021-04-06 ENCOUNTER — OFFICE VISIT (OUTPATIENT)
Dept: CARDIOLOGY | Facility: CLINIC | Age: 86
End: 2021-04-06

## 2021-04-06 VITALS
SYSTOLIC BLOOD PRESSURE: 160 MMHG | WEIGHT: 133 LBS | BODY MASS INDEX: 25.11 KG/M2 | HEIGHT: 61 IN | DIASTOLIC BLOOD PRESSURE: 60 MMHG | HEART RATE: 74 BPM

## 2021-04-06 DIAGNOSIS — I05.0 RHEUMATIC MITRAL STENOSIS: ICD-10-CM

## 2021-04-06 DIAGNOSIS — I10 ESSENTIAL HYPERTENSION: Primary | ICD-10-CM

## 2021-04-06 DIAGNOSIS — M25.511 ACUTE PAIN OF RIGHT SHOULDER: ICD-10-CM
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
## 2021-04-06 DIAGNOSIS — I06.0 RHEUMATIC AORTIC STENOSIS: ICD-10-CM

## 2021-04-06 PROCEDURE — 99214 OFFICE O/P EST MOD 30 MIN: CPT | Performed by: NURSE PRACTITIONER

## 2021-04-06 PROCEDURE — 93000 ELECTROCARDIOGRAM COMPLETE: CPT | Performed by: NURSE PRACTITIONER

## 2021-04-06 PROCEDURE — 73030 X-RAY EXAM OF SHOULDER: CPT

## 2021-04-06 NOTE — PROGRESS NOTES
Date of Office Visit: 21  Encounter Provider: RAYRAY Reddy  Place of Service: [facility] CARDIOLOGY  Patient Name: Mary Shi  :3/3/1925    Chief Complaint   Patient presents with   • S/P TAVR (transcatheter aortic valve replacement)   • Acute on chronic diastolic CHF (congestive heart failure) (C   • Congestive Heart Failure   • Atrial Fibrillation   • Follow-up   :     HPI: Mary Shi is a 89 y.o. female  with history of aortic stenosis status post TAVR, mitral stenoses, hyperlipidemia, chronic diastolic distal heart failure, stroke, atrial fibrillation, diabetes mellitus, and pulmonary hypertension.  She is followed by Dr. Mcmahan.  I will visit with her for the first time today and have reviewed her medical record.    She had issues with recurrent diastolic congestive heart failure over the years.  She underwent TAVR with a #23 sapient valve in 2016.  In , she suffered a stroke and was diagnosed with new onset atrial fibrillation.  In 2020 she had an echocardiogram which showed preserved left ventricular systolic function, indeterminate diastolic function, trivial perivalvular aortic regurgitation, severe mitral annular calcification, severe bileaflet mitral valve thickening with mild mitral regurgitation, moderate to severe mitral valve stenoses with a mean gradient of 9 mmHg.  There was severe pulmonary hypertension with RVSP 62 mmHg.  She presents for fluid retention.  She had some ham over the weekend.  On  she felt fatigued in the shower.  She had a 2 pound weight gain overnight.  She continues to complain of fatigue but no shortness of breath or swelling.  They weigh her daily.  She lives with her daughter who takes very good care of her.  Her weight fluctuates between 132-137 pounds.      Allergies   Allergen Reactions   • Codeine Itching and GI Intolerance   • Morphine And Related Itching           Family and social  "history reviewed.     ROS  All other systems were reviewed and are negative          Objective:     Vitals:    04/06/21 0810   BP: 160/60   BP Location: Left arm   Patient Position: Sitting   Pulse: 74   Height: 154.9 cm (61\")     Body mass index is 24.71 kg/m².    PHYSICAL EXAM:  Constitutional:       General: Not in acute distress.     Appearance: Well-developed. Not diaphoretic.   HENT:      Head: Normocephalic.   Pulmonary:      Effort: Pulmonary effort is normal. No respiratory distress.      Breath sounds: Examination of the left-lower field reveals decreased breath sounds and rales. Decreased breath sounds present. No wheezing. No rhonchi. Rales present.   Cardiovascular:      Normal rate. Regular rhythm.      Murmurs: There is a grade 2/6 systolic murmur at the LLSB.   Pulses:     Radial: 2+ bilaterally.  Skin:     General: Skin is warm and dry. There is no cyanosis.      Findings: No rash.   Neurological:      Mental Status: Alert and oriented to person, place, and time.   Psychiatric:         Behavior: Behavior normal.         Thought Content: Thought content normal.         Judgment: Judgment normal.           ECG 12 Lead    Date/Time: 4/6/2021 8:31 AM  Performed by: Aimee Diaz APRN  Authorized by: Aimee Diaz APRN   Comparison: compared with previous ECG   Similar to previous ECG  Rhythm: atrial fibrillation  Rate: normal    Clinical impression: abnormal EKG              Current Outpatient Medications   Medication Sig Dispense Refill   • acetaminophen (TYLENOL) 650 MG 8 hr tablet Take 650 mg by mouth Every 8 (Eight) Hours As Needed for Mild Pain .     • apixaban (Eliquis) 2.5 MG tablet tablet Take 1 tablet by mouth Every 12 (Twelve) Hours. 180 tablet 3   • atenolol (TENORMIN) 25 MG tablet Take 1 tablet by mouth Daily. 90 tablet 3   • atorvastatin (LIPITOR) 20 MG tablet Take 1 tablet by mouth Daily. 90 tablet 3   • bumetanide (BUMEX) 1 MG tablet Take 1 tablet by mouth Daily. 90 tablet 3   • glucose " blood test strip TEST TWO TIMES A  each 10   • Lancets (FREESTYLE) lancets USE TO TEST BLOOD SUGAR ONCE DAILY 100 each 4   • memantine (Namenda XR) 28 MG capsule sustained-release 24 hr extended release capsule Take 1 capsule by mouth Daily. 90 capsule 3   • metFORMIN (GLUCOPHAGE) 500 MG tablet Take one half tablet by mouth twice daily 90 tablet 3   • Multiple Vitamins-Minerals (MULTIVITAMIN WITH MINERALS) tablet tablet Take 1 tablet by mouth Daily.     • omeprazole (priLOSEC) 20 MG capsule Take 1 capsule by mouth Daily. 90 capsule 3   • silver sulfadiazine (Silvadene) 1 % cream Apply  topically to the appropriate area as directed Daily. 25 g 0   • cefuroxime (CEFTIN) 250 MG tablet Take 1 tablet by mouth 2 (Two) Times a Day. 14 tablet 1     No current facility-administered medications for this visit.     Assessment:       Diagnosis Plan   1. Essential hypertension     2. Rheumatic mitral stenosis     3. Rheumatic aortic stenosis          Orders Placed This Encounter   Procedures   • ECG 12 Lead     This order was created via procedure documentation     Order Specific Question:   Release to patient     Answer:   Immediate         Plan:   1.  89-year-old female with rheumatic aortic valve stenosis status post TAVR with #23 sapient valve in 2016 with only minimal perivalvular regurgitation on echo March 2020  2.  Rheumatic mitral valve stenoses-moderate to severe on echo March 2020  3.  Pulmonary hypertension severe  4.  Chronic atrial fibrillation.  She has been rate controlled.  [id number] score is 8 maintained on apixaban 2.5 mg BID.  Rate is controlled  5.  History of CVA September 2019  6.  Hypertension blood pressure overall stable based on home readings  7.  Hyperlipidemia  8.  Carotid artery stenoses moderate on the left and mild in the right December 2016  9.  Diabetes mellitus last hemoglobin A1c 7.0  10.  Chronic diastolic congestive heart failure.  She actually appears euvolemic on exam.  I gave  the daughter okay to give her 1 additional half tablet of Bumex for 2 to 3 pound weight gain overnight.  She verbalized understanding and is to call with any questions or concerns  11.  Recurrent UTI  12.  Fall last week with residual right shoulder pain-daughter has requested a x-ray of that so we will arrange for  13. Dementia- patient is a poor historian  Keep 2-month appointment as scheduled          It has been a pleasure to participate in this patient's care.      Thank you,  RAYRAY Reddy      **I used Dragon to dictate this note:**

## 2021-04-06 NOTE — TELEPHONE ENCOUNTER
PATIENT DAUGHTER CALLED AND STATED PATIENT DID GET XRAYS OF SHOULDER THIS MORNING 04/06 AT [facility]. DAUGHTER WOULD LIKE A CALL WHEN RESULTS ARE BACK     PLEASE ADVISE     [phone]

## 2021-04-07 ENCOUNTER — TELEPHONE (OUTPATIENT)
Dept: FAMILY MEDICINE CLINIC | Facility: CLINIC | Age: 86
End: 2021-04-07

## 2021-04-07 NOTE — TELEPHONE ENCOUNTER
Caller: ELIUD    Relationship: [facility]    Best call back number: [phone]  What form or medical record are you requesting: LAST OFFICE NOTE    Who is requesting this form or medical record from you: ELIUD [facility]    How would you like to receive the form or medical records (pick-up, mail, fax):   If fax, what is the fax number:[phone]  If mail, what is the address:   If pick-up, provide patient with address and location details    Timeframe paperwork needed: NEEDS IT THIS WEEK.     Additional note

## 2021-04-21 ENCOUNTER — TELEPHONE (OUTPATIENT)
Dept: FAMILY MEDICINE CLINIC | Facility: CLINIC | Age: 86
End: 2021-04-21

## 2021-04-21 NOTE — TELEPHONE ENCOUNTER
Caller: ABDELRAHMAN    Relationship: [facility]     Best call back number: [phone]    What orders are you requesting (i.e. lab or imaging): SIGNED OFFICE NOTE FROM 01/13/21.    In what timeframe would the patient need to come in: MELISSA QUICK STATES SHE HAS FAXED A FEW TIMES BUT HAS NOT RECEIVED BACK.  SHE IS PROVIDING A NEW FAX NUMBER TO TRY.    Additional notes: IS NEEDING A OFFICE NOTE FOR A FACE TO FACE VISIT ON 01/13/21.      FAX # [phone]

## 2021-04-21 NOTE — TELEPHONE ENCOUNTER
Caller: Aiyana Donis    Relationship: Emergency Contact    Best call back number: [phone]    What is the best time to reach you: ANY    Who are you requesting to speak with (clinical staff, provider,  specific staff member): CLINICAL      What was the call regarding: NEEDS SOMETHING FOR PAIN SUCH AS A EPIDURAL SHOT FOR SCIATIC PAIN DAUGHTER STATES THAT SHE NEEDS RELIEF OF SOME SORT.     Do you require a callback: YES

## 2021-04-22 NOTE — TELEPHONE ENCOUNTER
TATY FROM  [employer] [facility] [employer] CALLED IN AGAIN STATING THAT THE ORDER NEEDS TO BE SIGNED AND DATED BY THE PROVIDER.  PLEASE RE-FAX -[phone]    PLEASE ADVISE: [phone]

## 2021-05-03 ENCOUNTER — TELEPHONE (OUTPATIENT)
Dept: FAMILY MEDICINE CLINIC | Facility: CLINIC | Age: 86
End: 2021-05-03

## 2021-05-03 NOTE — TELEPHONE ENCOUNTER
DAUGHTER STATES PATIENT GETS A UTI ABOUT EVERY 6 WEEKS. PATIENT TALKS OUT OF HER MIND AND IS NOT COHERENT UNTIL SHE GETS 4 DOSES OF MEDICATION. PATIENT SAW DR MORRO [phone] AND HE GAVE HER CIPROFLOXACIN 250 MG. PATIENT JUST FINISHED 7 DAY DOSAGE.    PATIENT HAS HAD HER BLADDER SCANNED AND SHE'S BEEN SCOPED AND THEY DID NOT FIND ANYTHING THAT WOULD CAUSE THE ON GOING PROBLEMS.    PATIENTS DAUGHTER HAS BEEN TOLD THAT SOME TIMES ELDERLY PATIENTS ARE GIVEN A LOW DOSE ANTIBIOTIC TO HELP REOCCURRING UTI.    PATIENT TAKES PROBIOTIC, CRANBERRY PILL, DRINKS LIQUIDS THROUGHOUT THE DAY. DAUGHTERS WORK HARD TO MAKE SURE PATIENT DRY AND CLEAN. UTI'S CONTINUE TO HAPPEN AND PATIENT NEEDS HELP.    DAUGHTER CAN NOT GET DR MORRO KO TO CALL HER BACK.    PLEASE CALL:  [phone] (ANSHUL

## 2021-05-04 RX ORDER — SULFAMETHOXAZOLE AND TRIMETHOPRIM 400; 80 MG/1; MG/1
1 TABLET ORAL WEEKLY
Qty: 30 TABLET | Refills: 1 | Status: SHIPPED | OUTPATIENT
Start: 2021-05-04 | End: 2021-06-02

## 2021-05-04 NOTE — TELEPHONE ENCOUNTER
spoked to daughter Aiyana about putting her mother on bactrim once a week to help with reoccuring UTI. Pt daughter understood .

## 2021-06-02 ENCOUNTER — OFFICE VISIT (OUTPATIENT)
Dept: CARDIOLOGY | Facility: CLINIC | Age: 86
End: 2021-06-02

## 2021-06-02 ENCOUNTER — OFFICE VISIT (OUTPATIENT)
Dept: FAMILY MEDICINE CLINIC | Facility: CLINIC | Age: 86
End: 2021-06-02

## 2021-06-02 VITALS
DIASTOLIC BLOOD PRESSURE: 60 MMHG | WEIGHT: 133 LBS | HEART RATE: 72 BPM | BODY MASS INDEX: 25.11 KG/M2 | SYSTOLIC BLOOD PRESSURE: 118 MMHG | HEIGHT: 61 IN

## 2021-06-02 VITALS
HEIGHT: 61 IN | HEART RATE: 76 BPM | WEIGHT: 131.2 LBS | DIASTOLIC BLOOD PRESSURE: 63 MMHG | RESPIRATION RATE: 18 BRPM | TEMPERATURE: 97.3 F | BODY MASS INDEX: 24.77 KG/M2 | OXYGEN SATURATION: 97 % | SYSTOLIC BLOOD PRESSURE: 138 MMHG

## 2021-06-02 DIAGNOSIS — I50.33 ACUTE ON CHRONIC DIASTOLIC CHF (CONGESTIVE HEART FAILURE) (HCC): Primary | ICD-10-CM

## 2021-06-02 DIAGNOSIS — I27.20 PULMONARY HYPERTENSION (HCC): ICD-10-CM

## 2021-06-02 DIAGNOSIS — I48.11 LONGSTANDING PERSISTENT ATRIAL FIBRILLATION (HCC): ICD-10-CM

## 2021-06-02 DIAGNOSIS — I06.0 RHEUMATIC AORTIC STENOSIS: ICD-10-CM

## 2021-06-02 DIAGNOSIS — I05.0 RHEUMATIC MITRAL STENOSIS: ICD-10-CM

## 2021-06-02 DIAGNOSIS — I10 ESSENTIAL HYPERTENSION: ICD-10-CM

## 2021-06-02 DIAGNOSIS — Z95.2 S/P TAVR (TRANSCATHETER AORTIC VALVE REPLACEMENT): ICD-10-CM

## 2021-06-02 DIAGNOSIS — R41.82 ALTERED MENTAL STATUS, UNSPECIFIED ALTERED MENTAL STATUS TYPE: Primary | ICD-10-CM

## 2021-06-02 DIAGNOSIS — N39.0 FREQUENT UTI: ICD-10-CM

## 2021-06-02 DIAGNOSIS — R35.0 URINE FREQUENCY: Primary | ICD-10-CM

## 2021-06-02 PROBLEM — I63.512 ACUTE ISCHEMIC LEFT MCA STROKE: Status: RESOLVED | Noted: 2019-09-10 | Resolved: 2021-06-02

## 2021-06-02 PROBLEM — J18.9 PNEUMONIA DUE TO INFECTIOUS ORGANISM: Status: RESOLVED | Noted: 2020-02-26 | Resolved: 2021-06-02

## 2021-06-02 PROBLEM — J18.9 CAP (COMMUNITY ACQUIRED PNEUMONIA): Status: RESOLVED | Noted: 2020-02-25 | Resolved: 2021-06-02

## 2021-06-02 LAB
BILIRUB BLD-MCNC: NEGATIVE MG/DL
CLARITY, POC: ABNORMAL
COLOR UR: YELLOW
GLUCOSE UR STRIP-MCNC: NEGATIVE MG/DL
KETONES UR QL: NEGATIVE
LEUKOCYTE EST, POC: NEGATIVE
NITRITE UR-MCNC: NEGATIVE MG/ML
PH UR: 5.5 [PH] (ref 5–8)
PROT UR STRIP-MCNC: NEGATIVE MG/DL
RBC # UR STRIP: NEGATIVE /UL
SP GR UR: 1.01 (ref 1–1.03)
UROBILINOGEN UR QL: NORMAL

## 2021-06-02 PROCEDURE — 99213 OFFICE O/P EST LOW 20 MIN: CPT | Performed by: INTERNAL MEDICINE

## 2021-06-02 PROCEDURE — 93000 ELECTROCARDIOGRAM COMPLETE: CPT | Performed by: NURSE PRACTITIONER

## 2021-06-02 PROCEDURE — 81003 URINALYSIS AUTO W/O SCOPE: CPT | Performed by: INTERNAL MEDICINE

## 2021-06-02 PROCEDURE — 99214 OFFICE O/P EST MOD 30 MIN: CPT | Performed by: NURSE PRACTITIONER

## 2021-06-02 RX ORDER — NITROFURANTOIN 25; 75 MG/1; MG/1
100 CAPSULE ORAL 2 TIMES DAILY
COMMUNITY
Start: 2021-05-04 | End: 2021-12-16

## 2021-06-02 NOTE — PROGRESS NOTES
Date of Office Visit: 2021  Encounter Provider: RAYRAY Carranza  Place of Service: [facility] CARDIOLOGY  Patient Name: Mary Shi  :3/3/1925  Primary Cardiologist: Dr. Elissa Mcmahan    Chief Complaint   Patient presents with   • Follow-up   :     HPI: Mary Shi is a pleasant 89 y.o. female who presents today for cardiac follow-up visit.  I have reviewed her medical records.    She has a known history of aortic stenosis and mitral stenosis.  In 2016, she underwent TAVR with #23 JOSIE valve.  She did experience acute on chronic diastolic heart failure over the years. In 2019, she suffered a stroke and was diagnosed with new onset atrial fibrillation.     On 3/9/2020 repeat echocardiogram showed the following: EF 66%, diastolic function indeterminate, trivial paravalvular aortic regurgitation, severe mitral annular calcification, severe bileaflet mitral valve thickening, mild mitral regurgitation, and moderate to severe mitral valve stenosis with mean gradient of 9 mmHg, mild tricuspid regurgitation, and severe pulmonary hypertension with RVSP of 62 mmHg    In 2021, she saw RAYRAY Perez in our office for fluid retention, fatigue in the shower, and 2 pound weight gain overnight.  She had had some ham over the weekend.  Her baseline weight is between 132-137 pounds.  She appeared euvolemic on exam.  She was recommended to take 1 additional half tablet of bumetanide for 2-3 pound weight gain overnight.  She was to follow-up in 2 months.    She presents today for cardiac follow-up visit with her daughter accompanying her.  The patient is very tired today is actually fallen asleep on the examination table.  When aroused she is able to answer all of my questions appropriately.  She denies chest pain, shortness of air, palpitations, edema, dizziness, or syncope.  She has not needed to take any extra doses of bumetanide.  Her  weights have been stable.  Her daughter states that her biggest trouble is that she has frequent urinary tract infections which cause her to have bouts of confusion.  She is being treated with antibiotic therapy.  Her blood pressure and heart rate are both normal today.      Past Medical History:   Diagnosis Date   • 'light-for-dates' infant with signs of fetal malnutrition    • Amaurosis fugax    • Anemia    • Aortic stenosis     s/p TAVR    • Atrial fibrillation (CMS/[facility])    • Cancer (CMS/[facility])     skin   • CAP (community acquired pneumonia)    • Carotid artery stenosis     Per duplex 12/16/2016-proximal right internal carotid artery mild stenosis and proximal left moderate stenosis   • Cervical spine disease    • Cognitive disorder    • Congestive heart failure (CMS/HCC)    • Dementia (CMS/[facility])    • Diabetes mellitus (CMS/[facility])    • Diastolic dysfunction    • Dyspnea on exertion    • Generalized osteoarthritis of multiple sites    • GERD (gastroesophageal reflux disease)    • Gout    • Health care maintenance    • Hiatal hernia    • HL (hearing loss)    • Hyperlipidemia    • Hypertension    • Memory loss    • Mitral valve stenosis     Moderate per echocardiogram 2/2017; severe mitral annular calcification   • Nasal lesion    • Nasal stenosis    • 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[facility])    • Osteoarthritis     multiple sites   • PAF (paroxysmal atrial fibrillation) (CMS/[facility])    • Paroxysmal atrial fibrillation (CMS/HCC) 9/11/2019   • Patient had no falls in past year    • Peripheral neuropathy    • Peripheral vascular disease (CMS/[facility])    • PONV (postoperative nausea and vomiting)    • Rheumatic mitral stenosis    • Shingles    • Sleep apnea    • Stroke (CMS/[facility])    • Syncope    • Systolic hypertension    • Varicose veins        Past Surgical History:   Procedure Laterality Date   • AORTIC VALVE REPAIR/REPLACEMENT N/A 12/27/2016    Procedure: Transfemoral LEFT Transcatheter Aortic Valve Replacement TRANSESOPHAGEAL  ECHOCARDIOGRAM WITH ANESTHESIA;  Surgeon: Eduardo Brown MD;  Location: [facility] HYBRID OR 18/19;  Service:    • BLADDER REPAIR     • CARDIAC CATHETERIZATION N/A 12/14/2016    Procedure: Right Heart Cath;  Surgeon: Eduardo Brown MD;  Location: [facility] CATH INVASIVE LOCATION;  Service:    • CARDIAC CATHETERIZATION N/A 12/14/2016    Procedure: Coronary angiography;  Surgeon: Eduardo Brown MD;  Location: [facility] CATH INVASIVE LOCATION;  Service:    • HYSTERECTOMY     • KNEE SURGERY         Social History     Socioeconomic History   • Marital status:      Spouse name: Not on file   • Number of children: 15   • Years of education: 8th grade   • Highest education level: Not on file   Tobacco Use   • Smoking status: Never Smoker   • Smokeless tobacco: Never Used   Substance and Sexual Activity   • Alcohol use: No     Comment: no caffeine    • Drug use: No   • Sexual activity: Defer       Family History   Problem Relation Age of Onset   • Heart disease Mother    • Coronary artery disease Mother    • Hyperlipidemia Mother    • Hypertension Mother    • Cancer Sister    • Cancer Brother    • Diabetes Daughter    • Diabetes Daughter    • Cancer Sister    • Cancer Sister    • Cancer Sister    • Diabetes Son        The following portion of the patient's history were reviewed and updated as appropriate: past medical history, past surgical history, past social history, past family history, allergies, current medications, and problem list.    Review of Systems   Constitutional: Positive for weight loss.   Cardiovascular: Negative for leg swelling.   Respiratory: Positive for snoring.    Hematologic/Lymphatic: Bruises/bleeds easily.   Musculoskeletal: Positive for joint pain and myalgias.   Neurological: Positive for excessive daytime sleepiness.       Allergies   Allergen Reactions   • Codeine Itching and GI Intolerance   • Morphine And Related Itching         Current Outpatient Medications:   •  acetaminophen (TYLENOL) 650  "MG 8 hr tablet, Take 650 mg by mouth Every 8 (Eight) Hours As Needed for Mild Pain ., Disp: , Rfl:   •  apixaban (Eliquis) 2.5 MG tablet tablet, Take 1 tablet by mouth Every 12 (Twelve) Hours., Disp: 180 tablet, Rfl: 3  •  atenolol (TENORMIN) 25 MG tablet, Take 1 tablet by mouth Daily., Disp: 90 tablet, Rfl: 3  •  atorvastatin (LIPITOR) 20 MG tablet, Take 1 tablet by mouth Daily., Disp: 90 tablet, Rfl: 3  •  bumetanide (BUMEX) 1 MG tablet, Take 1 tablet by mouth Daily., Disp: 90 tablet, Rfl: 3  •  glucose blood test strip, TEST TWO TIMES A DAY, Disp: 200 each, Rfl: 10  •  Lancets (FREESTYLE) lancets, USE TO TEST BLOOD SUGAR ONCE DAILY, Disp: 100 each, Rfl: 4  •  memantine (Namenda XR) 28 MG capsule sustained-release 24 hr extended release capsule, Take 1 capsule by mouth Daily., Disp: 90 capsule, Rfl: 3  •  metFORMIN (GLUCOPHAGE) 500 MG tablet, Take one half tablet by mouth twice daily, Disp: 90 tablet, Rfl: 3  •  Multiple Vitamins-Minerals (MULTIVITAMIN WITH MINERALS) tablet tablet, Take 1 tablet by mouth Daily., Disp: , Rfl:   •  nitrofurantoin, macrocrystal-monohydrate, (MACROBID) 100 MG capsule, Take 100 mg by mouth 2 (two) times a day., Disp: , Rfl:   •  omeprazole (priLOSEC) 20 MG capsule, Take 1 capsule by mouth Daily., Disp: 90 capsule, Rfl: 3  •  silver sulfadiazine (Silvadene) 1 % cream, Apply  topically to the appropriate area as directed Daily., Disp: 25 g, Rfl: 0        Objective:     Vitals:    06/02/21 1344 06/02/21 1355   BP: 118/60 118/60   BP Location: Left arm Right arm   Pulse: 72    Weight: 60.3 kg (133 lb)    Height: 154.9 cm (61\")      Body mass index is 25.13 kg/m².    PHYSICAL EXAM:    Vitals Reviewed.   General Appearance: No acute distress, well developed and well nourished. Thin.  Elderly.  Appears younger than stated age.  Eyes: Conjunctiva and lids: No erythema, swelling, or discharge. Sclera non-icteric.   HENT: Atraumatic, normocephalic. External eyes, ears, and nose normal. No " hearing loss noted. Mucous membranes normal. Lips not cyanotic. Neck supple with no tenderness.  Respiratory: No signs of respiratory distress. Respiration rhythm and depth normal.   Clear to auscultation. No rales, crackles, rhonchi, or wheezing auscultated.   Cardiovascular:  Jugular Venous Pressure: Normal  Heart Rate and Rhythm: Irregularly, irregular.  Heart Sounds: Normal S1 and S2. No S3 or S4 noted.  Murmurs: No murmurs noted. No rubs, thrills, or gallops.   Lower Extremities: No edema noted.  Wearing compression stockings bilaterally.  Gastrointestinal:  Abdomen soft, non-distended, non-tender. Normal bowel sounds.    Musculoskeletal: Normal movement of extremities  Skin and Nails: General appearance normal. No pallor, cyanosis, diaphoresis. Skin temperature normal. No clubbing of fingernails.   Psychiatric: Patient alert and oriented to person, place, and time. Speech and behavior appropriate. Normal mood and affect.  Tired today.      ECG 12 Lead    Date/Time: 6/2/2021 1:50 PM  Performed by: Marylin Conway APRN  Authorized by: Marylin Conway APRN   Comparison: compared with previous ECG from 4/6/2021  Similar to previous ECG  Rhythm: atrial fibrillation  Rate: normal  BPM: 72  Q waves: V2 and V1    ST Segments: ST segments normal  T Waves: T waves normal  QRS axis: normal    Clinical impression: abnormal EKG              Assessment:       Diagnosis Plan   1. Acute on chronic diastolic CHF (congestive heart failure) (CMS/[facility])     2. Longstanding persistent atrial fibrillation (CMS/[facility])     3. Rheumatic aortic stenosis     4. S/P TAVR (transcatheter aortic valve replacement)     5. Rheumatic mitral stenosis     6. Pulmonary hypertension (CMS/[facility])     7. Essential hypertension     8. Frequent UTI            Plan:       1.  Acute on Chronic Diastolic Heart Failure: NYHA class II.  Euvolemic today.  Continue current dose of bumetanide.  They perform daily weights and will call with any concerns.    2.   Persistent Atrial Fibrillation: Rate controlled and asymptomatic.  Continue apixaban for stroke prevention.    3/4.  Aortic Stenosis: Status post TAVR.  Aortic valve stable per echocardiogram in 2020.    5. Mitral Stenosis: Moderate to severe per echocardiogram in 2020.  Continue to monitor.     6. Pulmonary Hypertension: Severe per echocardiogram in 2020.  She denies anginal symptoms.    7.  Hypertension: Blood pressure well controlled.    8.  Frequent UTIs: Followed by her PCP.  Her daughter reports intermittent confusion as her main symptom when she has UTIs.    9.  I feel that she is stable from a cardiac standpoint.  She will follow up with Dr. Mcmahan in 6 to 9 months, unless otherwise needed sooner.    As always, it has been a pleasure to participate in your patient's care. Thank you.       Sincerely,       RAYRAY Ramos  [facility] Cardiology      · COVID-19 Precautions - Patient was compliant in wearing a mask. When I saw the patient, I used appropriate personal protective equipment (PPE) including mask and eye shield (standard procedure).  Additionally, I used gown and gloves if indicated.  Hand hygiene was completed before and after seeing the patient.  · Dictated utilizing Dragon Dictation

## 2021-06-02 NOTE — PROGRESS NOTES
Subjective   Mary Shi is a 89 y.o. female. Patient is here today for   Chief Complaint   Patient presents with   • Urinary Frequency     LOWER BACK PAIN          Vitals:    06/02/21 1045   BP: 138/63   Pulse: 76   Resp: 18   Temp: 97.3 °F (36.3 °C)   SpO2: 97%     Body mass index is 24.8 kg/m².    The following portions of the patient's history were reviewed and updated as appropriate: allergies, current medications, past family history, past medical history, past social history, past surgical history and problem list.    Past Medical History:   Diagnosis Date   • 'light-for-dates' infant with signs of fetal malnutrition    • Amaurosis fugax    • Anemia    • Aortic stenosis     s/p TAVR    • Atrial fibrillation (CMS/[facility])    • Cancer (CMS/[facility])     skin   • CAP (community acquired pneumonia)    • Carotid artery stenosis     Per duplex 12/16/2016-proximal right internal carotid artery mild stenosis and proximal left moderate stenosis   • Cervical spine disease    • Cognitive disorder    • Congestive heart failure (CMS/[facility])    • Dementia (CMS/[facility])    • Diabetes mellitus (CMS/[facility])    • Diastolic dysfunction    • Dyspnea on exertion    • Generalized osteoarthritis of multiple sites    • GERD (gastroesophageal reflux disease)    • Gout    • Health care maintenance    • Hiatal hernia    • HL (hearing loss)    • Hyperlipidemia    • Hypertension    • Memory loss    • Mitral valve stenosis     Moderate per echocardiogram 2/2017; severe mitral annular calcification   • Nasal lesion    • Nasal stenosis    • 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[facility])    • Osteoarthritis     multiple sites   • PAF (paroxysmal atrial fibrillation) (CMS/[facility])    • Paroxysmal atrial fibrillation (CMS/[facility]) 9/11/2019   • Patient had no falls in past year    • Peripheral neuropathy    • Peripheral vascular disease (CMS/[facility])    • PONV (postoperative nausea and vomiting)    • Rheumatic mitral stenosis    • Shingles    • Sleep apnea    • Stroke (CMS/[facility])     • Syncope    • Systolic hypertension    • Varicose veins       Allergies   Allergen Reactions   • Codeine Itching and GI Intolerance   • Morphine And Related Itching      Social History     Socioeconomic History   • Marital status:      Spouse name: Not on file   • Number of children: 15   • Years of education: 8th grade   • Highest education level: Not on file   Tobacco Use   • Smoking status: Never Smoker   • Smokeless tobacco: Never Used   Substance and Sexual Activity   • Alcohol use: No     Comment: no caffeine    • Drug use: No   • Sexual activity: Defer        Current Outpatient Medications:   •  acetaminophen (TYLENOL) 650 MG 8 hr tablet, Take 650 mg by mouth Every 8 (Eight) Hours As Needed for Mild Pain ., Disp: , Rfl:   •  apixaban (Eliquis) 2.5 MG tablet tablet, Take 1 tablet by mouth Every 12 (Twelve) Hours., Disp: 180 tablet, Rfl: 3  •  atenolol (TENORMIN) 25 MG tablet, Take 1 tablet by mouth Daily., Disp: 90 tablet, Rfl: 3  •  atorvastatin (LIPITOR) 20 MG tablet, Take 1 tablet by mouth Daily., Disp: 90 tablet, Rfl: 3  •  bumetanide (BUMEX) 1 MG tablet, Take 1 tablet by mouth Daily., Disp: 90 tablet, Rfl: 3  •  Lancets (FREESTYLE) lancets, USE TO TEST BLOOD SUGAR ONCE DAILY, Disp: 100 each, Rfl: 4  •  memantine (Namenda XR) 28 MG capsule sustained-release 24 hr extended release capsule, Take 1 capsule by mouth Daily., Disp: 90 capsule, Rfl: 3  •  metFORMIN (GLUCOPHAGE) 500 MG tablet, Take one half tablet by mouth twice daily, Disp: 90 tablet, Rfl: 3  •  Multiple Vitamins-Minerals (MULTIVITAMIN WITH MINERALS) tablet tablet, Take 1 tablet by mouth Daily., Disp: , Rfl:   •  nitrofurantoin, macrocrystal-monohydrate, (MACROBID) 100 MG capsule, Take 100 mg by mouth 2 (two) times a day., Disp: , Rfl:   •  omeprazole (priLOSEC) 20 MG capsule, Take 1 capsule by mouth Daily., Disp: 90 capsule, Rfl: 3  •  silver sulfadiazine (Silvadene) 1 % cream, Apply  topically to the appropriate area as directed  Daily., Disp: 25 g, Rfl: 0  •  ALPRAZolam (XANAX) 0.25 MG tablet, Take 1 tablet by mouth At Night As Needed for Anxiety or Sleep., Disp: 30 tablet, Rfl: 5  •  doxycycline (MONODOX) 100 MG capsule, , Disp: , Rfl:   •  mupirocin (BACTROBAN) 2 % ointment, , Disp: , Rfl:   •  triamcinolone (KENALOG) 0.1 % cream, Apply  topically to the appropriate area as directed 2 (Two) Times a Day., Disp: 45 g, Rfl: 1  •  True Metrix Blood Glucose Test test strip, TEST TWO TIMES A DAY, Disp: 200 each, Rfl: 9     Objective     History of Present Illness Zahra is here with her daughter.  She complains of lower back pain and confusion.  She gets frequent UTIs and has been on Macrobid twice a day over the last 3 or 4 days without improvement.  She saw urology earlier this year and had cystoscopy.  She was placed on daily Macrobid by the urologist.  She does have dementia and is on Namenda.  Daughter states that she does not drink a lot of fluids.  She also has hypertension, hyperlipidemia, type 2 diabetes mellitus, atrial fibrillation.    Review of Systems   Constitutional: Negative for fever.   Respiratory: Negative for cough and shortness of breath.    Cardiovascular: Negative.    Gastrointestinal: Negative for abdominal pain.   Genitourinary: Positive for frequency. Negative for decreased urine volume, dysuria and urgency.   Musculoskeletal: Positive for back pain.   Psychiatric/Behavioral: Positive for confusion.       Physical Exam  Vitals reviewed.   Neurological:      Mental Status: She is alert.   Psychiatric:      Comments: Alert but confused         ASSESSMENT     Problems Addressed this Visit        Neuro    Altered mental status - Primary      Diagnoses       Codes Comments    Altered mental status, unspecified altered mental status type    -  Primary ICD-10-CM: R41.82  ICD-9-CM: 780.97           PLAN  Patient Instructions   Urinalysis today is normal.  We need to do some lab work including a complete blood count, basic  metabolic panel, hemoglobin A1c    No follow-ups on file.

## 2021-06-02 NOTE — PATIENT INSTRUCTIONS
Urinalysis today is normal.  We need to do some lab work including a complete blood count, basic metabolic panel, hemoglobin A1c

## 2021-06-16 DIAGNOSIS — E11.9 TYPE 2 DIABETES MELLITUS WITHOUT COMPLICATION, WITHOUT LONG-TERM CURRENT USE OF INSULIN (HCC): ICD-10-CM

## 2021-06-16 RX ORDER — CALCIUM CITRATE/VITAMIN D3 200MG-6.25
TABLET ORAL
Qty: 200 EACH | Refills: 9 | Status: SHIPPED | OUTPATIENT
Start: 2021-06-16 | End: 2022-06-22

## 2021-06-29 ENCOUNTER — OFFICE VISIT (OUTPATIENT)
Dept: FAMILY MEDICINE CLINIC | Facility: CLINIC | Age: 86
End: 2021-06-29

## 2021-06-29 VITALS
OXYGEN SATURATION: 99 % | SYSTOLIC BLOOD PRESSURE: 126 MMHG | DIASTOLIC BLOOD PRESSURE: 72 MMHG | RESPIRATION RATE: 18 BRPM | HEART RATE: 72 BPM | BODY MASS INDEX: 25.14 KG/M2 | TEMPERATURE: 98.4 F | HEIGHT: 61 IN

## 2021-06-29 DIAGNOSIS — R41.82 ALTERED MENTAL STATUS, UNSPECIFIED ALTERED MENTAL STATUS TYPE: ICD-10-CM

## 2021-06-29 DIAGNOSIS — L03.113 CELLULITIS OF RIGHT UPPER EXTREMITY: ICD-10-CM

## 2021-06-29 DIAGNOSIS — R44.3 HALLUCINATIONS: Primary | ICD-10-CM

## 2021-06-29 DIAGNOSIS — F09 COGNITIVE DISORDER: ICD-10-CM

## 2021-06-29 PROCEDURE — 99213 OFFICE O/P EST LOW 20 MIN: CPT | Performed by: INTERNAL MEDICINE

## 2021-06-29 RX ORDER — ALPRAZOLAM 0.25 MG/1
0.25 TABLET ORAL NIGHTLY PRN
Qty: 30 TABLET | Refills: 5 | Status: SHIPPED | OUTPATIENT
Start: 2021-06-29 | End: 2021-12-16

## 2021-06-29 RX ORDER — DOXYCYCLINE 100 MG/1
CAPSULE ORAL
COMMUNITY
Start: 2021-06-26 | End: 2021-12-16

## 2021-06-29 RX ORDER — TRIAMCINOLONE ACETONIDE 1 MG/G
CREAM TOPICAL 2 TIMES DAILY
Qty: 45 G | Refills: 1 | Status: SHIPPED | OUTPATIENT
Start: 2021-06-29 | End: 2021-12-16

## 2021-06-29 NOTE — PROGRESS NOTES
Subjective   Mary Shi is a 89 y.o. female. Patient is here today for   Chief Complaint   Patient presents with   • Insect Bite     tick bite,  right arm swollen and red           Vitals:    06/29/21 1406   BP: 126/72   Pulse: 72   Resp: 18   Temp: 98.4 °F (36.9 °C)   SpO2: 99%     Body mass index is 25.14 kg/m².    The following portions of the patient's history were reviewed and updated as appropriate: allergies, current medications, past family history, past medical history, past social history, past surgical history and problem list.    Past Medical History:   Diagnosis Date   • 'light-for-dates' infant with signs of fetal malnutrition    • Amaurosis fugax    • Anemia    • Aortic stenosis     s/p TAVR    • Atrial fibrillation (CMS/[facility])    • Cancer (CMS/[facility])     skin   • CAP (community acquired pneumonia)    • Carotid artery stenosis     Per duplex 12/16/2016-proximal right internal carotid artery mild stenosis and proximal left moderate stenosis   • Cervical spine disease    • Cognitive disorder    • Congestive heart failure (CMS/[facility])    • Dementia (CMS/[facility])    • Diabetes mellitus (CMS/[facility])    • Diastolic dysfunction    • Dyspnea on exertion    • Generalized osteoarthritis of multiple sites    • GERD (gastroesophageal reflux disease)    • Gout    • Health care maintenance    • Hiatal hernia    • HL (hearing loss)    • Hyperlipidemia    • Hypertension    • Memory loss    • Mitral valve stenosis     Moderate per echocardiogram 2/2017; severe mitral annular calcification   • Nasal lesion    • Nasal stenosis    • 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)    • Osteoarthritis     multiple sites   • PAF (paroxysmal atrial fibrillation) (CMS/[facility])    • Paroxysmal atrial fibrillation (CMS/[facility]) 9/11/2019   • Patient had no falls in past year    • Peripheral neuropathy    • Peripheral vascular disease (CMS/[facility])    • PONV (postoperative nausea and vomiting)    • Rheumatic mitral stenosis    • Shingles    • Sleep apnea    •  Stroke (CMS/HCC)    • Syncope    • Systolic hypertension    • Varicose veins       Allergies   Allergen Reactions   • Codeine Itching and GI Intolerance   • Morphine And Related Itching      Social History     Socioeconomic History   • Marital status:      Spouse name: Not on file   • Number of children: 15   • Years of education: 8th grade   • Highest education level: Not on file   Tobacco Use   • Smoking status: Never Smoker   • Smokeless tobacco: Never Used   Substance and Sexual Activity   • Alcohol use: No     Comment: no caffeine    • Drug use: No   • Sexual activity: Defer        Current Outpatient Medications:   •  acetaminophen (TYLENOL) 650 MG 8 hr tablet, Take 650 mg by mouth Every 8 (Eight) Hours As Needed for Mild Pain ., Disp: , Rfl:   •  apixaban (Eliquis) 2.5 MG tablet tablet, Take 1 tablet by mouth Every 12 (Twelve) Hours., Disp: 180 tablet, Rfl: 3  •  atenolol (TENORMIN) 25 MG tablet, Take 1 tablet by mouth Daily., Disp: 90 tablet, Rfl: 3  •  atorvastatin (LIPITOR) 20 MG tablet, Take 1 tablet by mouth Daily., Disp: 90 tablet, Rfl: 3  •  bumetanide (BUMEX) 1 MG tablet, Take 1 tablet by mouth Daily., Disp: 90 tablet, Rfl: 3  •  doxycycline (MONODOX) 100 MG capsule, , Disp: , Rfl:   •  Lancets (FREESTYLE) lancets, USE TO TEST BLOOD SUGAR ONCE DAILY, Disp: 100 each, Rfl: 4  •  memantine (Namenda XR) 28 MG capsule sustained-release 24 hr extended release capsule, Take 1 capsule by mouth Daily., Disp: 90 capsule, Rfl: 3  •  metFORMIN (GLUCOPHAGE) 500 MG tablet, Take one half tablet by mouth twice daily, Disp: 90 tablet, Rfl: 3  •  Multiple Vitamins-Minerals (MULTIVITAMIN WITH MINERALS) tablet tablet, Take 1 tablet by mouth Daily., Disp: , Rfl:   •  mupirocin (BACTROBAN) 2 % ointment, , Disp: , Rfl:   •  nitrofurantoin, macrocrystal-monohydrate, (MACROBID) 100 MG capsule, Take 100 mg by mouth 2 (two) times a day., Disp: , Rfl:   •  omeprazole (priLOSEC) 20 MG capsule, Take 1 capsule by mouth  Daily., Disp: 90 capsule, Rfl: 3  •  silver sulfadiazine (Silvadene) 1 % cream, Apply  topically to the appropriate area as directed Daily., Disp: 25 g, Rfl: 0  •  True Metrix Blood Glucose Test test strip, TEST TWO TIMES A DAY, Disp: 200 each, Rfl: 9  •  ALPRAZolam (XANAX) 0.25 MG tablet, Take 1 tablet by mouth At Night As Needed for Anxiety or Sleep., Disp: 30 tablet, Rfl: 5  •  triamcinolone (KENALOG) 0.1 % cream, Apply  topically to the appropriate area as directed 2 (Two) Times a Day., Disp: 45 g, Rfl: 1     Objective     History of Present Illness Zahra is here today with her daughter.  She was bitten by a tick last week and developed cellulitis on her right arm.  [place] [facility] [place] was placed on doxycycline and Bactroban ointment and a few days ago.  Her daughter states the redness and swelling has improved over the last 24 hours.  She also has been treated for frequent UTIs.  She gets confused at night and does not sleep.  She does hallucinate.  She does have dementia and is on Namenda XR.  She is followed by urology and recently had a urinalysis that was normal.  She previously was placed on daily Bactrim.  She was recently prescribed nitrofurantoin.    Review of Systems   Constitutional: Negative for chills and fever.   Respiratory: Negative.    Cardiovascular: Negative.    Skin: Positive for color change. Negative for rash.   Psychiatric/Behavioral: Positive for confusion and sleep disturbance.       Physical Exam  Vitals reviewed.   Constitutional:       Comments: Elderly female who is alert and cooperative.  She is quiet   Skin:     Comments: Tick bite dorsum mid left forearm.  There is erythema extending around the arm with mild swelling.   Neurological:      Mental Status: She is alert.   Psychiatric:         Mood and Affect: Mood normal.         Behavior: Behavior normal.         ASSESSMENT     Problems Addressed this Visit        Infectious Diseases     Cellulitis of right upper extremity       Neuro    Cognitive disorder    Relevant Medications    ALPRAZolam (XANAX) 0.25 MG tablet       Symptoms and Signs    Hallucinations - Primary    Relevant Medications    ALPRAZolam (XANAX) 0.25 MG tablet      Diagnoses       Codes Comments    Hallucinations    -  Primary ICD-10-CM: R44.3  ICD-9-CM: 780.1     Cognitive disorder     ICD-10-CM: F09  ICD-9-CM: 294.9     Cellulitis of right upper extremity     ICD-10-CM: L03.113  ICD-9-CM: 682.3           PLAN  There are no Patient Instructions on file for this visit.  No follow-ups on file.

## 2021-06-29 NOTE — PATIENT INSTRUCTIONS
Continue doxycycline until finished.  Long discussion about confusion and hallucinations and insomnia and diagnosis and treatment of urinary tract infections..  Suggest trying alprazolam 0.25 mg at bedtime as needed or even nightly.

## 2021-07-07 ENCOUNTER — TELEPHONE (OUTPATIENT)
Dept: NEUROLOGY | Facility: CLINIC | Age: 86
End: 2021-07-07

## 2021-07-07 NOTE — TELEPHONE ENCOUNTER
Caller: Shaila Shi    Relationship: Self    Best call back number: [phone]      What was the call regarding: TICK BITE     Do you require a callback: YES     MONROE MOHAMUD CALLED AND INFORMED US THAT HER MOM SHAILA WAS BIT  BY A TICK .THEY WENT TO PCP AND HE GAVE THEM SOME CREAM (TRIAMCINOLONEACETONIDE ) AND ANTIBOTIC, BOXDYCYYLI NE MONO    THEY STATE THAT SHE IS NOT RESPONDING TO ALL QUESTIONS , AND DON'T ANSWER EVEN SIMPLE QUESTIONS LIKE WHAT COLOR NAIL POLISH SHE WANTS. THIS IS ALL NEW  FOR HER AS SHE HAS RESPONDED IN PAST .  THEY WOULD LIKE A CALL TO SEE WHAT TO DO.     PLEASE CALL AND ADVISE.

## 2021-07-15 ENCOUNTER — TELEPHONE (OUTPATIENT)
Dept: FAMILY MEDICINE CLINIC | Facility: CLINIC | Age: 86
End: 2021-07-15

## 2021-07-15 NOTE — TELEPHONE ENCOUNTER
Caller: Aiyana Donis    Relationship: Emergency Contact    Best call back number: [phone] (H)    What is the best time to reach you: ANYTIME    Who are you requesting to speak with (clinical staff, provider,  specific staff member): DR BAXTER    What was the call regarding: DAUGHTER WOULD LIKE TO VERIFY IF DR BAXTER HAS RECEIVED PAPERWORK VIA FAX    Do you require a callback: YES

## 2021-08-02 ENCOUNTER — TELEPHONE (OUTPATIENT)
Dept: NEUROLOGY | Facility: CLINIC | Age: 86
End: 2021-08-02

## 2021-08-02 RX ORDER — LANCETS 28 GAUGE
EACH MISCELLANEOUS
Qty: 100 EACH | Refills: 4 | Status: SHIPPED | OUTPATIENT
Start: 2021-08-02 | End: 2022-10-12

## 2021-08-02 NOTE — TELEPHONE ENCOUNTER
Pt is scheduled with you on 09/14/21. Daughters are concerned as pt has been having episodes of continuous talking and staying awake at night. Daughter reports that it takes the pt 3-4 days to recover after an episode. They would like labs drawn and a sooner appt. There is an 8 am then end of this month but, they are from [place]. If they want this appt, are there labs you'd want them to have drawn in advance? If they refuse appt what would you recommend?    Thank you

## 2021-08-02 NOTE — TELEPHONE ENCOUNTER
Caller: MONROE     Relationship: DAUGHTER     Best call back number: [phone]    What is the best time to reach you: ANY     What was the call regarding: SOONER APPT.     Do you require a callback: YES    MOM KEEP HAVING EPISODES  LIKE SHE TALKS AND TALKS AND THEN STAYS UP ALL . THEN IT TAKES HER 3-4 DAYS TO RECOUP. SHE HAS ALSO LOST WEIGHT AND NOT WANTING TO EAT. WOULD LIKE TO BRING HER IN TO HAVE BLOOD TESTED AND TO SEE THE DOCTOR SOONER THAN SEPT APPT. (THAT APPT IS  TELEPHONE  AND THEY WANT TO BRING IN IF CANT GET IN SOONER.)    PLEASE ADVISE.

## 2021-08-03 ENCOUNTER — TELEPHONE (OUTPATIENT)
Dept: FAMILY MEDICINE CLINIC | Facility: CLINIC | Age: 86
End: 2021-08-03

## 2021-08-04 ENCOUNTER — OFFICE VISIT (OUTPATIENT)
Dept: FAMILY MEDICINE CLINIC | Facility: CLINIC | Age: 86
End: 2021-08-04

## 2021-08-04 VITALS
HEIGHT: 61 IN | BODY MASS INDEX: 23.98 KG/M2 | WEIGHT: 127 LBS | SYSTOLIC BLOOD PRESSURE: 150 MMHG | HEART RATE: 89 BPM | DIASTOLIC BLOOD PRESSURE: 62 MMHG | TEMPERATURE: 98.4 F | RESPIRATION RATE: 18 BRPM | OXYGEN SATURATION: 97 %

## 2021-08-04 DIAGNOSIS — R44.3 HALLUCINATIONS: ICD-10-CM

## 2021-08-04 DIAGNOSIS — E11.59 TYPE 2 DIABETES MELLITUS WITH OTHER CIRCULATORY COMPLICATION, WITHOUT LONG-TERM CURRENT USE OF INSULIN (HCC): ICD-10-CM

## 2021-08-04 DIAGNOSIS — D64.9 ANEMIA, UNSPECIFIED TYPE: ICD-10-CM

## 2021-08-04 DIAGNOSIS — I10 ESSENTIAL HYPERTENSION: ICD-10-CM

## 2021-08-04 DIAGNOSIS — I48.11 LONGSTANDING PERSISTENT ATRIAL FIBRILLATION (HCC): Primary | ICD-10-CM

## 2021-08-04 PROCEDURE — 99213 OFFICE O/P EST LOW 20 MIN: CPT | Performed by: INTERNAL MEDICINE

## 2021-08-04 NOTE — PROGRESS NOTES
Subjective   Mary Shi is a 89 y.o. female. Patient is here today for   Chief Complaint   Patient presents with   • Altered Mental Status     fu confused not sleeping          Vitals:    08/04/21 1116   BP: 150/62   Pulse: 89   Resp: 18   Temp: 98.4 °F (36.9 °C)   SpO2: 97%     Body mass index is 24.01 kg/m².    The following portions of the patient's history were reviewed and updated as appropriate: allergies, current medications, past family history, past medical history, past social history, past surgical history and problem list.    Past Medical History:   Diagnosis Date   • 'light-for-dates' infant with signs of fetal malnutrition    • Amaurosis fugax    • Anemia    • Aortic stenosis     s/p TAVR    • Atrial fibrillation (CMS/HCC)    • Cancer (CMS/[facility])     skin   • CAP (community acquired pneumonia)    • Carotid artery stenosis     Per duplex 12/16/2016-proximal right internal carotid artery mild stenosis and proximal left moderate stenosis   • Cervical spine disease    • Cognitive disorder    • Congestive heart failure (CMS/HCC)    • Dementia (CMS/HCC)    • Diabetes mellitus (CMS/[facility])    • Diastolic dysfunction    • Dyspnea on exertion    • Generalized osteoarthritis of multiple sites    • GERD (gastroesophageal reflux disease)    • Gout    • Health care maintenance    • Hiatal hernia    • HL (hearing loss)    • Hyperlipidemia    • Hypertension    • Memory loss    • Mitral valve stenosis     Moderate per echocardiogram 2/2017; severe mitral annular calcification   • Nasal lesion    • Nasal stenosis    • 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)    • Osteoarthritis     multiple sites   • PAF (paroxysmal atrial fibrillation) (CMS/HCC)    • Paroxysmal atrial fibrillation (CMS/HCC) 9/11/2019   • Patient had no falls in past year    • Peripheral neuropathy    • Peripheral vascular disease (CMS/HCC)    • PONV (postoperative nausea and vomiting)    • Rheumatic mitral stenosis    • Shingles    • Sleep apnea    •  Stroke (CMS/HCC)    • Syncope    • Systolic hypertension    • Varicose veins       Allergies   Allergen Reactions   • Codeine Itching and GI Intolerance   • Morphine And Related Itching      Social History     Socioeconomic History   • Marital status:      Spouse name: Not on file   • Number of children: 15   • Years of education: 8th grade   • Highest education level: Not on file   Tobacco Use   • Smoking status: Never Smoker   • Smokeless tobacco: Never Used   Substance and Sexual Activity   • Alcohol use: No     Comment: no caffeine    • Drug use: No   • Sexual activity: Defer        Current Outpatient Medications:   •  acetaminophen (TYLENOL) 650 MG 8 hr tablet, Take 650 mg by mouth Every 8 (Eight) Hours As Needed for Mild Pain ., Disp: , Rfl:   •  ALPRAZolam (XANAX) 0.25 MG tablet, Take 1 tablet by mouth At Night As Needed for Anxiety or Sleep., Disp: 30 tablet, Rfl: 5  •  apixaban (Eliquis) 2.5 MG tablet tablet, Take 1 tablet by mouth Every 12 (Twelve) Hours., Disp: 180 tablet, Rfl: 3  •  atenolol (TENORMIN) 25 MG tablet, Take 1 tablet by mouth Daily., Disp: 90 tablet, Rfl: 3  •  atorvastatin (LIPITOR) 20 MG tablet, Take 1 tablet by mouth Daily., Disp: 90 tablet, Rfl: 3  •  bumetanide (BUMEX) 1 MG tablet, Take 1 tablet by mouth Daily., Disp: 90 tablet, Rfl: 3  •  doxycycline (MONODOX) 100 MG capsule, , Disp: , Rfl:   •  Lancets (freestyle) lancets, USE TO TEST BLOOD SUGAR ONCE DAILY, Disp: 100 each, Rfl: 4  •  memantine (Namenda XR) 28 MG capsule sustained-release 24 hr extended release capsule, Take 1 capsule by mouth Daily., Disp: 90 capsule, Rfl: 3  •  metFORMIN (GLUCOPHAGE) 500 MG tablet, Take one half tablet by mouth twice daily, Disp: 90 tablet, Rfl: 3  •  Multiple Vitamins-Minerals (MULTIVITAMIN WITH MINERALS) tablet tablet, Take 1 tablet by mouth Daily., Disp: , Rfl:   •  mupirocin (BACTROBAN) 2 % ointment, , Disp: , Rfl:   •  nitrofurantoin, macrocrystal-monohydrate, (MACROBID) 100 MG capsule,  Take 100 mg by mouth 2 (two) times a day., Disp: , Rfl:   •  omeprazole (priLOSEC) 20 MG capsule, Take 1 capsule by mouth Daily., Disp: 90 capsule, Rfl: 3  •  silver sulfadiazine (Silvadene) 1 % cream, Apply  topically to the appropriate area as directed Daily., Disp: 25 g, Rfl: 0  •  triamcinolone (KENALOG) 0.1 % cream, Apply  topically to the appropriate area as directed 2 (Two) Times a Day., Disp: 45 g, Rfl: 1  •  True Metrix Blood Glucose Test test strip, TEST TWO TIMES A DAY, Disp: 200 each, Rfl: 9     Objective     History of Present Illness Zahra is here today with her daughter.  She has periods of confusion and hallucinations which they have contributed to frequent urinary tract infections.  The episodes happen once or twice a month.  She usually does well most nights and days.  She was up all night a few nights ago.  They went to the urologist and her urine was clear.  She was however placed on antibiotics.  She has alprazolam to take as needed at bedtime.  0.25 mg does not help.  Her daughter states they gave her 1-1/2 pills and she slept most of the following day.  She does have an appointment with neurology.  She does have hypertension, permanent atrial fibrillation, type 2 diabetes mellitus, and mild anemia.    Review of Systems   Constitutional: Negative.    Respiratory: Negative.    Cardiovascular: Negative.    Gastrointestinal: Negative.    Genitourinary: Negative for dysuria, frequency and urgency.   Psychiatric/Behavioral: Positive for confusion and hallucinations.       Physical Exam  Vitals reviewed.   Constitutional:       Comments: Elderly female who is alert, cooperative, and pleasantly confused   Cardiovascular:      Rate and Rhythm: Normal rate. Rhythm irregular.      Heart sounds: Normal heart sounds.      Comments: 140/60  Pulmonary:      Effort: Pulmonary effort is normal.      Breath sounds: Normal breath sounds.   Musculoskeletal:      Right lower leg: No edema.      Left lower leg:  No edema.   Neurological:      Mental Status: She is alert.         ASSESSMENT we will check some lab work today.  Discussed possibility of starting a nightly medicine if symptoms progress.  Would continue alprazolam now on an as-needed basis.     Problems Addressed this Visit        Cardiac and Vasculature    Essential hypertension    Relevant Orders    CBC & Differential (Completed)    Comprehensive Metabolic Panel (Completed)    Longstanding persistent atrial fibrillation (CMS/HCC) - Primary       Endocrine and Metabolic    Type 2 diabetes mellitus (CMS/HCC)    Relevant Orders    Comprehensive Metabolic Panel (Completed)    Hemoglobin A1c (Completed)       Hematology and Neoplasia    Anemia    Relevant Orders    Vitamin B12 (Completed)       Symptoms and Signs    Hallucinations      Diagnoses       Codes Comments    Longstanding persistent atrial fibrillation (CMS/HCC)    -  Primary ICD-10-CM: I48.11  ICD-9-CM: 427.31     Essential hypertension     ICD-10-CM: I10  ICD-9-CM: 401.9     Type 2 diabetes mellitus with other circulatory complication, without long-term current use of insulin (CMS/[facility])     ICD-10-CM: E11.59  ICD-9-CM: 250.70     Hallucinations     ICD-10-CM: R44.3  ICD-9-CM: 780.1     Anemia, unspecified type     ICD-10-CM: D64.9  ICD-9-CM: 285.9           PLAN  There are no Patient Instructions on file for this visit.  No follow-ups on file.

## 2021-08-05 LAB
ALBUMIN SERPL-MCNC: 4.3 G/DL (ref 3.5–4.6)
ALBUMIN/GLOB SERPL: 1.5 {RATIO} (ref 1.2–2.2)
ALP SERPL-CCNC: 133 IU/L (ref 48–121)
ALT SERPL-CCNC: 13 IU/L (ref 0–32)
AST SERPL-CCNC: 19 IU/L (ref 0–40)
BASOPHILS # BLD AUTO: 0 X10E3/UL (ref 0–0.2)
BASOPHILS NFR BLD AUTO: 1 %
BILIRUB SERPL-MCNC: 0.5 MG/DL (ref 0–1.2)
BUN SERPL-MCNC: 36 MG/DL (ref 10–36)
BUN/CREAT SERPL: 47 (ref 12–28)
CALCIUM SERPL-MCNC: 9.9 MG/DL (ref 8.7–10.3)
CHLORIDE SERPL-SCNC: 100 MMOL/L (ref 96–106)
CO2 SERPL-SCNC: 28 MMOL/L (ref 20–29)
CREAT SERPL-MCNC: 0.76 MG/DL (ref 0.57–1)
EOSINOPHIL # BLD AUTO: 0 X10E3/UL (ref 0–0.4)
EOSINOPHIL NFR BLD AUTO: 0 %
ERYTHROCYTE [DISTWIDTH] IN BLOOD BY AUTOMATED COUNT: 14 % (ref 11.7–15.4)
GLOBULIN SER CALC-MCNC: 2.9 G/DL (ref 1.5–4.5)
GLUCOSE SERPL-MCNC: 175 MG/DL (ref 65–99)
HBA1C MFR BLD: 7 % (ref 4.8–5.6)
HCT VFR BLD AUTO: 35.1 % (ref 34–46.6)
HGB BLD-MCNC: 11.4 G/DL (ref 11.1–15.9)
IMM GRANULOCYTES # BLD AUTO: 0 X10E3/UL (ref 0–0.1)
IMM GRANULOCYTES NFR BLD AUTO: 0 %
LYMPHOCYTES # BLD AUTO: 1.7 X10E3/UL (ref 0.7–3.1)
LYMPHOCYTES NFR BLD AUTO: 24 %
MCH RBC QN AUTO: 28.9 PG (ref 26.6–33)
MCHC RBC AUTO-ENTMCNC: 32.5 G/DL (ref 31.5–35.7)
MCV RBC AUTO: 89 FL (ref 79–97)
MONOCYTES # BLD AUTO: 0.6 X10E3/UL (ref 0.1–0.9)
MONOCYTES NFR BLD AUTO: 8 %
NEUTROPHILS # BLD AUTO: 4.7 X10E3/UL (ref 1.4–7)
NEUTROPHILS NFR BLD AUTO: 67 %
PLATELET # BLD AUTO: 201 X10E3/UL (ref 150–450)
POTASSIUM SERPL-SCNC: 4.7 MMOL/L (ref 3.5–5.2)
PROT SERPL-MCNC: 7.2 G/DL (ref 6–8.5)
RBC # BLD AUTO: 3.95 X10E6/UL (ref 3.77–5.28)
SODIUM SERPL-SCNC: 143 MMOL/L (ref 134–144)
VIT B12 SERPL-MCNC: 857 PG/ML (ref 232–1245)
WBC # BLD AUTO: 7.1 X10E3/UL (ref 3.4–10.8)

## 2021-08-10 NOTE — TELEPHONE ENCOUNTER
Scheduled pt for 9 am on 08/24/21.    Aiyana aguilar they took pt to PCP last week and they wanted her to f/u with neuro.

## 2021-08-10 NOTE — TELEPHONE ENCOUNTER
Yes, you can offer them the appointment at 8 AM.  I would want to see the patient before suggesting any labs and/or other diagnostics.  I would recommend that they see PCP this week if possible and present to the emergency room for worsening or changing symptoms.

## 2021-08-24 ENCOUNTER — OFFICE VISIT (OUTPATIENT)
Dept: NEUROLOGY | Facility: CLINIC | Age: 86
End: 2021-08-24

## 2021-08-24 VITALS — OXYGEN SATURATION: 100 % | SYSTOLIC BLOOD PRESSURE: 100 MMHG | DIASTOLIC BLOOD PRESSURE: 70 MMHG | HEART RATE: 74 BPM

## 2021-08-24 DIAGNOSIS — Z91.81 AT MODERATE RISK FOR FALL: ICD-10-CM

## 2021-08-24 DIAGNOSIS — R44.3 HALLUCINATIONS: ICD-10-CM

## 2021-08-24 DIAGNOSIS — R41.82 ALTERED MENTAL STATUS, UNSPECIFIED ALTERED MENTAL STATUS TYPE: ICD-10-CM

## 2021-08-24 DIAGNOSIS — F03.90 DEMENTIA WITHOUT BEHAVIORAL DISTURBANCE, UNSPECIFIED DEMENTIA TYPE: Primary | ICD-10-CM

## 2021-08-24 PROCEDURE — 99214 OFFICE O/P EST MOD 30 MIN: CPT | Performed by: NURSE PRACTITIONER

## 2021-08-24 RX ORDER — OLANZAPINE 2.5 MG/1
2.5 TABLET ORAL NIGHTLY PRN
Qty: 30 TABLET | Refills: 2 | Status: SHIPPED | OUTPATIENT
Start: 2021-08-24 | End: 2022-05-23

## 2021-08-24 NOTE — PATIENT INSTRUCTIONS

## 2021-08-24 NOTE — PROGRESS NOTES
DOS: 2021  NAME: Mary Shi   : 3/3/1925  PCP: Everette Rubio MD    Chief Complaint   Patient presents with   • Stroke      Patient is accompanied by her daughter who assists with providing majority of history.      Neurological Problem and Interval History:  89 y.o. right-handed female with a Hx of Congestive heart failure, dementia, diabetes mellitus, hyperlipidemia, hypertension, status post TAVR, obstructive sleep apnea, multiple right MCA infarct with etiology thought to be secondary to A. fib/cardioembolic source from seen today in follow-up due to worsening dementia with intermittent visual hallucinations and poor quality of sleep.     Daughter reports that once about every 3 weeks patient starts obsessing about the past, has some visual hallucinations specifically sees things like bugs and has very poor quality of sleep where she is up all night. Patient has been using 0.25 mg of Xanax as needed-has not offered much in the way of relief of symptoms and/or improved sleep and on 1-2 occasions use 0.50 mg and was very sedate and took 4 to 5 days to recover entirely therefore family here seeking other options. Given episodes are sporadic; once every 3 weeks I think we could use Zyprexa 2.5 to 5 mg with each episode as needed which daughter is in agreement with. Last  on chart. Will avoid Seroquel at this time but could consider in the future if needed. I will call and discuss treatment plan with PCP to confirm that he is in agreement and I have recommended that family hold Xanax at this time based on patient's response.      Review of Systems:        Review of Systems   Constitutional: Positive for appetite change (decreased ). Negative for activity change and unexpected weight change.   HENT: Negative for facial swelling, trouble swallowing and voice change.    Eyes: Negative for photophobia, pain and visual disturbance.   Respiratory: Negative for chest tightness, shortness of breath  and wheezing.    Cardiovascular: Negative for chest pain, palpitations and leg swelling.   Gastrointestinal: Negative for abdominal pain, nausea and vomiting.   Endocrine: Negative for cold intolerance and heat intolerance.   Musculoskeletal: Negative for arthralgias, back pain, gait problem, joint swelling, myalgias, neck pain and neck stiffness.   Neurological: Positive for weakness. Negative for dizziness, tremors, seizures, syncope, facial asymmetry, speech difficulty, light-headedness, numbness and headaches.   Hematological: Does not bruise/bleed easily.   Psychiatric/Behavioral: Positive for confusion, hallucinations and sleep disturbance (with episode of talking). Negative for agitation, behavioral problems, decreased concentration, dysphoric mood, self-injury and suicidal ideas. The patient is not nervous/anxious and is not hyperactive.          Current Outpatient Medications:   •  acetaminophen (TYLENOL) 650 MG 8 hr tablet, Take 650 mg by mouth Every 8 (Eight) Hours As Needed for Mild Pain ., Disp: , Rfl:   •  ALPRAZolam (XANAX) 0.25 MG tablet, Take 1 tablet by mouth At Night As Needed for Anxiety or Sleep., Disp: 30 tablet, Rfl: 5  •  apixaban (Eliquis) 2.5 MG tablet tablet, Take 1 tablet by mouth Every 12 (Twelve) Hours., Disp: 180 tablet, Rfl: 3  •  atenolol (TENORMIN) 25 MG tablet, Take 1 tablet by mouth Daily., Disp: 90 tablet, Rfl: 3  •  atorvastatin (LIPITOR) 20 MG tablet, Take 1 tablet by mouth Daily., Disp: 90 tablet, Rfl: 3  •  bumetanide (BUMEX) 1 MG tablet, Take 1 tablet by mouth Daily., Disp: 90 tablet, Rfl: 3  •  Lancets (freestyle) lancets, USE TO TEST BLOOD SUGAR ONCE DAILY, Disp: 100 each, Rfl: 4  •  memantine (Namenda XR) 28 MG capsule sustained-release 24 hr extended release capsule, Take 1 capsule by mouth Daily., Disp: 90 capsule, Rfl: 3  •  metFORMIN (GLUCOPHAGE) 500 MG tablet, Take one half tablet by mouth twice daily, Disp: 90 tablet, Rfl: 3  •  Multiple Vitamins-Minerals  "(MULTIVITAMIN WITH MINERALS) tablet tablet, Take 1 tablet by mouth Daily., Disp: , Rfl:   •  mupirocin (BACTROBAN) 2 % ointment, , Disp: , Rfl:   •  omeprazole (priLOSEC) 20 MG capsule, Take 1 capsule by mouth Daily., Disp: 90 capsule, Rfl: 3  •  silver sulfadiazine (Silvadene) 1 % cream, Apply  topically to the appropriate area as directed Daily., Disp: 25 g, Rfl: 0  •  triamcinolone (KENALOG) 0.1 % cream, Apply  topically to the appropriate area as directed 2 (Two) Times a Day., Disp: 45 g, Rfl: 1  •  True Metrix Blood Glucose Test test strip, TEST TWO TIMES A DAY, Disp: 200 each, Rfl: 9  •  doxycycline (MONODOX) 100 MG capsule, , Disp: , Rfl:   •  nitrofurantoin, macrocrystal-monohydrate, (MACROBID) 100 MG capsule, Take 100 mg by mouth 2 (two) times a day., Disp: , Rfl:   •  OLANZapine (ZyPREXA) 2.5 MG tablet, Take 1 tablet by mouth At Night As Needed (hallucinations)., Disp: 30 tablet, Rfl: 2    \"The following portions of the patient's history were reviewed and updated as appropriate: allergies, current medications, past family history, past medical history, past social history, past surgical history and problem list.\"  Review and Interpretation of Imaging:  No new imaging reviewed  Laboratory Results:             Lab Results   Component Value Date    HGBA1C 7.0 (H) 08/04/2021         Lab Results   Component Value Date    CHOL 167 06/24/2018         Lab Results   Component Value Date    HDL 31 (L) 06/11/2019    HDL 31 (L) 12/07/2018    HDL 33 (L) 06/24/2018         Lab Results   Component Value Date    LDL 60 06/11/2019    LDL CANCELED 12/07/2018    LDL 71 06/24/2018         Lab Results   Component Value Date    TRIG 321 (H) 06/11/2019    TRIG 587 (H) 12/07/2018    TRIG 314 (H) 06/24/2018     Lab Results   Component Value Date    RPR Non-Reactive 09/10/2019     Lab Results   Component Value Date    TSH 3.510 01/13/2021     Lab Results   Component Value Date    [id number] 857 08/04/2021     Physical " Examination: NIHSS: 1     mRS: 2  General Appearance:           Well developed, well nourished, well groomed, alert, and cooperative.  HEENT:                      Normocephalic.    Neck and Spine:                Normal range of motion.  Normal alignment. No mass or tenderness. No bruits.  Cardiac:                                 Regular rate and rhythm. No murmurs.  Peripheral Vasculature:       Radial and pedal pulses are equal and symmetric.   Extremities:                           No edema or deformities. Normal joint ROM.  Skin:                                       No rashes or birth marks.     Neurological examination:  Higher Integrative  Function:                Oriented to time, place and person. Impaired registration, recall, attention span and concentration. Normal language including comprehension, spontaneous speech, repetition, reading, writing, naming and vocabulary. No neglect with normal visual-spatial function and construction. Normal fund of knowledge and higher integrative function.  CN II:       Pupils are equal, round, and reactive to light. Normal visual acuity and visual fields; prior RHH not noted   CN III IV VI:           Extraocular movements are full without nystagmus.   CN V:     Normal facial sensation and strength of muscles of mastication.  CN VII:                         Facial movements are symmetric. No weakness.  CN VIII:                                   Auditory acuity is normal.  CN IX & X:                              Symmetric palatal movement.  CN XI:   Sternocleidomastoid and trapezius are normal.  No weakness.  CN XII:                                    The tongue is midline.  No atrophy or fasciculations.  Motor:   Normal muscle strength, bulk and tone in upper and lower extremities except RUE 4+/5 RLE 4+/5.  No fasciculations, rigidity, spasticity, or abnormal movements.  Reflexes:                 Plantar responses are flexor.  Sensation:             Normal to light in  arms and legs.   Coordination:                        Finger to nose test shows no dysmetria.       Diagnoses:  1. Dementia; now with visual hallucinations and poor quality of sleep  2. History of left MCA infarcts etiology thought to be secondary to A. fib patient with residual hemianopsia (right)  3. Hypertension  4. Hyperlipidemia  5. Obstructive sleep apnea      Plan:   Zyprexa 2.5 to 5mg daily as needed for visual hallucinations and increased  Agitation   Continue Eliquis and statin as written   Namenda XR 28 mg daily    Consider nightly melatonin to assist with normalcy of sleep pattern   Blood pressure control to <130/80   Goal LDL <70-recommend high dose statins-    Serum glucose < 140   Call 911 for stroke any stroke symptoms   Follow-up with me in 3 to 6 months; sooner if indicated    Call placed to PCP; left message for better to call back  Diagnoses and all orders for this visit:    1. Dementia without behavioral disturbance, unspecified dementia type (CMS/HCC) (Primary)    2. Hallucinations    3. Altered mental status, unspecified altered mental status type    4. At moderate risk for fall    Other orders  -     OLANZapine (ZyPREXA) 2.5 MG tablet; Take 1 tablet by mouth At Night As Needed (hallucinations).  Dispense: 30 tablet; Refill: 2

## 2021-09-08 ENCOUNTER — TELEPHONE (OUTPATIENT)
Dept: FAMILY MEDICINE CLINIC | Facility: CLINIC | Age: 86
End: 2021-09-08

## 2021-10-18 ENCOUNTER — OFFICE VISIT (OUTPATIENT)
Dept: FAMILY MEDICINE CLINIC | Facility: CLINIC | Age: 86
End: 2021-10-18

## 2021-10-18 VITALS
HEIGHT: 61 IN | DIASTOLIC BLOOD PRESSURE: 72 MMHG | BODY MASS INDEX: 24.2 KG/M2 | WEIGHT: 128.2 LBS | SYSTOLIC BLOOD PRESSURE: 126 MMHG | TEMPERATURE: 97.3 F | RESPIRATION RATE: 18 BRPM

## 2021-10-18 DIAGNOSIS — Z23 NEED FOR IMMUNIZATION AGAINST INFLUENZA: ICD-10-CM

## 2021-10-18 DIAGNOSIS — I50.33 ACUTE ON CHRONIC DIASTOLIC CONGESTIVE HEART FAILURE (HCC): Primary | ICD-10-CM

## 2021-10-18 DIAGNOSIS — R06.02 SHORTNESS OF BREATH: ICD-10-CM

## 2021-10-18 PROCEDURE — 99213 OFFICE O/P EST LOW 20 MIN: CPT | Performed by: STUDENT IN AN ORGANIZED HEALTH CARE EDUCATION/TRAINING PROGRAM

## 2021-10-18 PROCEDURE — G0008 ADMIN INFLUENZA VIRUS VAC: HCPCS | Performed by: STUDENT IN AN ORGANIZED HEALTH CARE EDUCATION/TRAINING PROGRAM

## 2021-10-18 PROCEDURE — 90662 IIV NO PRSV INCREASED AG IM: CPT | Performed by: STUDENT IN AN ORGANIZED HEALTH CARE EDUCATION/TRAINING PROGRAM

## 2021-10-18 RX ORDER — BUMETANIDE 0.5 MG/1
0.5 TABLET ORAL DAILY
Qty: 90 TABLET | Refills: 0 | Status: SHIPPED | OUTPATIENT
Start: 2021-10-18 | End: 2021-11-08

## 2021-10-18 RX ORDER — ALBUTEROL SULFATE 90 UG/1
2 AEROSOL, METERED RESPIRATORY (INHALATION) EVERY 4 HOURS PRN
Qty: 6.7 G | Refills: 0 | Status: SHIPPED | OUTPATIENT
Start: 2021-10-18

## 2021-10-18 NOTE — PROGRESS NOTES
"Chief Complaint  Shortness of Breath (having trouble breathing at night and needs rx for inhaler )    Subjective          Mary Shi presents to [facility]  For shortness of breath for almost 3 days.  Patient was accompanied with one of her daughter.  Daughter reported patient is unable to sleep lying down position at night for almost 3 days.  As per patient when she sits up she feels comfortable while sitting but when she lie on her back it makes her feel short of breath.  Patient denies being short of breath when sitting.  Review of system is negative for fever, headache, neck pain, chest pain, nausea, vomiting, abdominal pain, any recent change in bowel and bladder habits      Objective   Vital Signs:   /72   Temp 97.3 °F (36.3 °C) (Temporal)   Resp 18   Ht 154.9 cm (60.98\")   Wt 58.2 kg (128 lb 3.2 oz)   BMI 24.24 kg/m²     Physical Exam  HENT:      Head: Normocephalic and atraumatic.      Mouth/Throat:      Mouth: Mucous membranes are moist.      Pharynx: Oropharynx is clear.   Eyes:      Extraocular Movements: Extraocular movements intact.      Conjunctiva/sclera: Conjunctivae normal.      Pupils: Pupils are equal, round, and reactive to light.   Cardiovascular:      Rate and Rhythm: Normal rate and regular rhythm.   Pulmonary:      Effort: Pulmonary effort is normal. No respiratory distress.      Breath sounds: Normal breath sounds.      Comments: Mild crepitations heard  Abdominal:      General: Bowel sounds are normal.      Palpations: Abdomen is soft.   Musculoskeletal:      Cervical back: Neck supple.      Comments: Patient was sitting on the wheelchair   Skin:     General: Skin is warm.      Capillary Refill: Capillary refill takes less than 2 seconds.   Neurological:      General: No focal deficit present.      Mental Status: She is alert and oriented to person, place, and time. Mental status is at baseline.   Psychiatric:         Mood and Affect: Mood " normal.        Result Review :     CMP    CMP 1/13/21 8/4/21   Glucose 137 (A) 175 (A)   BUN 22 36   Creatinine 0.71 0.76   eGFR Non  Am 73 66   eGFR African Am 84 77   Sodium 133 (A) 143   Potassium 4.1 4.7   Chloride 96 100   Calcium 9.4 9.9   Total Protein 6.9 7.2   Albumin 3.9 4.3   Globulin 3.0 2.9   Total Bilirubin 0.6 0.5   Alkaline Phosphatase 119 (A) 133 (A)   AST (SGOT) 21 19   ALT (SGPT) 15 13   (A) Abnormal value       Comments are available for some flowsheets but are not being displayed.           CBC    CBC 1/13/21 8/4/21   WBC 7.6 7.1   RBC 3.79 3.95   Hemoglobin 10.9 (A) 11.4   Hematocrit 32.4 (A) 35.1   MCV 86 89   MCH 28.8 28.9   MCHC 33.6 32.5   RDW 13.7 14.0   Platelets 196 201   (A) Abnormal value                      Assessment and Plan    Diagnoses and all orders for this visit:    1. Acute on chronic diastolic congestive heart failure (HCC) (Primary)  Comments:  Patient is taking Bumex 1 mg daily in the morning, recommended to take0.5 at night also.  Orders:  -     bumetanide (BUMEX) 0.5 MG tablet; Take 1 tablet by mouth Daily.  Dispense: 90 tablet; Refill: 0    2. Shortness of breath  -     albuterol sulfate  (90 Base) MCG/ACT inhaler; Inhale 2 puffs Every 4 (Four) Hours As Needed for Wheezing (shortness of breath).  Dispense: 6.7 g; Refill: 0    3. Need for immunization against influenza  -     Fluzone High-Dose 65+yrs ([id number])        Follow Up   Return if symptoms worsen or fail to improve.  Patient was given instructions and counseling regarding her condition or for health maintenance advice. Please see specific information pulled into the AVS if appropriate.   Update on 10/19/2021  Patient was called and asked about how her symptoms were at last night.  Spoke to the daughter and daughter informed patient slept whole night.  Patient denies having any shortness of breath while lying down at night or in the morning.  Daughter reported she has appointment with cardiologist  tomorrow and was encouraged to see cardiologist also to get their recommendations.

## 2021-10-20 ENCOUNTER — TELEPHONE (OUTPATIENT)
Dept: CARDIOLOGY | Facility: CLINIC | Age: 86
End: 2021-10-20

## 2021-10-20 NOTE — TELEPHONE ENCOUNTER
Pt saw PCP on 10/18/2021 and she want to make sure that you are aware of her Bumex change  They want her to take half a tablet at night     Daughter cancelled pts appt with Evita SEO today because she was told by PCP to cancel the appt      Assessment and Plan    Diagnoses and all orders for this visit:     1. Acute on chronic diastolic congestive heart failure (HCC) (Primary)  Comments:  Patient is taking Bumex 1 mg daily in the morning, recommended to take0.5 at night also.  Orders:  -     bumetanide (BUMEX) 0.5 MG tablet; Take 1 tablet by mouth Daily.  Dispense: 90 tablet; Refill: 0     2. Shortness of breath  -     albuterol sulfate  (90 Base) MCG/ACT inhaler; Inhale 2 puffs Every 4 (Four) Hours As Needed for Wheezing (shortness of breath).  Dispense: 6.7 g; Refill: 0     3. Need for immunization against influenza  -     Fluzone High-Dose 65+yrs ([id number])           Follow Up   Return if symptoms worsen or fail to improve.  Patient was given instructions and counseling regarding her condition or for health maintenance advice. Please see specific information pulled into the AVS if appropriate.   Update on 10/19/2021  Patient was called and asked about how her symptoms were at last night.  Spoke to the daughter and daughter informed patient slept whole night.  Patient denies having any shortness of breath while lying down at night or in the morning.  Daughter reported she has appointment with cardiologist tomorrow and was encouraged to see cardiologist also to get their recommendations.

## 2021-11-06 DIAGNOSIS — E11.59 TYPE 2 DIABETES MELLITUS WITH OTHER CIRCULATORY COMPLICATION, WITHOUT LONG-TERM CURRENT USE OF INSULIN (HCC): ICD-10-CM

## 2021-11-08 RX ORDER — MEMANTINE HYDROCHLORIDE 28 MG/1
28 CAPSULE, EXTENDED RELEASE ORAL DAILY
Qty: 90 CAPSULE | Refills: 3 | Status: SHIPPED | OUTPATIENT
Start: 2021-11-08 | End: 2022-10-17

## 2021-11-08 RX ORDER — OMEPRAZOLE 20 MG/1
20 CAPSULE, DELAYED RELEASE ORAL DAILY
Qty: 90 CAPSULE | Refills: 3 | Status: SHIPPED | OUTPATIENT
Start: 2021-11-08 | End: 2022-10-17

## 2021-11-08 RX ORDER — BUMETANIDE 1 MG/1
1 TABLET ORAL DAILY
Qty: 90 TABLET | Refills: 3 | Status: SHIPPED | OUTPATIENT
Start: 2021-11-08 | End: 2022-05-23

## 2021-11-08 RX ORDER — APIXABAN 2.5 MG/1
TABLET, FILM COATED ORAL
Qty: 180 TABLET | Refills: 3 | Status: SHIPPED | OUTPATIENT
Start: 2021-11-08 | End: 2022-10-17

## 2021-11-08 RX ORDER — ATORVASTATIN CALCIUM 20 MG/1
20 TABLET, FILM COATED ORAL DAILY
Qty: 90 TABLET | Refills: 3 | Status: SHIPPED | OUTPATIENT
Start: 2021-11-08 | End: 2022-10-17

## 2021-11-12 ENCOUNTER — TELEPHONE (OUTPATIENT)
Dept: NEUROLOGY | Facility: CLINIC | Age: 86
End: 2021-11-12

## 2021-11-12 NOTE — TELEPHONE ENCOUNTER
Provider: JUAN  Caller: MONROE (PTS DAUGHTER)      Reason for Call: THEY ARE WANTING AN UPDATE ON THE LA PAPERWORK BEING PREPARED FOR DHRUV THE OTHER DAUGHTER. PLEASE ADVISE. THANKS.

## 2021-11-15 NOTE — TELEPHONE ENCOUNTER
Informed Aiyana [facility] forms was faxed on Thursday, 11/11/21. Aiyana verbalized understanding and appreciation.

## 2021-11-22 ENCOUNTER — TELEPHONE (OUTPATIENT)
Dept: NEUROLOGY | Facility: CLINIC | Age: 86
End: 2021-11-22

## 2021-11-22 NOTE — TELEPHONE ENCOUNTER
Would recommend continuing to monitor patient.  If she remains neurologically intact no additional treatment needed.  If neurologic assessment changes recommend presenting to the emergency room to complete CT of the head.  If family would prefer, I am okay to send order for CT of the head to further evaluate for bleed due to patient being on anticoagulation therapy.

## 2021-11-22 NOTE — TELEPHONE ENCOUNTER
Spoke with daughter. Informed to continue to monitor pt; if any neurological changes, advised to present to ED for CT. Daughter verbalized understanding and agreement.

## 2021-11-22 NOTE — TELEPHONE ENCOUNTER
Provider: WILL MARTINEZ    Caller: MONROE MOHAMUD    Relationship to Patient: DAUGHTER    Pharmacy: NA    Phone Number: [phone]    Reason for Call: PATIENTS DAUGHTER STATES SHAILA FELL THIS MORNING BUT SHE SAID NOTHING IS HURTING AND NOT ACTING UNUSUAL BUT THERE IS A TINY LITTLE RED SPOT ON HER HEAD. MONROE WANTS TO KNOW IF IT WOULD EVEN BE WORTH A TRIP TO ER SINCE PATIENT IS ON ELIQUIS. SHE DOESN'T THINK THEY WOULD BE ABLE TO DO ANYTHING SINCE PATIENT IS ON THAT MEDICATION. PLEASE ADVISE.     When was the patient last seen: 8-24-21

## 2021-11-30 RX ORDER — ATENOLOL 25 MG/1
25 TABLET ORAL DAILY
Qty: 90 TABLET | Refills: 3 | Status: SHIPPED | OUTPATIENT
Start: 2021-11-30 | End: 2022-05-23

## 2021-12-01 ENCOUNTER — TELEPHONE (OUTPATIENT)
Dept: FAMILY MEDICINE CLINIC | Facility: CLINIC | Age: 86
End: 2021-12-01

## 2021-12-01 NOTE — TELEPHONE ENCOUNTER
Caller: Aiyana Donis    Relationship: Emergency Contact    Best call back number: [phone]    What orders are you requesting (i.e. lab or imaging): URINE CULTURE     In what timeframe would the patient need to come in: ASAP     Where will you receive your lab/imaging services: [facility] [place]     Additional notes: PTS DAUGHTER IS CALLING TO ASK MD BAXTER IF HE CAN CALL IN AN ORDER TO [facility] SO PT CAN RECEIVE A URINE CULTURE DUE TO HER LOWER BACK HURTING. DAUGHTER STATED THAT PT GETS UTIS FREQUENTLY

## 2021-12-02 DIAGNOSIS — R41.0 CONFUSION: ICD-10-CM

## 2021-12-02 DIAGNOSIS — R30.0 DYSURIA: Primary | ICD-10-CM

## 2021-12-02 NOTE — TELEPHONE ENCOUNTER
PATIENTS DAUGHTER IS CALLING BACK IN ON THIS SHE IS REALLY WANTING TO GET HER TESTED FOR THE UTI BECAUSE SHE IS TALKING OUT OF HER HEAD SHE STATES AND HAS THE LOWER BACK PAIN.      CAN YOU CALL AND LET HER KNOW IF THE ORDER IS SENT IN SO THEY CAN GET HER OVER THERE.      CALLBACK NUMBER IS  [phone]

## 2021-12-03 ENCOUNTER — TELEPHONE (OUTPATIENT)
Dept: FAMILY MEDICINE CLINIC | Facility: CLINIC | Age: 86
End: 2021-12-03

## 2021-12-03 RX ORDER — SULFAMETHOXAZOLE AND TRIMETHOPRIM 800; 160 MG/1; MG/1
1 TABLET ORAL 2 TIMES DAILY
Qty: 14 TABLET | Refills: 1 | Status: SHIPPED | OUTPATIENT
Start: 2021-12-03 | End: 2021-12-16

## 2021-12-03 NOTE — TELEPHONE ENCOUNTER
Caller: DonisAiyana    Relationship: Emergency Contact    Best call back number: [phone]    Caller requesting test results: DAUGHTER     What test was performed: URINALYSIS    When was the test performed: 12-1-21         Additional notes: PATIENT IS NOT FEELING WELL. HOPES TO GET RESULTS TODAY, 12-3-21.

## 2021-12-06 LAB
APPEARANCE UR: CLEAR
BACTERIA #/AREA URNS HPF: NORMAL /[HPF]
BACTERIA UR CULT: ABNORMAL
BACTERIA UR CULT: ABNORMAL
BILIRUB UR QL STRIP: NEGATIVE
CASTS URNS QL MICRO: NORMAL /LPF
COLOR UR: YELLOW
EPI CELLS #/AREA URNS HPF: NORMAL /HPF (ref 0–10)
GLUCOSE UR QL: NEGATIVE
HGB UR QL STRIP: NEGATIVE
KETONES UR QL STRIP: NEGATIVE
LEUKOCYTE ESTERASE UR QL STRIP: ABNORMAL
MICRO URNS: ABNORMAL
NITRITE UR QL STRIP: NEGATIVE
OTHER ANTIBIOTIC SUSC ISLT: ABNORMAL
PH UR STRIP: 6.5 [PH] (ref 5–7.5)
PROT UR QL STRIP: NEGATIVE
RBC #/AREA URNS HPF: NORMAL /HPF (ref 0–2)
SP GR UR: 1.01 (ref 1–1.03)
URINALYSIS REFLEX: ABNORMAL
UROBILINOGEN UR STRIP-MCNC: 0.2 MG/DL (ref 0.2–1)
WBC #/AREA URNS HPF: NORMAL /HPF (ref 0–5)

## 2021-12-07 DIAGNOSIS — R53.1 WEAKNESS: Primary | ICD-10-CM

## 2021-12-07 NOTE — TELEPHONE ENCOUNTER
Caller: Monroe Donis    Relationship: Emergency Contact    Best call back number:  [phone]     What is the best time to reach you: ANY     Who are you requesting to speak with (clinical staff, provider,  specific staff member): CLINICAL STAFF     Do you know the name of the person who called: N/A     What was the call regarding: PATIENT WANTS TO KNOW IF SOMETHING WAS CALLED IN FOR HER MOM FOR HER UTI AND MONROE WANTED TO KNOW IF PHYSICAL THERAPY CAN BE ORDERED FOR PATIENT . SHE FELL 2 WEEKS Monday AND HASN'T GOTTEN HER STRENGTH BACK . SHE CAN'T WALK ON HER OWN . PLEASE CALL MONROE AND ADVISE .       Do you require a callback: YES

## 2021-12-09 ENCOUNTER — TELEPHONE (OUTPATIENT)
Dept: FAMILY MEDICINE CLINIC | Facility: CLINIC | Age: 86
End: 2021-12-09

## 2021-12-09 ENCOUNTER — OFFICE VISIT (OUTPATIENT)
Dept: FAMILY MEDICINE CLINIC | Facility: CLINIC | Age: 86
End: 2021-12-09

## 2021-12-09 ENCOUNTER — HOSPITAL ENCOUNTER (OUTPATIENT)
Dept: GENERAL RADIOLOGY | Facility: HOSPITAL | Age: 86
Discharge: HOME | End: 2021-12-09
Admitting: INTERNAL MEDICINE

## 2021-12-09 VITALS
DIASTOLIC BLOOD PRESSURE: 68 MMHG | OXYGEN SATURATION: 93 % | HEART RATE: 68 BPM | SYSTOLIC BLOOD PRESSURE: 114 MMHG | TEMPERATURE: 96.3 F | BODY MASS INDEX: 24.24 KG/M2 | HEIGHT: 61 IN | RESPIRATION RATE: 18 BRPM

## 2021-12-09 DIAGNOSIS — N30.00 ACUTE CYSTITIS WITHOUT HEMATURIA: ICD-10-CM
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
## 2021-12-09 DIAGNOSIS — R53.1 WEAKNESS: ICD-10-CM

## 2021-12-09 DIAGNOSIS — M54.50 ACUTE BILATERAL LOW BACK PAIN WITHOUT SCIATICA: ICD-10-CM

## 2021-12-09 PROCEDURE — 99214 OFFICE O/P EST MOD 30 MIN: CPT | Performed by: INTERNAL MEDICINE

## 2021-12-09 PROCEDURE — 72100 X-RAY EXAM L-S SPINE 2/3 VWS: CPT

## 2021-12-09 NOTE — TELEPHONE ENCOUNTER
Caller: Aiyana Donis    Relationship: Emergency Contact    Best call back number: [phone]    What is the best time to reach you: THIS MORNING    Who are you requesting to speak with (clinical staff, provider,  specific staff member): CLINICAL STAFF      What was the call regarding: PATIENT'S DAUGHTER WOULD LIKE TO KNOW IF THE REQUEST FOR HOME HEALTH  WAS CHANGED TO IN HOME PT?  IF SO SHE WOULD LIKE TO CANCEL THE APPOINTMENT FOR TODAY.    Do you require a callback: YES

## 2021-12-09 NOTE — PROGRESS NOTES
Subjective   Mary Shi is a 89 y.o. female. Patient is here today for   Chief Complaint   Patient presents with   • Fall     post fall          Vitals:    12/09/21 1543   BP: 114/68   Pulse: 68   Resp: 18   Temp: 96.3 °F (35.7 °C)   SpO2: 93%     Body mass index is 24.24 kg/m².    The following portions of the patient's history were reviewed and updated as appropriate: allergies, current medications, past family history, past medical history, past social history, past surgical history and problem list.    Past Medical History:   Diagnosis Date   • 'light-for-dates' infant with signs of fetal malnutrition    • Amaurosis fugax    • Anemia    • Aortic stenosis     s/p TAVR    • Atrial fibrillation ([facility])    • Cancer ([facility])     skin   • CAP (community acquired pneumonia)    • Carotid artery stenosis     Per duplex 12/16/2016-proximal right internal carotid artery mild stenosis and proximal left moderate stenosis   • Cervical spine disease    • Cognitive disorder    • Congestive heart failure ([facility])    • Dementia ([facility])    • Diabetes mellitus ([facility])    • Diastolic dysfunction    • Dyspnea on exertion    • Generalized osteoarthritis of multiple sites    • GERD (gastroesophageal reflux disease)    • Gout    • Health care maintenance    • Hiatal hernia    • HL (hearing loss)    • Hyperlipidemia    • Hypertension    • Memory loss    • Mitral valve stenosis     Moderate per echocardiogram 2/2017; severe mitral annular calcification   • Nasal lesion    • Nasal stenosis    • 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[facility])    • Osteoarthritis     multiple sites   • PAF (paroxysmal atrial fibrillation) ([facility])    • Paroxysmal atrial fibrillation ([facility]) 9/11/2019   • Patient had no falls in past year    • Peripheral neuropathy    • Peripheral vascular disease ([facility])    • PONV (postoperative nausea and vomiting)    • Rheumatic mitral stenosis    • Shingles    • Sleep apnea    • Stroke ([facility])    • Syncope    • Systolic hypertension    • Varicose veins        Allergies   Allergen Reactions   • Codeine Itching and GI Intolerance   • Morphine And Related Itching      Social History     Socioeconomic History   • Marital status:    • Number of children: 15   • Years of education: 8th grade   Tobacco Use   • Smoking status: Never Smoker   • Smokeless tobacco: Never Used   Substance and Sexual Activity   • Alcohol use: No     Comment: no caffeine    • Drug use: No   • Sexual activity: Defer        Current Outpatient Medications:   •  acetaminophen (TYLENOL) 650 MG 8 hr tablet, Take 650 mg by mouth Every 8 (Eight) Hours As Needed for Mild Pain ., Disp: , Rfl:   •  albuterol sulfate  (90 Base) MCG/ACT inhaler, Inhale 2 puffs Every 4 (Four) Hours As Needed for Wheezing (shortness of breath)., Disp: 6.7 g, Rfl: 0  •  ALPRAZolam (XANAX) 0.25 MG tablet, Take 1 tablet by mouth At Night As Needed for Anxiety or Sleep., Disp: 30 tablet, Rfl: 5  •  atenolol (TENORMIN) 25 MG tablet, TAKE 1 TABLET BY MOUTH  DAILY, Disp: 90 tablet, Rfl: 3  •  atorvastatin (LIPITOR) 20 MG tablet, TAKE 1 TABLET BY MOUTH  DAILY, Disp: 90 tablet, Rfl: 3  •  bumetanide (BUMEX) 1 MG tablet, TAKE 1 TABLET BY MOUTH  DAILY, Disp: 90 tablet, Rfl: 3  •  doxycycline (MONODOX) 100 MG capsule, , Disp: , Rfl:   •  Eliquis 2.5 MG tablet tablet, TAKE 1 TABLET BY MOUTH  EVERY 12 HOURS, Disp: 180 tablet, Rfl: 3  •  Lancets (freestyle) lancets, USE TO TEST BLOOD SUGAR ONCE DAILY, Disp: 100 each, Rfl: 4  •  memantine (NAMENDA XR) 28 MG capsule sustained-release 24 hr extended release capsule, TAKE 1 CAPSULE BY MOUTH  DAILY, Disp: 90 capsule, Rfl: 3  •  metFORMIN (GLUCOPHAGE) 500 MG tablet, TAKE ONE-HALF TABLET BY  MOUTH TWICE DAILY, Disp: 90 tablet, Rfl: 3  •  Multiple Vitamins-Minerals (MULTIVITAMIN WITH MINERALS) tablet tablet, Take 1 tablet by mouth Daily., Disp: , Rfl:   •  mupirocin (BACTROBAN) 2 % ointment, , Disp: , Rfl:   •  nitrofurantoin, macrocrystal-monohydrate, (MACROBID) 100 MG capsule, Take  100 mg by mouth 2 (two) times a day., Disp: , Rfl:   •  OLANZapine (ZyPREXA) 2.5 MG tablet, Take 1 tablet by mouth At Night As Needed (hallucinations)., Disp: 30 tablet, Rfl: 2  •  omeprazole (priLOSEC) 20 MG capsule, TAKE 1 CAPSULE BY MOUTH  DAILY, Disp: 90 capsule, Rfl: 3  •  silver sulfadiazine (Silvadene) 1 % cream, Apply  topically to the appropriate area as directed Daily., Disp: 25 g, Rfl: 0  •  sulfamethoxazole-trimethoprim (BACTRIM DS,SEPTRA DS) 800-160 MG per tablet, Take 1 tablet by mouth 2 (Two) Times a Day., Disp: 14 tablet, Rfl: 1  •  triamcinolone (KENALOG) 0.1 % cream, Apply  topically to the appropriate area as directed 2 (Two) Times a Day., Disp: 45 g, Rfl: 1  •  True Metrix Blood Glucose Test test strip, TEST TWO TIMES A DAY, Disp: 200 each, Rfl: 9     Objective     History of Present Illness Zahra is here today with her daughter.  She fell 2 weeks ago in the bathroom.  She was holding onto a bar and slid down landing on her right hip.  Her daughter states there is no bruising.  They did apply ice.  She has been complaining of back pain in the mornings when she first wakes up.  She was diagnosed with a UTI last week and started on antibiotics a couple days ago.  Urine culture grew E. coli.  She has no complaints of back pain at present.      Review of Systems   Constitutional: Negative for chills and fever.   Respiratory: Negative.    Cardiovascular: Negative.    Musculoskeletal: Positive for back pain.   Neurological: Positive for weakness.   Psychiatric/Behavioral: Positive for confusion. Negative for agitation.       Physical Exam  Vitals reviewed.   Constitutional:       Comments: Elderly female who is alert and somewhat cooperative.  She is in a wheelchair   Abdominal:      Tenderness: There is no right CVA tenderness or left CVA tenderness.   Musculoskeletal:      Comments: Spine is nontender to palpation, hip is nontender to palpitation, he can stand with assistance.   Neurological:       Mental Status: She is alert.         ASSESSMENT we will obtain lumbosacral spine x-ray and look for compression fracture.  An order for [facility] home health was placed last week for home physical therapy.  Continue antibiotics as prescribed.     Problems Addressed this Visit        Genitourinary and Reproductive     Acute cystitis without hematuria       Musculoskeletal and Injuries    Fall - Primary    Relevant Orders    XR Spine Lumbar 2 or 3 View    Acute bilateral low back pain without sciatica    Relevant Orders    XR Spine Lumbar 2 or 3 View       Symptoms and Signs    Weakness      Diagnoses       Codes Comments    Fall, initial encounter    -  Primary ICD-10-CM: W19.XXXA  ICD-9-CM: E888.9     Weakness     ICD-10-CM: R53.1  ICD-9-CM: 780.79     Acute bilateral low back pain without sciatica     ICD-10-CM: M54.50  ICD-9-CM: 724.2, 338.19     Acute cystitis without hematuria     ICD-10-CM: N30.00  ICD-9-CM: 595.0           PLAN  There are no Patient Instructions on file for this visit.  No follow-ups on file.

## 2021-12-09 NOTE — TELEPHONE ENCOUNTER
Caller: Aiyana Donis    Relationship: Emergency Contact    Best call back number: [phone]    What was the call regarding: PLEASE CALL PATIENT'S DAUGHTER REGARDING HER REFERRAL. ATTEMPTED WARM TRANSFER, NO ANSWER.    Do you require a callback: YES

## 2021-12-10 ENCOUNTER — TELEPHONE (OUTPATIENT)
Dept: FAMILY MEDICINE CLINIC | Facility: CLINIC | Age: 86
End: 2021-12-10

## 2021-12-10 DIAGNOSIS — M54.50 ACUTE BILATERAL LOW BACK PAIN WITHOUT SCIATICA: ICD-10-CM
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
## 2021-12-10 DIAGNOSIS — R53.1 WEAKNESS: ICD-10-CM

## 2021-12-10 NOTE — TELEPHONE ENCOUNTER
PT WAS SEEN BY DR BAXTER ON 12-9-21 AND A REFERRAL WAS PUT IN FOR PHYSICAL THERAPY. THE HUB CALLED TO SET UP THIS APPT AND PT'S DAUGHTER WANT HOME HEALTH TO COME OUT.     NEED NEW ORDER/REFERRAL PUT IN FOR HOME HEALTH.     THANKS

## 2021-12-15 ENCOUNTER — TELEPHONE (OUTPATIENT)
Dept: FAMILY MEDICINE CLINIC | Facility: CLINIC | Age: 86
End: 2021-12-15

## 2021-12-15 NOTE — TELEPHONE ENCOUNTER
Caller: Aiyana Donis    Relationship: Emergency Contact    Best call back number: [phone]    What is the best time to reach you: ASAP    Who are you requesting to speak with (clinical staff, provider,  specific staff member):DR BAXTER    What was the call regarding: PATIENT'S DAUGHTER CALLED TO TELL DR BAXTER THAT HER MOTHER STILL HAS UTI SYMPTOMS. SHE IS CONFUSED AND DELUSIONAL ABOUT STUFF    Do you require a callback:YES

## 2021-12-16 ENCOUNTER — OFFICE VISIT (OUTPATIENT)
Dept: FAMILY MEDICINE CLINIC | Facility: CLINIC | Age: 86
End: 2021-12-16

## 2021-12-16 VITALS
HEART RATE: 82 BPM | RESPIRATION RATE: 18 BRPM | OXYGEN SATURATION: 98 % | HEIGHT: 61 IN | TEMPERATURE: 97.2 F | DIASTOLIC BLOOD PRESSURE: 70 MMHG | SYSTOLIC BLOOD PRESSURE: 132 MMHG | BODY MASS INDEX: 24.24 KG/M2

## 2021-12-16 DIAGNOSIS — R41.0 DELIRIUM: ICD-10-CM

## 2021-12-16 DIAGNOSIS — N39.0 URINARY TRACT INFECTION WITHOUT HEMATURIA, SITE UNSPECIFIED: Primary | ICD-10-CM

## 2021-12-16 LAB
BILIRUB BLD-MCNC: NEGATIVE MG/DL
CLARITY, POC: CLEAR
COLOR UR: YELLOW
EXPIRATION DATE: NORMAL
GLUCOSE UR STRIP-MCNC: NEGATIVE MG/DL
KETONES UR QL: NEGATIVE
LEUKOCYTE EST, POC: NEGATIVE
Lab: NORMAL
NITRITE UR-MCNC: NEGATIVE MG/ML
PH UR: 6 [PH] (ref 5–8)
PROT UR STRIP-MCNC: NEGATIVE MG/DL
RBC # UR STRIP: NEGATIVE /UL
SP GR UR: 1.01 (ref 1–1.03)
UROBILINOGEN UR QL: NORMAL

## 2021-12-16 PROCEDURE — 99213 OFFICE O/P EST LOW 20 MIN: CPT | Performed by: STUDENT IN AN ORGANIZED HEALTH CARE EDUCATION/TRAINING PROGRAM

## 2021-12-16 PROCEDURE — 81003 URINALYSIS AUTO W/O SCOPE: CPT | Performed by: STUDENT IN AN ORGANIZED HEALTH CARE EDUCATION/TRAINING PROGRAM

## 2021-12-16 RX ORDER — AMOXICILLIN AND CLAVULANATE POTASSIUM 875; 125 MG/1; MG/1
1 TABLET, FILM COATED ORAL 2 TIMES DAILY
Qty: 20 TABLET | Refills: 1 | Status: SHIPPED | OUTPATIENT
Start: 2021-12-16 | End: 2022-01-04

## 2021-12-16 NOTE — PROGRESS NOTES
"Chief Complaint  Urinary Tract Infection (still experiencing uti symptoms from 12/2/21) and Fatigue (confused,side pain)    Subjective          Mary Shi presents to [facility]  For urinary symptoms since 2 weeks.  Patient was accompanied with her daughter.  History was taken both from the patient and the daughter.  Patient reported she is feeling well other than having burning urination whenever she urinates.  Daughter reported sometimes she noticed patient talking meaningless things since 2 weeks.  She was given Bactrim for 2 weeks but it has not resolved patient symptoms.  Urine culture was also done which showed E. coli sensitive to Bactrim but patient is still complaining of burning urination so today antibiotics were changed.        Objective   Vital Signs:   /70 (BP Location: Right arm, Patient Position: Sitting)   Pulse 82   Temp 97.2 °F (36.2 °C) (Oral)   Resp 18   Ht 154.9 cm (60.98\")   SpO2 98%   BMI 24.24 kg/m²     Physical Exam  Vitals reviewed.   Constitutional:       Comments: Slightly drowsy but easily awake able on calling her name and then she had a conversation for a few minutes   HENT:      Head: Normocephalic and atraumatic.      Mouth/Throat:      Mouth: Mucous membranes are moist.      Pharynx: Oropharynx is clear.   Cardiovascular:      Rate and Rhythm: Normal rate and regular rhythm.   Pulmonary:      Effort: Pulmonary effort is normal.      Breath sounds: Normal breath sounds.   Abdominal:      General: Bowel sounds are normal. There is no distension.      Palpations: Abdomen is soft.      Tenderness: There is no right CVA tenderness, left CVA tenderness or guarding.   Musculoskeletal:         General: Normal range of motion.      Cervical back: Neck supple.   Skin:     General: Skin is warm.      Capillary Refill: Capillary refill takes less than 2 seconds.   Neurological:      Comments: Oriented to place and person        Result Review " :                 Assessment and Plan    Diagnoses and all orders for this visit:    1. Urinary tract infection without hematuria, site unspecified (Primary)  -     POCT urinalysis dipstick, automated  -     Urine Culture - Urine, Urine, Clean Catch  -     amoxicillin-clavulanate (Augmentin) 875-125 MG per tablet; Take 1 tablet by mouth 2 (Two) Times a Day.  Dispense: 20 tablet; Refill: 1    2. Delirium  -     CBC & Differential  -     Comprehensive metabolic panel        Follow Up   No follow-ups on file.  Patient was given instructions and counseling regarding her condition or for health maintenance advice. Please see specific information pulled into the AVS if appropriate.

## 2021-12-17 LAB
ALBUMIN SERPL-MCNC: 4.3 G/DL (ref 3.5–4.6)
ALBUMIN/GLOB SERPL: 1.5 {RATIO} (ref 1.2–2.2)
ALP SERPL-CCNC: 135 IU/L (ref 44–121)
ALT SERPL-CCNC: 15 IU/L (ref 0–32)
AST SERPL-CCNC: 21 IU/L (ref 0–40)
BACTERIA UR CULT: NORMAL
BACTERIA UR CULT: NORMAL
BASOPHILS # BLD AUTO: 0 X10E3/UL (ref 0–0.2)
BASOPHILS NFR BLD AUTO: 0 %
BILIRUB SERPL-MCNC: 0.4 MG/DL (ref 0–1.2)
BUN SERPL-MCNC: 27 MG/DL (ref 10–36)
BUN/CREAT SERPL: 23 (ref 12–28)
CALCIUM SERPL-MCNC: 9.8 MG/DL (ref 8.7–10.3)
CHLORIDE SERPL-SCNC: 97 MMOL/L (ref 96–106)
CO2 SERPL-SCNC: 25 MMOL/L (ref 20–29)
CREAT SERPL-MCNC: 1.16 MG/DL (ref 0.57–1)
EOSINOPHIL # BLD AUTO: 0 X10E3/UL (ref 0–0.4)
EOSINOPHIL NFR BLD AUTO: 0 %
ERYTHROCYTE [DISTWIDTH] IN BLOOD BY AUTOMATED COUNT: 13.3 % (ref 11.7–15.4)
GLOBULIN SER CALC-MCNC: 2.9 G/DL (ref 1.5–4.5)
GLUCOSE SERPL-MCNC: 111 MG/DL (ref 65–99)
HCT VFR BLD AUTO: 32.1 % (ref 34–46.6)
HGB BLD-MCNC: 10.5 G/DL (ref 11.1–15.9)
IMM GRANULOCYTES # BLD AUTO: 0 X10E3/UL (ref 0–0.1)
IMM GRANULOCYTES NFR BLD AUTO: 0 %
LYMPHOCYTES # BLD AUTO: 1.6 X10E3/UL (ref 0.7–3.1)
LYMPHOCYTES NFR BLD AUTO: 23 %
MCH RBC QN AUTO: 28.5 PG (ref 26.6–33)
MCHC RBC AUTO-ENTMCNC: 32.7 G/DL (ref 31.5–35.7)
MCV RBC AUTO: 87 FL (ref 79–97)
MONOCYTES # BLD AUTO: 0.7 X10E3/UL (ref 0.1–0.9)
MONOCYTES NFR BLD AUTO: 10 %
NEUTROPHILS # BLD AUTO: 4.5 X10E3/UL (ref 1.4–7)
NEUTROPHILS NFR BLD AUTO: 67 %
PLATELET # BLD AUTO: 232 X10E3/UL (ref 150–450)
POTASSIUM SERPL-SCNC: 4.5 MMOL/L (ref 3.5–5.2)
PROT SERPL-MCNC: 7.2 G/DL (ref 6–8.5)
RBC # BLD AUTO: 3.69 X10E6/UL (ref 3.77–5.28)
SODIUM SERPL-SCNC: 135 MMOL/L (ref 134–144)
WBC # BLD AUTO: 6.8 X10E3/UL (ref 3.4–10.8)

## 2021-12-21 ENCOUNTER — TELEPHONE (OUTPATIENT)
Dept: FAMILY MEDICINE CLINIC | Facility: CLINIC | Age: 86
End: 2021-12-21

## 2021-12-21 NOTE — TELEPHONE ENCOUNTER
Caller: Liat Patel    Relationship: Emergency Contact    Best call back number: [phone]    Caller requesting test results: PATIENT'S DAUGHTER    What test was performed: LABS AND URINALYSIS    When was the test performed: 12/16/2021    Where was the test performed: IN-OFFICE

## 2022-01-04 ENCOUNTER — OFFICE VISIT (OUTPATIENT)
Dept: CARDIOLOGY | Facility: CLINIC | Age: 87
End: 2022-01-04

## 2022-01-04 VITALS
WEIGHT: 122 LBS | BODY MASS INDEX: 23.03 KG/M2 | SYSTOLIC BLOOD PRESSURE: 110 MMHG | HEART RATE: 62 BPM | HEIGHT: 61 IN | DIASTOLIC BLOOD PRESSURE: 60 MMHG

## 2022-01-04 DIAGNOSIS — E78.49 OTHER HYPERLIPIDEMIA: ICD-10-CM

## 2022-01-04 DIAGNOSIS — I05.0 RHEUMATIC MITRAL STENOSIS: ICD-10-CM

## 2022-01-04 DIAGNOSIS — I10 ESSENTIAL HYPERTENSION: ICD-10-CM

## 2022-01-04 DIAGNOSIS — Z95.2 S/P TAVR (TRANSCATHETER AORTIC VALVE REPLACEMENT): ICD-10-CM

## 2022-01-04 DIAGNOSIS — I06.0 RHEUMATIC AORTIC STENOSIS: Primary | ICD-10-CM

## 2022-01-04 PROCEDURE — 99214 OFFICE O/P EST MOD 30 MIN: CPT | Performed by: INTERNAL MEDICINE

## 2022-01-04 PROCEDURE — 93000 ELECTROCARDIOGRAM COMPLETE: CPT | Performed by: INTERNAL MEDICINE

## 2022-01-04 NOTE — PROGRESS NOTES
Date of Office Visit: 2022  Encounter Provider: Elissa Mcmahan MD  Place of Service: [facility]  Patient Name: Mary Shi  :3/3/1925      Patient ID:  Mary Shi is a 89 y.o. female is here for  followup for valvular disease and atrial fibrillation.        History of Present Illness    She had an echocardiogram done 2012. This showed moderate aortic stenosis,  mild mitral stenosis, moderate left atrial dilation, grade I-A diastolic dysfunction,  moderate concentric left ventricular hypertrophy, ejection fraction of 65-70%. She also  had a carotid duplex study showing 50-60% stenosis of the right internal carotid artery.   She feels somewhat short-winded when she bends over because of the hiatal hernia.      She was in the hospital with chest pain and difficulty breathing on 10/08/2014. She had had a prior stress nuclear perfusion study, which was done 2014 for chest pain and this showed no evidence of ischemia. During the hospitalization in October we felt the chest pain was probably mostly related to a hiatal hernia, somewhat related to her valvular heart disease, and possibly a small amount of heart failure as well, although her BNP was not markedly elevated. We did repeat her echocardiogram, which was done 2013, showing ejection fraction of 63%, moderate concentric left ventricular hypertrophy, grade 1A diastolic dysfunction, moderate mitral insufficiency, moderate mitral stenosis, moderate aortic stenosis. This is essentially unchanged from her echocardiograms both in 2013 and 2012. She had another echocardiogram done 10/2014, which showed a grade 2 diastolic dysfunction, ejection fraction of 67%, moderate concentric left ventricular hypertrophy, moderate mitral and moderate aortic stenosis, trivial aortic regurgitation. Again, this is unchanged from echoes dated 2012 and 2013.     In 2016, she  underwent TAVR with a #23 S3 Rubina valve for severe aortic valvular stenosis.     She had an echocardiogram done 6/24/18 showing LVEF 60%, severe left atrial enlargement, mild to moderate tricuspid insufficiency, RVSP elevated gradient of 55 mmHg, severe mitral stenosis, mild mitral insufficiency.  The mean mitral valve gradient was 10 mmHg.     She was hospitalized in June 2018 for shortness of breath felt to be secondary to acute on chronic diastolic heart failure.  She responded well to IV diuresis and was transitioned back to her oral bumetanide.  This exacerbation was felt to be secondary to eating a high sodium diet and going out to eat with family visiting her.       She was hospitalized September 2019 with an acute left MCA ischemic stroke.  she had an echocardiogram done 9/10/2019 showing ejection fraction 71%, moderate mitral valvular stenosis, mild mitral valve insufficiency, mild aortic insufficiency but TAVR otherwise appeared normal and mild to moderate pulmonary hypertension.  She was started on Eliquis.  MRI of the brain showed multiple acute infarcts the left MCA territory as well as a remote left occipital infarct.  There was mild to moderate small vessel disease.  There were no focal high-grade stenoses or aneurysms.       Echo done 3/20/2020 showed ejection fraction of 66% with normal appearing TAVR, moderate severe mitral stenosis, severe MAC, mild tricuspid insufficiency.  RVSP was 62 mmHg.  This was done for heart failure.     She lives with her daughter in [place].    Labs on 12/16/2021 show hemoglobin 10.5, otherwise normal CBC, creatinine 1.16, glucose 111, otherwise normal CMP.  She came today with her daughters Aiyana and Megan.  They help care for her as well as her daughter Liat.  She has been more sluggish and has been struggling with UTIs.  She has had no dizziness, syncope or falls.  She has fair appetite.  She does not feel her heart racing or skipping.  She denies chest pain or  difficulty breathing.  They check her blood pressure at home and note that his been mostly low.       Past Medical History:   Diagnosis Date   • 'light-for-dates' infant with signs of fetal malnutrition    • Amaurosis fugax    • Anemia    • Aortic stenosis     s/p TAVR    • Atrial fibrillation (HCC)    • Cancer ([facility])     skin   • CAP (community acquired pneumonia)    • Carotid artery stenosis     Per duplex 12/16/2016-proximal right internal carotid artery mild stenosis and proximal left moderate stenosis   • Cervical spine disease    • Cognitive disorder    • Congestive heart failure (HCC)    • Dementia ([facility])    • Diabetes mellitus ([facility])    • Diastolic dysfunction    • Dyspnea on exertion    • Generalized osteoarthritis of multiple sites    • GERD (gastroesophageal reflux disease)    • Gout    • Health care maintenance    • Hiatal hernia    • HL (hearing loss)    • Hyperlipidemia    • Hypertension    • Memory loss    • Mitral valve stenosis     Moderate per echocardiogram 2/2017; severe mitral annular calcification   • Nasal lesion    • Nasal stenosis    • 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[facility])    • Osteoarthritis     multiple sites   • PAF (paroxysmal atrial fibrillation) ([facility])    • Paroxysmal atrial fibrillation ([facility]) 9/11/2019   • Patient had no falls in past year    • Peripheral neuropathy    • Peripheral vascular disease ([facility])    • PONV (postoperative nausea and vomiting)    • Rheumatic mitral stenosis    • Shingles    • Sleep apnea    • Stroke ([facility])    • Syncope    • Systolic hypertension    • Varicose veins          Past Surgical History:   Procedure Laterality Date   • AORTIC VALVE REPAIR/REPLACEMENT N/A 12/27/2016    Procedure: Transfemoral LEFT Transcatheter Aortic Valve Replacement TRANSESOPHAGEAL ECHOCARDIOGRAM WITH ANESTHESIA;  Surgeon: Eduardo Brown MD;  Location: [facility] 18/19;  Service:    • BLADDER REPAIR     • CARDIAC CATHETERIZATION N/A 12/14/2016    Procedure: Right Heart Cath;  Surgeon:  Eduardo Brown MD;  Location: [facility] INVASIVE LOCATION;  Service:    • CARDIAC CATHETERIZATION N/A 12/14/2016    Procedure: Coronary angiography;  Surgeon: Eduardo Brown MD;  Location: [facility] INVASIVE LOCATION;  Service:    • HYSTERECTOMY     • KNEE SURGERY         Current Outpatient Medications on File Prior to Visit   Medication Sig Dispense Refill   • acetaminophen (TYLENOL) 650 MG 8 hr tablet Take 650 mg by mouth Every 8 (Eight) Hours As Needed for Mild Pain .     • albuterol sulfate  (90 Base) MCG/ACT inhaler Inhale 2 puffs Every 4 (Four) Hours As Needed for Wheezing (shortness of breath). 6.7 g 0   • atenolol (TENORMIN) 25 MG tablet TAKE 1 TABLET BY MOUTH  DAILY 90 tablet 3   • atorvastatin (LIPITOR) 20 MG tablet TAKE 1 TABLET BY MOUTH  DAILY 90 tablet 3   • bumetanide (BUMEX) 1 MG tablet TAKE 1 TABLET BY MOUTH  DAILY 90 tablet 3   • Eliquis 2.5 MG tablet tablet TAKE 1 TABLET BY MOUTH  EVERY 12 HOURS 180 tablet 3   • Lancets (freestyle) lancets USE TO TEST BLOOD SUGAR ONCE DAILY 100 each 4   • memantine (NAMENDA XR) 28 MG capsule sustained-release 24 hr extended release capsule TAKE 1 CAPSULE BY MOUTH  DAILY 90 capsule 3   • metFORMIN (GLUCOPHAGE) 500 MG tablet TAKE ONE-HALF TABLET BY  MOUTH TWICE DAILY 90 tablet 3   • Multiple Vitamins-Minerals (MULTIVITAMIN WITH MINERALS) tablet tablet Take 1 tablet by mouth Daily.     • OLANZapine (ZyPREXA) 2.5 MG tablet Take 1 tablet by mouth At Night As Needed (hallucinations). 30 tablet 2   • omeprazole (priLOSEC) 20 MG capsule TAKE 1 CAPSULE BY MOUTH  DAILY 90 capsule 3   • True Metrix Blood Glucose Test test strip TEST TWO TIMES A  each 9   • [DISCONTINUED] amoxicillin-clavulanate (Augmentin) 875-125 MG per tablet Take 1 tablet by mouth 2 (Two) Times a Day. 20 tablet 1     No current facility-administered medications on file prior to visit.       Social History     Socioeconomic History   • Marital status:    • Number of children: 15  "  • Years of education: 8th grade   Tobacco Use   • Smoking status: Never Smoker   • Smokeless tobacco: Never Used   Substance and Sexual Activity   • Alcohol use: No     Comment: no caffeine    • Drug use: No   • Sexual activity: Defer           ROS    Procedures    ECG 12 Lead    Date/Time: 1/4/2022 12:17 PM  Performed by: Elissa Mcmahan MD  Authorized by: Elissa Mcmahan MD   Comparison: compared with previous ECG   Similar to previous ECG  Rhythm: atrial fibrillation  Other findings: low voltage    Clinical impression: abnormal EKG                Objective:      Vitals:    01/04/22 1156   BP: 110/60   BP Location: Left arm   Pulse: 62   Weight: 55.3 kg (122 lb)   Height: 154.9 cm (61\")     Body mass index is 23.05 kg/m².    Vitals reviewed.   Constitutional:       General: Not in acute distress.     Appearance: Well-developed. Not diaphoretic.   Eyes:      General: No scleral icterus.     Conjunctiva/sclera: Conjunctivae normal.   HENT:      Head: Normocephalic and atraumatic.   Neck:      Thyroid: No thyromegaly.      Vascular: No carotid bruit or JVD.      Lymphadenopathy: No cervical adenopathy.   Pulmonary:      Effort: Pulmonary effort is normal. No respiratory distress.      Breath sounds: Normal breath sounds. No wheezing. No rhonchi. No rales.   Chest:      Chest wall: Not tender to palpatation.   Cardiovascular:      Normal rate. Irregularly irregular rhythm.      Murmurs: There is a grade 2/6 midsystolic murmur at the URSB and ULSB.      No gallop.   Pulses:     Intact distal pulses.   Edema:     Peripheral edema absent.   Abdominal:      General: Bowel sounds are normal. There is no distension or abdominal bruit.      Palpations: Abdomen is soft. There is no abdominal mass.      Tenderness: There is no abdominal tenderness.   Musculoskeletal:         General: No deformity.      Extremities: No clubbing present.     Cervical back: Neck supple. Skin:     General: Skin is warm and dry. There " is no cyanosis.      Coloration: Skin is not pale.      Findings: No rash.   Neurological:      Mental Status: Alert and oriented to person, place, and time.      Cranial Nerves: No cranial nerve deficit.   Psychiatric:         Judgment: Judgment normal.         Lab Review:       Assessment:      Diagnosis Plan   1. Rheumatic aortic stenosis     2. Rheumatic mitral stenosis     3. S/P TAVR (transcatheter aortic valve replacement)     4. Other hyperlipidemia     5. Essential hypertension       1. Severe AS. S/p TAVR 12/2016.   2. Right carotid artery stenosis, 50-60%.   3. Hypertension. BP  is now low.  4. Hyperlipidemia.  On atorvastatin  5. Varicose veins; stable.   6. Diabetes mellitus type 2.   7. Dementia, fairly advanced.  8. Moderate to severe MS.  9. Grade 2 diastolic dysfunction.  10. Hiatal hernia which causes her dyspnea and chest pain.  11. Venous insufficiency, use compression stockings.  12. Gout.   13. CVA 9/2019, on eliquis as was due to atrial fibrillation.  She is not on warfarin as she is not a candidate for this given her frailty.     Plan:       Stop atenolol for low blood pressure and low heart rate.  Continue to monitor blood pressure at home.  Would tolerate higher blood pressure.  May need to also stop Bumex.  See back in 6 months, no other testing at this time.

## 2022-01-10 ENCOUNTER — TELEPHONE (OUTPATIENT)
Dept: FAMILY MEDICINE CLINIC | Facility: CLINIC | Age: 87
End: 2022-01-10

## 2022-01-10 NOTE — TELEPHONE ENCOUNTER
Caller: Aiyana Donis    Relationship: Emergency Contact    Best call back number: [phone]    What was the call regarding: PATIENT HAS A TOOTH THAT HAS BEEN BOTHERING HER, DENTIST RECOMMENDED EITHER PULLING IT OR A ROOT CANAL. PLEASE ADVISE PATIENT IF ONE IS BETTER THEN THE OTHER DUE TO BEING ON ELIQUIS. PATIENT'S DAUGHTER IS ON [facility] VERBAL.     Do you require a callback: YES

## 2022-01-18 NOTE — TELEPHONE ENCOUNTER
DR BAXTER SAID STOP ONE DAY PRIOR TO PROCEDURE.I ATTEMPTED TO CALL BUT VOICEMAIL IS NOT SETUP YET.

## 2022-01-19 ENCOUNTER — TELEPHONE (OUTPATIENT)
Dept: CARDIOLOGY | Facility: CLINIC | Age: 87
End: 2022-01-19

## 2022-01-19 NOTE — TELEPHONE ENCOUNTER
"----- Message from Loan Chand MA sent at 1/18/2022  4:18 PM EST -----  Regarding: FW: follow up after Discontinuing Atenolol-Remaining VS    ----- Message -----  From: Mary Shi  Sent: 1/18/2022   4:03 PM EST  To: Анна [facility]  Subject: follow up after Discontinuing Atenolol-Remai#    Please find the remaining VS for  K. \"[place]ia\" Yuridia.    "

## 2022-01-19 NOTE — TELEPHONE ENCOUNTER
Spoke with patient daughter, Aiyana (on the consent form), and told her the above.  She verbalized understanding.  Advised her that if anything changes to call or send a [employer] message and we would address it.    Loan

## 2022-01-19 NOTE — TELEPHONE ENCOUNTER
Vitals are stable - pls call - no changes.     rm  ----- Message from Loan Chand MA sent at 1/18/2022  4:17 PM EST -----  Regarding: FW: Follow -up  Daily VS & Weight after D/C Atenolol    ----- Message -----  From: Mary Shi  Sent: 1/18/2022   4:02 PM EST  To: Анна [facility]  Subject: Follow -up  Daily VS & Weight after D/C [place]#    Good afternoon Dr. Mcmahan,  My mom Milvia Shi stopped her Atenolol on 1-4-22 (last dose per her visit ).  Her VS are attached.  she did have SOB without exertion, diminished BS and unable to lie flat on Friday (1-14-22) and an additional .5 of Bumetanide was given that evening (one-time dose ).  Improvement noted.  Today 1-18-22, BS are improved and clear. Sa02 92%.  She is oriented and eating well.  She did sleep well last night  flat in her bed.  Seems to be at her baseline.    Will send the remaining VS in another message.    Thank you for taking such great care of our mother!

## 2022-02-08 ENCOUNTER — TELEMEDICINE (OUTPATIENT)
Dept: NEUROLOGY | Facility: CLINIC | Age: 87
End: 2022-02-08

## 2022-02-08 VITALS — OXYGEN SATURATION: 93 % | HEART RATE: 83 BPM

## 2022-02-08 DIAGNOSIS — Z91.81 AT MODERATE RISK FOR FALL: ICD-10-CM

## 2022-02-08 DIAGNOSIS — I48.19 ATRIAL FIBRILLATION, PERSISTENT: ICD-10-CM

## 2022-02-08 DIAGNOSIS — Z86.73 HISTORY OF STROKE: Primary | ICD-10-CM

## 2022-02-08 DIAGNOSIS — F03.90 DEMENTIA WITHOUT BEHAVIORAL DISTURBANCE, UNSPECIFIED DEMENTIA TYPE: ICD-10-CM

## 2022-02-08 DIAGNOSIS — E11.9 TYPE 2 DIABETES MELLITUS WITHOUT COMPLICATION, WITHOUT LONG-TERM CURRENT USE OF INSULIN: ICD-10-CM

## 2022-02-08 DIAGNOSIS — I10 ESSENTIAL HYPERTENSION: ICD-10-CM

## 2022-02-08 DIAGNOSIS — I65.23 BILATERAL CAROTID ARTERY STENOSIS: ICD-10-CM

## 2022-02-08 DIAGNOSIS — R44.3 HALLUCINATIONS: ICD-10-CM

## 2022-02-08 DIAGNOSIS — E78.49 OTHER HYPERLIPIDEMIA: ICD-10-CM

## 2022-02-08 PROCEDURE — 99213 OFFICE O/P EST LOW 20 MIN: CPT | Performed by: NURSE PRACTITIONER

## 2022-02-08 NOTE — PROGRESS NOTES
"CC: Stroke and dementia visual hallucinations    HPI:  Mary Shi is a  89 y.o. right-handed female congestive heart failure, dementia, diabetes mellitus, hypertension, hyperlipidemia, status post TAVR, obstructive sleep apnea, multiple right MCA infarcts with etiology thought to be secondary to A. fib/cardioembolic source Home seen today in follow-up for above chief complaints.  Patient was last evaluated me via video visit August 2021 for the same.  That time family reported that patient had been obsessing about the past and had some visual hallucinations specifically seeing things like bugs and had reported poor quality of sleep.  Patient had been using low-dose Xanax as needed which was not offering her much in the way of relief of symptoms and/or improved sleep for offered as needed Zyprexa 2.5 to 5 mg for any acute episodes in the future.  Since that time, family reports that they have not had to give patient any Zyprexa.  Minimal episodes and no reported visual hallucinations.  Family notes that patient had a bad couple of weeks when coming off atenolol and had some fluid retention requiring Bumex but has since returned to her baseline.  During this time she also had a broken crown/toothache which is also since been repaired.  She uses occasional melatonin which has had a reverse effect on her and made her \"wired\".  She continues to live at home with her daughter.  She has not had any falls/recent illnesses/hospitalizations.  She continues to use walker (Rollator) and wheelchair to assist with mobility.  Patient has had maybe 1 to 2 pound weight loss but otherwise maintaining weight.  10 used to use [place] instant breakfast to assist with increased protein/vitamin intake.  She remains on Namenda 28 mg XR and is tolerating without any difficulty family notes no worsening or change in memory.  Patient is alert and attentive on video visit she has some difficulty with answering orientation questions " but is able to look at the clock on the wall until the time the date and the month and knows the 2 children that are with her in the room during the video visit.  Family denies any other complaints and or concerns and is overall happy with patient's current state.  Plan to see patient back in 6 months time; sooner if indicated        Past Medical History:   Diagnosis Date   • 'light-for-dates' infant with signs of fetal malnutrition    • Amaurosis fugax    • Anemia    • Aortic stenosis     s/p TAVR    • Atrial fibrillation (HCC)    • Cancer ([facility])     skin   • CAP (community acquired pneumonia)    • Carotid artery stenosis     Per duplex 12/16/2016-proximal right internal carotid artery mild stenosis and proximal left moderate stenosis   • Cervical spine disease    • Cognitive disorder    • Congestive heart failure ([facility])    • Dementia ([facility])    • Diabetes mellitus ([facility])    • Diastolic dysfunction    • Dyspnea on exertion    • Generalized osteoarthritis of multiple sites    • GERD (gastroesophageal reflux disease)    • Gout    • Health care maintenance    • Hiatal hernia    • HL (hearing loss)    • Hyperlipidemia    • Hypertension    • Memory loss    • Mitral valve stenosis     Moderate per echocardiogram 2/2017; severe mitral annular calcification   • Nasal lesion    • Nasal stenosis    • 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[facility])    • Osteoarthritis     multiple sites   • PAF (paroxysmal atrial fibrillation) ([facility])    • Paroxysmal atrial fibrillation ([facility]) 9/11/2019   • Patient had no falls in past year    • Peripheral neuropathy    • Peripheral vascular disease ([facility])    • PONV (postoperative nausea and vomiting)    • Rheumatic mitral stenosis    • Shingles    • Sleep apnea    • Stroke ([facility])    • Syncope    • Systolic hypertension    • Varicose veins          Past Surgical History:   Procedure Laterality Date   • AORTIC VALVE REPAIR/REPLACEMENT N/A 12/27/2016    Procedure: Transfemoral LEFT Transcatheter Aortic Valve Replacement  TRANSESOPHAGEAL ECHOCARDIOGRAM WITH ANESTHESIA;  Surgeon: Eduardo Brown MD;  Location: [facility] HYBRID OR 18/19;  Service:    • BLADDER REPAIR     • CARDIAC CATHETERIZATION N/A 12/14/2016    Procedure: Right Heart Cath;  Surgeon: Eduardo Brown MD;  Location: [facility] CATH INVASIVE LOCATION;  Service:    • CARDIAC CATHETERIZATION N/A 12/14/2016    Procedure: Coronary angiography;  Surgeon: Eduardo Brown MD;  Location: [facility] CATH INVASIVE LOCATION;  Service:    • HYSTERECTOMY     • KNEE SURGERY             Current Outpatient Medications:   •  acetaminophen (TYLENOL) 650 MG 8 hr tablet, Take 650 mg by mouth Every 8 (Eight) Hours As Needed for Mild Pain ., Disp: , Rfl:   •  albuterol sulfate  (90 Base) MCG/ACT inhaler, Inhale 2 puffs Every 4 (Four) Hours As Needed for Wheezing (shortness of breath)., Disp: 6.7 g, Rfl: 0  •  atenolol (TENORMIN) 25 MG tablet, TAKE 1 TABLET BY MOUTH  DAILY, Disp: 90 tablet, Rfl: 3  •  atorvastatin (LIPITOR) 20 MG tablet, TAKE 1 TABLET BY MOUTH  DAILY, Disp: 90 tablet, Rfl: 3  •  bumetanide (BUMEX) 1 MG tablet, TAKE 1 TABLET BY MOUTH  DAILY, Disp: 90 tablet, Rfl: 3  •  Eliquis 2.5 MG tablet tablet, TAKE 1 TABLET BY MOUTH  EVERY 12 HOURS, Disp: 180 tablet, Rfl: 3  •  Lancets (freestyle) lancets, USE TO TEST BLOOD SUGAR ONCE DAILY, Disp: 100 each, Rfl: 4  •  memantine (NAMENDA XR) 28 MG capsule sustained-release 24 hr extended release capsule, TAKE 1 CAPSULE BY MOUTH  DAILY, Disp: 90 capsule, Rfl: 3  •  metFORMIN (GLUCOPHAGE) 500 MG tablet, TAKE ONE-HALF TABLET BY  MOUTH TWICE DAILY, Disp: 90 tablet, Rfl: 3  •  Multiple Vitamins-Minerals (MULTIVITAMIN WITH MINERALS) tablet tablet, Take 1 tablet by mouth Daily., Disp: , Rfl:   •  OLANZapine (ZyPREXA) 2.5 MG tablet, Take 1 tablet by mouth At Night As Needed (hallucinations)., Disp: 30 tablet, Rfl: 2  •  omeprazole (priLOSEC) 20 MG capsule, TAKE 1 CAPSULE BY MOUTH  DAILY, Disp: 90 capsule, Rfl: 3  •  True Metrix Blood Glucose Test test  strip, TEST TWO TIMES A DAY, Disp: 200 each, Rfl: 9      Family History   Problem Relation Age of Onset   • Heart disease Mother    • Coronary artery disease Mother    • Hyperlipidemia Mother    • Hypertension Mother    • Cancer Sister    • Cancer Brother    • Diabetes Daughter    • Diabetes Daughter    • Cancer Sister    • Cancer Sister    • Cancer Sister    • Diabetes Son          Social History     Socioeconomic History   • Marital status:    • Number of children: 15   • Years of education: 8th grade   Tobacco Use   • Smoking status: Never Smoker   • Smokeless tobacco: Never Used   Vaping Use   • Vaping Use: Never used   Substance and Sexual Activity   • Alcohol use: No     Comment: no caffeine    • Drug use: No   • Sexual activity: Defer         Allergies   Allergen Reactions   • Codeine Itching and GI Intolerance   • Morphine And Related Itching         Pain Scale:0/10        ROS:  Review of Systems   Constitutional: Negative for activity change, appetite change, fatigue and fever.   HENT: Negative.    Respiratory: Negative.    Cardiovascular: Negative.    Gastrointestinal: Negative.    Genitourinary: Negative.    Skin: Negative.    Neurological:        Intermittent impulsiveness/hallucinations and focusing on the past   Psychiatric/Behavioral: The patient is nervous/anxious.            Physical Exam:  Vitals:    02/08/22 1215   Pulse: 83   SpO2: 93%     Weight according to family 124.6 this a.m.-vitals per daughter Shaunna      Exam limited by telephone visit   Neurological: Patient  is alert and oriented to self/family; difficulty with naming date/month/year without looking at calendars  Psychiatric: Patient has a normal mood and affect. Patients behavior is normal. Judgment and thought content normal.        Results:      Lab Results   Component Value Date    GLUCOSE 111 (H) 12/16/2021    BUN 27 12/16/2021    CREATININE 1.16 (H) 12/16/2021    EGFRIFNONA 40 (L) 12/16/2021    EGFRIFAFRI 46 (L) 12/16/2021     BCR 23 12/16/2021    CO2 25 12/16/2021    CALCIUM 9.8 12/16/2021    PROTENTOTREF 7.2 12/16/2021    ALBUMIN 4.3 12/16/2021    LABIL2 1.5 12/16/2021    AST 21 12/16/2021    ALT 15 12/16/2021       Lab Results   Component Value Date    WBC 6.8 12/16/2021    HGB 10.5 (L) 12/16/2021    HCT 32.1 (L) 12/16/2021    MCV 87 12/16/2021     12/16/2021         .  Lab Results   Component Value Date    RPR Non-Reactive 09/10/2019         Lab Results   Component Value Date    TSH 3.510 01/13/2021         Lab Results   Component Value Date    ETDIEXUU52 857 08/04/2021         Lab Results   Component Value Date    FOLATE >20.00 09/10/2019         Lab Results   Component Value Date    HGBA1C 7.0 (H) 08/04/2021               Assessment:   1.  History of stroke: Multiple left MCA infarcts with etiology felt to be secondary to atrial verbalization/cardioembolic source with residual right sided hemianopsia  2.  Atrial fibrillation; on adjusted dose Eliquis  3.  Cognitive impairment/dementia; on Namenda extended release (times approximately 10 years)  4.  Hypertension  5.  Hyperlipidemia  6.  Obstructive sleep apnea; CPAP            Plan:  · Continue adjusted dose Eliquis 2.5 mg twice daily, atorvastatin 20 mg daily  · Continue Namenda extended release  · Zyprexa 2.5 to 5 mg twice daily as needed for increased agitation and impulsiveness  · Follow-up with me 6 months; sooner if needed        Diagnoses and all orders for this visit:    1. History of stroke (Primary)    2. Hallucinations    3. Dementia without behavioral disturbance, unspecified dementia type (HCC)    4. At moderate risk for fall    5. Other hyperlipidemia    6. Essential hypertension    7. Type 2 diabetes mellitus without complication, without long-term current use of insulin (HCC)    8. Atrial fibrillation, persistent (HCC)    9. Bilateral carotid artery stenosis                                    Dictated utilizing Dragon dictation.

## 2022-05-20 ENCOUNTER — TELEPHONE (OUTPATIENT)
Dept: CARDIOLOGY | Facility: CLINIC | Age: 87
End: 2022-05-20

## 2022-05-20 NOTE — TELEPHONE ENCOUNTER
This was sent by accident. The patient has sent a [employer] message with BP readings so I added my note to that instead of another telephone encounter so you could see the BP readings.     Cherie Greer RN  Triage [facility]

## 2022-05-23 ENCOUNTER — LAB (OUTPATIENT)
Dept: LAB | Facility: HOSPITAL | Age: 87
End: 2022-05-23

## 2022-05-23 ENCOUNTER — TELEPHONE (OUTPATIENT)
Dept: CARDIOLOGY | Facility: CLINIC | Age: 87
End: 2022-05-23

## 2022-05-23 ENCOUNTER — OFFICE VISIT (OUTPATIENT)
Dept: CARDIOLOGY | Facility: CLINIC | Age: 87
End: 2022-05-23

## 2022-05-23 VITALS
SYSTOLIC BLOOD PRESSURE: 125 MMHG | DIASTOLIC BLOOD PRESSURE: 70 MMHG | HEART RATE: 78 BPM | BODY MASS INDEX: 23.6 KG/M2 | WEIGHT: 125 LBS | HEIGHT: 61 IN

## 2022-05-23 DIAGNOSIS — I05.0 MITRAL VALVE STENOSIS, UNSPECIFIED ETIOLOGY: ICD-10-CM

## 2022-05-23 DIAGNOSIS — R53.83 FATIGUE, UNSPECIFIED TYPE: ICD-10-CM

## 2022-05-23 DIAGNOSIS — I10 ESSENTIAL HYPERTENSION: ICD-10-CM

## 2022-05-23 DIAGNOSIS — I50.32 CHRONIC DIASTOLIC CONGESTIVE HEART FAILURE: ICD-10-CM

## 2022-05-23 DIAGNOSIS — Z95.2 S/P TAVR (TRANSCATHETER AORTIC VALVE REPLACEMENT): Primary | ICD-10-CM

## 2022-05-23 DIAGNOSIS — I63.511 CEREBROVASCULAR ACCIDENT (CVA) DUE TO OCCLUSION OF RIGHT MIDDLE CEREBRAL ARTERY: ICD-10-CM

## 2022-05-23 DIAGNOSIS — D64.9 ANEMIA, UNSPECIFIED TYPE: ICD-10-CM

## 2022-05-23 DIAGNOSIS — D50.0 IRON DEFICIENCY ANEMIA DUE TO CHRONIC BLOOD LOSS: Primary | ICD-10-CM

## 2022-05-23 LAB
ALBUMIN SERPL-MCNC: 3.7 G/DL (ref 3.5–5.2)
ALBUMIN/GLOB SERPL: 1.3 G/DL
ALP SERPL-CCNC: 110 U/L (ref 39–117)
ALT SERPL W P-5'-P-CCNC: 14 U/L (ref 1–33)
ANION GAP SERPL CALCULATED.3IONS-SCNC: 9 MMOL/L (ref 5–15)
AST SERPL-CCNC: 15 U/L (ref 1–32)
BASOPHILS # BLD AUTO: 0.03 10*3/MM3 (ref 0–0.2)
BASOPHILS NFR BLD AUTO: 0.4 % (ref 0–1.5)
BILIRUB SERPL-MCNC: 0.4 MG/DL (ref 0–1.2)
BILIRUB UR QL STRIP: NEGATIVE
BUN SERPL-MCNC: 26 MG/DL (ref 8–23)
BUN/CREAT SERPL: 39.4 (ref 7–25)
CALCIUM SPEC-SCNC: 9.4 MG/DL (ref 8.2–9.6)
CHLORIDE SERPL-SCNC: 100 MMOL/L (ref 98–107)
CLARITY UR: CLEAR
CO2 SERPL-SCNC: 29 MMOL/L (ref 22–29)
COLOR UR: YELLOW
CREAT SERPL-MCNC: 0.66 MG/DL (ref 0.57–1)
DEPRECATED RDW RBC AUTO: 42.8 FL (ref 37–54)
EGFRCR SERPLBLD CKD-EPI 2021: 79.9 ML/MIN/1.73
EOSINOPHIL # BLD AUTO: 0.04 10*3/MM3 (ref 0–0.4)
EOSINOPHIL NFR BLD AUTO: 0.5 % (ref 0.3–6.2)
ERYTHROCYTE [DISTWIDTH] IN BLOOD BY AUTOMATED COUNT: 13.7 % (ref 12.3–15.4)
FERRITIN SERPL-MCNC: 297 NG/ML (ref 13–150)
GLOBULIN UR ELPH-MCNC: 2.9 GM/DL
GLUCOSE SERPL-MCNC: 117 MG/DL (ref 65–99)
GLUCOSE UR STRIP-MCNC: NEGATIVE MG/DL
HCT VFR BLD AUTO: 33.2 % (ref 34–46.6)
HGB BLD-MCNC: 10.5 G/DL (ref 12–15.9)
HGB UR QL STRIP.AUTO: NEGATIVE
IMM GRANULOCYTES # BLD AUTO: 0.04 10*3/MM3 (ref 0–0.05)
IMM GRANULOCYTES NFR BLD AUTO: 0.5 % (ref 0–0.5)
KETONES UR QL STRIP: NEGATIVE
LEUKOCYTE ESTERASE UR QL STRIP.AUTO: NEGATIVE
LYMPHOCYTES # BLD AUTO: 1.56 10*3/MM3 (ref 0.7–3.1)
LYMPHOCYTES NFR BLD AUTO: 19.7 % (ref 19.6–45.3)
MCH RBC QN AUTO: 27 PG (ref 26.6–33)
MCHC RBC AUTO-ENTMCNC: 31.6 G/DL (ref 31.5–35.7)
MCV RBC AUTO: 85.3 FL (ref 79–97)
MONOCYTES # BLD AUTO: 0.73 10*3/MM3 (ref 0.1–0.9)
MONOCYTES NFR BLD AUTO: 9.2 % (ref 5–12)
NEUTROPHILS NFR BLD AUTO: 5.52 10*3/MM3 (ref 1.7–7)
NEUTROPHILS NFR BLD AUTO: 69.7 % (ref 42.7–76)
NITRITE UR QL STRIP: NEGATIVE
NRBC BLD AUTO-RTO: 0 /100 WBC (ref 0–0.2)
NT-PROBNP SERPL-MCNC: 1598 PG/ML (ref 0–1800)
PH UR STRIP.AUTO: 6.5 [PH] (ref 5–8)
PLATELET # BLD AUTO: 271 10*3/MM3 (ref 140–450)
PMV BLD AUTO: 10.4 FL (ref 6–12)
POTASSIUM SERPL-SCNC: 4.5 MMOL/L (ref 3.5–5.2)
PROT SERPL-MCNC: 6.6 G/DL (ref 6–8.5)
PROT UR QL STRIP: NEGATIVE
RBC # BLD AUTO: 3.89 10*6/MM3 (ref 3.77–5.28)
SODIUM SERPL-SCNC: 138 MMOL/L (ref 136–145)
SP GR UR STRIP: 1.02 (ref 1–1.03)
URATE SERPL-MCNC: 5.4 MG/DL (ref 2.4–5.7)
UROBILINOGEN UR QL STRIP: NORMAL
WBC NRBC COR # BLD: 7.92 10*3/MM3 (ref 3.4–10.8)

## 2022-05-23 PROCEDURE — 84550 ASSAY OF BLOOD/URIC ACID: CPT

## 2022-05-23 PROCEDURE — 99214 OFFICE O/P EST MOD 30 MIN: CPT | Performed by: NURSE PRACTITIONER

## 2022-05-23 PROCEDURE — 82728 ASSAY OF FERRITIN: CPT

## 2022-05-23 PROCEDURE — 83880 ASSAY OF NATRIURETIC PEPTIDE: CPT

## 2022-05-23 PROCEDURE — 84466 ASSAY OF TRANSFERRIN: CPT

## 2022-05-23 PROCEDURE — 93000 ELECTROCARDIOGRAM COMPLETE: CPT | Performed by: NURSE PRACTITIONER

## 2022-05-23 PROCEDURE — 36415 COLL VENOUS BLD VENIPUNCTURE: CPT

## 2022-05-23 PROCEDURE — 80053 COMPREHEN METABOLIC PANEL: CPT

## 2022-05-23 PROCEDURE — 85025 COMPLETE CBC W/AUTO DIFF WBC: CPT

## 2022-05-23 PROCEDURE — 83540 ASSAY OF IRON: CPT

## 2022-05-23 PROCEDURE — 81003 URINALYSIS AUTO W/O SCOPE: CPT

## 2022-05-23 RX ORDER — BUMETANIDE 1 MG/1
0.5 TABLET ORAL AS NEEDED
Qty: 90 TABLET | Refills: 3 | Status: ON HOLD | OUTPATIENT
Start: 2022-05-23 | End: 2022-05-30 | Stop reason: SDUPTHER

## 2022-05-23 NOTE — PROGRESS NOTES
[facility] CARDIOLOGY  [address]  [facility] [address]  [facility] [address]  Phone: [phone]      Patient Name: Mary Shi  :3/3/1925  Age: 89 y.o.  Primary Cardiologist: Elissa Mcmahan MD  Encounter Provider:  RAYRAY Rodarte      Chief Complaint     Fatigue     SUBJECTIVE     History of Present Illness:  Mary Shi is a 89 y.o.  female whose medical history includes dementia, type 2 diabetes, hypertension, hyperlipidemia, obstructive sleep apnea, multiple right MCA infarcts.. They are followed in our office by Dr. Mcmahan for valvular disease, diastolic heart failure, and atrial fibrillation. Below is a summary of pertinent cardiology findings:  • The plan for 2012 echocardiogram showed moderate aortic stenosis, mild mitral stenosis, moderate left atrial dilatation, grade 1A diastolic dysfunction, moderate concentric left ventricular hypertrophy, and EF 65 to 70%.  Carotid artery Doppler study at that time showed 50 to 60% stenosis of the right internal carotid artery.  • Echocardiogram 2013 showed EF 63%, moderate concentric left ventricular hypertrophy, grade 1A diastolic dysfunction, moderate mitral insufficiency, moderate mitral stenosis, and moderate aortic stenosis.  • Admitted 2014 for chest pain and dyspnea.  Chest pain felt to be related to hiatal hernia.  May have been some heart failure although her BNP was not markedly elevated.  Echocardiogram showed grade 2 diastolic dysfunction, EF 67%, moderate concentric left ventricular hypertrophy, moderate mitral stenosis, moderate aortic stenosis, and trivial aortic regurgitation.  • 2014 stress nuclear perfusion study showed no evidence of ischemia  • 20.  16 she had TAVR with a #23 S3 sapient valve for severe aortic valvular stenosis.  • 2018 echocardiogram showed EF 60%, severe left atrial enlargement, mild to moderate tricuspid insufficiency,  RVSP elevated at 55 mmHg, severe mitral stenosis, mild mitral insufficiency, and the mean mitral valve gradient was 10 mmHg.  This was done when hospitalized for acute on chronic diastolic heart failure.  She responded well to IV diuretics and was discharged home on oral bumetanide.  • Is paralyzed September 2019 with acute left MCA ischemic stroke.  Echocardiogram done September 10, 2019 showed EF 71%, moderate mitral valvular stenosis, mild mitral valve insufficiency, mild aortic insufficiency but TAVR otherwise appeared normal, and mild to moderate pulmonary hypertension.  MRI of the brain showed multiple acute infarcts of the left MCA territory as well as a remote left occipital infarct; there was mild to moderate small vessel disease.  There is no high-grade stenosis or aneurysm.  She was discharged on apixaban.  • Echocardiogram March 20, 2020 showed EF 66% with normal-appearing TAVR, moderate severe mitral stenosis, severe MAC, mild tricuspid insufficiency and RVSP 62 mmHg.  This was done to evaluate for heart failure.  • Atenolol was stopped in January 2022 for low blood pressure.    05/23/22 Follow-up:  She is here for early follow-up and I am seeing her for the first time today.  She is with her daughters who are providing most of the history. Her daughter reports fatigue to point that she is sleeping most of the day. She is not participating in activities that she usually enjoys. BP at home has been 110-120s/60s with HR 60-80s. Blood sugars have been 120-160s. Her weight has been stable but her appetite is only fair. She has stable dyspnea with exertion, no cough, and no orthopnea. She has no leg swelling. Her daughters would like for her to have physical therapy.     Past Medical History:   Diagnosis Date   • 'light-for-dates' infant with signs of fetal malnutrition    • Amaurosis fugax    • Anemia    • Aortic stenosis    • Atrial fibrillation (HCC)    • Cancer (HCC)    • CAP (community acquired  pneumonia)    • Carotid artery stenosis    • Cervical spine disease    • Cognitive disorder    • Congestive heart failure (HCC)    • Dementia (HCC)    • Diabetes mellitus ([facility])    • Diastolic dysfunction    • Dyspnea on exertion    • Generalized osteoarthritis of multiple sites    • GERD (gastroesophageal reflux disease)    • Gout    • Health care maintenance    • Hiatal hernia    • HL (hearing loss)    • Hyperlipidemia    • Hypertension    • Memory loss    • Mitral valve stenosis    • Nasal lesion    • Nasal stenosis    • 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[facility])    • Osteoarthritis    • PAF (paroxysmal atrial fibrillation) ([facility])    • Paroxysmal atrial fibrillation ([facility]) 9/11/2019   • Patient had no falls in past year    • Peripheral neuropathy    • Peripheral vascular disease ([facility])    • PONV (postoperative nausea and vomiting)    • Rheumatic mitral stenosis    • Shingles    • Sleep apnea    • Stroke ([facility])    • Syncope    • Systolic hypertension    • Varicose veins          Past Surgical History:   Procedure Laterality Date   • AORTIC VALVE REPAIR/REPLACEMENT N/A 12/27/2016    Procedure: Transfemoral LEFT Transcatheter Aortic Valve Replacement TRANSESOPHAGEAL ECHOCARDIOGRAM WITH ANESTHESIA;  Surgeon: Eduardo Brown MD;  Location: [facility] OR 18/19;  Service:    • BLADDER REPAIR     • CARDIAC CATHETERIZATION N/A 12/14/2016    Procedure: Right Heart Cath;  Surgeon: Eduardo Brown MD;  Location: [facility] CATH INVASIVE LOCATION;  Service:    • CARDIAC CATHETERIZATION N/A 12/14/2016    Procedure: Coronary angiography;  Surgeon: Eduardo Brown MD;  Location: [facility] CATH INVASIVE LOCATION;  Service:    • HYSTERECTOMY     • KNEE SURGERY           Social History     Socioeconomic History   • Marital status:    • Number of children: 15   • Years of education: 8th grade   Tobacco Use   • Smoking status: Never Smoker   • Smokeless tobacco: Never Used   Vaping Use   • Vaping Use: Never used   Substance and Sexual Activity   •  Alcohol use: No     Comment: no caffeine    • Drug use: No   • Sexual activity: Defer         Review of Systems     Review of Systems   Constitutional: Positive for decreased appetite and fever.   Cardiovascular: Positive for dyspnea on exertion and irregular heartbeat. Negative for chest pain, leg swelling, near-syncope, orthopnea, palpitations and syncope.   Respiratory: Positive for shortness of breath.    Genitourinary: Negative.          Medications     Allergies as of 05/23/2022 - Reviewed 05/23/2022   Allergen Reaction Noted   • Codeine Itching and GI Intolerance 05/21/2016   • Morphine and related Itching 05/21/2016         Current Outpatient Medications:   •  acetaminophen (TYLENOL) 650 MG 8 hr tablet, Take 650 mg by mouth Every 8 (Eight) Hours As Needed for Mild Pain ., Disp: , Rfl:   •  albuterol sulfate  (90 Base) MCG/ACT inhaler, Inhale 2 puffs Every 4 (Four) Hours As Needed for Wheezing (shortness of breath)., Disp: 6.7 g, Rfl: 0  •  atorvastatin (LIPITOR) 20 MG tablet, TAKE 1 TABLET BY MOUTH  DAILY, Disp: 90 tablet, Rfl: 3  •  bumetanide (BUMEX) 1 MG tablet, Take 0.5 tablets by mouth As Needed (swelling or weight gain.)., Disp: 90 tablet, Rfl: 3  •  Eliquis 2.5 MG tablet tablet, TAKE 1 TABLET BY MOUTH  EVERY 12 HOURS, Disp: 180 tablet, Rfl: 3  •  Lancets (freestyle) lancets, USE TO TEST BLOOD SUGAR ONCE DAILY, Disp: 100 each, Rfl: 4  •  memantine (NAMENDA XR) 28 MG capsule sustained-release 24 hr extended release capsule, TAKE 1 CAPSULE BY MOUTH  DAILY, Disp: 90 capsule, Rfl: 3  •  metFORMIN (GLUCOPHAGE) 500 MG tablet, TAKE ONE-HALF TABLET BY  MOUTH TWICE DAILY, Disp: 90 tablet, Rfl: 3  •  Multiple Vitamins-Minerals (MULTIVITAMIN WITH MINERALS) tablet tablet, Take 1 tablet by mouth Daily., Disp: , Rfl:   •  omeprazole (priLOSEC) 20 MG capsule, TAKE 1 CAPSULE BY MOUTH  DAILY, Disp: 90 capsule, Rfl: 3  •  True Metrix Blood Glucose Test test strip, TEST TWO TIMES A DAY, Disp: 200 each, Rfl: 9       "  OBJECTIVE     Vital Signs:   /70   Pulse 78   Ht 154.9 cm (61\")   Wt 56.7 kg (125 lb)   BMI 23.62 kg/m²       Weight:  Wt Readings from Last 3 Encounters:   05/23/22 56.7 kg (125 lb)   01/04/22 55.3 kg (122 lb)   10/18/21 58.2 kg (128 lb 3.2 oz)     Body mass index is 23.62 kg/m².        Physical Exam     Physical Exam  Constitutional:       General: She is not in acute distress.  HENT:      Head: Normocephalic and atraumatic.      Mouth/Throat:      Mouth: Mucous membranes are moist.   Eyes:      General: No scleral icterus.     Extraocular Movements: Extraocular movements intact.      Conjunctiva/sclera: Conjunctivae normal.      Pupils: Pupils are equal, round, and reactive to light.   Cardiovascular:      Rate and Rhythm: Normal rate. Rhythm irregularly irregular.      Pulses: Normal pulses.      Heart sounds: S1 normal and S2 normal. Murmur heard.      Comments: II/VI murmur  Pulmonary:      Effort: No respiratory distress.      Breath sounds: Normal breath sounds. No wheezing, rhonchi or rales.      Comments: Diminished in right base  Abdominal:      General: Bowel sounds are normal. There is no distension.      Palpations: Abdomen is soft.      Tenderness: There is no abdominal tenderness.   Musculoskeletal:         General: Normal range of motion.      Cervical back: Normal range of motion and neck supple.      Right lower leg: No edema.      Left lower leg: No edema.   Skin:     General: Skin is warm and dry.      Coloration: Skin is not jaundiced.   Neurological:      Mental Status: She is alert and oriented to person, place, and time.   Psychiatric:         Mood and Affect: Mood normal.         Reviewed Data     Result Review :   The following data was reviewed by: RAYRAY Rodarte on 05/23/2022:  Labs on December 16, 2021 showed creatinine 1.2, potassium 4.3,  but other LFTs normal, and hemoglobin 10.5 but CBC otherwise unremarkable.        ECG 12 Lead    Date/Time: " 5/23/2022 3:28 PM  Performed by: Hortensia Grajeda APRN  Authorized by: Hortensia Grajeda APRN   Comparison: compared with previous ECG from 1/4/2022  Similar to previous ECG  Rhythm: atrial fibrillation    Clinical impression: abnormal EKG            Assessment and Plan        Assessment and Plan      Assessment:  1. S/P TAVR (transcatheter aortic valve replacement)    2. Fatigue, unspecified type    3. Chronic diastolic congestive heart failure (HCC)    4. Mitral valve stenosis, unspecified etiology    5. Cerebrovascular accident (CVA) due to occlusion of right middle cerebral artery (HCC)    6. Essential hypertension         1. S/P TAVR (#23 S3 Rubina) for aortic stenosis in December 2016; normal appearing on echocardiogram 3/30/30.  2. Fatigue: initially improved off atenolol but worsening of late. Her weight is stable and she has no edema. No recent labs since December 2021.  3. Chronic diastolic heart failure: compensated by exam today. She is diminished in right base; her daughters state their sister who is a nurse says this is stable for her. They decline CXR today.   4. Mitral valve stenosis: moderate to severe on echocardiogram on 3/20/22.  5. History of Right MCA stroke: she is on apixaban.   6. Hypertension: controlled off atenolol.     Plan:  1. Will update labs today including a urine sample to rule out UTI.   2. Stop bumetanide. We discussed that 0.5 mg could be taken daily as needed for 3-lb weight gain overnight or 5-lb weight gain over a week.   3. I will discuss with Dr. Rubio if it would be appropriate to order physical therapy.     Atrial Fibrillation and Atrial Flutter  Assessment  • The patient has permanent atrial fibrillation  • This is valvular in etiology  • The patient's CHADS2-VASc score is 9  • A [id number] score of 2 or more is considered a high risk for a thromboembolic event  • Apixaban prescribed    Plan  • Continue in atrial fibrillation with rate control  •  Continue apixaban for antithrombotic therapy, bleeding issues discussed     Heart Failure  Assessment  • NYHA class III-A - There is limitation of physical activity. The patient is comfortable at rest, but ordinary activity causes fatigue, palpitations or shortness of breath.  • Diuretics prescribed  • The most recent ejection fraction is 60%  • Left ventricular function is normal by qualitative assessment  • The left ventricle was last assessed on 3/20/2020    Plan  • The patient has received heart failure education on the following topics: prognosis/end-of-life issues, dietary sodium restriction, minimizing or avoiding NSAID use, symptom management, physical activity and weight monitoring  • The heart failure care plan was discussed with the patient today including: continuing the current program and lifestyle modifications  •  The patient was not counseled about ICD or CRT-D implantation    Subjective/Objective  • The patient reports dyspnea    • Physical exam findings negative for rales.          Follow Up:   Return for Keep July appointment with Dr. Mcmahan.  Orders Placed This Encounter   Procedures   • Comprehensive Metabolic Panel   • proBNP   • Uric Acid   • Ferritin   • Urinalysis With Culture If Indicated - Urine, Clean Catch   • ECG 12 Lead   • CBC & Differential          I appreciate the opportunity to participate in this patient's care.      Thank you,  RAYRAY Langston

## 2022-05-23 NOTE — TELEPHONE ENCOUNTER
pls call - ok to stop bumex and continue to monitor her vitals at home.     RM    ----- Message from Cherie Greer RN sent at 5/20/2022  8:51 AM EDT -----  Regarding: FW: Mary Shi. [birth year]      ----- Message -----  From: Maryjorge Shi  Sent: 5/19/2022   6:28 PM EDT  To: Анна [facility]  Subject: Mary Shi. [birth year]                        Mary Shi was taken off atenolol and felt so much better well now she can’t hardly hold her head, you had mentioned you may try taking her off the bumetanide what do you think. She is so sleepy . Attached is her vitals please advise. Thanks Aiyana [phone]

## 2022-05-23 NOTE — TELEPHONE ENCOUNTER
Per verbal release form, left VM of recommendations and to call back with any further questions or concerns.    Cherie Greer RN  Triage MG

## 2022-05-24 ENCOUNTER — TELEPHONE (OUTPATIENT)
Dept: CARDIOLOGY | Facility: CLINIC | Age: 87
End: 2022-05-24

## 2022-05-24 DIAGNOSIS — D64.9 ANEMIA, UNSPECIFIED TYPE: ICD-10-CM

## 2022-05-24 DIAGNOSIS — R53.83 FATIGUE, UNSPECIFIED TYPE: Primary | ICD-10-CM

## 2022-05-24 PROBLEM — D50.0 IRON DEFICIENCY ANEMIA DUE TO CHRONIC BLOOD LOSS: Status: ACTIVE | Noted: 2022-05-24

## 2022-05-24 LAB
IRON 24H UR-MRATE: 20 MCG/DL (ref 37–145)
IRON SATN MFR SERPL: 5 % (ref 20–50)
TIBC SERPL-MCNC: 368 MCG/DL (ref 298–536)
TRANSFERRIN SERPL-MCNC: 247 MG/DL (ref 200–360)

## 2022-05-24 RX ORDER — FAMOTIDINE 10 MG/ML
20 INJECTION, SOLUTION INTRAVENOUS ONCE
OUTPATIENT
Start: 2022-05-31 | End: 2022-05-31

## 2022-05-24 RX ORDER — DIPHENHYDRAMINE HCL 25 MG
25 CAPSULE ORAL ONCE
OUTPATIENT
Start: 2022-05-31 | End: 2022-05-31

## 2022-05-24 RX ORDER — SODIUM CHLORIDE 9 MG/ML
250 INJECTION, SOLUTION INTRAVENOUS ONCE
OUTPATIENT
Start: 2022-05-31

## 2022-05-24 RX ORDER — FAMOTIDINE 10 MG/ML
20 INJECTION, SOLUTION INTRAVENOUS AS NEEDED
OUTPATIENT
Start: 2022-05-31

## 2022-05-24 RX ORDER — ACETAMINOPHEN 325 MG/1
650 TABLET ORAL ONCE
OUTPATIENT
Start: 2022-05-31 | End: 2022-05-31

## 2022-05-24 RX ORDER — DIPHENHYDRAMINE HYDROCHLORIDE 50 MG/ML
50 INJECTION INTRAMUSCULAR; INTRAVENOUS AS NEEDED
OUTPATIENT
Start: 2022-05-31

## 2022-05-24 NOTE — PROGRESS NOTES
She is iron deficient. I ordered 510 mg IV feraheme weekly x 2 doses.     I left a message for daughter at [phone]

## 2022-05-24 NOTE — TELEPHONE ENCOUNTER
Patient daughter called and stated that she was returning your call.  She said it was something about iron def.  Please advise.    CB: [phone]    Loan

## 2022-05-26 ENCOUNTER — APPOINTMENT (OUTPATIENT)
Dept: GENERAL RADIOLOGY | Facility: HOSPITAL | Age: 87
End: 2022-05-26

## 2022-05-26 ENCOUNTER — HOSPITAL ENCOUNTER (INPATIENT)
Facility: HOSPITAL | Age: 87
LOS: 3 days | Discharge: HOME HEALTH | End: 2022-05-30
Attending: EMERGENCY MEDICINE | Admitting: INTERNAL MEDICINE

## 2022-05-26 DIAGNOSIS — I50.9 ACUTE ON CHRONIC CONGESTIVE HEART FAILURE, UNSPECIFIED HEART FAILURE TYPE: ICD-10-CM

## 2022-05-26 DIAGNOSIS — J96.01 ACUTE HYPOXEMIC RESPIRATORY FAILURE: Primary | ICD-10-CM

## 2022-05-26 DIAGNOSIS — J90 RECURRENT RIGHT PLEURAL EFFUSION: ICD-10-CM

## 2022-05-26 DIAGNOSIS — I50.33 ACUTE ON CHRONIC DIASTOLIC CHF (CONGESTIVE HEART FAILURE): ICD-10-CM

## 2022-05-26 LAB
BASOPHILS # BLD AUTO: 0.04 10*3/MM3 (ref 0–0.2)
BASOPHILS NFR BLD AUTO: 0.3 % (ref 0–1.5)
DEPRECATED RDW RBC AUTO: 43.3 FL (ref 37–54)
EOSINOPHIL # BLD AUTO: 0.01 10*3/MM3 (ref 0–0.4)
EOSINOPHIL NFR BLD AUTO: 0.1 % (ref 0.3–6.2)
ERYTHROCYTE [DISTWIDTH] IN BLOOD BY AUTOMATED COUNT: 14.2 % (ref 12.3–15.4)
HCT VFR BLD AUTO: 31.4 % (ref 34–46.6)
HGB BLD-MCNC: 10.3 G/DL (ref 12–15.9)
IMM GRANULOCYTES # BLD AUTO: 0.05 10*3/MM3 (ref 0–0.05)
IMM GRANULOCYTES NFR BLD AUTO: 0.4 % (ref 0–0.5)
LYMPHOCYTES # BLD AUTO: 1.44 10*3/MM3 (ref 0.7–3.1)
LYMPHOCYTES NFR BLD AUTO: 12.4 % (ref 19.6–45.3)
MCH RBC QN AUTO: 27.4 PG (ref 26.6–33)
MCHC RBC AUTO-ENTMCNC: 32.8 G/DL (ref 31.5–35.7)
MCV RBC AUTO: 83.5 FL (ref 79–97)
MONOCYTES # BLD AUTO: 0.81 10*3/MM3 (ref 0.1–0.9)
MONOCYTES NFR BLD AUTO: 7 % (ref 5–12)
NEUTROPHILS NFR BLD AUTO: 79.8 % (ref 42.7–76)
NEUTROPHILS NFR BLD AUTO: 9.25 10*3/MM3 (ref 1.7–7)
NRBC BLD AUTO-RTO: 0 /100 WBC (ref 0–0.2)
PLATELET # BLD AUTO: 251 10*3/MM3 (ref 140–450)
PMV BLD AUTO: 10.4 FL (ref 6–12)
RBC # BLD AUTO: 3.76 10*6/MM3 (ref 3.77–5.28)
WBC NRBC COR # BLD: 11.6 10*3/MM3 (ref 3.4–10.8)

## 2022-05-26 PROCEDURE — 99284 EMERGENCY DEPT VISIT MOD MDM: CPT

## 2022-05-26 PROCEDURE — 85025 COMPLETE CBC W/AUTO DIFF WBC: CPT | Performed by: NURSE PRACTITIONER

## 2022-05-26 PROCEDURE — 93005 ELECTROCARDIOGRAM TRACING: CPT | Performed by: EMERGENCY MEDICINE

## 2022-05-26 PROCEDURE — 93005 ELECTROCARDIOGRAM TRACING: CPT

## 2022-05-26 PROCEDURE — 0202U NFCT DS 22 TRGT SARS-COV-2: CPT | Performed by: NURSE PRACTITIONER

## 2022-05-26 PROCEDURE — 80053 COMPREHEN METABOLIC PANEL: CPT | Performed by: NURSE PRACTITIONER

## 2022-05-26 PROCEDURE — 86901 BLOOD TYPING SEROLOGIC RH(D): CPT | Performed by: NURSE PRACTITIONER

## 2022-05-26 PROCEDURE — 86850 RBC ANTIBODY SCREEN: CPT | Performed by: NURSE PRACTITIONER

## 2022-05-26 PROCEDURE — 86900 BLOOD TYPING SEROLOGIC ABO: CPT | Performed by: NURSE PRACTITIONER

## 2022-05-26 PROCEDURE — 83880 ASSAY OF NATRIURETIC PEPTIDE: CPT | Performed by: NURSE PRACTITIONER

## 2022-05-26 PROCEDURE — 84484 ASSAY OF TROPONIN QUANT: CPT | Performed by: NURSE PRACTITIONER

## 2022-05-26 PROCEDURE — 93010 ELECTROCARDIOGRAM REPORT: CPT | Performed by: INTERNAL MEDICINE

## 2022-05-26 PROCEDURE — 71045 X-RAY EXAM CHEST 1 VIEW: CPT

## 2022-05-26 RX ORDER — BUMETANIDE 0.25 MG/ML
2 INJECTION INTRAMUSCULAR; INTRAVENOUS ONCE
Status: COMPLETED | OUTPATIENT
Start: 2022-05-26 | End: 2022-05-27

## 2022-05-26 RX ORDER — SODIUM CHLORIDE 0.9 % (FLUSH) 0.9 %
10 SYRINGE (ML) INJECTION AS NEEDED
Status: DISCONTINUED | OUTPATIENT
Start: 2022-05-26 | End: 2022-05-30 | Stop reason: HOSPADM

## 2022-05-27 ENCOUNTER — HOSPITAL ENCOUNTER (OUTPATIENT)
Dept: INFUSION THERAPY | Facility: HOSPITAL | Age: 87
Setting detail: INFUSION SERIES
Discharge: HOME | End: 2022-05-27

## 2022-05-27 PROBLEM — I48.91 ATRIAL FIBRILLATION (HCC): Status: ACTIVE | Noted: 2019-09-11

## 2022-05-27 PROBLEM — Z79.01 CHRONIC ANTICOAGULATION: Status: ACTIVE | Noted: 2022-05-27

## 2022-05-27 PROBLEM — J90 PLEURAL EFFUSION ON RIGHT: Status: ACTIVE | Noted: 2022-05-27

## 2022-05-27 PROBLEM — J96.01 ACUTE HYPOXEMIC RESPIRATORY FAILURE: Status: ACTIVE | Noted: 2022-05-27

## 2022-05-27 LAB
ABO GROUP BLD: NORMAL
ALBUMIN SERPL-MCNC: 3.3 G/DL (ref 3.5–5.2)
ALBUMIN/GLOB SERPL: 0.8 G/DL
ALP SERPL-CCNC: 100 U/L (ref 39–117)
ALT SERPL W P-5'-P-CCNC: 14 U/L (ref 1–33)
ANION GAP SERPL CALCULATED.3IONS-SCNC: 10 MMOL/L (ref 5–15)
ANION GAP SERPL CALCULATED.3IONS-SCNC: 13.9 MMOL/L (ref 5–15)
AST SERPL-CCNC: 17 U/L (ref 1–32)
B PARAPERT DNA SPEC QL NAA+PROBE: NOT DETECTED
B PERT DNA SPEC QL NAA+PROBE: NOT DETECTED
BILIRUB SERPL-MCNC: 0.6 MG/DL (ref 0–1.2)
BLD GP AB SCN SERPL QL: NEGATIVE
BUN SERPL-MCNC: 26 MG/DL (ref 8–23)
BUN SERPL-MCNC: 29 MG/DL (ref 8–23)
BUN/CREAT SERPL: 37.7 (ref 7–25)
BUN/CREAT SERPL: 37.7 (ref 7–25)
C PNEUM DNA NPH QL NAA+NON-PROBE: NOT DETECTED
CALCIUM SPEC-SCNC: 9.1 MG/DL (ref 8.2–9.6)
CALCIUM SPEC-SCNC: 9.8 MG/DL (ref 8.2–9.6)
CHLORIDE SERPL-SCNC: 101 MMOL/L (ref 98–107)
CHLORIDE SERPL-SCNC: 101 MMOL/L (ref 98–107)
CO2 SERPL-SCNC: 25.1 MMOL/L (ref 22–29)
CO2 SERPL-SCNC: 29 MMOL/L (ref 22–29)
CREAT SERPL-MCNC: 0.69 MG/DL (ref 0.57–1)
CREAT SERPL-MCNC: 0.77 MG/DL (ref 0.57–1)
DEPRECATED RDW RBC AUTO: 41.9 FL (ref 37–54)
EGFRCR SERPLBLD CKD-EPI 2021: 70.3 ML/MIN/1.73
EGFRCR SERPLBLD CKD-EPI 2021: 79 ML/MIN/1.73
ERYTHROCYTE [DISTWIDTH] IN BLOOD BY AUTOMATED COUNT: 14.2 % (ref 12.3–15.4)
FLUAV SUBTYP SPEC NAA+PROBE: NOT DETECTED
FLUBV RNA ISLT QL NAA+PROBE: NOT DETECTED
GLOBULIN UR ELPH-MCNC: 4 GM/DL
GLUCOSE BLDC GLUCOMTR-MCNC: 126 MG/DL (ref 70–130)
GLUCOSE BLDC GLUCOMTR-MCNC: 172 MG/DL (ref 70–130)
GLUCOSE BLDC GLUCOMTR-MCNC: 255 MG/DL (ref 70–130)
GLUCOSE SERPL-MCNC: 132 MG/DL (ref 65–99)
GLUCOSE SERPL-MCNC: 160 MG/DL (ref 65–99)
HADV DNA SPEC NAA+PROBE: NOT DETECTED
HCOV 229E RNA SPEC QL NAA+PROBE: NOT DETECTED
HCOV HKU1 RNA SPEC QL NAA+PROBE: NOT DETECTED
HCOV NL63 RNA SPEC QL NAA+PROBE: NOT DETECTED
HCOV OC43 RNA SPEC QL NAA+PROBE: NOT DETECTED
HCT VFR BLD AUTO: 30.4 % (ref 34–46.6)
HGB BLD-MCNC: 10 G/DL (ref 12–15.9)
HMPV RNA NPH QL NAA+NON-PROBE: NOT DETECTED
HPIV1 RNA ISLT QL NAA+PROBE: NOT DETECTED
HPIV2 RNA SPEC QL NAA+PROBE: NOT DETECTED
HPIV3 RNA NPH QL NAA+PROBE: NOT DETECTED
HPIV4 P GENE NPH QL NAA+PROBE: NOT DETECTED
M PNEUMO IGG SER IA-ACNC: NOT DETECTED
MCH RBC QN AUTO: 27 PG (ref 26.6–33)
MCHC RBC AUTO-ENTMCNC: 32.9 G/DL (ref 31.5–35.7)
MCV RBC AUTO: 82.2 FL (ref 79–97)
NT-PROBNP SERPL-MCNC: 2507 PG/ML (ref 0–1800)
PLATELET # BLD AUTO: 242 10*3/MM3 (ref 140–450)
PMV BLD AUTO: 10.3 FL (ref 6–12)
POTASSIUM SERPL-SCNC: 3.7 MMOL/L (ref 3.5–5.2)
POTASSIUM SERPL-SCNC: 4.7 MMOL/L (ref 3.5–5.2)
PROT SERPL-MCNC: 7.3 G/DL (ref 6–8.5)
QT INTERVAL: 348 MS
RBC # BLD AUTO: 3.7 10*6/MM3 (ref 3.77–5.28)
RH BLD: POSITIVE
RHINOVIRUS RNA SPEC NAA+PROBE: NOT DETECTED
RSV RNA NPH QL NAA+NON-PROBE: NOT DETECTED
SARS-COV-2 RNA NPH QL NAA+NON-PROBE: NOT DETECTED
SODIUM SERPL-SCNC: 140 MMOL/L (ref 136–145)
SODIUM SERPL-SCNC: 140 MMOL/L (ref 136–145)
T&S EXPIRATION DATE: NORMAL
TROPONIN T SERPL-MCNC: <0.01 NG/ML (ref 0–0.03)
WBC NRBC COR # BLD: 9.72 10*3/MM3 (ref 3.4–10.8)

## 2022-05-27 PROCEDURE — 82962 GLUCOSE BLOOD TEST: CPT

## 2022-05-27 PROCEDURE — 97530 THERAPEUTIC ACTIVITIES: CPT

## 2022-05-27 PROCEDURE — 25010000002 ENOXAPARIN PER 10 MG: Performed by: INTERNAL MEDICINE

## 2022-05-27 PROCEDURE — 80048 BASIC METABOLIC PNL TOTAL CA: CPT | Performed by: NURSE PRACTITIONER

## 2022-05-27 PROCEDURE — 92610 EVALUATE SWALLOWING FUNCTION: CPT | Performed by: SPEECH-LANGUAGE PATHOLOGIST

## 2022-05-27 PROCEDURE — 36415 COLL VENOUS BLD VENIPUNCTURE: CPT | Performed by: NURSE PRACTITIONER

## 2022-05-27 PROCEDURE — 97162 PT EVAL MOD COMPLEX 30 MIN: CPT

## 2022-05-27 PROCEDURE — 85027 COMPLETE CBC AUTOMATED: CPT | Performed by: NURSE PRACTITIONER

## 2022-05-27 PROCEDURE — 63710000001 INSULIN LISPRO (HUMAN) PER 5 UNITS: Performed by: NURSE PRACTITIONER

## 2022-05-27 RX ORDER — MEMANTINE HYDROCHLORIDE 10 MG/1
10 TABLET ORAL EVERY 12 HOURS SCHEDULED
Refills: 3 | Status: DISCONTINUED | OUTPATIENT
Start: 2022-05-27 | End: 2022-05-30 | Stop reason: HOSPADM

## 2022-05-27 RX ORDER — ALUMINA, MAGNESIA, AND SIMETHICONE 2400; 2400; 240 MG/30ML; MG/30ML; MG/30ML
15 SUSPENSION ORAL EVERY 6 HOURS PRN
Status: DISCONTINUED | OUTPATIENT
Start: 2022-05-27 | End: 2022-05-30 | Stop reason: HOSPADM

## 2022-05-27 RX ORDER — AMOXICILLIN 250 MG
2 CAPSULE ORAL 2 TIMES DAILY
Status: DISCONTINUED | OUTPATIENT
Start: 2022-05-27 | End: 2022-05-30 | Stop reason: HOSPADM

## 2022-05-27 RX ORDER — MULTIPLE VITAMINS W/ MINERALS TAB 9MG-400MCG
1 TAB ORAL DAILY
Status: DISCONTINUED | OUTPATIENT
Start: 2022-05-27 | End: 2022-05-30 | Stop reason: HOSPADM

## 2022-05-27 RX ORDER — ALBUTEROL SULFATE 2.5 MG/3ML
2.5 SOLUTION RESPIRATORY (INHALATION) EVERY 6 HOURS PRN
Refills: 0 | Status: DISCONTINUED | OUTPATIENT
Start: 2022-05-27 | End: 2022-05-30 | Stop reason: HOSPADM

## 2022-05-27 RX ORDER — ONDANSETRON 4 MG/1
4 TABLET, FILM COATED ORAL EVERY 6 HOURS PRN
Status: DISCONTINUED | OUTPATIENT
Start: 2022-05-27 | End: 2022-05-30 | Stop reason: HOSPADM

## 2022-05-27 RX ORDER — ACETAMINOPHEN 650 MG/1
650 SUPPOSITORY RECTAL EVERY 4 HOURS PRN
Status: DISCONTINUED | OUTPATIENT
Start: 2022-05-27 | End: 2022-05-30 | Stop reason: HOSPADM

## 2022-05-27 RX ORDER — BISACODYL 5 MG/1
5 TABLET, DELAYED RELEASE ORAL DAILY PRN
Status: DISCONTINUED | OUTPATIENT
Start: 2022-05-27 | End: 2022-05-30 | Stop reason: HOSPADM

## 2022-05-27 RX ORDER — PANTOPRAZOLE SODIUM 40 MG/1
40 TABLET, DELAYED RELEASE ORAL EVERY MORNING
Refills: 3 | Status: DISCONTINUED | OUTPATIENT
Start: 2022-05-27 | End: 2022-05-30 | Stop reason: HOSPADM

## 2022-05-27 RX ORDER — ONDANSETRON 2 MG/ML
4 INJECTION INTRAMUSCULAR; INTRAVENOUS EVERY 6 HOURS PRN
Status: DISCONTINUED | OUTPATIENT
Start: 2022-05-27 | End: 2022-05-30 | Stop reason: HOSPADM

## 2022-05-27 RX ORDER — SODIUM CHLORIDE 0.9 % (FLUSH) 0.9 %
10 SYRINGE (ML) INJECTION AS NEEDED
Status: DISCONTINUED | OUTPATIENT
Start: 2022-05-27 | End: 2022-05-30 | Stop reason: HOSPADM

## 2022-05-27 RX ORDER — ACETAMINOPHEN 160 MG/5ML
650 SOLUTION ORAL EVERY 4 HOURS PRN
Status: DISCONTINUED | OUTPATIENT
Start: 2022-05-27 | End: 2022-05-30 | Stop reason: HOSPADM

## 2022-05-27 RX ORDER — ENOXAPARIN SODIUM 100 MG/ML
1 INJECTION SUBCUTANEOUS EVERY 24 HOURS
Status: DISCONTINUED | OUTPATIENT
Start: 2022-05-27 | End: 2022-05-29

## 2022-05-27 RX ORDER — ACETAMINOPHEN 325 MG/1
650 TABLET ORAL EVERY 4 HOURS PRN
Status: DISCONTINUED | OUTPATIENT
Start: 2022-05-27 | End: 2022-05-30 | Stop reason: HOSPADM

## 2022-05-27 RX ORDER — BUMETANIDE 0.25 MG/ML
2 INJECTION INTRAMUSCULAR; INTRAVENOUS ONCE
Status: COMPLETED | OUTPATIENT
Start: 2022-05-27 | End: 2022-05-27

## 2022-05-27 RX ORDER — FERROUS SULFATE 325(65) MG
325 TABLET ORAL
Status: DISCONTINUED | OUTPATIENT
Start: 2022-05-27 | End: 2022-05-30 | Stop reason: HOSPADM

## 2022-05-27 RX ORDER — ATORVASTATIN CALCIUM 20 MG/1
20 TABLET, FILM COATED ORAL DAILY
Status: DISCONTINUED | OUTPATIENT
Start: 2022-05-27 | End: 2022-05-30 | Stop reason: HOSPADM

## 2022-05-27 RX ORDER — INSULIN LISPRO 100 [IU]/ML
0-7 INJECTION, SOLUTION INTRAVENOUS; SUBCUTANEOUS
Status: DISCONTINUED | OUTPATIENT
Start: 2022-05-27 | End: 2022-05-30 | Stop reason: HOSPADM

## 2022-05-27 RX ORDER — NICOTINE POLACRILEX 4 MG
15 LOZENGE BUCCAL
Status: DISCONTINUED | OUTPATIENT
Start: 2022-05-27 | End: 2022-05-30 | Stop reason: HOSPADM

## 2022-05-27 RX ORDER — BISACODYL 10 MG
10 SUPPOSITORY, RECTAL RECTAL DAILY PRN
Status: DISCONTINUED | OUTPATIENT
Start: 2022-05-27 | End: 2022-05-30 | Stop reason: HOSPADM

## 2022-05-27 RX ORDER — POLYETHYLENE GLYCOL 3350 17 G/17G
17 POWDER, FOR SOLUTION ORAL DAILY PRN
Status: DISCONTINUED | OUTPATIENT
Start: 2022-05-27 | End: 2022-05-30 | Stop reason: HOSPADM

## 2022-05-27 RX ORDER — NITROGLYCERIN 0.4 MG/1
0.4 TABLET SUBLINGUAL
Status: DISCONTINUED | OUTPATIENT
Start: 2022-05-27 | End: 2022-05-30 | Stop reason: HOSPADM

## 2022-05-27 RX ORDER — UREA 10 %
3 LOTION (ML) TOPICAL NIGHTLY PRN
Status: DISCONTINUED | OUTPATIENT
Start: 2022-05-27 | End: 2022-05-30 | Stop reason: HOSPADM

## 2022-05-27 RX ORDER — DEXTROSE MONOHYDRATE 25 G/50ML
25 INJECTION, SOLUTION INTRAVENOUS
Status: DISCONTINUED | OUTPATIENT
Start: 2022-05-27 | End: 2022-05-30 | Stop reason: HOSPADM

## 2022-05-27 RX ORDER — BUMETANIDE 0.25 MG/ML
1 INJECTION INTRAMUSCULAR; INTRAVENOUS
Status: DISCONTINUED | OUTPATIENT
Start: 2022-05-28 | End: 2022-05-29

## 2022-05-27 RX ADMIN — INSULIN LISPRO 4 UNITS: 100 INJECTION, SOLUTION INTRAVENOUS; SUBCUTANEOUS at 16:41

## 2022-05-27 RX ADMIN — BUMETANIDE 2 MG: 0.25 INJECTION INTRAMUSCULAR; INTRAVENOUS at 00:07

## 2022-05-27 RX ADMIN — ATORVASTATIN CALCIUM 20 MG: 20 TABLET, FILM COATED ORAL at 21:01

## 2022-05-27 RX ADMIN — MEMANTINE HYDROCHLORIDE 10 MG: 10 TABLET, FILM COATED ORAL at 11:28

## 2022-05-27 RX ADMIN — FERROUS SULFATE TAB 325 MG (65 MG ELEMENTAL FE) 325 MG: 325 (65 FE) TAB at 14:52

## 2022-05-27 RX ADMIN — MEMANTINE HYDROCHLORIDE 10 MG: 10 TABLET, FILM COATED ORAL at 20:58

## 2022-05-27 RX ADMIN — PANTOPRAZOLE SODIUM 40 MG: 40 TABLET, DELAYED RELEASE ORAL at 11:28

## 2022-05-27 RX ADMIN — BUMETANIDE 2 MG: 0.25 INJECTION INTRAMUSCULAR; INTRAVENOUS at 11:28

## 2022-05-27 RX ADMIN — ENOXAPARIN SODIUM 60 MG: 100 INJECTION SUBCUTANEOUS at 14:51

## 2022-05-27 RX ADMIN — MULTIPLE VITAMINS W/ MINERALS TAB 1 TABLET: TAB at 11:28

## 2022-05-27 RX ADMIN — INSULIN LISPRO 2 UNITS: 100 INJECTION, SOLUTION INTRAVENOUS; SUBCUTANEOUS at 11:28

## 2022-05-28 ENCOUNTER — APPOINTMENT (OUTPATIENT)
Dept: CARDIOLOGY | Facility: HOSPITAL | Age: 87
End: 2022-05-28

## 2022-05-28 LAB
ALBUMIN SERPL-MCNC: 3.5 G/DL (ref 3.5–5.2)
ANION GAP SERPL CALCULATED.3IONS-SCNC: 13 MMOL/L (ref 5–15)
AORTIC DIMENSIONLESS INDEX: 0.5 (DI)
BH CV ECHO MEAS - AI P1/2T: 358.5 MSEC
BH CV ECHO MEAS - AO MAX PG: 25.8 MMHG
BH CV ECHO MEAS - AO MEAN PG: 16 MMHG
BH CV ECHO MEAS - AO ROOT DIAM: 3.9 CM
BH CV ECHO MEAS - AO V2 MAX: 254.1 CM/SEC
BH CV ECHO MEAS - AO V2 VTI: 49.1 CM
BH CV ECHO MEAS - AVA(I,D): 1.45 CM2
BH CV ECHO MEAS - EDV(CUBED): 71.4 ML
BH CV ECHO MEAS - EDV(MOD-SP2): 49 ML
BH CV ECHO MEAS - EDV(MOD-SP4): 43 ML
BH CV ECHO MEAS - EF(MOD-BP): 64 %
BH CV ECHO MEAS - EF(MOD-SP2): 69.4 %
BH CV ECHO MEAS - EF(MOD-SP4): 53.5 %
BH CV ECHO MEAS - ESV(MOD-SP2): 15 ML
BH CV ECHO MEAS - ESV(MOD-SP4): 20 ML
BH CV ECHO MEAS - FS: 26.7 %
BH CV ECHO MEAS - IVS/LVPW: 0.98 CM
BH CV ECHO MEAS - IVSD: 0.76 CM
BH CV ECHO MEAS - LAT PEAK E' VEL: 4.5 CM/SEC
BH CV ECHO MEAS - LV DIASTOLIC VOL/BSA (35-75): 27.9 CM2
BH CV ECHO MEAS - LV MASS(C)D: 93.2 GRAMS
BH CV ECHO MEAS - LV MAX PG: 6.7 MMHG
BH CV ECHO MEAS - LV MEAN PG: 3.2 MMHG
BH CV ECHO MEAS - LV SYSTOLIC VOL/BSA (12-30): 13 CM2
BH CV ECHO MEAS - LV V1 MAX: 129.5 CM/SEC
BH CV ECHO MEAS - LV V1 VTI: 23.6 CM
BH CV ECHO MEAS - LVIDD: 4.1 CM
BH CV ECHO MEAS - LVIDS: 3 CM
BH CV ECHO MEAS - LVOT AREA: 3 CM2
BH CV ECHO MEAS - LVOT DIAM: 1.96 CM
BH CV ECHO MEAS - LVPWD: 0.77 CM
BH CV ECHO MEAS - MED PEAK E' VEL: 4.2 CM/SEC
BH CV ECHO MEAS - MR MAX PG: 109.8 MMHG
BH CV ECHO MEAS - MR MAX VEL: 523.9 CM/SEC
BH CV ECHO MEAS - MV DEC SLOPE: 1002 CM/SEC2
BH CV ECHO MEAS - MV DEC TIME: 0.22 MSEC
BH CV ECHO MEAS - MV E MAX VEL: 211 CM/SEC
BH CV ECHO MEAS - MV MAX PG: 21.3 MMHG
BH CV ECHO MEAS - MV MEAN PG: 7.5 MMHG
BH CV ECHO MEAS - MV P1/2T: 63.7 MSEC
BH CV ECHO MEAS - MV V2 VTI: 46.4 CM
BH CV ECHO MEAS - MVA(P1/2T): 3.5 CM2
BH CV ECHO MEAS - MVA(VTI): 1.53 CM2
BH CV ECHO MEAS - PA ACC TIME: 0.11 SEC
BH CV ECHO MEAS - PA PR(ACCEL): 29.9 MMHG
BH CV ECHO MEAS - PA V2 MAX: 90.9 CM/SEC
BH CV ECHO MEAS - PULM DIAS VEL: 40.4 CM/SEC
BH CV ECHO MEAS - PULM S/D: 0.87
BH CV ECHO MEAS - PULM SYS VEL: 35.2 CM/SEC
BH CV ECHO MEAS - QP/QS: 0.48
BH CV ECHO MEAS - RAP SYSTOLE: 3 MMHG
BH CV ECHO MEAS - RV MAX PG: 1.83 MMHG
BH CV ECHO MEAS - RV V1 MAX: 67.7 CM/SEC
BH CV ECHO MEAS - RV V1 VTI: 11.6 CM
BH CV ECHO MEAS - RVOT DIAM: 1.94 CM
BH CV ECHO MEAS - RVSP: 58.2 MMHG
BH CV ECHO MEAS - SI(MOD-SP2): 22.1 ML/M2
BH CV ECHO MEAS - SI(MOD-SP4): 14.9 ML/M2
BH CV ECHO MEAS - SV(LVOT): 71.2 ML
BH CV ECHO MEAS - SV(MOD-SP2): 34 ML
BH CV ECHO MEAS - SV(MOD-SP4): 23 ML
BH CV ECHO MEAS - SV(RVOT): 34.5 ML
BH CV ECHO MEAS - TAPSE (>1.6): 1.28 CM
BH CV ECHO MEAS - TR MAX PG: 55.2 MMHG
BH CV ECHO MEAS - TR MAX VEL: 371.5 CM/SEC
BH CV ECHO MEASUREMENTS AVERAGE E/E' RATIO: 48.51
BH CV XLRA - RV BASE: 3.4 CM
BH CV XLRA - RV LENGTH: 4.9 CM
BH CV XLRA - RV MID: 2.36 CM
BH CV XLRA - TDI S': 10 CM/SEC
BUN SERPL-MCNC: 26 MG/DL (ref 8–23)
BUN/CREAT SERPL: 34.2 (ref 7–25)
CALCIUM SPEC-SCNC: 8.8 MG/DL (ref 8.2–9.6)
CHLORIDE SERPL-SCNC: 99 MMOL/L (ref 98–107)
CO2 SERPL-SCNC: 26 MMOL/L (ref 22–29)
CREAT SERPL-MCNC: 0.76 MG/DL (ref 0.57–1)
EGFRCR SERPLBLD CKD-EPI 2021: 71.4 ML/MIN/1.73
GLUCOSE BLDC GLUCOMTR-MCNC: 141 MG/DL (ref 70–130)
GLUCOSE BLDC GLUCOMTR-MCNC: 151 MG/DL (ref 70–130)
GLUCOSE BLDC GLUCOMTR-MCNC: 182 MG/DL (ref 70–130)
GLUCOSE SERPL-MCNC: 114 MG/DL (ref 65–99)
HBA1C MFR BLD: 7.7 % (ref 4.8–5.6)
LEFT ATRIUM VOLUME INDEX: 64.9 ML/M2
MAGNESIUM SERPL-MCNC: 1.6 MG/DL (ref 1.7–2.3)
MAXIMAL PREDICTED HEART RATE: 123 BPM
PHOSPHATE SERPL-MCNC: 4.1 MG/DL (ref 2.5–4.5)
POTASSIUM SERPL-SCNC: 3.7 MMOL/L (ref 3.5–5.2)
SINUS: 3.2 CM
SODIUM SERPL-SCNC: 138 MMOL/L (ref 136–145)
STJ: 2.8 CM
STRESS TARGET HR: 105 BPM

## 2022-05-28 PROCEDURE — 93306 TTE W/DOPPLER COMPLETE: CPT | Performed by: INTERNAL MEDICINE

## 2022-05-28 PROCEDURE — 93306 TTE W/DOPPLER COMPLETE: CPT

## 2022-05-28 PROCEDURE — 82962 GLUCOSE BLOOD TEST: CPT

## 2022-05-28 PROCEDURE — 63710000001 INSULIN LISPRO (HUMAN) PER 5 UNITS: Performed by: NURSE PRACTITIONER

## 2022-05-28 PROCEDURE — 83036 HEMOGLOBIN GLYCOSYLATED A1C: CPT | Performed by: NURSE PRACTITIONER

## 2022-05-28 PROCEDURE — 25010000002 ENOXAPARIN PER 10 MG: Performed by: INTERNAL MEDICINE

## 2022-05-28 PROCEDURE — 99222 1ST HOSP IP/OBS MODERATE 55: CPT | Performed by: INTERNAL MEDICINE

## 2022-05-28 PROCEDURE — 80069 RENAL FUNCTION PANEL: CPT | Performed by: INTERNAL MEDICINE

## 2022-05-28 PROCEDURE — 83735 ASSAY OF MAGNESIUM: CPT | Performed by: INTERNAL MEDICINE

## 2022-05-28 RX ADMIN — MULTIPLE VITAMINS W/ MINERALS TAB 1 TABLET: TAB at 08:31

## 2022-05-28 RX ADMIN — DOCUSATE SODIUM 50MG AND SENNOSIDES 8.6MG 2 TABLET: 8.6; 5 TABLET, FILM COATED ORAL at 08:30

## 2022-05-28 RX ADMIN — MEMANTINE HYDROCHLORIDE 10 MG: 10 TABLET, FILM COATED ORAL at 22:15

## 2022-05-28 RX ADMIN — INSULIN LISPRO 2 UNITS: 100 INJECTION, SOLUTION INTRAVENOUS; SUBCUTANEOUS at 17:11

## 2022-05-28 RX ADMIN — BUMETANIDE 1 MG: 0.25 INJECTION INTRAMUSCULAR; INTRAVENOUS at 08:29

## 2022-05-28 RX ADMIN — FERROUS SULFATE TAB 325 MG (65 MG ELEMENTAL FE) 325 MG: 325 (65 FE) TAB at 08:30

## 2022-05-28 RX ADMIN — INSULIN LISPRO 2 UNITS: 100 INJECTION, SOLUTION INTRAVENOUS; SUBCUTANEOUS at 11:02

## 2022-05-28 RX ADMIN — ATORVASTATIN CALCIUM 20 MG: 20 TABLET, FILM COATED ORAL at 22:15

## 2022-05-28 RX ADMIN — ENOXAPARIN SODIUM 60 MG: 100 INJECTION SUBCUTANEOUS at 13:50

## 2022-05-28 RX ADMIN — BUMETANIDE 1 MG: 0.25 INJECTION INTRAMUSCULAR; INTRAVENOUS at 17:12

## 2022-05-28 RX ADMIN — MEMANTINE HYDROCHLORIDE 10 MG: 10 TABLET, FILM COATED ORAL at 08:31

## 2022-05-28 RX ADMIN — PANTOPRAZOLE SODIUM 40 MG: 40 TABLET, DELAYED RELEASE ORAL at 08:30

## 2022-05-29 ENCOUNTER — APPOINTMENT (OUTPATIENT)
Dept: GENERAL RADIOLOGY | Facility: HOSPITAL | Age: 87
End: 2022-05-29

## 2022-05-29 LAB
ALBUMIN SERPL-MCNC: 3.2 G/DL (ref 3.5–5.2)
ANION GAP SERPL CALCULATED.3IONS-SCNC: 12 MMOL/L (ref 5–15)
BUN SERPL-MCNC: 28 MG/DL (ref 8–23)
BUN/CREAT SERPL: 35.4 (ref 7–25)
CALCIUM SPEC-SCNC: 8.7 MG/DL (ref 8.2–9.6)
CHLORIDE SERPL-SCNC: 100 MMOL/L (ref 98–107)
CO2 SERPL-SCNC: 29 MMOL/L (ref 22–29)
CREAT SERPL-MCNC: 0.79 MG/DL (ref 0.57–1)
EGFRCR SERPLBLD CKD-EPI 2021: 68.1 ML/MIN/1.73
GLUCOSE BLDC GLUCOMTR-MCNC: 132 MG/DL (ref 70–130)
GLUCOSE BLDC GLUCOMTR-MCNC: 168 MG/DL (ref 70–130)
GLUCOSE BLDC GLUCOMTR-MCNC: 170 MG/DL (ref 70–130)
GLUCOSE BLDC GLUCOMTR-MCNC: 234 MG/DL (ref 70–130)
GLUCOSE SERPL-MCNC: 113 MG/DL (ref 65–99)
MAGNESIUM SERPL-MCNC: 1.8 MG/DL (ref 1.7–2.3)
PHOSPHATE SERPL-MCNC: 3.6 MG/DL (ref 2.5–4.5)
POTASSIUM SERPL-SCNC: 3.4 MMOL/L (ref 3.5–5.2)
POTASSIUM SERPL-SCNC: 4.2 MMOL/L (ref 3.5–5.2)
SODIUM SERPL-SCNC: 141 MMOL/L (ref 136–145)

## 2022-05-29 PROCEDURE — 63710000001 INSULIN LISPRO (HUMAN) PER 5 UNITS: Performed by: NURSE PRACTITIONER

## 2022-05-29 PROCEDURE — 71046 X-RAY EXAM CHEST 2 VIEWS: CPT

## 2022-05-29 PROCEDURE — 82962 GLUCOSE BLOOD TEST: CPT

## 2022-05-29 PROCEDURE — 0 MAGNESIUM SULFATE 4 GM/100ML SOLUTION: Performed by: INTERNAL MEDICINE

## 2022-05-29 PROCEDURE — 99232 SBSQ HOSP IP/OBS MODERATE 35: CPT | Performed by: INTERNAL MEDICINE

## 2022-05-29 PROCEDURE — 84132 ASSAY OF SERUM POTASSIUM: CPT | Performed by: INTERNAL MEDICINE

## 2022-05-29 PROCEDURE — 97530 THERAPEUTIC ACTIVITIES: CPT

## 2022-05-29 PROCEDURE — 80069 RENAL FUNCTION PANEL: CPT | Performed by: INTERNAL MEDICINE

## 2022-05-29 PROCEDURE — 83735 ASSAY OF MAGNESIUM: CPT | Performed by: INTERNAL MEDICINE

## 2022-05-29 PROCEDURE — 25010000002 NA FERRIC GLUC CPLX PER 12.5 MG: Performed by: INTERNAL MEDICINE

## 2022-05-29 RX ORDER — POTASSIUM CHLORIDE 750 MG/1
40 TABLET, FILM COATED, EXTENDED RELEASE ORAL AS NEEDED
Status: DISCONTINUED | OUTPATIENT
Start: 2022-05-29 | End: 2022-05-30 | Stop reason: HOSPADM

## 2022-05-29 RX ORDER — MAGNESIUM SULFATE HEPTAHYDRATE 40 MG/ML
2 INJECTION, SOLUTION INTRAVENOUS AS NEEDED
Status: DISCONTINUED | OUTPATIENT
Start: 2022-05-29 | End: 2022-05-30 | Stop reason: HOSPADM

## 2022-05-29 RX ORDER — BUMETANIDE 1 MG/1
1 TABLET ORAL 2 TIMES DAILY
Status: DISCONTINUED | OUTPATIENT
Start: 2022-05-29 | End: 2022-05-30 | Stop reason: HOSPADM

## 2022-05-29 RX ORDER — POTASSIUM CHLORIDE 7.45 MG/ML
10 INJECTION INTRAVENOUS
Status: DISCONTINUED | OUTPATIENT
Start: 2022-05-29 | End: 2022-05-30 | Stop reason: HOSPADM

## 2022-05-29 RX ORDER — ACETAMINOPHEN 325 MG/1
650 TABLET ORAL ONCE
Status: DISCONTINUED | OUTPATIENT
Start: 2022-05-29 | End: 2022-05-29

## 2022-05-29 RX ORDER — MAGNESIUM SULFATE HEPTAHYDRATE 40 MG/ML
4 INJECTION, SOLUTION INTRAVENOUS AS NEEDED
Status: DISCONTINUED | OUTPATIENT
Start: 2022-05-29 | End: 2022-05-30 | Stop reason: HOSPADM

## 2022-05-29 RX ORDER — POTASSIUM CHLORIDE 1.5 G/1.77G
40 POWDER, FOR SOLUTION ORAL AS NEEDED
Status: DISCONTINUED | OUTPATIENT
Start: 2022-05-29 | End: 2022-05-30 | Stop reason: HOSPADM

## 2022-05-29 RX ADMIN — APIXABAN 2.5 MG: 2.5 TABLET, FILM COATED ORAL at 22:54

## 2022-05-29 RX ADMIN — MULTIPLE VITAMINS W/ MINERALS TAB 1 TABLET: TAB at 09:54

## 2022-05-29 RX ADMIN — INSULIN LISPRO 2 UNITS: 100 INJECTION, SOLUTION INTRAVENOUS; SUBCUTANEOUS at 11:37

## 2022-05-29 RX ADMIN — INSULIN LISPRO 3 UNITS: 100 INJECTION, SOLUTION INTRAVENOUS; SUBCUTANEOUS at 16:48

## 2022-05-29 RX ADMIN — MAGNESIUM SULFATE HEPTAHYDRATE 4 G: 40 INJECTION, SOLUTION INTRAVENOUS at 14:17

## 2022-05-29 RX ADMIN — FERROUS SULFATE TAB 325 MG (65 MG ELEMENTAL FE) 325 MG: 325 (65 FE) TAB at 09:54

## 2022-05-29 RX ADMIN — POTASSIUM CHLORIDE 40 MEQ: 750 TABLET, EXTENDED RELEASE ORAL at 11:38

## 2022-05-29 RX ADMIN — MEMANTINE HYDROCHLORIDE 10 MG: 10 TABLET, FILM COATED ORAL at 22:54

## 2022-05-29 RX ADMIN — BUMETANIDE 1 MG: 1 TABLET ORAL at 22:54

## 2022-05-29 RX ADMIN — MEMANTINE HYDROCHLORIDE 10 MG: 10 TABLET, FILM COATED ORAL at 09:54

## 2022-05-29 RX ADMIN — ATORVASTATIN CALCIUM 20 MG: 20 TABLET, FILM COATED ORAL at 22:53

## 2022-05-29 RX ADMIN — PANTOPRAZOLE SODIUM 40 MG: 40 TABLET, DELAYED RELEASE ORAL at 09:53

## 2022-05-29 RX ADMIN — POTASSIUM CHLORIDE 40 MEQ: 750 TABLET, EXTENDED RELEASE ORAL at 14:15

## 2022-05-29 RX ADMIN — BUMETANIDE 1 MG: 1 TABLET ORAL at 11:37

## 2022-05-29 RX ADMIN — APIXABAN 2.5 MG: 2.5 TABLET, FILM COATED ORAL at 11:37

## 2022-05-29 RX ADMIN — DOCUSATE SODIUM 50MG AND SENNOSIDES 8.6MG 2 TABLET: 8.6; 5 TABLET, FILM COATED ORAL at 09:54

## 2022-05-29 RX ADMIN — SODIUM CHLORIDE 250 MG: 9 INJECTION, SOLUTION INTRAVENOUS at 18:06

## 2022-05-30 ENCOUNTER — READMISSION MANAGEMENT (OUTPATIENT)
Dept: CALL CENTER | Facility: HOSPITAL | Age: 87
End: 2022-05-30

## 2022-05-30 VITALS
SYSTOLIC BLOOD PRESSURE: 124 MMHG | DIASTOLIC BLOOD PRESSURE: 68 MMHG | WEIGHT: 120.8 LBS | OXYGEN SATURATION: 95 % | TEMPERATURE: 97.7 F | HEART RATE: 77 BPM | HEIGHT: 61 IN | RESPIRATION RATE: 16 BRPM | BODY MASS INDEX: 22.81 KG/M2

## 2022-05-30 LAB
ALBUMIN SERPL-MCNC: 3.2 G/DL (ref 3.5–5.2)
ANION GAP SERPL CALCULATED.3IONS-SCNC: 10 MMOL/L (ref 5–15)
BUN SERPL-MCNC: 26 MG/DL (ref 8–23)
BUN/CREAT SERPL: 35.6 (ref 7–25)
CALCIUM SPEC-SCNC: 8.6 MG/DL (ref 8.2–9.6)
CHLORIDE SERPL-SCNC: 102 MMOL/L (ref 98–107)
CO2 SERPL-SCNC: 28 MMOL/L (ref 22–29)
CREAT SERPL-MCNC: 0.73 MG/DL (ref 0.57–1)
EGFRCR SERPLBLD CKD-EPI 2021: 74.9 ML/MIN/1.73
GLUCOSE BLDC GLUCOMTR-MCNC: 125 MG/DL (ref 70–130)
GLUCOSE BLDC GLUCOMTR-MCNC: 209 MG/DL (ref 70–130)
GLUCOSE SERPL-MCNC: 114 MG/DL (ref 65–99)
PHOSPHATE SERPL-MCNC: 3 MG/DL (ref 2.5–4.5)
POTASSIUM SERPL-SCNC: 3.7 MMOL/L (ref 3.5–5.2)
SODIUM SERPL-SCNC: 140 MMOL/L (ref 136–145)

## 2022-05-30 PROCEDURE — 82962 GLUCOSE BLOOD TEST: CPT

## 2022-05-30 PROCEDURE — 63710000001 INSULIN LISPRO (HUMAN) PER 5 UNITS: Performed by: NURSE PRACTITIONER

## 2022-05-30 PROCEDURE — 80069 RENAL FUNCTION PANEL: CPT | Performed by: INTERNAL MEDICINE

## 2022-05-30 PROCEDURE — 99233 SBSQ HOSP IP/OBS HIGH 50: CPT

## 2022-05-30 RX ORDER — BUMETANIDE 1 MG/1
1 TABLET ORAL DAILY
Qty: 90 TABLET | Refills: 0 | Status: SHIPPED | OUTPATIENT
Start: 2022-05-30 | End: 2022-11-15 | Stop reason: SDUPTHER

## 2022-05-30 RX ADMIN — MEMANTINE HYDROCHLORIDE 10 MG: 10 TABLET, FILM COATED ORAL at 09:02

## 2022-05-30 RX ADMIN — BUMETANIDE 1 MG: 1 TABLET ORAL at 09:02

## 2022-05-30 RX ADMIN — MULTIPLE VITAMINS W/ MINERALS TAB 1 TABLET: TAB at 09:02

## 2022-05-30 RX ADMIN — PANTOPRAZOLE SODIUM 40 MG: 40 TABLET, DELAYED RELEASE ORAL at 06:30

## 2022-05-30 RX ADMIN — FERROUS SULFATE TAB 325 MG (65 MG ELEMENTAL FE) 325 MG: 325 (65 FE) TAB at 09:02

## 2022-05-30 RX ADMIN — APIXABAN 2.5 MG: 2.5 TABLET, FILM COATED ORAL at 09:02

## 2022-05-30 RX ADMIN — INSULIN LISPRO 3 UNITS: 100 INJECTION, SOLUTION INTRAVENOUS; SUBCUTANEOUS at 11:59

## 2022-05-30 NOTE — OUTREACH NOTE
Prep Survey    Flowsheet Row Responses   [facility] facility patient discharged from? [facility]   Is LACE score < 7 ? No   Emergency Room discharge w/ pulse ox? No   Eligibility [facility]   Date of Admission 05/26/22   Date of Discharge 05/30/22   Discharge Disposition Home-Health Care Svc   Discharge diagnosis A/C CHF, acute hypoxic resp failure, pleural effusion, dementia, T2DM   Does the patient have one of the following disease processes/diagnoses(primary or secondary)? CHF   Does the patient have Home health ordered? Yes   What is the Home health agency?  [facility]   Is there a DME ordered? Yes   What DME was ordered? O2 from [place]   Prep survey completed? Yes          AYSHA MARTIN - Registered Nurse

## 2022-05-31 ENCOUNTER — TRANSITIONAL CARE MANAGEMENT TELEPHONE ENCOUNTER (OUTPATIENT)
Dept: CALL CENTER | Facility: HOSPITAL | Age: 87
End: 2022-05-31

## 2022-05-31 ENCOUNTER — TELEPHONE (OUTPATIENT)
Dept: CARDIOLOGY | Facility: CLINIC | Age: 87
End: 2022-05-31

## 2022-05-31 ENCOUNTER — APPOINTMENT (OUTPATIENT)
Dept: INFUSION THERAPY | Facility: HOSPITAL | Age: 87
End: 2022-05-31

## 2022-05-31 NOTE — OUTREACH NOTE
Call Center TCM Note    Flowsheet Row Responses   [facility] patient discharged from? [place]   Does the patient have one of the following disease processes/diagnoses(primary or secondary)? CHF   TCM attempt successful? Yes   Call start time 1511   Call end time 1525   Discharge diagnosis A/C CHF, acute hypoxic resp failure, pleural effusion, dementia, T2DM   Person spoke with today (if not patient) and relationship Liat-daughter    Meds reviewed with patient/caregiver? Yes   Is the patient having any side effects they believe may be caused by any medication additions or changes? No   Does the patient have all medications ordered at discharge? Yes   Is the patient taking all medications as directed (includes completed medication regime)? Yes   Does the patient have a primary care provider?  Yes   Does the patient have an appointment with their PCP within 7 days of discharge? Yes   Comments regarding PCP Hospital d/c f/u appt is on 6/6/22 at 2:00 pm    Has the patient kept scheduled appointments due by today? N/A   What is the Home health agency?  [facility]    Has home health visited the patient within 72 hours of discharge? Yes   What DME was ordered? O2 from [place]   Has all DME been delivered? Yes   DME comments 97% on 2 L O2    Pulse Ox monitoring None   Psychosocial issues? No   Psychosocial comments Liat lives next door to patient    Did the patient receive a copy of their discharge instructions? Yes   Nursing interventions Reviewed instructions with patient   What is the patient's perception of their health status since discharge? Improving   Nursing interventions Nurse provided patient education   Is the patient weighing daily? Yes   Does the patient have scales? Yes   Is the patient able to teach back Heart Failure diet management? Yes   Is the patient able to teach back signs and symptoms of worsening condition? (i.e. weight gain, shortness of air, etc.) Yes   TCM call completed? Yes   Wrap up  additional comments We discussed fluid restrictions. Daughter said her sister was told pt needs no more than 4 cups of fluids per day. RN advised they to get exact number from cards. She verbalized understanding.            Sherron Wilkinson RN    5/31/2022, 15:27 EDT

## 2022-05-31 NOTE — TELEPHONE ENCOUNTER
Mary Grajeda with [facility] called and wanted to know if the patient needed to be on fluid restrictions being she just got out of the hospital with CHF.      I have spoken with TMM and she stated that she did not need to be restricted.  I have called and left a message on Mary Grajeda's voicemail with that information and I have let the patient know as well.    Loan

## 2022-05-31 NOTE — TELEPHONE ENCOUNTER
----- Message from Loan Chand MA sent at 5/31/2022  1:53 PM EDT -----  Regarding: FW: Mom is scheduled for an iron infusion on June 3rd she got her 1st one while in the hospital on Alpesh May 29th   Can you please advise?  ----- Message -----  From: Mary Shi  Sent: 5/31/2022   1:31 PM EDT  To: Анна [facility]  Subject: Mom is scheduled for an iron infusion on Jun#    Should we reschedule this ? The hospital gave her iron supplement pills so will she get iron supplement daily plus the iron infusion.  Please let us know [phone] or [phone] Liat. Appreciate your help

## 2022-05-31 NOTE — TELEPHONE ENCOUNTER
I spoke with her. Her mom is feeling better and got IV iron in the hospital; ok to cancel outpatient IV iron infusions.     YEHUDA

## 2022-06-03 ENCOUNTER — APPOINTMENT (OUTPATIENT)
Dept: INFUSION THERAPY | Facility: HOSPITAL | Age: 87
End: 2022-06-03

## 2022-06-06 ENCOUNTER — OFFICE VISIT (OUTPATIENT)
Dept: FAMILY MEDICINE CLINIC | Facility: CLINIC | Age: 87
End: 2022-06-06
Payer: MEDICARE

## 2022-06-06 VITALS
BODY MASS INDEX: 21.79 KG/M2 | TEMPERATURE: 95.9 F | RESPIRATION RATE: 18 BRPM | OXYGEN SATURATION: 95 % | DIASTOLIC BLOOD PRESSURE: 72 MMHG | SYSTOLIC BLOOD PRESSURE: 138 MMHG | HEART RATE: 82 BPM | HEIGHT: 61 IN | WEIGHT: 115.4 LBS

## 2022-06-06 DIAGNOSIS — I48.21 PERMANENT ATRIAL FIBRILLATION: ICD-10-CM

## 2022-06-06 DIAGNOSIS — I50.33 ACUTE ON CHRONIC DIASTOLIC CHF (CONGESTIVE HEART FAILURE): Primary | ICD-10-CM

## 2022-06-06 PROCEDURE — 99214 OFFICE O/P EST MOD 30 MIN: CPT | Performed by: INTERNAL MEDICINE

## 2022-06-06 NOTE — PROGRESS NOTES
Subjective   Mary Shi is a 89 y.o. female. Patient is here today for   Chief Complaint   Patient presents with   • Hospital Follow Up Visit          Vitals:    06/06/22 1408   BP: 138/72   Pulse: 82   Resp: 18   Temp: 95.9 °F (35.5 °C)   SpO2: 95%     Body mass index is 21.82 kg/m².    The following portions of the patient's history were reviewed and updated as appropriate: allergies, current medications, past family history, past medical history, past social history, past surgical history and problem list.    Past Medical History:   Diagnosis Date   • 'light-for-dates' infant with signs of fetal malnutrition    • Abnormal ECG    • Amaurosis fugax    • Anemia    • Aortic stenosis     s/p TAVR    • Aortic valve replaced     Had TAVR   • Asthma     SOB   • Atrial fibrillation    • Cancer     skin   • CAP (community acquired pneumonia)    • Carotid artery stenosis     Per duplex 12/16/2016-proximal right internal carotid artery mild stenosis and proximal left moderate stenosis   • Cervical spine disease    • Cognitive disorder    • Congestive heart failure    • Coronary artery disease    • Dementia    • Diabetes mellitus    • Diastolic dysfunction    • Dyspnea on exertion    • Generalized osteoarthritis of multiple sites    • GERD (gastroesophageal reflux disease)    • Gout    • Health care maintenance    • Heart murmur    • Hiatal hernia    • HL (hearing loss)    • Hyperlipidemia    • Hypertension    • Memory loss    • Mitral valve prolapse    • Mitral valve stenosis     Moderate per echocardiogram 2/2017; severe mitral annular calcification   • Nasal lesion    • Nasal stenosis    • Neuropathy in diabetes    • Osteoarthritis     multiple sites   • PAF (paroxysmal atrial fibrillation)    • Paroxysmal atrial fibrillation 09/11/2019   • Patient had no falls in past year    • Peripheral neuropathy    • Peripheral vascular disease    • PONV (postoperative nausea and vomiting)    • Rheumatic mitral stenosis    •  Shingles    • Sleep apnea    • Stroke    • Syncope    • Systolic hypertension    • Type 2 diabetes mellitus with hyperglycemia, without long-term current use of insulin 4/6/2017   • Varicose veins       Allergies   Allergen Reactions   • Codeine Itching and GI Intolerance   • Morphine And Related Itching      Social History     Socioeconomic History   • Marital status:    • Number of children: 15   • Years of education: 8th grade   Tobacco Use   • Smoking status: Never     Passive exposure: Never   • Smokeless tobacco: Never   Vaping Use   • Vaping Use: Never used   Substance and Sexual Activity   • Alcohol use: Never     Comment: no caffeine    • Drug use: Never   • Sexual activity: Not Currently     Partners: Female        Current Outpatient Medications:   •  acetaminophen (TYLENOL) 650 MG 8 hr tablet, Take 1 tablet by mouth Every 8 (Eight) Hours As Needed for Mild Pain., Disp: , Rfl:   •  albuterol sulfate  (90 Base) MCG/ACT inhaler, Inhale 2 puffs Every 4 (Four) Hours As Needed for Wheezing (shortness of breath)., Disp: 6.7 g, Rfl: 0  •  atorvastatin (LIPITOR) 20 MG tablet, TAKE 1 TABLET BY MOUTH  DAILY, Disp: 90 tablet, Rfl: 3  •  bumetanide (BUMEX) 1 MG tablet, Take 1 tablet BY MOUTH ONCE DAILY, Disp: 90 tablet, Rfl: 0  •  Eliquis 2.5 MG tablet tablet, TAKE 1 TABLET BY MOUTH  EVERY 12 HOURS, Disp: 180 tablet, Rfl: 3  •  Ferrous Sulfate (IRON SUPPLEMENT PO), Take  by mouth., Disp: , Rfl:   •  Lancets (freestyle) lancets, USE TO TEST BLOOD SUGAR DAILY, Disp: 100 each, Rfl: 4  •  memantine (NAMENDA XR) 28 MG capsule sustained-release 24 hr extended release capsule, TAKE 1 CAPSULE BY MOUTH  DAILY, Disp: 90 capsule, Rfl: 3  •  metFORMIN (GLUCOPHAGE) 500 MG tablet, TAKE ONE-HALF TABLET BY  MOUTH TWICE DAILY, Disp: 90 tablet, Rfl: 3  •  metoprolol succinate XL (TOPROL-XL) 25 MG 24 hr tablet, Take 0.5 tablets by mouth Every Night for 30 days., Disp: 15 tablet, Rfl: 0  •  Multiple Vitamins-Minerals  (MULTIVITAMIN WITH MINERALS) tablet tablet, Take 1 tablet by mouth Daily., Disp: , Rfl:   •  OLANZapine (zyPREXA) 2.5 MG tablet, Take 2.5 mg by mouth 2 (Two) Times a Day As Needed. (Patient not taking: Reported on 3/20/2023), Disp: , Rfl:   •  omeprazole (priLOSEC) 20 MG capsule, TAKE 1 CAPSULE BY MOUTH  DAILY, Disp: 90 capsule, Rfl: 3  •  polyethyl glycol-propyl glycol (SYSTANE) 0.4-0.3 % solution ophthalmic solution (artificial tears), 2 drops Every 1 (One) Hour As Needed., Disp: , Rfl:   •  True Metrix Blood Glucose Test test strip, TEST TWO TIMES A DAY, Disp: 100 each, Rfl: 11     Objective     History of Present Illness Zahra is here today with her daughter for hospital discharge follow-up.  She was admitted to [facility] on May 26 with respiratory failure due to acute on chronic diastolic heart failure.  Chest x-ray showed pleural effusion right greater than left.  She responded to IV diuretics and was subsequently discharged on Bumex milligram daily and nasal O2 2 L/min.  O2 sat after activity today was 94%.  After incentive spirometry he went up to 97% according to her daughter.  Home health is following as well as physical therapy.    Review of Systems   Constitutional: Negative for unexpected weight change.   Respiratory: Negative for shortness of breath.    Cardiovascular: Negative for chest pain and leg swelling.   Gastrointestinal: Negative.    Genitourinary: Negative.    Psychiatric/Behavioral: Negative for behavioral problems.       Physical Exam  Vitals reviewed.   Constitutional:       Comments: Elderly female in a wheelchair who is alert and cooperative   Cardiovascular:      Rate and Rhythm: Normal rate. Rhythm irregular.      Heart sounds: Normal heart sounds.     No gallop.   Pulmonary:      Effort: No respiratory distress.      Breath sounds: No rales.   Musculoskeletal:      Right lower leg: No edema.      Left lower leg: No edema.   Neurological:      Mental Status: She  is alert.         ASSESSMENT reviewed and discussed hospital records as well as hospital discharge medication list.  Patient is stable on current diuretic regimen.  Continue home health and physical therapy.     Problems Addressed this Visit        Cardiac and Vasculature    Acute on chronic diastolic CHF (congestive heart failure) (HCC) - Primary    Atrial fibrillation (HCC)   Diagnoses       Codes Comments    Acute on chronic diastolic CHF (congestive heart failure)    -  Primary ICD-10-CM: I50.33  ICD-9-CM: 428.33, 428.0     Permanent atrial fibrillation     ICD-10-CM: I48.21  ICD-9-CM: 427.31           PLAN  There are no Patient Instructions on file for this visit.  No follow-ups on file.

## 2022-06-07 ENCOUNTER — APPOINTMENT (OUTPATIENT)
Dept: INFUSION THERAPY | Facility: HOSPITAL | Age: 87
End: 2022-06-07

## 2022-06-08 ENCOUNTER — READMISSION MANAGEMENT (OUTPATIENT)
Dept: CALL CENTER | Facility: HOSPITAL | Age: 87
End: 2022-06-08

## 2022-06-08 NOTE — OUTREACH NOTE
CHF Week 2 Survey    Flowsheet Row Responses   [facility] patient discharged from? [place]   Does the patient have one of the following disease processes/diagnoses(primary or secondary)? CHF   Week 2 attempt successful? Yes   Call start time 1527   Call end time 1529   Discharge diagnosis A/C CHF, acute hypoxic resp failure, pleural effusion, dementia, T2DM   Person spoke with today (if not patient) and relationship Liat-daughter    Meds reviewed with patient/caregiver? Yes   Is the patient taking all medications as directed (includes completed medication regime)? Yes   Has the patient kept scheduled appointments due by today? Yes   What is the Home health agency?  [facility]   Has home health visited the patient within 72 hours of discharge? Yes   Pulse Ox monitoring Intermittent   Pulse Ox device source Patient   O2 Sat comments 2L o2 >90%,  RA walking 94%   O2 Sat: education provided Sat levels, Monitoring frequency, When to seek care   O2 Sat education comments MD told them to keep above 90% and to wear when sleeping.   Psychosocial issues? No   Comments She has improved and is doing well. Appetite is fair.   What is the patient's perception of their health status since discharge? Returned to baseline/stable   Is the patient weighing daily? Yes   Is the patient able to teach back Heart Failure Zones? Yes   If the patient is a current smoker, are they able to teach back resources for cessation? Not a smoker   Is the patient/caregiver able to teach back the hierarchy of who to call/visit for symptoms/problems? PCP, Specialist, Home health nurse, Urgent Care, ED, 911 Yes   CHF Week 2 call completed? Yes   Revoked No further contact(revokes)-requires comment   Is the patient interested in additional calls from an ambulatory ?  NOTE:  applies to high risk patients requiring additional follow-up. No   Graduated/Revoked comments alverto DUGAN - Registered Nurse

## 2022-06-10 ENCOUNTER — LAB REQUISITION (OUTPATIENT)
Dept: LAB | Facility: HOSPITAL | Age: 87
End: 2022-06-10

## 2022-06-10 DIAGNOSIS — D64.9 ANEMIA, UNSPECIFIED: ICD-10-CM

## 2022-06-10 DIAGNOSIS — R53.1 WEAKNESS: ICD-10-CM

## 2022-06-10 LAB
ALBUMIN SERPL-MCNC: 3.9 G/DL (ref 3.5–5.2)
ALBUMIN/GLOB SERPL: 1.1 G/DL
ALP SERPL-CCNC: 115 U/L (ref 39–117)
ALT SERPL W P-5'-P-CCNC: 15 U/L (ref 1–33)
ANION GAP SERPL CALCULATED.3IONS-SCNC: 11.4 MMOL/L (ref 5–15)
AST SERPL-CCNC: 25 U/L (ref 1–32)
BASOPHILS # BLD AUTO: 0.03 10*3/MM3 (ref 0–0.2)
BASOPHILS NFR BLD AUTO: 0.5 % (ref 0–1.5)
BILIRUB SERPL-MCNC: 0.5 MG/DL (ref 0–1.2)
BUN SERPL-MCNC: 30 MG/DL (ref 8–23)
BUN/CREAT SERPL: 42.3 (ref 7–25)
CALCIUM SPEC-SCNC: 10.2 MG/DL (ref 8.2–9.6)
CHLORIDE SERPL-SCNC: 101 MMOL/L (ref 98–107)
CO2 SERPL-SCNC: 29.6 MMOL/L (ref 22–29)
CREAT SERPL-MCNC: 0.71 MG/DL (ref 0.57–1)
DEPRECATED RDW RBC AUTO: 49.2 FL (ref 37–54)
EGFRCR SERPLBLD CKD-EPI 2021: 77.4 ML/MIN/1.73
EOSINOPHIL # BLD AUTO: 0.03 10*3/MM3 (ref 0–0.4)
EOSINOPHIL NFR BLD AUTO: 0.5 % (ref 0.3–6.2)
ERYTHROCYTE [DISTWIDTH] IN BLOOD BY AUTOMATED COUNT: 15.2 % (ref 12.3–15.4)
GLOBULIN UR ELPH-MCNC: 3.5 GM/DL
GLUCOSE SERPL-MCNC: 145 MG/DL (ref 65–99)
HCT VFR BLD AUTO: 35.1 % (ref 34–46.6)
HGB BLD-MCNC: 10.4 G/DL (ref 12–15.9)
IMM GRANULOCYTES # BLD AUTO: 0.01 10*3/MM3 (ref 0–0.05)
IMM GRANULOCYTES NFR BLD AUTO: 0.2 % (ref 0–0.5)
IRON 24H UR-MRATE: 56 MCG/DL (ref 37–145)
IRON SATN MFR SERPL: 15 % (ref 20–50)
LYMPHOCYTES # BLD AUTO: 1.48 10*3/MM3 (ref 0.7–3.1)
LYMPHOCYTES NFR BLD AUTO: 24.1 % (ref 19.6–45.3)
MCH RBC QN AUTO: 25.9 PG (ref 26.6–33)
MCHC RBC AUTO-ENTMCNC: 29.6 G/DL (ref 31.5–35.7)
MCV RBC AUTO: 87.5 FL (ref 79–97)
MONOCYTES # BLD AUTO: 0.45 10*3/MM3 (ref 0.1–0.9)
MONOCYTES NFR BLD AUTO: 7.3 % (ref 5–12)
NEUTROPHILS NFR BLD AUTO: 4.14 10*3/MM3 (ref 1.7–7)
NEUTROPHILS NFR BLD AUTO: 67.4 % (ref 42.7–76)
PLATELET # BLD AUTO: 203 10*3/MM3 (ref 140–450)
PMV BLD AUTO: 11.8 FL (ref 6–12)
POTASSIUM SERPL-SCNC: 3.9 MMOL/L (ref 3.5–5.2)
PROT SERPL-MCNC: 7.4 G/DL (ref 6–8.5)
RBC # BLD AUTO: 4.01 10*6/MM3 (ref 3.77–5.28)
SODIUM SERPL-SCNC: 142 MMOL/L (ref 136–145)
TIBC SERPL-MCNC: 383 MCG/DL (ref 298–536)
TRANSFERRIN SERPL-MCNC: 257 MG/DL (ref 200–360)
WBC NRBC COR # BLD: 6.14 10*3/MM3 (ref 3.4–10.8)

## 2022-06-10 PROCEDURE — 83540 ASSAY OF IRON: CPT | Performed by: INTERNAL MEDICINE

## 2022-06-10 PROCEDURE — 80053 COMPREHEN METABOLIC PANEL: CPT | Performed by: INTERNAL MEDICINE

## 2022-06-10 PROCEDURE — 85025 COMPLETE CBC W/AUTO DIFF WBC: CPT | Performed by: INTERNAL MEDICINE

## 2022-06-10 PROCEDURE — 84466 ASSAY OF TRANSFERRIN: CPT | Performed by: INTERNAL MEDICINE

## 2022-06-13 NOTE — PROGRESS NOTES
Enter Query Response Below      Query Response: Acute on chronic diastolic heart failure due to valvular heart disease             If applicable, please update the problem list.     Patient: Mary Shi        : 3/3/1925  Account: [id number]           Admit Date: 2022        Options to Respond to Query:    1. Access the Encounter     a. From the To-Do Side bar, click Respond With Note.     b. Click New Note     c. Answer query within the yellow box.                d. Update the Problem List if applicable.     Dr. Cochran:     Patient admitted with acute on chronic diastolic congestive heart failure. Patient documented to have history of hypertension and valvular heart disease. The patient was  treated with IV Bumex. Most recent Echo showed EF 66%, moderate to severe mitral stenosis, TAVR present ad moderate concentric hypertrophy.     After study was the etiology of the CHF felt to be due to:    Hypertensive heart disease  Valvular heart disease  Both  Other (please specify)__________________  Clinically indeterminable     By submitting this query, we are merely seeking further clarification of documentation to accurately reflect all conditions that you are monitoring, evaluating, treating or that extend the hospitalization or utilize additional resources of care. Please utilize your independent clinical judgment when addressing the question(s) above.     This query and your response, once completed, will be entered into the legal medical record.    Sincerely,  Howard EPSTEIN RN, CCDS  Clinical Documentation Integrity Program   [email or web address]

## 2022-06-21 ENCOUNTER — TELEPHONE (OUTPATIENT)
Dept: FAMILY MEDICINE CLINIC | Facility: CLINIC | Age: 87
End: 2022-06-21

## 2022-06-21 NOTE — TELEPHONE ENCOUNTER
Caller: Monroe Donis    Relationship: Emergency Contact    Best call back number: [phone]       What is the best time to reach you: ANYTIME    Who are you requesting to speak with (clinical staff, provider,  specific staff member): CLINICAL STAFF    Do you know the name of the person who called: MONROE     What was the call regarding: PATIENTS DAUGHTER CALLED TO SEE IF THE PATIENTS LABS COULD BE REVIEWED TO SEE IF THE PATIENTS IRON LEVELS HAVE REGULATED.     PATIENTS DAUGHTER HAS QUESTIONS AND IS REQUESTING A CALL BACK.     Do you require a callback: YES

## 2022-06-22 DIAGNOSIS — E11.9 TYPE 2 DIABETES MELLITUS WITHOUT COMPLICATION, WITHOUT LONG-TERM CURRENT USE OF INSULIN: ICD-10-CM

## 2022-06-22 RX ORDER — CALCIUM CITRATE/VITAMIN D3 200MG-6.25
TABLET ORAL
Qty: 100 EACH | Refills: 11 | Status: SHIPPED | OUTPATIENT
Start: 2022-06-22

## 2022-06-30 ENCOUNTER — LAB REQUISITION (OUTPATIENT)
Dept: LAB | Facility: HOSPITAL | Age: 87
End: 2022-06-30

## 2022-06-30 DIAGNOSIS — I11.0 HYPERTENSIVE HEART DISEASE WITH HEART FAILURE: ICD-10-CM

## 2022-06-30 DIAGNOSIS — I10 ESSENTIAL (PRIMARY) HYPERTENSION: ICD-10-CM

## 2022-06-30 DIAGNOSIS — F03.90 UNSPECIFIED DEMENTIA WITHOUT BEHAVIORAL DISTURBANCE: ICD-10-CM

## 2022-06-30 DIAGNOSIS — R41.82 ALTERED MENTAL STATUS, UNSPECIFIED: ICD-10-CM

## 2022-06-30 DIAGNOSIS — I50.32 CHRONIC DIASTOLIC (CONGESTIVE) HEART FAILURE: ICD-10-CM

## 2022-06-30 DIAGNOSIS — I50.9 HEART FAILURE, UNSPECIFIED: ICD-10-CM

## 2022-06-30 LAB
BACTERIA UR QL AUTO: ABNORMAL /HPF
BILIRUB UR QL STRIP: NEGATIVE
CLARITY UR: CLEAR
COLOR UR: YELLOW
GLUCOSE UR STRIP-MCNC: NEGATIVE MG/DL
HGB UR QL STRIP.AUTO: NEGATIVE
KETONES UR QL STRIP: NEGATIVE
LEUKOCYTE ESTERASE UR QL STRIP.AUTO: NEGATIVE
NITRITE UR QL STRIP: NEGATIVE
PH UR STRIP.AUTO: 5.5 [PH] (ref 5–8)
PROT UR QL STRIP: NEGATIVE
RBC # UR STRIP: ABNORMAL /HPF
REF LAB TEST METHOD: ABNORMAL
SP GR UR STRIP: 1.01 (ref 1–1.03)
SQUAMOUS #/AREA URNS HPF: ABNORMAL /HPF
UROBILINOGEN UR QL STRIP: NORMAL
WBC # UR STRIP: ABNORMAL /HPF

## 2022-06-30 PROCEDURE — 81001 URINALYSIS AUTO W/SCOPE: CPT | Performed by: INTERNAL MEDICINE

## 2022-06-30 PROCEDURE — 87086 URINE CULTURE/COLONY COUNT: CPT | Performed by: INTERNAL MEDICINE

## 2022-07-01 LAB — BACTERIA SPEC AEROBE CULT: NO GROWTH

## 2022-07-06 ENCOUNTER — HOSPITAL ENCOUNTER (OUTPATIENT)
Dept: GENERAL RADIOLOGY | Facility: HOSPITAL | Age: 87
Discharge: HOME | End: 2022-07-06

## 2022-07-06 ENCOUNTER — TELEPHONE (OUTPATIENT)
Dept: CARDIOLOGY | Facility: CLINIC | Age: 87
End: 2022-07-06

## 2022-07-06 ENCOUNTER — OFFICE VISIT (OUTPATIENT)
Dept: CARDIOLOGY | Facility: CLINIC | Age: 87
End: 2022-07-06

## 2022-07-06 ENCOUNTER — HOSPITAL ENCOUNTER (OUTPATIENT)
Dept: CARDIOLOGY | Facility: HOSPITAL | Age: 87
Discharge: HOME | End: 2022-07-06

## 2022-07-06 VITALS
SYSTOLIC BLOOD PRESSURE: 138 MMHG | BODY MASS INDEX: 22.47 KG/M2 | HEART RATE: 75 BPM | HEIGHT: 61 IN | DIASTOLIC BLOOD PRESSURE: 60 MMHG | WEIGHT: 119 LBS

## 2022-07-06 DIAGNOSIS — I50.33 ACUTE ON CHRONIC DIASTOLIC CHF (CONGESTIVE HEART FAILURE): Primary | ICD-10-CM

## 2022-07-06 DIAGNOSIS — I48.21 PERMANENT ATRIAL FIBRILLATION: ICD-10-CM

## 2022-07-06 DIAGNOSIS — I50.33 ACUTE ON CHRONIC DIASTOLIC CHF (CONGESTIVE HEART FAILURE): ICD-10-CM

## 2022-07-06 DIAGNOSIS — E78.49 OTHER HYPERLIPIDEMIA: ICD-10-CM

## 2022-07-06 DIAGNOSIS — Z95.2 S/P TAVR (TRANSCATHETER AORTIC VALVE REPLACEMENT): ICD-10-CM

## 2022-07-06 DIAGNOSIS — I10 ESSENTIAL HYPERTENSION: ICD-10-CM

## 2022-07-06 LAB
ALBUMIN SERPL-MCNC: 4 G/DL (ref 3.5–5.2)
ALBUMIN/GLOB SERPL: 1.5 G/DL
ALP SERPL-CCNC: 103 U/L (ref 39–117)
ALT SERPL W P-5'-P-CCNC: 17 U/L (ref 1–33)
ANION GAP SERPL CALCULATED.3IONS-SCNC: 11 MMOL/L (ref 5–15)
AST SERPL-CCNC: 21 U/L (ref 1–32)
BASOPHILS # BLD AUTO: 0.04 10*3/MM3 (ref 0–0.2)
BASOPHILS NFR BLD AUTO: 0.7 % (ref 0–1.5)
BILIRUB SERPL-MCNC: 0.4 MG/DL (ref 0–1.2)
BUN SERPL-MCNC: 31 MG/DL (ref 8–23)
BUN/CREAT SERPL: 50 (ref 7–25)
CALCIUM SPEC-SCNC: 9.6 MG/DL (ref 8.2–9.6)
CHLORIDE SERPL-SCNC: 99 MMOL/L (ref 98–107)
CO2 SERPL-SCNC: 28 MMOL/L (ref 22–29)
CREAT SERPL-MCNC: 0.62 MG/DL (ref 0.57–1)
DEPRECATED RDW RBC AUTO: 47.7 FL (ref 37–54)
EGFRCR SERPLBLD CKD-EPI 2021: 81.1 ML/MIN/1.73
EOSINOPHIL # BLD AUTO: 0.01 10*3/MM3 (ref 0–0.4)
EOSINOPHIL NFR BLD AUTO: 0.2 % (ref 0.3–6.2)
ERYTHROCYTE [DISTWIDTH] IN BLOOD BY AUTOMATED COUNT: 15.7 % (ref 12.3–15.4)
GLOBULIN UR ELPH-MCNC: 2.7 GM/DL
GLUCOSE SERPL-MCNC: 159 MG/DL (ref 65–99)
HCT VFR BLD AUTO: 33.4 % (ref 34–46.6)
HGB BLD-MCNC: 10.5 G/DL (ref 12–15.9)
IMM GRANULOCYTES # BLD AUTO: 0.03 10*3/MM3 (ref 0–0.05)
IMM GRANULOCYTES NFR BLD AUTO: 0.5 % (ref 0–0.5)
LYMPHOCYTES # BLD AUTO: 1.38 10*3/MM3 (ref 0.7–3.1)
LYMPHOCYTES NFR BLD AUTO: 22.4 % (ref 19.6–45.3)
MAGNESIUM SERPL-MCNC: 2.1 MG/DL (ref 1.7–2.3)
MCH RBC QN AUTO: 26.6 PG (ref 26.6–33)
MCHC RBC AUTO-ENTMCNC: 31.4 G/DL (ref 31.5–35.7)
MCV RBC AUTO: 84.8 FL (ref 79–97)
MONOCYTES # BLD AUTO: 0.49 10*3/MM3 (ref 0.1–0.9)
MONOCYTES NFR BLD AUTO: 8 % (ref 5–12)
NEUTROPHILS NFR BLD AUTO: 4.2 10*3/MM3 (ref 1.7–7)
NEUTROPHILS NFR BLD AUTO: 68.2 % (ref 42.7–76)
NRBC BLD AUTO-RTO: 0 /100 WBC (ref 0–0.2)
NT-PROBNP SERPL-MCNC: 1436 PG/ML (ref 0–1800)
PLATELET # BLD AUTO: 210 10*3/MM3 (ref 140–450)
PMV BLD AUTO: 11.3 FL (ref 6–12)
POTASSIUM SERPL-SCNC: 3.7 MMOL/L (ref 3.5–5.2)
PROT SERPL-MCNC: 6.7 G/DL (ref 6–8.5)
RBC # BLD AUTO: 3.94 10*6/MM3 (ref 3.77–5.28)
SODIUM SERPL-SCNC: 138 MMOL/L (ref 136–145)
TSH SERPL DL<=0.05 MIU/L-ACNC: 3.53 UIU/ML (ref 0.27–4.2)
URATE SERPL-MCNC: 5.3 MG/DL (ref 2.4–5.7)
WBC NRBC COR # BLD: 6.15 10*3/MM3 (ref 3.4–10.8)

## 2022-07-06 PROCEDURE — 85025 COMPLETE CBC W/AUTO DIFF WBC: CPT | Performed by: INTERNAL MEDICINE

## 2022-07-06 PROCEDURE — 36415 COLL VENOUS BLD VENIPUNCTURE: CPT

## 2022-07-06 PROCEDURE — 99214 OFFICE O/P EST MOD 30 MIN: CPT | Performed by: INTERNAL MEDICINE

## 2022-07-06 PROCEDURE — 83880 ASSAY OF NATRIURETIC PEPTIDE: CPT | Performed by: INTERNAL MEDICINE

## 2022-07-06 PROCEDURE — 93000 ELECTROCARDIOGRAM COMPLETE: CPT | Performed by: INTERNAL MEDICINE

## 2022-07-06 PROCEDURE — 71046 X-RAY EXAM CHEST 2 VIEWS: CPT

## 2022-07-06 PROCEDURE — 84550 ASSAY OF BLOOD/URIC ACID: CPT | Performed by: INTERNAL MEDICINE

## 2022-07-06 PROCEDURE — 83735 ASSAY OF MAGNESIUM: CPT | Performed by: INTERNAL MEDICINE

## 2022-07-06 PROCEDURE — 80053 COMPREHEN METABOLIC PANEL: CPT | Performed by: INTERNAL MEDICINE

## 2022-07-06 PROCEDURE — 84443 ASSAY THYROID STIM HORMONE: CPT | Performed by: INTERNAL MEDICINE

## 2022-07-06 NOTE — PROGRESS NOTES
Date of Office Visit: 2022  Encounter Provider: Elissa Mcmahan MD  Place of Service: [facility] CARDIOLOGY  Patient Name: Mary Shi  :3/3/1925      Patient ID:  Mary Shi is a 89 y.o. female is here for  followup for         History of Present Illness    She had an echocardiogram done 2012. This showed moderate aortic stenosis,  mild mitral stenosis, moderate left atrial dilation, grade I-A diastolic dysfunction,  moderate concentric left ventricular hypertrophy, ejection fraction of 65-70%. She also  had a carotid duplex study showing 50-60% stenosis of the right internal carotid artery.   She feels somewhat short-winded when she bends over because of the hiatal hernia.      She was in the hospital with chest pain and difficulty breathing on 10/08/2014. She had had a prior stress nuclear perfusion study, which was done 2014 for chest pain and this showed no evidence of ischemia. During the hospitalization in October we felt the chest pain was probably mostly related to a hiatal hernia, somewhat related to her valvular heart disease, and possibly a small amount of heart failure as well, although her BNP was not markedly elevated. We did repeat her echocardiogram, which was done 2013, showing ejection fraction of 63%, moderate concentric left ventricular hypertrophy, grade 1A diastolic dysfunction, moderate mitral insufficiency, moderate mitral stenosis, moderate aortic stenosis. This is essentially unchanged from her echocardiograms both in 2013 and 2012. She had another echocardiogram done 10/2014, which showed a grade 2 diastolic dysfunction, ejection fraction of 67%, moderate concentric left ventricular hypertrophy, moderate mitral and moderate aortic stenosis, trivial aortic regurgitation. Again, this is unchanged from echoes dated 2012 and 2013.     In 2016, she underwent TAVR with a #23 S3 Rubina valve for  severe aortic valvular stenosis.     She had an echocardiogram done 6/24/18 showing LVEF 60%, severe left atrial enlargement, mild to moderate tricuspid insufficiency, RVSP elevated gradient of 55 mmHg, severe mitral stenosis, mild mitral insufficiency.  The mean mitral valve gradient was 10 mmHg.     She was hospitalized in June 2018 for shortness of breath felt to be secondary to acute on chronic diastolic heart failure.  She responded well to IV diuresis and was transitioned back to her oral bumetanide.  This exacerbation was felt to be secondary to eating a high sodium diet and going out to eat with family visiting her.       She was hospitalized September 2019 with an acute left MCA ischemic stroke.  she had an echocardiogram done 9/10/2019 showing ejection fraction 71%, moderate mitral valvular stenosis, mild mitral valve insufficiency, mild aortic insufficiency but TAVR otherwise appeared normal and mild to moderate pulmonary hypertension.  She was started on Eliquis.  MRI of the brain showed multiple acute infarcts the left MCA territory as well as a remote left occipital infarct.  There was mild to moderate small vessel disease.  There were no focal high-grade stenoses or aneurysms.        Echo done 3/20/2020 showed ejection fraction of 66% with normal appearing TAVR, moderate severe mitral stenosis, severe MAC, mild tricuspid insufficiency.  RVSP was 62 mmHg.  This was done for heart failure.     She lives with her daughter in [place].   She was very fatigued and they thought it was due to Bumex.  We started weaning down the Bumex and then stopped altogether and she came to the hospital then with acute heart failure.  She presented with acute respiratory failure with lower extremity edema.  Her diuresis dyspnea and lower extremity edema improved with IV diuretics.  She also had pleural effusions and there is consideration of thoracentesis but she was doing so well that they decided not to do this.  In  addition she was found to be iron deficient and received iron infusions and that actually helped her energy level quite a bit.  She is getting home PT and now is able to get out of bed.  She seems to be doing much better.  She is looking forward to celebrating her oldest son's 80th birthday.  She had him when she was 17 years old.    Past Medical History:   Diagnosis Date   • 'light-for-dates' infant with signs of fetal malnutrition    • Abnormal ECG    • Amaurosis fugax    • Anemia    • Aortic stenosis     s/p TAVR    • Aortic valve replaced     Had TAVR   • Asthma     SOB   • Atrial fibrillation (HCC)    • Cancer ([facility])     skin   • CAP (community acquired pneumonia)    • Carotid artery stenosis     Per duplex 12/16/2016-proximal right internal carotid artery mild stenosis and proximal left moderate stenosis   • Cervical spine disease    • Cognitive disorder    • Congestive heart failure (HCC)    • Coronary artery disease    • Dementia ([facility])    • Diabetes mellitus ([facility])    • Diastolic dysfunction    • Dyspnea on exertion    • Generalized osteoarthritis of multiple sites    • GERD (gastroesophageal reflux disease)    • Gout    • Health care maintenance    • Heart murmur    • Hiatal hernia    • HL (hearing loss)    • Hyperlipidemia    • Hypertension    • Memory loss    • Mitral valve prolapse    • Mitral valve stenosis     Moderate per echocardiogram 2/2017; severe mitral annular calcification   • Nasal lesion    • Nasal stenosis    • 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[facility])    • Osteoarthritis     multiple sites   • PAF (paroxysmal atrial fibrillation) ([facility])    • Paroxysmal atrial fibrillation ([facility]) 09/11/2019   • Patient had no falls in past year    • Peripheral neuropathy    • Peripheral vascular disease ([facility])    • PONV (postoperative nausea and vomiting)    • Rheumatic mitral stenosis    • Shingles    • Sleep apnea    • Stroke ([facility])    • Syncope    • Systolic hypertension    • Varicose veins          Past Surgical History:    Procedure Laterality Date   • AORTIC VALVE REPAIR/REPLACEMENT N/A 12/27/2016    Procedure: Transfemoral LEFT Transcatheter Aortic Valve Replacement TRANSESOPHAGEAL ECHOCARDIOGRAM WITH ANESTHESIA;  Surgeon: Eduardo Brown MD;  Location: [facility] OR 18/19;  Service:    • AORTIC VALVE REPAIR/REPLACEMENT  12/27/2016   • AORTIC VALVE SURGERY      TAVR   • BLADDER REPAIR     • CARDIAC CATHETERIZATION N/A 12/14/2016    Procedure: Right Heart Cath;  Surgeon: Eduardo Brown MD;  Location: [facility] CATH INVASIVE LOCATION;  Service:    • CARDIAC CATHETERIZATION N/A 12/14/2016    Procedure: Coronary angiography;  Surgeon: Eduardo Brown MD;  Location: [facility] INVASIVE LOCATION;  Service:    • HYSTERECTOMY     • KNEE SURGERY         Current Outpatient Medications on File Prior to Visit   Medication Sig Dispense Refill   • acetaminophen (TYLENOL) 650 MG 8 hr tablet Take 650 mg by mouth Every 8 (Eight) Hours As Needed for Mild Pain .     • albuterol sulfate  (90 Base) MCG/ACT inhaler Inhale 2 puffs Every 4 (Four) Hours As Needed for Wheezing (shortness of breath). 6.7 g 0   • atorvastatin (LIPITOR) 20 MG tablet TAKE 1 TABLET BY MOUTH  DAILY 90 tablet 3   • bumetanide (BUMEX) 1 MG tablet Take 1 tablet by mouth Daily. 90 tablet 0   • Eliquis 2.5 MG tablet tablet TAKE 1 TABLET BY MOUTH  EVERY 12 HOURS 180 tablet 3   • Lancets (freestyle) lancets USE TO TEST BLOOD SUGAR ONCE DAILY 100 each 4   • memantine (NAMENDA XR) 28 MG capsule sustained-release 24 hr extended release capsule TAKE 1 CAPSULE BY MOUTH  DAILY 90 capsule 3   • metFORMIN (GLUCOPHAGE) 500 MG tablet TAKE ONE-HALF TABLET BY  MOUTH TWICE DAILY 90 tablet 3   • Multiple Vitamins-Minerals (MULTIVITAMIN WITH MINERALS) tablet tablet Take 1 tablet by mouth Daily.     • omeprazole (priLOSEC) 20 MG capsule TAKE 1 CAPSULE BY MOUTH  DAILY 90 capsule 3   • polyethyl glycol-propyl glycol (SYSTANE) 0.4-0.3 % solution ophthalmic solution (artificial tears) 2 drops  "Every 1 (One) Hour As Needed.     • True Metrix Blood Glucose Test test strip TEST TWO TIMES A  each 11     No current facility-administered medications on file prior to visit.       Social History     Socioeconomic History   • Marital status:    • Number of children: 15   • Years of education: 8th grade   Tobacco Use   • Smoking status: Never Smoker   • Smokeless tobacco: Never Used   Vaping Use   • Vaping Use: Never used   Substance and Sexual Activity   • Alcohol use: Never     Comment: no caffeine    • Drug use: Never   • Sexual activity: Not Currently     Partners: Female           ROS    Procedures    ECG 12 Lead    Date/Time: 7/6/2022 12:13 PM  Performed by: Elissa Mcmahan MD  Authorized by: Elissa Mcmahan MD   Comparison: compared with previous ECG   Rhythm: atrial fibrillation  Q waves: I, aVL and V2      Clinical impression: abnormal EKG                Objective:      Vitals:    07/06/22 1139   BP: 138/60   Pulse: 75   Weight: 54 kg (119 lb)   Height: 154.9 cm (61\")     Body mass index is 22.48 kg/m².    Vitals reviewed.   Constitutional:       General: Not in acute distress.     Appearance: Well-developed. Not diaphoretic.   Eyes:      General: No scleral icterus.     Conjunctiva/sclera: Conjunctivae normal.   HENT:      Head: Normocephalic and atraumatic.   Neck:      Thyroid: No thyromegaly.      Vascular: No carotid bruit or JVD.      Lymphadenopathy: No cervical adenopathy.   Pulmonary:      Effort: Pulmonary effort is normal. No respiratory distress.      Breath sounds: Normal breath sounds. No wheezing. No rhonchi. No rales.   Chest:      Chest wall: Not tender to palpatation.   Cardiovascular:      Normal rate. Irregularly irregular rhythm.      Murmurs: There is no murmur.      No gallop.   Pulses:     Intact distal pulses.   Edema:     Peripheral edema absent.   Abdominal:      General: Bowel sounds are normal. There is no distension or abdominal bruit.      " Palpations: Abdomen is soft. There is no abdominal mass.      Tenderness: There is no abdominal tenderness.   Musculoskeletal:         General: No deformity.      Extremities: No clubbing present.     Cervical back: Neck supple. Skin:     General: Skin is warm and dry. There is no cyanosis.      Coloration: Skin is not pale.      Findings: No rash.   Neurological:      Mental Status: Alert and oriented to person, place, and time.      Cranial Nerves: No cranial nerve deficit.   Psychiatric:         Judgment: Judgment normal.         Lab Review:       Assessment:      Diagnosis Plan   1. Acute on chronic diastolic CHF (congestive heart failure) ([facility])  Comprehensive Metabolic Panel    CBC & Differential    Magnesium    TSH    Uric Acid    proBNP    XR Chest 2 View   2. Permanent atrial fibrillation ([facility])     3. Essential hypertension     4. Other hyperlipidemia     5. S/P TAVR (transcatheter aortic valve replacement)         1. Severe AS. S/p TAVR 12/2016.   2. Right carotid artery stenosis, 50-60%.   3. Hypertension. BP  is now low.  4. Hyperlipidemia.  On atorvastatin  5. Varicose veins; stable.   6. Diabetes mellitus type 2.   7. Dementia, fairly advanced.  8. Moderate to severe MS.  9. Grade 2 diastolic dysfunction.  10. Hiatal hernia which causes her dyspnea and chest pain.  11. Venous insufficiency, use compression stockings.  12. Gout.   13. CVA 9/2019, on eliquis as was due to atrial fibrillation.  She is not on warfarin as she is not a candidate for this given her frailty.  14.  Iron deficiency.      Plan:       Check laboratory values today as well as a chest x-ray for the recent pleural effusions and heart failure.  See Ernestina in 8 weeks.  No medication changes.  Recommend that she remain on Bumex as when she discontinued this, she had heart failure pretty quickly.  She seems to be doing better with physical therapy and iron treatments.

## 2022-07-07 ENCOUNTER — TELEPHONE (OUTPATIENT)
Dept: CARDIOLOGY | Facility: CLINIC | Age: 87
End: 2022-07-07

## 2022-07-07 NOTE — TELEPHONE ENCOUNTER
Notified pt's daughter. She verbalized understanding.    Thank you,    Trini Marcus, RN  [facility]  07/07/22 13:06 EDT

## 2022-07-07 NOTE — TELEPHONE ENCOUNTER
I called and gave chest x-rays results.  She has still has pleural effusions the right worse than left.  Continue current Bumex but she gets short winded to give an extra Bumex and let me know.  She actually is doing well so I am a little concerned to just go ahead and bump the Bumex up chronically but we will see how she does.  I spoke with her daughter about this.

## 2022-07-11 ENCOUNTER — TELEPHONE (OUTPATIENT)
Dept: FAMILY MEDICINE CLINIC | Facility: CLINIC | Age: 87
End: 2022-07-11

## 2022-07-11 NOTE — TELEPHONE ENCOUNTER
Caller: LAMIN    Relationship: Other    Best call back number: [phone]    What was the call regarding: PAPERWORK WAS FAXED TO THE OFFICE ON 6/30 FOR DIABETIC SHOES FOR THE PATIENT. PLEASE SEND BACK AS SOON AS POSSIBLE TO FAX: [phone] KAE ELIZABETH    Do you require a callback: YES, IF PAPERWORK NEEDS TO BE RESENT.

## 2022-08-26 ENCOUNTER — OFFICE VISIT (OUTPATIENT)
Dept: FAMILY MEDICINE CLINIC | Facility: CLINIC | Age: 87
End: 2022-08-26

## 2022-08-26 ENCOUNTER — TELEPHONE (OUTPATIENT)
Dept: FAMILY MEDICINE CLINIC | Facility: CLINIC | Age: 87
End: 2022-08-26

## 2022-08-26 DIAGNOSIS — R79.81 LOW OXYGEN SATURATION: ICD-10-CM

## 2022-08-26 DIAGNOSIS — R53.83 FATIGUE, UNSPECIFIED TYPE: ICD-10-CM

## 2022-08-26 DIAGNOSIS — R06.02 SHORTNESS OF BREATH: ICD-10-CM

## 2022-08-26 DIAGNOSIS — U07.1 COVID-19 VIRUS INFECTION: Primary | ICD-10-CM

## 2022-08-26 PROBLEM — M15.0 PRIMARY GENERALIZED (OSTEO)ARTHRITIS: Status: ACTIVE | Noted: 2022-05-31

## 2022-08-26 PROCEDURE — 99442 PR PHYS/QHP TELEPHONE EVALUATION 11-20 MIN: CPT | Performed by: NURSE PRACTITIONER

## 2022-08-26 NOTE — TELEPHONE ENCOUNTER
Returned phone call and let Liat know per provider that patient needs to go to the ER to be treated, daughter was upset that this was the only option

## 2022-08-26 NOTE — TELEPHONE ENCOUNTER
Caller: Liat Patel    Relationship: Emergency Contact    Best call back number: [phone]    Do you require a callback: THEY HAVE ADDITIONAL QUESTIONS REGARDING TELEHEALTH APPT THEY HAD EARLIER TODAY. PLEASE CALL.

## 2022-08-26 NOTE — TELEPHONE ENCOUNTER
Caller: Aiyana Donis    Relationship to patient: Emergency Contact    Best call back number: [phone]    Date of exposure: 08.21.22    Date of positive COVID19 test: 08.25.22    COVID19 symptoms: COUGH, CHEST CONGESTION, WEAK, FATIGUE     Date of initial quarantine: 08.25.22    Additional information or concerns: PATIENT'S DAUGHTER IS REQUESTING A PRESCRIPTION BE SENT TO THE PHARMACY.    What is the patients preferred pharmacy: [facility] [address] [phone] [facility] [phone]

## 2022-08-26 NOTE — PROGRESS NOTES
Subjective     Mary Shi is a 89 y.o.. female.     You have chosen to receive care through a telephone visit. Do you consent to use a telephone visit for your medical care today? yes    Speaking with pt's daughter Aiyana Donis. Pt started feeling ill for a couple of days; positive for covid on 8/25/22. Normally on O2 2L with CPAP at night; this am on RA was 84%-88%. Pt placed on O2 2L n/c. Pt O2 sat 92%.     URI   This is a new problem. Episode onset: couple of days. The problem has been gradually worsening. There has been no fever. Associated symptoms include coughing, diarrhea and rhinorrhea. Pertinent negatives include no chest pain, congestion, ear pain, headaches, nausea, sore throat or vomiting.       The following portions of the patient's history were reviewed and updated as appropriate: allergies, current medications, past family history, past medical history, past social history, past surgical history and problem list.    Past Medical History:   Diagnosis Date   • 'light-for-dates' infant with signs of fetal malnutrition    • Abnormal ECG    • Amaurosis fugax    • Anemia    • Aortic stenosis     s/p TAVR    • Aortic valve replaced     Had TAVR   • Asthma     SOB   • Atrial fibrillation (HCC)    • Cancer (HCC)     skin   • CAP (community acquired pneumonia)    • Carotid artery stenosis     Per duplex 12/16/2016-proximal right internal carotid artery mild stenosis and proximal left moderate stenosis   • Cervical spine disease    • Cognitive disorder    • Congestive heart failure (HCC)    • Coronary artery disease    • Dementia (HCC)    • Diabetes mellitus (HCC)    • Diastolic dysfunction    • Dyspnea on exertion    • Generalized osteoarthritis of multiple sites    • GERD (gastroesophageal reflux disease)    • Gout    • Health care maintenance    • Heart murmur    • Hiatal hernia    • HL (hearing loss)    • Hyperlipidemia    • Hypertension    • Memory loss    • Mitral valve prolapse    • Mitral  valve stenosis     Moderate per echocardiogram 2/2017; severe mitral annular calcification   • Nasal lesion    • Nasal stenosis    • 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[facility])    • Osteoarthritis     multiple sites   • PAF (paroxysmal atrial fibrillation) ([facility])    • Paroxysmal atrial fibrillation ([facility]) 09/11/2019   • Patient had no falls in past year    • Peripheral neuropathy    • Peripheral vascular disease ([facility])    • PONV (postoperative nausea and vomiting)    • Rheumatic mitral stenosis    • Shingles    • Sleep apnea    • Stroke ([facility])    • Syncope    • Systolic hypertension    • Varicose veins        Past Surgical History:   Procedure Laterality Date   • AORTIC VALVE REPAIR/REPLACEMENT N/A 12/27/2016    Procedure: Transfemoral LEFT Transcatheter Aortic Valve Replacement TRANSESOPHAGEAL ECHOCARDIOGRAM WITH ANESTHESIA;  Surgeon: Eduardo Brown MD;  Location: [facility] OR 18/19;  Service:    • AORTIC VALVE REPAIR/REPLACEMENT  12/27/2016   • AORTIC VALVE SURGERY      TAVR   • BLADDER REPAIR     • CARDIAC CATHETERIZATION N/A 12/14/2016    Procedure: Right Heart Cath;  Surgeon: Eduarod Brown MD;  Location: [facility] CATH INVASIVE LOCATION;  Service:    • CARDIAC CATHETERIZATION N/A 12/14/2016    Procedure: Coronary angiography;  Surgeon: Eduardo Brown MD;  Location: [facility] CATH INVASIVE LOCATION;  Service:    • HYSTERECTOMY     • KNEE SURGERY         Review of Systems   Constitutional: Positive for activity change, appetite change and fatigue. Negative for fever.   HENT: Positive for postnasal drip and rhinorrhea. Negative for congestion, ear pain and sore throat.    Respiratory: Positive for cough and shortness of breath.    Cardiovascular: Negative for chest pain and palpitations.   Gastrointestinal: Positive for diarrhea. Negative for nausea and vomiting.   Genitourinary: Negative.    Neurological: Negative.  Negative for dizziness and headaches.       Allergies   Allergen Reactions   • Codeine Itching and GI Intolerance    • Morphine And Related Itching       Objective     There were no vitals filed for this visit.  There is no height or weight on file to calculate BMI.        Current Outpatient Medications:   •  acetaminophen (TYLENOL) 650 MG 8 hr tablet, Take 650 mg by mouth Every 8 (Eight) Hours As Needed for Mild Pain ., Disp: , Rfl:   •  albuterol sulfate  (90 Base) MCG/ACT inhaler, Inhale 2 puffs Every 4 (Four) Hours As Needed for Wheezing (shortness of breath)., Disp: 6.7 g, Rfl: 0  •  atorvastatin (LIPITOR) 20 MG tablet, TAKE 1 TABLET BY MOUTH  DAILY, Disp: 90 tablet, Rfl: 3  •  bumetanide (BUMEX) 1 MG tablet, Take 1 tablet by mouth Daily., Disp: 90 tablet, Rfl: 0  •  Eliquis 2.5 MG tablet tablet, TAKE 1 TABLET BY MOUTH  EVERY 12 HOURS, Disp: 180 tablet, Rfl: 3  •  Lancets (freestyle) lancets, USE TO TEST BLOOD SUGAR ONCE DAILY, Disp: 100 each, Rfl: 4  •  memantine (NAMENDA XR) 28 MG capsule sustained-release 24 hr extended release capsule, TAKE 1 CAPSULE BY MOUTH  DAILY, Disp: 90 capsule, Rfl: 3  •  metFORMIN (GLUCOPHAGE) 500 MG tablet, TAKE ONE-HALF TABLET BY  MOUTH TWICE DAILY, Disp: 90 tablet, Rfl: 3  •  Multiple Vitamins-Minerals (MULTIVITAMIN WITH MINERALS) tablet tablet, Take 1 tablet by mouth Daily., Disp: , Rfl:   •  omeprazole (priLOSEC) 20 MG capsule, TAKE 1 CAPSULE BY MOUTH  DAILY, Disp: 90 capsule, Rfl: 3  •  polyethyl glycol-propyl glycol (SYSTANE) 0.4-0.3 % solution ophthalmic solution (artificial tears), 2 drops Every 1 (One) Hour As Needed., Disp: , Rfl:   •  True Metrix Blood Glucose Test test strip, TEST TWO TIMES A DAY, Disp: 100 each, Rfl: 11    Time: 19 minutes    Diagnoses and all orders for this visit:    1. COVID-19 virus infection (Primary)    2. Low oxygen saturation    3. Shortness of breath    4. Fatigue, unspecified type      Keep pt on O2 2L for transportation and pt to go to ER for further eval/tx    Return for recommend going to ER for further eval/tx; pt's daughter verb.  understanding..

## 2022-08-27 ENCOUNTER — HOSPITAL ENCOUNTER (EMERGENCY)
Facility: HOSPITAL | Age: 87
Discharge: HOME | End: 2022-08-27
Attending: EMERGENCY MEDICINE | Admitting: EMERGENCY MEDICINE

## 2022-08-27 ENCOUNTER — APPOINTMENT (OUTPATIENT)
Dept: GENERAL RADIOLOGY | Facility: HOSPITAL | Age: 87
End: 2022-08-27

## 2022-08-27 VITALS
SYSTOLIC BLOOD PRESSURE: 127 MMHG | HEIGHT: 62 IN | RESPIRATION RATE: 18 BRPM | BODY MASS INDEX: 22.45 KG/M2 | HEART RATE: 89 BPM | DIASTOLIC BLOOD PRESSURE: 61 MMHG | TEMPERATURE: 99.1 F | WEIGHT: 122 LBS | OXYGEN SATURATION: 94 %

## 2022-08-27 DIAGNOSIS — U07.1 COVID-19: Primary | ICD-10-CM

## 2022-08-27 LAB
ALBUMIN SERPL-MCNC: 3.7 G/DL (ref 3.5–5.2)
ALBUMIN/GLOB SERPL: 1.2 G/DL
ALP SERPL-CCNC: 87 U/L (ref 39–117)
ALT SERPL W P-5'-P-CCNC: 17 U/L (ref 1–33)
ANION GAP SERPL CALCULATED.3IONS-SCNC: 10.2 MMOL/L (ref 5–15)
AST SERPL-CCNC: 24 U/L (ref 1–32)
B PARAPERT DNA SPEC QL NAA+PROBE: NOT DETECTED
B PERT DNA SPEC QL NAA+PROBE: NOT DETECTED
BASOPHILS # BLD AUTO: 0.02 10*3/MM3 (ref 0–0.2)
BASOPHILS NFR BLD AUTO: 0.4 % (ref 0–1.5)
BILIRUB SERPL-MCNC: 0.5 MG/DL (ref 0–1.2)
BUN SERPL-MCNC: 23 MG/DL (ref 8–23)
BUN/CREAT SERPL: 32.4 (ref 7–25)
C PNEUM DNA NPH QL NAA+NON-PROBE: NOT DETECTED
CALCIUM SPEC-SCNC: 8.9 MG/DL (ref 8.2–9.6)
CHLORIDE SERPL-SCNC: 99 MMOL/L (ref 98–107)
CO2 SERPL-SCNC: 29.8 MMOL/L (ref 22–29)
CREAT SERPL-MCNC: 0.71 MG/DL (ref 0.57–1)
DEPRECATED RDW RBC AUTO: 48.1 FL (ref 37–54)
EGFRCR SERPLBLD CKD-EPI 2021: 77.4 ML/MIN/1.73
EOSINOPHIL # BLD AUTO: 0 10*3/MM3 (ref 0–0.4)
EOSINOPHIL NFR BLD AUTO: 0 % (ref 0.3–6.2)
ERYTHROCYTE [DISTWIDTH] IN BLOOD BY AUTOMATED COUNT: 16.1 % (ref 12.3–15.4)
FLUAV SUBTYP SPEC NAA+PROBE: NOT DETECTED
FLUBV RNA ISLT QL NAA+PROBE: NOT DETECTED
GLOBULIN UR ELPH-MCNC: 3.1 GM/DL
GLUCOSE SERPL-MCNC: 128 MG/DL (ref 65–99)
HADV DNA SPEC NAA+PROBE: NOT DETECTED
HCOV 229E RNA SPEC QL NAA+PROBE: NOT DETECTED
HCOV HKU1 RNA SPEC QL NAA+PROBE: NOT DETECTED
HCOV NL63 RNA SPEC QL NAA+PROBE: NOT DETECTED
HCOV OC43 RNA SPEC QL NAA+PROBE: NOT DETECTED
HCT VFR BLD AUTO: 33.4 % (ref 34–46.6)
HGB BLD-MCNC: 10.9 G/DL (ref 12–15.9)
HMPV RNA NPH QL NAA+NON-PROBE: NOT DETECTED
HPIV1 RNA ISLT QL NAA+PROBE: NOT DETECTED
HPIV2 RNA SPEC QL NAA+PROBE: NOT DETECTED
HPIV3 RNA NPH QL NAA+PROBE: NOT DETECTED
HPIV4 P GENE NPH QL NAA+PROBE: NOT DETECTED
IMM GRANULOCYTES # BLD AUTO: 0.01 10*3/MM3 (ref 0–0.05)
IMM GRANULOCYTES NFR BLD AUTO: 0.2 % (ref 0–0.5)
LYMPHOCYTES # BLD AUTO: 1.34 10*3/MM3 (ref 0.7–3.1)
LYMPHOCYTES NFR BLD AUTO: 24.6 % (ref 19.6–45.3)
M PNEUMO IGG SER IA-ACNC: NOT DETECTED
MCH RBC QN AUTO: 26.9 PG (ref 26.6–33)
MCHC RBC AUTO-ENTMCNC: 32.6 G/DL (ref 31.5–35.7)
MCV RBC AUTO: 82.5 FL (ref 79–97)
MONOCYTES # BLD AUTO: 0.72 10*3/MM3 (ref 0.1–0.9)
MONOCYTES NFR BLD AUTO: 13.2 % (ref 5–12)
NEUTROPHILS NFR BLD AUTO: 3.35 10*3/MM3 (ref 1.7–7)
NEUTROPHILS NFR BLD AUTO: 61.6 % (ref 42.7–76)
NRBC BLD AUTO-RTO: 0 /100 WBC (ref 0–0.2)
NT-PROBNP SERPL-MCNC: 1346 PG/ML (ref 0–1800)
PLATELET # BLD AUTO: 147 10*3/MM3 (ref 140–450)
PMV BLD AUTO: 10.7 FL (ref 6–12)
POTASSIUM SERPL-SCNC: 4.4 MMOL/L (ref 3.5–5.2)
PROCALCITONIN SERPL-MCNC: <0.02 NG/ML (ref 0–0.25)
PROT SERPL-MCNC: 6.8 G/DL (ref 6–8.5)
RBC # BLD AUTO: 4.05 10*6/MM3 (ref 3.77–5.28)
RHINOVIRUS RNA SPEC NAA+PROBE: NOT DETECTED
RSV RNA NPH QL NAA+NON-PROBE: NOT DETECTED
SARS-COV-2 RNA NPH QL NAA+NON-PROBE: DETECTED
SODIUM SERPL-SCNC: 139 MMOL/L (ref 136–145)
TROPONIN T SERPL-MCNC: <0.01 NG/ML (ref 0–0.03)
WBC NRBC COR # BLD: 5.44 10*3/MM3 (ref 3.4–10.8)

## 2022-08-27 PROCEDURE — 25010000002 DEXAMETHASONE SODIUM PHOSPHATE 20 MG/5ML SOLUTION: Performed by: EMERGENCY MEDICINE

## 2022-08-27 PROCEDURE — 93010 ELECTROCARDIOGRAM REPORT: CPT | Performed by: INTERNAL MEDICINE

## 2022-08-27 PROCEDURE — 93005 ELECTROCARDIOGRAM TRACING: CPT | Performed by: EMERGENCY MEDICINE

## 2022-08-27 PROCEDURE — 0202U NFCT DS 22 TRGT SARS-COV-2: CPT | Performed by: EMERGENCY MEDICINE

## 2022-08-27 PROCEDURE — 84484 ASSAY OF TROPONIN QUANT: CPT | Performed by: EMERGENCY MEDICINE

## 2022-08-27 PROCEDURE — 99284 EMERGENCY DEPT VISIT MOD MDM: CPT

## 2022-08-27 PROCEDURE — 96374 THER/PROPH/DIAG INJ IV PUSH: CPT

## 2022-08-27 PROCEDURE — 83880 ASSAY OF NATRIURETIC PEPTIDE: CPT | Performed by: EMERGENCY MEDICINE

## 2022-08-27 PROCEDURE — 84145 PROCALCITONIN (PCT): CPT | Performed by: EMERGENCY MEDICINE

## 2022-08-27 PROCEDURE — 85025 COMPLETE CBC W/AUTO DIFF WBC: CPT | Performed by: EMERGENCY MEDICINE

## 2022-08-27 PROCEDURE — 71045 X-RAY EXAM CHEST 1 VIEW: CPT

## 2022-08-27 PROCEDURE — 80053 COMPREHEN METABOLIC PANEL: CPT | Performed by: EMERGENCY MEDICINE

## 2022-08-27 RX ORDER — DEXAMETHASONE 4 MG/1
4 TABLET ORAL 2 TIMES DAILY WITH MEALS
Qty: 8 TABLET | Refills: 0 | Status: SHIPPED | OUTPATIENT
Start: 2022-08-27 | End: 2023-02-16 | Stop reason: HOSPADM

## 2022-08-27 RX ORDER — SODIUM CHLORIDE 0.9 % (FLUSH) 0.9 %
10 SYRINGE (ML) INJECTION AS NEEDED
Status: DISCONTINUED | OUTPATIENT
Start: 2022-08-27 | End: 2022-08-27 | Stop reason: HOSPADM

## 2022-08-27 RX ORDER — DEXAMETHASONE SODIUM PHOSPHATE 4 MG/ML
4 INJECTION, SOLUTION INTRA-ARTICULAR; INTRALESIONAL; INTRAMUSCULAR; INTRAVENOUS; SOFT TISSUE ONCE
Status: COMPLETED | OUTPATIENT
Start: 2022-08-27 | End: 2022-08-27

## 2022-08-27 RX ADMIN — SODIUM CHLORIDE 500 ML: 9 INJECTION, SOLUTION INTRAVENOUS at 14:56

## 2022-08-27 RX ADMIN — DEXAMETHASONE SODIUM PHOSPHATE 4 MG: 4 INJECTION, SOLUTION INTRAMUSCULAR; INTRAVENOUS at 14:58

## 2022-08-29 ENCOUNTER — PATIENT OUTREACH (OUTPATIENT)
Dept: CASE MANAGEMENT | Facility: OTHER | Age: 87
End: 2022-08-29

## 2022-08-29 NOTE — OUTREACH NOTE
AMBULATORY CASE MANAGEMENT NOTE    Name and Relationship of Patient/Support Person: [facility] - Emergency Contact    ED Potential Covid Discharge Follow-up  RN-ACM outreach with patient's daughterLiat (nurse).  Discussed 8/27/22  ED visit regarding COVID 19. Patient treated and discharged home. Daughter states patient tested positive for COVID 19 on  8/25/22.  Patient compliant with ED recommendations;  taking Dexamethasone as ED directed and under quarantine.   Daughter states improvement regarding fever; nausea; diarrhea; appetite cough and strength. Daughter states patient compliant with medications; use of O2 as needed; monitoring of blood pressure; blood sugars; O2 SAT; use of spirometer and  Inhaler. Daughter reports no barriers in obtaining : food; medication and has transportation (assisted by family). Daughter states O2SAT 92-93%;blood pressure; blood sugars and weight WNL's.    Reviewed with patient's [facility] ED AVS recommendations; quarantine education; education regarding being free of fever for 24 hours without use of Tylenol; hydration;  24/7 Nurse Triage Line; COVID 19 information telephone line; ACM contact information;  My Chart; Telehealth appointment availability;  affordability of food; medications; transportation; continued monitoring of symptoms; evaluation of worsening symptoms and establishing contact with PCP for follow up recommendations. Daughter verbalized understanding. Daughter had questions regarding cardiology appointment. RN-ACM advised daughter to contact cardiology regarding 8/31/22 appointment recommendations and verbalized understanding.  Daughter states to appreciate patient outreach. No further questions voiced at this time.   Adult Patient Profile  Questions/Answers    Flowsheet Row Most Recent Value   Symptoms/Conditions Managed at Home other (see comments), cardiovascular, diabetes, type 2, respiratory, gastrointestinal  [COVID 19 virus]   Cardiovascular  Symptoms/Conditions hypertension, heart failure   Cardiovascular Management Strategies medication therapy, other (see comments), weight management, fluid modification, adequate rest  [Physician follow up]   Diabetes Management Strategies medication therapy, blood glucose testing, other (see comments)  [Physician follow up]   Gastrointestinal Symptoms/Conditions diarrhea, nausea   Gastrointestinal Management Strategies adequate rest, other (see comments)  [Physician follow up]   Respiratory Symptoms/Conditions cough   Respiratory Management Strategies adequate rest, oxygen therapy, weight management, breathing techniques   Oxygen Therapy Device nasal cannula   Oxygen Therapy Times other (see comments)  [night time and PRN]   Barriers to Taking Medication as Prescribed none   Equipment Currently Used at Home wheelchair, rollator, glucometer, bp cuff, pulse ox, oxygen, other (see comments)  [spirometer ]   Name of Support/Comfort Primary Source Patient has 24 hour care from caregivers (family)   People in Home alone        Social Work Assessment  Questions/Answers    Flowsheet Row Most Recent Value   People in Home alone   Functional Status Comments Daughter states patient receiving assistance with ADL's,  meal preparation,  transportation and ambulating with rollator   Equipment Currently Used at Home wheelchair, rollator, glucometer, bp cuff, pulse ox, oxygen, other (see comments)  [spirometer ]        Send Education  Questions/Answers    Flowsheet Row Most Recent Value   AWV Materials Send Materials   Other Patient Education/Resources  24/7 [facility] Nurse Call Line, Advanced Care Planning, MyChart   24/7 Nurse Call Line Education Method Verbal   ACP Education Method Verbal   MyChart Education Method Verbal      SDOH updated and reviewed with the patient during this program:  Financial Resource Strain: Low Risk    • Difficulty of Paying Living Expenses: Not hard at all      Food Insecurity: No Food  Insecurity   • Worried About Running Out of Food in the Last Year: Never true   • Ran Out of Food in the Last Year: Never true      Transportation Needs: No Transportation Needs   • Lack of Transportation (Medical): No   • Lack of Transportation (Non-Medical): No       Education Documentation  Blood Pressure Monitoring, taught by Soraya Michel, RN at 8/29/2022  1:48 PM.  Learner: Family  Readiness: Acceptance  Method: Explanation  Response: Verbalizes Understanding    Weight Monitoring, taught by Soraya Michel RN at 8/29/2022  1:48 PM.  Learner: Family  Readiness: Acceptance  Method: Explanation  Response: Verbalizes Understanding    Provider Follow-Up, taught by Soraya Michel RN at 8/29/2022  1:48 PM.  Learner: Family  Readiness: Acceptance  Method: Explanation  Response: Verbalizes Understanding    Blood Glucose Monitoring, taught by Soraya Michel RN at 8/29/2022  1:48 PM.  Learner: Family  Readiness: Acceptance  Method: Explanation  Response: Verbalizes Understanding    Unresolved/Worsening Symptoms, taught by Soraya Michel RN at 8/29/2022  1:48 PM.  Learner: Family  Readiness: Acceptance  Method: Explanation  Response: Verbalizes Understanding    Home Instructions, taught by Soraya Michel RN at 8/29/2022  1:48 PM.  Learner: Family  Readiness: Acceptance  Method: Explanation  Response: Verbalizes Understanding          SORAYA VIDAL  Ambulatory Case Management    8/29/2022, 13:48 EDT

## 2022-08-30 ENCOUNTER — TELEPHONE (OUTPATIENT)
Dept: CARDIOLOGY | Facility: CLINIC | Age: 87
End: 2022-08-30

## 2022-08-30 LAB — QT INTERVAL: 384 MS

## 2022-08-30 NOTE — TELEPHONE ENCOUNTER
Pt Lvm states that she tested + for covid on Thursday 8/25/2022 [id number]    Per Dr. Mcmahan pt needs to reschedule  her appointment

## 2022-09-02 ENCOUNTER — PATIENT OUTREACH (OUTPATIENT)
Dept: CASE MANAGEMENT | Facility: OTHER | Age: 87
End: 2022-09-02

## 2022-09-02 NOTE — OUTREACH NOTE
AMBULATORY CASE MANAGEMENT NOTE    Name and Relationship of Patient/Support Person: Mary Shi V - Self   ED Potential Covid Discharge Follow-up  RN-ACM outreach with patient's daughter, Aiyana. Daughter states improvement regarding cough; appetite; nausea; fever and strength. Daughter states patient using O2 at night; compliant with medications; medical appointments; blood pressure and blood sugars with values WNL's. Daughter states patient has no difficulty with chest pain; SOB or sleeping. Reviewed with daughter education and verbalized understanding. Daughter states to appreciate patient outreach. No further questions or concerns voiced at this time.          Education Documentation  Oxygen Safety, taught by Soraya Michel, RN at 9/2/2022  2:09 PM.  Learner: Patient  Readiness: Acceptance  Method: Explanation  Response: Verbalizes Understanding    Unresolved/Worsening Symptoms, taught by Soraya Michel, RN at 9/2/2022  2:09 PM.  Learner: Patient  Readiness: Acceptance  Method: Explanation  Response: Verbalizes Understanding    Home Instructions, taught by Soraya Michel, RN at 9/2/2022  2:09 PM.  Learner: Patient  Readiness: Acceptance  Method: Explanation  Response: Verbalizes Understanding          SORAYA VIDAL  Ambulatory Case Management    9/2/2022, 14:09 EDT

## 2022-09-07 ENCOUNTER — TELEPHONE (OUTPATIENT)
Dept: FAMILY MEDICINE CLINIC | Facility: CLINIC | Age: 87
End: 2022-09-07

## 2022-09-07 NOTE — TELEPHONE ENCOUNTER
Caller: Aiyana Donis    Relationship: Emergency Contact    Best call back number: [phone]    What orders are you requesting (i.e. lab or imaging): PHYSICAL THERAPY    In what timeframe would the patient need to come in: ASAP    Where will you receive your lab/imaging services: IN HER HOME    Additional notes: PLEASE SEND TO [facility] IN [place]    PHONE : [phone]    HAD COVID AND IS REALLY WEAK NOW.

## 2022-09-09 ENCOUNTER — TELEPHONE (OUTPATIENT)
Dept: FAMILY MEDICINE CLINIC | Facility: CLINIC | Age: 87
End: 2022-09-09

## 2022-09-09 DIAGNOSIS — U07.1 COVID-19 VIRUS INFECTION: ICD-10-CM

## 2022-09-09 DIAGNOSIS — R53.1 WEAKNESS: Primary | ICD-10-CM

## 2022-09-09 NOTE — TELEPHONE ENCOUNTER
Caller: Aiyana Donis    Relationship: Emergency Contact    Best call back number: [phone]    What was the call regarding: PATIENT'S DAUGHTER WAS CALLING TO SEE IF THE PHYSICAL THERAPY ORDERS WERE SENT IN TO [facility]. PATIENT'S DAUGHTER STATED SHE WAS HOPING TO HAVE HER START PHYSICAL THERAPY ON Monday SO THOSE ORDERS WOULD NEED BE SENT IN ASAP. PLEASE ADVISE.    [employer] PHONE NUMBER: [phone]    Do you require a callback: YES

## 2022-09-12 ENCOUNTER — OFFICE VISIT (OUTPATIENT)
Dept: CARDIOLOGY | Facility: CLINIC | Age: 87
End: 2022-09-12

## 2022-09-12 VITALS
HEART RATE: 76 BPM | WEIGHT: 115 LBS | BODY MASS INDEX: 21.71 KG/M2 | HEIGHT: 61 IN | DIASTOLIC BLOOD PRESSURE: 76 MMHG | SYSTOLIC BLOOD PRESSURE: 142 MMHG | OXYGEN SATURATION: 98 %

## 2022-09-12 DIAGNOSIS — I06.0 RHEUMATIC AORTIC STENOSIS: Primary | ICD-10-CM

## 2022-09-12 DIAGNOSIS — U07.1 COVID-19: ICD-10-CM

## 2022-09-12 DIAGNOSIS — I63.511 CEREBROVASCULAR ACCIDENT (CVA) DUE TO OCCLUSION OF RIGHT MIDDLE CEREBRAL ARTERY: ICD-10-CM

## 2022-09-12 DIAGNOSIS — E11.59 TYPE 2 DIABETES MELLITUS WITH OTHER CIRCULATORY COMPLICATION, WITHOUT LONG-TERM CURRENT USE OF INSULIN: ICD-10-CM

## 2022-09-12 DIAGNOSIS — R53.1 WEAKNESS GENERALIZED: ICD-10-CM

## 2022-09-12 DIAGNOSIS — I65.23 BILATERAL CAROTID ARTERY STENOSIS: ICD-10-CM

## 2022-09-12 DIAGNOSIS — I50.33 ACUTE ON CHRONIC DIASTOLIC CHF (CONGESTIVE HEART FAILURE): ICD-10-CM

## 2022-09-12 DIAGNOSIS — I05.0 RHEUMATIC MITRAL STENOSIS: ICD-10-CM

## 2022-09-12 DIAGNOSIS — F03.90 DEMENTIA WITHOUT BEHAVIORAL DISTURBANCE, UNSPECIFIED DEMENTIA TYPE: ICD-10-CM

## 2022-09-12 PROCEDURE — 99213 OFFICE O/P EST LOW 20 MIN: CPT | Performed by: NURSE PRACTITIONER

## 2022-09-12 PROCEDURE — 93000 ELECTROCARDIOGRAM COMPLETE: CPT | Performed by: NURSE PRACTITIONER

## 2022-09-12 NOTE — PROGRESS NOTES
[facility] CARDIOLOGY  [address]  [facility] [address]  [facility] [address]  Phone: [phone]      Patient Name: Mary Shi  :3/3/1925  Age: 89 y.o.  Primary Cardiologist: Elissa Mcmahan MD  Encounter Provider:  RAYRAY Rodarte      Chief Complaint     Fatigue     SUBJECTIVE     History of Present Illness:  Mary Shi is a 89 y.o.  female whose medical history includes dementia, type 2 diabetes, hypertension, hyperlipidemia, obstructive sleep apnea, multiple right MCA infarcts.. They are followed in our office by Dr. Mcmahan for valvular disease, diastolic heart failure, and atrial fibrillation.     22 Follow-up:  She is here for 8-week follow-up.  She was hospitalized in  she was hospitalized in May 2022 with acute respiratory failure secondary to CHF after we had lowered her Bumex for fatigue.  She responded to IV Lasix and was also given IV iron for anemia.  She was noted to have bilateral pleural effusions; thoracenteses not considered given her age and frailty.  Follow-up chest x-ray showed bilateral pleural effusions worse on the right.  She was instructed to take extra Bumex as needed.  She was seen in the emergency room on 2022 for worsening shortness of breath and tested positive for COVID-19.  Chest x-ray showed persistent right-sided pleural effusion but unchanged from previous.  He was given 1 dose of IV dexamethasone and sent home with low-dose steroids per family request.      Since her ER visit she has been feeling better.  Her daughter states that the steroids really helped her recover from her COVID symptoms.  Her breathing is comfortable and she denies orthopnea: She has no leg swelling..  She and her family noticed that she still is a little more fatigued but feel that this is slowly improving.  Her appetite is also slowly improving and she is starting to gain back some weight she lost.  Her family is  concerned about her weakness.    Below is a summary of pertinent cardiology findings:  • The plan for 6/20/2012 echocardiogram showed moderate aortic stenosis, mild mitral stenosis, moderate left atrial dilatation, grade 1A diastolic dysfunction, moderate concentric left ventricular hypertrophy, and EF 65 to 70%.  Carotid artery Doppler study at that time showed 50 to 60% stenosis of the right internal carotid artery.  • Echocardiogram November 1, 2013 showed EF 63%, moderate concentric left ventricular hypertrophy, grade 1A diastolic dysfunction, moderate mitral insufficiency, moderate mitral stenosis, and moderate aortic stenosis.  • Admitted October 8, 2014 for chest pain and dyspnea.  Chest pain felt to be related to hiatal hernia.  May have been some heart failure although her BNP was not markedly elevated.  Echocardiogram showed grade 2 diastolic dysfunction, EF 67%, moderate concentric left ventricular hypertrophy, moderate mitral stenosis, moderate aortic stenosis, and trivial aortic regurgitation.  • June 2014 stress nuclear perfusion study showed no evidence of ischemia  • 20.  16 she had TAVR with a #23 S3 sapient valve for severe aortic valvular stenosis.  • June 24, 2018 echocardiogram showed EF 60%, severe left atrial enlargement, mild to moderate tricuspid insufficiency, RVSP elevated at 55 mmHg, severe mitral stenosis, mild mitral insufficiency, and the mean mitral valve gradient was 10 mmHg.  This was done when hospitalized for acute on chronic diastolic heart failure.  She responded well to IV diuretics and was discharged home on oral bumetanide.  • Is paralyzed September 2019 with acute left MCA ischemic stroke.  Echocardiogram done September 10, 2019 showed EF 71%, moderate mitral valvular stenosis, mild mitral valve insufficiency, mild aortic insufficiency but TAVR otherwise appeared normal, and mild to moderate pulmonary hypertension.  MRI of the brain showed multiple acute infarcts of the left  MCA territory as well as a remote left occipital infarct; there was mild to moderate small vessel disease.  There is no high-grade stenosis or aneurysm.  She was discharged on apixaban.  • Echocardiogram March 20, 2020 showed EF 66% with normal-appearing TAVR, moderate severe mitral stenosis, severe MAC, mild tricuspid insufficiency and RVSP 62 mmHg.  This was done to evaluate for heart failure.  • Atenolol was stopped in January 2022 for low blood pressure.      Past Medical History:   Diagnosis Date   • 'light-for-dates' infant with signs of fetal malnutrition    • Abnormal ECG    • Amaurosis fugax    • Anemia    • Aortic stenosis    • Aortic valve replaced    • Asthma    • Atrial fibrillation ([facility])    • Cancer ([facility])    • CAP (community acquired pneumonia)    • Carotid artery stenosis    • Cervical spine disease    • Cognitive disorder    • Congestive heart failure ([facility])    • Coronary artery disease    • Dementia ([facility])    • Diabetes mellitus ([facility])    • Diastolic dysfunction    • Dyspnea on exertion    • Generalized osteoarthritis of multiple sites    • GERD (gastroesophageal reflux disease)    • Gout    • Health care maintenance    • Heart murmur    • Hiatal hernia    • HL (hearing loss)    • Hyperlipidemia    • Hypertension    • Memory loss    • Mitral valve prolapse    • Mitral valve stenosis    • Nasal lesion    • Nasal stenosis    • 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[facility])    • Osteoarthritis    • PAF (paroxysmal atrial fibrillation) ([facility])    • Paroxysmal atrial fibrillation ([facility]) 09/11/2019   • Patient had no falls in past year    • Peripheral neuropathy    • Peripheral vascular disease ([facility])    • PONV (postoperative nausea and vomiting)    • Rheumatic mitral stenosis    • Shingles    • Sleep apnea    • Stroke ([facility])    • Syncope    • Systolic hypertension    • Varicose veins          Past Surgical History:   Procedure Laterality Date   • AORTIC VALVE REPAIR/REPLACEMENT N/A 12/27/2016    Procedure: Transfemoral LEFT  Transcatheter Aortic Valve Replacement TRANSESOPHAGEAL ECHOCARDIOGRAM WITH ANESTHESIA;  Surgeon: Eduardo Brown MD;  Location[facility] OR 18/19;  Service:    • AORTIC VALVE REPAIR/REPLACEMENT  12/27/2016   • AORTIC VALVE SURGERY      TAVR   • BLADDER REPAIR     • CARDIAC CATHETERIZATION N/A 12/14/2016    Procedure: Right Heart Cath;  Surgeon: Eduardo Brown MD;  Location: [facility] CATH INVASIVE LOCATION;  Service:    • CARDIAC CATHETERIZATION N/A 12/14/2016    Procedure: Coronary angiography;  Surgeon: Eduardo Brown MD;  Location: [facility] CATH INVASIVE LOCATION;  Service:    • HYSTERECTOMY     • KNEE SURGERY           Social History     Socioeconomic History   • Marital status:    • Number of children: 15   • Years of education: 8th grade   Tobacco Use   • Smoking status: Never Smoker   • Smokeless tobacco: Never Used   Vaping Use   • Vaping Use: Never used   Substance and Sexual Activity   • Alcohol use: Never     Comment: no caffeine    • Drug use: Never   • Sexual activity: Not Currently     Partners: Female         Review of Systems     Review of Systems   Constitutional: Positive for decreased appetite. Negative for fever.   Cardiovascular: Positive for dyspnea on exertion and irregular heartbeat. Negative for chest pain, leg swelling, near-syncope, orthopnea, palpitations and syncope.   Respiratory: Negative for shortness of breath.    Genitourinary: Negative.          Medications     Allergies as of 09/12/2022 - Reviewed 09/12/2022   Allergen Reaction Noted   • Codeine Itching and GI Intolerance 05/21/2016   • Morphine and related Itching 05/21/2016         Current Outpatient Medications:   •  acetaminophen (TYLENOL) 650 MG 8 hr tablet, Take 650 mg by mouth Every 8 (Eight) Hours As Needed for Mild Pain ., Disp: , Rfl:   •  albuterol sulfate  (90 Base) MCG/ACT inhaler, Inhale 2 puffs Every 4 (Four) Hours As Needed for Wheezing (shortness of breath)., Disp: 6.7 g, Rfl: 0  •  atorvastatin  "(LIPITOR) 20 MG tablet, TAKE 1 TABLET BY MOUTH  DAILY, Disp: 90 tablet, Rfl: 3  •  bumetanide (BUMEX) 1 MG tablet, Take 1 tablet by mouth Daily., Disp: 90 tablet, Rfl: 0  •  dexamethasone (DECADRON) 4 MG tablet, Take 1 tablet by mouth 2 (Two) Times a Day With Meals., Disp: 8 tablet, Rfl: 0  •  Eliquis 2.5 MG tablet tablet, TAKE 1 TABLET BY MOUTH  EVERY 12 HOURS, Disp: 180 tablet, Rfl: 3  •  Ferrous Sulfate (IRON SUPPLEMENT PO), Take  by mouth., Disp: , Rfl:   •  Lancets (freestyle) lancets, USE TO TEST BLOOD SUGAR ONCE DAILY, Disp: 100 each, Rfl: 4  •  memantine (NAMENDA XR) 28 MG capsule sustained-release 24 hr extended release capsule, TAKE 1 CAPSULE BY MOUTH  DAILY, Disp: 90 capsule, Rfl: 3  •  metFORMIN (GLUCOPHAGE) 500 MG tablet, TAKE ONE-HALF TABLET BY  MOUTH TWICE DAILY, Disp: 90 tablet, Rfl: 3  •  Multiple Vitamins-Minerals (MULTIVITAMIN WITH MINERALS) tablet tablet, Take 1 tablet by mouth Daily., Disp: , Rfl:   •  omeprazole (priLOSEC) 20 MG capsule, TAKE 1 CAPSULE BY MOUTH  DAILY, Disp: 90 capsule, Rfl: 3  •  polyethyl glycol-propyl glycol (SYSTANE) 0.4-0.3 % solution ophthalmic solution (artificial tears), 2 drops Every 1 (One) Hour As Needed., Disp: , Rfl:   •  True Metrix Blood Glucose Test test strip, TEST TWO TIMES A DAY, Disp: 100 each, Rfl: 11        OBJECTIVE     Vital Signs:   /76   Pulse 76   Ht 154.9 cm (61\")   Wt 52.2 kg (115 lb)   SpO2 98%   BMI 21.73 kg/m²       Weight:  Wt Readings from Last 3 Encounters:   09/12/22 52.2 kg (115 lb)   08/27/22 55.3 kg (122 lb)   07/06/22 54 kg (119 lb)     Body mass index is 21.73 kg/m².        Physical Exam     Physical Exam  Constitutional:       General: She is not in acute distress.  HENT:      Head: Normocephalic and atraumatic.      Mouth/Throat:      Mouth: Mucous membranes are moist.   Eyes:      General: No scleral icterus.     Extraocular Movements: Extraocular movements intact.      Conjunctiva/sclera: Conjunctivae normal.      Pupils: " Pupils are equal, round, and reactive to light.   Cardiovascular:      Rate and Rhythm: Normal rate. Rhythm irregularly irregular.      Pulses: Normal pulses.      Heart sounds: S1 normal and S2 normal. Murmur heard.      Comments: II/VI murmur  Pulmonary:      Effort: No respiratory distress.      Breath sounds: Normal breath sounds. No wheezing, rhonchi or rales.      Comments: Diminished in right base  Abdominal:      General: Bowel sounds are normal. There is no distension.      Palpations: Abdomen is soft.      Tenderness: There is no abdominal tenderness.   Musculoskeletal:         General: Normal range of motion.      Cervical back: Normal range of motion and neck supple.      Right lower leg: No edema.      Left lower leg: No edema.   Skin:     General: Skin is warm and dry.      Coloration: Skin is not jaundiced.   Neurological:      Mental Status: She is alert and oriented to person, place, and time.   Psychiatric:         Mood and Affect: Mood normal.         Reviewed Data     Result Review :   The following data was reviewed by: RAYRAY Rodarte on 09/13/22:  · Labs on December 16, 2021 showed creatinine 1.2, potassium 4.3,  but other LFTs normal, and hemoglobin 10.5 but CBC otherwise unremarkable.    · Labs 08/27/2022:  cr 0.7, K 4.4, otherwise unremarkable CMP, proBNP 1346, troponin less than 0.010, hemoglobin 10.9,         ECG 12 Lead    Date/Time: 9/12/2022 2:22 PM  Performed by: Hortensia Grajeda APRN  Authorized by: Hortensia Grajeda APRN   Comparison: compared with previous ECG from 8/27/2022  Similar to previous ECG  Rhythm: atrial flutter  Rate: normal  BPM: 77  T inversion: III and aVF  T flattening: V3, V4, V5 and V6    Clinical impression: abnormal EKG            Assessment and Plan        Assessment and Plan      Assessment:  1. Rheumatic aortic stenosis    2. Rheumatic mitral stenosis    3. Acute on chronic diastolic CHF (congestive heart  failure) ([facility])    4. Bilateral carotid artery stenosis    5. Cerebrovascular accident (CVA) due to occlusion of right middle cerebral artery ([facility])    6. Dementia without behavioral disturbance, unspecified dementia type ([facility])    7. Type 2 diabetes mellitus with other circulatory complication, without long-term current use of insulin ([facility])    8. Weakness generalized    9. COVID-19         1. Aortic stenosis: S/P TAVR (#23 S3 Rubina) for aortic stenosis in December 2016.  Echocardiogram in May 2022 showed EF 64%, grade 2 with high LAP LV diastolic dysfunction, severe MAC with moderate mitral valve stenosis and mean gradient 7.5 mmHg.  Moderate to severe pulmonary hypertension, TAVR valve present and gradients are within defined limits.  Her proBNP during recent ER visit was normal.  She appears compensated by exam today.  2. Fatigue: initially improved off atenolol.  Her family feels fatigue is slowly improving as she recovers from COVID.  3. Chronic diastolic heart failure: Bumex was stopped in May 2022 to see if her fatigue improved, however she had acute on chronic diastolic heart failure and required hospitalization.  She is compensated by exam today.  She remains on 1 mg Bumex daily.  4. Mitral valve stenosis: moderate to severe on echocardiogram on 3/20/22; was stable on echocardiogram from May 2022.  5. History of Right MCA stroke: she is on apixaban.   6. Bilateral carotid artery disease: She is on Lipitor and apixaban.  7. Hypertension: controlled off atenolol.   8. Weakness: She was doing well with physical therapy but now has lost ground since having COVID-19.  9. Dementia: She lives with family.  She is pleasant and oriented to person and situation today though admits to being fatigued.  She is treated with Namenda.  10. COVID-19: She tested positive after a known exposure in late August 2022.  She was seen in the emergency room and given IV and oral dexamethasone.  She went home to recover with her family  and has done fine.  Initially she lost appetite and had lost some weight but her appetite is starting to improve.  She is still fatigued and having some weakness which is affecting her mobility.    Plan:  1. I made no medication changes today.  2. Per family request I will submit a referral to [facility] in [facility] to assist with mobility and strengthening.  3. She will follow-up with Dr. Mcmahan in 6 months.    Atrial Fibrillation and Atrial Flutter  Assessment  • The patient has permanent atrial fibrillation  • This is valvular in etiology  • The patient's CHADS2-VASc score is 9  • A [id number] score of 2 or more is considered a high risk for a thromboembolic event  • Apixaban prescribed    Plan  • Continue in atrial fibrillation with rate control  • Continue apixaban for antithrombotic therapy, bleeding issues discussed     Heart Failure  Assessment  • NYHA class III-A - There is limitation of physical activity. The patient is comfortable at rest, but ordinary activity causes fatigue, palpitations or shortness of breath.  • Diuretics prescribed  • The most recent ejection fraction is 60%  • Left ventricular function is normal by qualitative assessment  • The left ventricle was last assessed on 3/20/2020    Plan  • The patient has received heart failure education on the following topics: prognosis/end-of-life issues, dietary sodium restriction, minimizing or avoiding NSAID use, symptom management, physical activity and weight monitoring  • The heart failure care plan was discussed with the patient today including: continuing the current program and lifestyle modifications  •  The patient was not counseled about ICD or CRT-D implantation    Subjective/Objective    • Physical exam findings negative for rales.          Follow Up:   Return in about 6 months (around 3/12/2023) for Follow-up with Dr. Mcmahan.  Orders Placed This Encounter   Procedures   • Ambulatory Referral to Physical Therapy  Evaluate and treat   • ECG 12 Lead          I appreciate the opportunity to participate in this patient's care.      Thank you,  Ernestina Lozada, APRN

## 2022-09-16 ENCOUNTER — TELEPHONE (OUTPATIENT)
Dept: CARDIOLOGY | Facility: CLINIC | Age: 87
End: 2022-09-16

## 2022-09-16 NOTE — TELEPHONE ENCOUNTER
Patient caregiver called and stated that the referral to [facility] has not been sent over for at home PT/OT.  I see the MM put in an order for PT but I do not think that will work for home health.  Should I call and give [facility] a verbal order for the home PT/OT? Or do you want her to go to an outpatient facility?  Please advise.    CB: [phone]    A: [phone]    Thanks,  Loan

## 2022-09-16 NOTE — TELEPHONE ENCOUNTER
Called [facility] and they wanted me to fax over the referral, lat ov, and demographics.   Have printed it and faxed to [phone]    Loan

## 2022-09-28 ENCOUNTER — TELEMEDICINE (OUTPATIENT)
Dept: NEUROLOGY | Facility: CLINIC | Age: 87
End: 2022-09-28

## 2022-09-28 DIAGNOSIS — R44.3 HALLUCINATIONS: ICD-10-CM

## 2022-09-28 DIAGNOSIS — Z86.73 HISTORY OF STROKE: Primary | ICD-10-CM

## 2022-09-28 DIAGNOSIS — I48.19 ATRIAL FIBRILLATION, PERSISTENT: ICD-10-CM

## 2022-09-28 DIAGNOSIS — I10 ESSENTIAL HYPERTENSION: ICD-10-CM

## 2022-09-28 DIAGNOSIS — Z91.81 AT MODERATE RISK FOR FALL: ICD-10-CM

## 2022-09-28 DIAGNOSIS — E11.9 TYPE 2 DIABETES MELLITUS WITHOUT COMPLICATION, WITHOUT LONG-TERM CURRENT USE OF INSULIN: ICD-10-CM

## 2022-09-28 DIAGNOSIS — I65.23 BILATERAL CAROTID ARTERY STENOSIS: ICD-10-CM

## 2022-09-28 DIAGNOSIS — F03.90 DEMENTIA WITHOUT BEHAVIORAL DISTURBANCE, UNSPECIFIED DEMENTIA TYPE: ICD-10-CM

## 2022-09-28 DIAGNOSIS — E78.49 OTHER HYPERLIPIDEMIA: ICD-10-CM

## 2022-09-28 PROCEDURE — 99213 OFFICE O/P EST LOW 20 MIN: CPT | Performed by: NURSE PRACTITIONER

## 2022-09-28 RX ORDER — OLANZAPINE 2.5 MG/1
2.5 TABLET ORAL 2 TIMES DAILY PRN
COMMUNITY

## 2022-09-28 NOTE — PROGRESS NOTES
CC: Stroke and dementia with associated visual hallucinations.    HPI:  Mary Shi is a  89 y.o.  female known history of dementia, diabetes mellitus, hypertension, hyperlipidemia, congestive heart failure, status post TAVR, obstructive sleep apnea (compliant with treatment diagnosed since multiple right MCA infarcts with etiology felt to be secondary to A. fib/cardioembolic source who I am seeing via video visit today for stroke and dementia follow-up.  Patient is accompanied via video visit by her daughter Aiyana who provides majority of history.    Since since last evaluation daughter reports patient had COVID end of August and recovered well did not require hospitalization.  Daughter did report increased episodes of confusion/impulsiveness.  She has not sustained any specific falls although she reports she slid out of bed during this time.  She remains on adjusted dose Eliquis and Lipitor 20 mg daily for secondary stroke prevention.  She is on extended release Namenda daily and is tolerating without difficulty.  Family reports more frequent episodes of confusion but no change in most day-to-day activities.  She has some intermittent hallucinations seeing bugs chickens and other random animals.  Appears to be reassured fairly easily.  Patient and family deny any other complaints or concerns-I will plan to see her back in 6 months time; sooner if needed.         Past Medical History:   Diagnosis Date   • 'light-for-dates' infant with signs of fetal malnutrition    • Abnormal ECG    • Amaurosis fugax    • Anemia    • Aortic stenosis     s/p TAVR    • Aortic valve replaced     Had TAVR   • Asthma     SOB   • Atrial fibrillation (HCC)    • Cancer (HCC)     skin   • CAP (community acquired pneumonia)    • Carotid artery stenosis     Per duplex 12/16/2016-proximal right internal carotid artery mild stenosis and proximal left moderate stenosis   • Cervical spine disease    • Cognitive disorder    • Congestive  heart failure (HCC)    • Coronary artery disease    • Dementia ([facility])    • Diabetes mellitus ([facility])    • Diastolic dysfunction    • Dyspnea on exertion    • Generalized osteoarthritis of multiple sites    • GERD (gastroesophageal reflux disease)    • Gout    • Health care maintenance    • Heart murmur    • Hiatal hernia    • HL (hearing loss)    • Hyperlipidemia    • Hypertension    • Memory loss    • Mitral valve prolapse    • Mitral valve stenosis     Moderate per echocardiogram 2/2017; severe mitral annular calcification   • Nasal lesion    • Nasal stenosis    • 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[facility])    • Osteoarthritis     multiple sites   • PAF (paroxysmal atrial fibrillation) ([facility])    • Paroxysmal atrial fibrillation ([facility]) 09/11/2019   • Patient had no falls in past year    • Peripheral neuropathy    • Peripheral vascular disease ([facility])    • PONV (postoperative nausea and vomiting)    • Rheumatic mitral stenosis    • Shingles    • Sleep apnea    • Stroke ([facility])    • Syncope    • Systolic hypertension    • Varicose veins          Past Surgical History:   Procedure Laterality Date   • AORTIC VALVE REPAIR/REPLACEMENT N/A 12/27/2016    Procedure: Transfemoral LEFT Transcatheter Aortic Valve Replacement TRANSESOPHAGEAL ECHOCARDIOGRAM WITH ANESTHESIA;  Surgeon: Eduardo Brown MD;  Location: [facility] OR 18/19;  Service:    • AORTIC VALVE REPAIR/REPLACEMENT  12/27/2016   • AORTIC VALVE SURGERY      TAVR   • BLADDER REPAIR     • CARDIAC CATHETERIZATION N/A 12/14/2016    Procedure: Right Heart Cath;  Surgeon: Eduardo Brown MD;  Location: [facility] INVASIVE LOCATION;  Service:    • CARDIAC CATHETERIZATION N/A 12/14/2016    Procedure: Coronary angiography;  Surgeon: Eduardo Brown MD;  Location: [facility] INVASIVE LOCATION;  Service:    • HYSTERECTOMY     • KNEE SURGERY             Current Outpatient Medications:   •  acetaminophen (TYLENOL) 650 MG 8 hr tablet, Take 650 mg by mouth Every 8 (Eight) Hours As Needed for Mild  Pain ., Disp: , Rfl:   •  albuterol sulfate  (90 Base) MCG/ACT inhaler, Inhale 2 puffs Every 4 (Four) Hours As Needed for Wheezing (shortness of breath)., Disp: 6.7 g, Rfl: 0  •  atorvastatin (LIPITOR) 20 MG tablet, TAKE 1 TABLET BY MOUTH  DAILY, Disp: 90 tablet, Rfl: 3  •  bumetanide (BUMEX) 1 MG tablet, Take 1 tablet by mouth Daily., Disp: 90 tablet, Rfl: 0  •  dexamethasone (DECADRON) 4 MG tablet, Take 1 tablet by mouth 2 (Two) Times a Day With Meals., Disp: 8 tablet, Rfl: 0  •  Eliquis 2.5 MG tablet tablet, TAKE 1 TABLET BY MOUTH  EVERY 12 HOURS, Disp: 180 tablet, Rfl: 3  •  Ferrous Sulfate (IRON SUPPLEMENT PO), Take  by mouth., Disp: , Rfl:   •  Lancets (freestyle) lancets, USE TO TEST BLOOD SUGAR ONCE DAILY, Disp: 100 each, Rfl: 4  •  memantine (NAMENDA XR) 28 MG capsule sustained-release 24 hr extended release capsule, TAKE 1 CAPSULE BY MOUTH  DAILY, Disp: 90 capsule, Rfl: 3  •  metFORMIN (GLUCOPHAGE) 500 MG tablet, TAKE ONE-HALF TABLET BY  MOUTH TWICE DAILY, Disp: 90 tablet, Rfl: 3  •  Multiple Vitamins-Minerals (MULTIVITAMIN WITH MINERALS) tablet tablet, Take 1 tablet by mouth Daily., Disp: , Rfl:   •  OLANZapine (zyPREXA) 2.5 MG tablet, Take 2.5 mg by mouth 2 (Two) Times a Day As Needed., Disp: , Rfl:   •  omeprazole (priLOSEC) 20 MG capsule, TAKE 1 CAPSULE BY MOUTH  DAILY, Disp: 90 capsule, Rfl: 3  •  polyethyl glycol-propyl glycol (SYSTANE) 0.4-0.3 % solution ophthalmic solution (artificial tears), 2 drops Every 1 (One) Hour As Needed., Disp: , Rfl:   •  True Metrix Blood Glucose Test test strip, TEST TWO TIMES A DAY, Disp: 100 each, Rfl: 11      Family History   Problem Relation Age of Onset   • Heart disease Mother    • Coronary artery disease Mother    • Hyperlipidemia Mother    • Hypertension Mother    • Cancer Sister    • Cancer Brother    • Diabetes Daughter    • Diabetes Daughter    • Cancer Sister    • Cancer Sister    • Cancer Sister    • Diabetes Son          Social History      Socioeconomic History   • Marital status:    • Number of children: 15   • Years of education: 8th grade   Tobacco Use   • Smoking status: Never Smoker   • Smokeless tobacco: Never Used   Vaping Use   • Vaping Use: Never used   Substance and Sexual Activity   • Alcohol use: Never     Comment: no caffeine    • Drug use: Never   • Sexual activity: Not Currently     Partners: Female         Allergies   Allergen Reactions   • Codeine Itching and GI Intolerance   • Morphine And Related Itching         Pain Scale:FLACC 0               ROS:  Review of Systems   Constitutional: Positive for fatigue.   Respiratory: Negative.    Gastrointestinal: Negative.    Genitourinary: Positive for difficulty urinating.   Musculoskeletal: Negative.    Skin: Negative.    Neurological: Negative.    Psychiatric/Behavioral:        Increased periods of excessive talking/chatting repetitive questioning- improves with use of Zyprexa           Physical Exam:  Exam limited by telephone visit   Neurological: Patient  is alert; oriented to self and daughter otherwise disoriented.   Psychiatric: Patient has a normal mood and affect is flat. Patients behavior is normal.      Results:      Lab Results   Component Value Date    GLUCOSE 128 (H) 08/27/2022    BUN 23 08/27/2022    CREATININE 0.71 08/27/2022    EGFRIFNONA 40 (L) 12/16/2021    EGFRIFAFRI 46 (L) 12/16/2021    BCR 32.4 (H) 08/27/2022    CO2 29.8 (H) 08/27/2022    CALCIUM 8.9 08/27/2022    PROTENTOTREF 7.2 12/16/2021    ALBUMIN 3.70 08/27/2022    LABIL2 1.5 12/16/2021    AST 24 08/27/2022    ALT 17 08/27/2022       Lab Results   Component Value Date    WBC 5.44 08/27/2022    HGB 10.9 (L) 08/27/2022    HCT 33.4 (L) 08/27/2022    MCV 82.5 08/27/2022     08/27/2022         .  Lab Results   Component Value Date    RPR Non-Reactive 09/10/2019         Lab Results   Component Value Date    TSH 3.530 07/06/2022         Lab Results   Component Value Date    SBVMWHJF42 857 08/04/2021          Lab Results   Component Value Date    FOLATE >20.00 09/10/2019         Lab Results   Component Value Date    HGBA1C 7.70 (H) 05/28/2022       Diagnoses:  1. Dementia; with visual hallucinations- well controlled with use of as needed Zyprexa-taking less than 3 times per month often less than 2  2. History of left MCA infarcts etiology thought to be secondary to A. fib patient with residual hemianopsia (right)  3. Hypertension  4. Hyperlipidemia  5. Obstructive sleep apnea  6.  Atrial fibrillation-fully anticoagulated      Plan:  · Continue as needed Zyprexa 2.5 to 5 mg daily as needed for visual hallucinations and increased agitation  · Continue Eliquis 2.5 mg twice daily and statin as written  · Namenda ER 28 mg daily  · Follow-up with me 6 months; sooner if needed    I performed this clinical encounter by utilizing a real-time telehealth telephone/video connection between my location and the patient's location at home.     This patient has consented to a telehealth visit via video/. The visit was scheduled as a video visit to comply with patient safety concerns in accordance with CDC recommendations.  I spent 15 minutes in total including but not limited to the 10 minutes spent in direct conversation with this patient/ daughter     Diagnoses and all orders for this visit:    1. History of stroke (Primary)    2. Atrial fibrillation, persistent (HCC)    3. Bilateral carotid artery stenosis    4. Other hyperlipidemia    5. At moderate risk for fall    6. Dementia without behavioral disturbance, unspecified dementia type    7. Hallucinations    8. Essential hypertension    9. Type 2 diabetes mellitus without complication, without long-term current use of insulin (HCC)                                    Dictated utilizing Dragon dictation.

## 2022-10-12 DIAGNOSIS — E11.59 TYPE 2 DIABETES MELLITUS WITH OTHER CIRCULATORY COMPLICATION, WITHOUT LONG-TERM CURRENT USE OF INSULIN: ICD-10-CM

## 2022-10-12 RX ORDER — LANCETS 28 GAUGE
EACH MISCELLANEOUS
Qty: 100 EACH | Refills: 4 | Status: SHIPPED | OUTPATIENT
Start: 2022-10-12 | End: 2023-01-25 | Stop reason: SDUPTHER

## 2022-10-17 RX ORDER — MEMANTINE HYDROCHLORIDE 28 MG/1
28 CAPSULE, EXTENDED RELEASE ORAL DAILY
Qty: 90 CAPSULE | Refills: 3 | Status: SHIPPED | OUTPATIENT
Start: 2022-10-17

## 2022-10-17 RX ORDER — APIXABAN 2.5 MG/1
TABLET, FILM COATED ORAL
Qty: 180 TABLET | Refills: 3 | Status: SHIPPED | OUTPATIENT
Start: 2022-10-17

## 2022-10-17 RX ORDER — OMEPRAZOLE 20 MG/1
20 CAPSULE, DELAYED RELEASE ORAL DAILY
Qty: 90 CAPSULE | Refills: 3 | Status: SHIPPED | OUTPATIENT
Start: 2022-10-17

## 2022-10-17 RX ORDER — ATORVASTATIN CALCIUM 20 MG/1
20 TABLET, FILM COATED ORAL DAILY
Qty: 90 TABLET | Refills: 3 | Status: SHIPPED | OUTPATIENT
Start: 2022-10-17

## 2022-11-03 NOTE — TELEPHONE ENCOUNTER
PATIENTS DAUGHTER CALLED TO SEE IF WE HAD THESE RESULTS IN YET AND WHAT THEY WERE. PLEASE CALL HER BACK IF THEY ARE IN   03-Nov-2022 09:44

## 2022-11-09 RX ORDER — BUMETANIDE 1 MG/1
1 TABLET ORAL DAILY
Qty: 90 TABLET | Refills: 0 | Status: CANCELLED | OUTPATIENT
Start: 2022-11-09

## 2022-11-09 NOTE — TELEPHONE ENCOUNTER
Caller: DonisAiyana    Relationship: Emergency Contact    Best call back number: [phone]    Requested Prescriptions:   Requested Prescriptions     Pending Prescriptions Disp Refills   • bumetanide (BUMEX) 1 MG tablet 90 tablet 0     Sig: Take 1 tablet by mouth Daily.        Pharmacy where request should be sent: [facility] [address] [place]  - [phone] [facility] [phone] FX     Additional details provided by patient: PATIENT'S DAUGHTER STATES THAT THE PATIENT HAS 8 DAYS LEFT ON THIS PRESCRIPTION.     Does the patient have less than a 3 day supply:  [] Yes  [x] No    Fara Jacobs Rep   11/09/22 09:36 EST

## 2022-11-10 RX ORDER — BUMETANIDE 0.5 MG/1
TABLET ORAL
Qty: 90 TABLET | Refills: 0 | Status: SHIPPED | OUTPATIENT
Start: 2022-11-10 | End: 2022-11-15

## 2022-11-11 ENCOUNTER — TELEPHONE (OUTPATIENT)
Dept: FAMILY MEDICINE CLINIC | Facility: CLINIC | Age: 87
End: 2022-11-11

## 2022-11-11 NOTE — TELEPHONE ENCOUNTER
Caller: Monroe Donis    Relationship to patient: Emergency Contact    Best call back number: [phone]   Patient is needing:     MONROE SAYS THE MEDICATION WAS ORDERED INCORRECTLY , SHE PICKED UP PRESCRIPTION WAS ORDERED FOR BUMENTANIDE 0.5 MG INSTEAD OF 1 MG, SHE WOULD LIKE A CALL BACK REGARDING THE CHANGE      bumetanide (BUMEX) 0.5 MG tablet    bumetanide (BUMEX) 1 MG tablet

## 2022-11-15 RX ORDER — BUMETANIDE 1 MG/1
1 TABLET ORAL DAILY
Qty: 90 TABLET | Refills: 0 | Status: SHIPPED | OUTPATIENT
Start: 2022-11-15 | End: 2023-02-03

## 2022-11-22 ENCOUNTER — TELEPHONE (OUTPATIENT)
Dept: CARDIOLOGY | Facility: CLINIC | Age: 87
End: 2022-11-22

## 2022-11-22 NOTE — TELEPHONE ENCOUNTER
OK to hold Eliquis for 48 hours prior to dental root extraction/ root canal given the risk of bleeding with this procedure. If she has any stroke like symptoms while off Eliquis, she needs to be seen in ER ASAP.     Thanks!  RAYRAY Fatima

## 2022-11-22 NOTE — TELEPHONE ENCOUNTER
Can you find out what kind of procedure? She has a high risk of stroke and if the procedure can be done without, that would be preferable.     Thanks!  RAYRAY Fatima

## 2022-11-22 NOTE — TELEPHONE ENCOUNTER
Patient is needing a dental procedure.  She is needing to come off of the Eliquis for 3 days.  He dentist is Alfonso Joshi, or Hang at [phone].  Please advise.    CB: [phone]    Thanks,  Loan

## 2022-11-22 NOTE — TELEPHONE ENCOUNTER
Patient is needing a to have a root removed.  They are wanting to do a extraction or a root canal.  She is currently taking ABX to keep down infection.    Loan

## 2022-11-23 NOTE — TELEPHONE ENCOUNTER
Called and spoke with patient's daughter beth about recs from  on holding eliquis and reporting to ER with any stroke like symptoms.  She verbalizes understanding.    Called and Spoke with Elmer at [facility] and [facility] and let them know.  They provided me with a fax number and I will fax this thread to them as well.    Fax # [phone]    Indira Chand RN  [facility] Triage  11/23/22 09:20 EST

## 2022-12-05 ENCOUNTER — TELEPHONE (OUTPATIENT)
Dept: NEUROLOGY | Facility: CLINIC | Age: 87
End: 2022-12-05

## 2022-12-05 DIAGNOSIS — Z86.73 HISTORY OF STROKE: ICD-10-CM

## 2022-12-05 DIAGNOSIS — M15.9 GENERALIZED OSTEOARTHRITIS: Primary | ICD-10-CM

## 2022-12-05 NOTE — TELEPHONE ENCOUNTER
Provider: JUAN  Caller: MONROE MOHAMUD  Relationship to Patient: DAUGHTER  Pharmacy: N/A  Phone Number: [phone]  Reason for Call: PATIENT DAUGHTER TELEPHONED REQUESTING REFERRAL FOR PHYSICAL THERAPY FOR PATIENT.    THEY ARE REQUESTING [facility].    PLEASE FAX ORDERS & DEMOGRAPHICS TO:    [phone]    CALL WITH ANY QUESTIONS.    THANK YOU.

## 2022-12-12 ENCOUNTER — TELEPHONE (OUTPATIENT)
Dept: FAMILY MEDICINE CLINIC | Facility: CLINIC | Age: 87
End: 2022-12-12

## 2022-12-12 NOTE — TELEPHONE ENCOUNTER
Caller: DonisAiyana    Relationship: Emergency Contact    Best call back number: [phone] OR [phone]    What orders are you requesting (i.e. lab or imaging): [facility]     In what timeframe would the patient need to come in: AS SOON AS POSSIBLE    Additional notes: PATIENT FELL ON 12/7 AND HAS A WOUND ON HER RIGHT LOWER FOREARM. PATIENT'S DAUGTHER IS REQUESTING ORDERS TO GO TO [facility] TO HAVE SOMEONE COME OUT AN ENSURE THEY ARE DRESSING WOUND CORRECTLY. [facility] TOLD THEM THEY WOULD NEED A ORDER, LAST OFFICE VISIT, AND DEMOGRAPHIC FOR PATIENT, WITH INSTRUCTIONS FOR CARE. PATIENT'S DAUGHTER WILL UPLOAD PICTURE OF WOUND VIA [employer] AND REQUESTING THESE ORDERS DUE TO DIFFICULTY IN GETTING PATIENT OUT IN WINTER.         [facility]  FAX: [phone]

## 2022-12-13 ENCOUNTER — TELEMEDICINE (OUTPATIENT)
Dept: FAMILY MEDICINE CLINIC | Facility: CLINIC | Age: 87
End: 2022-12-13

## 2022-12-13 ENCOUNTER — HOME HEALTH ADMISSION (OUTPATIENT)
Dept: HOME HEALTH SERVICES | Facility: HOME HEALTHCARE | Age: 87
End: 2022-12-13

## 2022-12-13 DIAGNOSIS — S41.111A SKIN TEAR OF RIGHT UPPER ARM WITHOUT COMPLICATION, INITIAL ENCOUNTER: Primary | ICD-10-CM

## 2022-12-13 PROCEDURE — 99214 OFFICE O/P EST MOD 30 MIN: CPT | Performed by: NURSE PRACTITIONER

## 2022-12-13 NOTE — PATIENT INSTRUCTIONS
Keep area clean and dry, wash with soap and water daily, dial soap or equivalent preferable  Apply triple antibiotic ointment daily and keep covered with dry sterile dressing  Monitor site and if having any fevers, colorful drainage, redness and/or swelling pt should be seen by a medical provider.  Discussed concerns of hitting of head and pt being on blood thinners. Discussed what to monitor for concerns of bleed and if pt having any symptoms to take pt to ER. Pt's daughter verb. Understanding.

## 2022-12-13 NOTE — PROGRESS NOTES
Subjective     Mary Shi is a 89 y.o.. female.     Mode of Visit: Video  Location of patient: home  Location of provider: [facility]  You have chosen to receive care through a telehealth visit.  Does the patient consent to use a video/audio connection their medical care today? yes  The visit included audio and video interaction. No technical issues occurred during this visit.     Pt's visit today for concerns of skin tear to right arm s/p fall last Wednesday. Pt's daughter stating pt lives in [place] and fell at home on 12/7/22. Pt's daughter stating pt did hit head when she fell but denies pt lost consciousness. Pt's daughter denies pt has c/o headache, dizziness, nausea, vomiting and/or vision issues.  Pt's daughter stating they have nurses in the family that has been coming in to check on pt since fall. Pt's family at first was keeping it open to air without any treatments; then started to clean with saline wound cleanser and cover with aquaphor on area daily and keeping covered with dressing. Pt's daughter denies pt with fevers; drainage, swelling, redness and/or warmth at site of wound. Pt's family sent picture in of wound yesterday. Pt's daughter requesting home health nursing to come out for wound care, requesting VNA. During visit pt's arm covered and not able to assess wound today, only by picture sent yesterday.       Abrasion  Pertinent negatives include no fever, headaches, nausea or vomiting.       The following portions of the patient's history were reviewed and updated as appropriate: allergies, current medications, past family history, past medical history, past social history, past surgical history and problem list.    Past Medical History:   Diagnosis Date   • 'light-for-dates' infant with signs of fetal malnutrition    • Abnormal ECG    • Amaurosis fugax    • Anemia    • Aortic stenosis     s/p TAVR    • Aortic valve replaced     Had TAVR   • Asthma     SOB   • Atrial fibrillation  ([facility])    • Cancer ([facility])     skin   • CAP (community acquired pneumonia)    • Carotid artery stenosis     Per duplex 12/16/2016-proximal right internal carotid artery mild stenosis and proximal left moderate stenosis   • Cervical spine disease    • Cognitive disorder    • Congestive heart failure (HCC)    • Coronary artery disease    • Dementia ([facility])    • Diabetes mellitus ([facility])    • Diastolic dysfunction    • Dyspnea on exertion    • Generalized osteoarthritis of multiple sites    • GERD (gastroesophageal reflux disease)    • Gout    • Health care maintenance    • Heart murmur    • Hiatal hernia    • HL (hearing loss)    • Hyperlipidemia    • Hypertension    • Memory loss    • Mitral valve prolapse    • Mitral valve stenosis     Moderate per echocardiogram 2/2017; severe mitral annular calcification   • Nasal lesion    • Nasal stenosis    • 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[facility])    • Osteoarthritis     multiple sites   • PAF (paroxysmal atrial fibrillation) ([facility])    • Paroxysmal atrial fibrillation ([facility]) 09/11/2019   • Patient had no falls in past year    • Peripheral neuropathy    • Peripheral vascular disease ([facility])    • PONV (postoperative nausea and vomiting)    • Rheumatic mitral stenosis    • Shingles    • Sleep apnea    • Stroke ([facility])    • Syncope    • Systolic hypertension    • Varicose veins        Past Surgical History:   Procedure Laterality Date   • AORTIC VALVE REPAIR/REPLACEMENT N/A 12/27/2016    Procedure: Transfemoral LEFT Transcatheter Aortic Valve Replacement TRANSESOPHAGEAL ECHOCARDIOGRAM WITH ANESTHESIA;  Surgeon: Eduardo Brown MD;  Location: [facility] OR 18/19;  Service:    • AORTIC VALVE REPAIR/REPLACEMENT  12/27/2016   • AORTIC VALVE SURGERY      TAVR   • BLADDER REPAIR     • CARDIAC CATHETERIZATION N/A 12/14/2016    Procedure: Right Heart Cath;  Surgeon: Eduardo Brown MD;  Location: [facility] INVASIVE LOCATION;  Service:    • CARDIAC CATHETERIZATION N/A 12/14/2016    Procedure: Coronary  angiography;  Surgeon: Eduardo Brown MD;  Location: [facility] INVASIVE LOCATION;  Service:    • HYSTERECTOMY     • KNEE SURGERY         Review of Systems   Constitutional: Negative for fever.   Eyes: Negative for visual disturbance.   Respiratory: Negative.    Cardiovascular: Negative.    Gastrointestinal: Negative for nausea and vomiting.   Neurological: Negative for dizziness and headaches.       Allergies   Allergen Reactions   • Codeine Itching and GI Intolerance   • Morphine And Related Itching       Objective     There were no vitals filed for this visit.  There is no height or weight on file to calculate BMI.    Physical Exam  Constitutional:       Appearance: She is not ill-appearing.   Neurological:      Mental Status: She is alert.           Current Outpatient Medications:   •  acetaminophen (TYLENOL) 650 MG 8 hr tablet, Take 650 mg by mouth Every 8 (Eight) Hours As Needed for Mild Pain ., Disp: , Rfl:   •  albuterol sulfate  (90 Base) MCG/ACT inhaler, Inhale 2 puffs Every 4 (Four) Hours As Needed for Wheezing (shortness of breath)., Disp: 6.7 g, Rfl: 0  •  atorvastatin (LIPITOR) 20 MG tablet, TAKE 1 TABLET BY MOUTH  DAILY, Disp: 90 tablet, Rfl: 3  •  bumetanide (BUMEX) 1 MG tablet, Take 1 tablet by mouth Daily., Disp: 90 tablet, Rfl: 0  •  dexamethasone (DECADRON) 4 MG tablet, Take 1 tablet by mouth 2 (Two) Times a Day With Meals., Disp: 8 tablet, Rfl: 0  •  Eliquis 2.5 MG tablet tablet, TAKE 1 TABLET BY MOUTH  EVERY 12 HOURS, Disp: 180 tablet, Rfl: 3  •  Ferrous Sulfate (IRON SUPPLEMENT PO), Take  by mouth., Disp: , Rfl:   •  Lancets (freestyle) lancets, USE TO TEST BLOOD SUGAR DAILY, Disp: 100 each, Rfl: 4  •  memantine (NAMENDA XR) 28 MG capsule sustained-release 24 hr extended release capsule, TAKE 1 CAPSULE BY MOUTH  DAILY, Disp: 90 capsule, Rfl: 3  •  metFORMIN (GLUCOPHAGE) 500 MG tablet, TAKE ONE-HALF TABLET BY  MOUTH TWICE DAILY, Disp: 90 tablet, Rfl: 3  •  Multiple Vitamins-Minerals  (MULTIVITAMIN WITH MINERALS) tablet tablet, Take 1 tablet by mouth Daily., Disp: , Rfl:   •  OLANZapine (zyPREXA) 2.5 MG tablet, Take 2.5 mg by mouth 2 (Two) Times a Day As Needed., Disp: , Rfl:   •  omeprazole (priLOSEC) 20 MG capsule, TAKE 1 CAPSULE BY MOUTH  DAILY, Disp: 90 capsule, Rfl: 3  •  polyethyl glycol-propyl glycol (SYSTANE) 0.4-0.3 % solution ophthalmic solution (artificial tears), 2 drops Every 1 (One) Hour As Needed., Disp: , Rfl:   •  True Metrix Blood Glucose Test test strip, TEST TWO TIMES A DAY, Disp: 100 each, Rfl: 11    No results found for this or any previous visit (from the past 2016 hour(s)).      Diagnoses and all orders for this visit:    1. Skin tear of right upper arm without complication, initial encounter (Primary)  -     Ambulatory Referral to Home Health        Patient Instructions   Keep area clean and dry, wash with soap and water daily, dial soap or equivalent preferable  Apply triple antibiotic ointment daily and keep covered with dry sterile dressing  Monitor site and if having any fevers, colorful drainage, redness and/or swelling pt should be seen by a medical provider.  Discussed concerns of hitting of head and pt being on blood thinners. Discussed what to monitor for concerns of bleed and if pt having any symptoms to take pt to ER. Pt's daughter verb. Understanding.       Return if symptoms worsen or fail to improve, for Dr. Rubio as needed/as recommended.

## 2023-01-25 RX ORDER — LANCETS 28 GAUGE
EACH MISCELLANEOUS
Qty: 100 EACH | Refills: 4 | Status: SHIPPED | OUTPATIENT
Start: 2023-01-25

## 2023-01-25 NOTE — TELEPHONE ENCOUNTER
Caller: [facility] [id number] [place], [place] - [address] [facility] [phone] [place] [phone] FX    Relationship: Pharmacy    Requested Prescriptions:   Requested Prescriptions     Pending Prescriptions Disp Refills   • Lancets (freestyle) lancets 100 each 4     Sig: USE TO TEST BLOOD SUGAR DAILY        Pharmacy where request should be sent: [facility] [id number] [place], [place] - [address] [facility] - [phone] [place] [phone] FX     Additional details provided by patient: PATIENT NEEDS A NEW SCRIPT FOR THESE LANCETS AS SHE MOVED TO [facility] FROM ANOTHER PHARMACY.     Does the patient have less than a 3 day supply:  [x] Yes  [] No    Fara Perez Rep   01/25/23 09:41 EST

## 2023-02-03 RX ORDER — BUMETANIDE 1 MG/1
TABLET ORAL
Qty: 90 TABLET | Refills: 0 | Status: SHIPPED | OUTPATIENT
Start: 2023-02-03

## 2023-02-14 ENCOUNTER — HOSPITAL ENCOUNTER (INPATIENT)
Facility: HOSPITAL | Age: 88
LOS: 2 days | Discharge: HOME HEALTH | DRG: 292 | End: 2023-02-16
Attending: EMERGENCY MEDICINE | Admitting: INTERNAL MEDICINE
Payer: MEDICARE

## 2023-02-14 ENCOUNTER — TELEPHONE (OUTPATIENT)
Dept: CARDIOLOGY | Facility: CLINIC | Age: 88
End: 2023-02-14
Payer: MEDICARE

## 2023-02-14 ENCOUNTER — APPOINTMENT (OUTPATIENT)
Dept: GENERAL RADIOLOGY | Facility: HOSPITAL | Age: 88
DRG: 292 | End: 2023-02-14
Payer: MEDICARE

## 2023-02-14 DIAGNOSIS — I50.33 ACUTE ON CHRONIC DIASTOLIC CHF (CONGESTIVE HEART FAILURE): ICD-10-CM

## 2023-02-14 DIAGNOSIS — Z86.59 HISTORY OF DEMENTIA: ICD-10-CM

## 2023-02-14 DIAGNOSIS — J96.01 ACUTE HYPOXEMIC RESPIRATORY FAILURE: ICD-10-CM

## 2023-02-14 DIAGNOSIS — R77.8 ELEVATED TROPONIN: ICD-10-CM

## 2023-02-14 DIAGNOSIS — I48.21 PERMANENT ATRIAL FIBRILLATION: ICD-10-CM

## 2023-02-14 DIAGNOSIS — I50.9 ACUTE ON CHRONIC CONGESTIVE HEART FAILURE, UNSPECIFIED HEART FAILURE TYPE: Primary | ICD-10-CM

## 2023-02-14 DIAGNOSIS — E83.41 HYPERMAGNESEMIA: ICD-10-CM

## 2023-02-14 DIAGNOSIS — R09.02 HYPOXIA: ICD-10-CM

## 2023-02-14 DIAGNOSIS — R73.9 HYPERGLYCEMIA: ICD-10-CM

## 2023-02-14 DIAGNOSIS — J90 BILATERAL PLEURAL EFFUSION: ICD-10-CM

## 2023-02-14 LAB
ALBUMIN SERPL-MCNC: 3.8 G/DL (ref 3.5–5.2)
ALBUMIN/GLOB SERPL: 1.3 G/DL
ALP SERPL-CCNC: 113 U/L (ref 39–117)
ALT SERPL W P-5'-P-CCNC: 10 U/L (ref 1–33)
ANION GAP SERPL CALCULATED.3IONS-SCNC: 11 MMOL/L (ref 5–15)
APTT PPP: 29.4 SECONDS (ref 22.7–35.4)
AST SERPL-CCNC: 20 U/L (ref 1–32)
BASOPHILS # BLD AUTO: 0.03 10*3/MM3 (ref 0–0.2)
BASOPHILS NFR BLD AUTO: 0.4 % (ref 0–1.5)
BILIRUB SERPL-MCNC: 0.5 MG/DL (ref 0–1.2)
BUN SERPL-MCNC: 33 MG/DL (ref 8–23)
BUN/CREAT SERPL: 42.3 (ref 7–25)
CALCIUM SPEC-SCNC: 9.3 MG/DL (ref 8.2–9.6)
CHLORIDE SERPL-SCNC: 102 MMOL/L (ref 98–107)
CO2 SERPL-SCNC: 29 MMOL/L (ref 22–29)
CREAT SERPL-MCNC: 0.78 MG/DL (ref 0.57–1)
DEPRECATED RDW RBC AUTO: 42.3 FL (ref 37–54)
EGFRCR SERPLBLD CKD-EPI 2021: 69.2 ML/MIN/1.73
EOSINOPHIL # BLD AUTO: 0.02 10*3/MM3 (ref 0–0.4)
EOSINOPHIL NFR BLD AUTO: 0.3 % (ref 0.3–6.2)
ERYTHROCYTE [DISTWIDTH] IN BLOOD BY AUTOMATED COUNT: 13.5 % (ref 12.3–15.4)
GEN 5 2HR TROPONIN T REFLEX: 23 NG/L
GLOBULIN UR ELPH-MCNC: 2.9 GM/DL
GLUCOSE SERPL-MCNC: 161 MG/DL (ref 65–99)
HCT VFR BLD AUTO: 37.6 % (ref 34–46.6)
HGB BLD-MCNC: 12.3 G/DL (ref 12–15.9)
HOLD SPECIMEN: NORMAL
HOLD SPECIMEN: NORMAL
IMM GRANULOCYTES # BLD AUTO: 0.02 10*3/MM3 (ref 0–0.05)
IMM GRANULOCYTES NFR BLD AUTO: 0.3 % (ref 0–0.5)
INR PPP: 1.1 (ref 0.9–1.1)
LYMPHOCYTES # BLD AUTO: 1.85 10*3/MM3 (ref 0.7–3.1)
LYMPHOCYTES NFR BLD AUTO: 25.6 % (ref 19.6–45.3)
MAGNESIUM SERPL-MCNC: 2.4 MG/DL (ref 1.7–2.3)
MCH RBC QN AUTO: 28.7 PG (ref 26.6–33)
MCHC RBC AUTO-ENTMCNC: 32.7 G/DL (ref 31.5–35.7)
MCV RBC AUTO: 87.6 FL (ref 79–97)
MONOCYTES # BLD AUTO: 0.67 10*3/MM3 (ref 0.1–0.9)
MONOCYTES NFR BLD AUTO: 9.3 % (ref 5–12)
NEUTROPHILS NFR BLD AUTO: 4.63 10*3/MM3 (ref 1.7–7)
NEUTROPHILS NFR BLD AUTO: 64.1 % (ref 42.7–76)
NRBC BLD AUTO-RTO: 0 /100 WBC (ref 0–0.2)
NT-PROBNP SERPL-MCNC: 1726 PG/ML (ref 0–1800)
PLATELET # BLD AUTO: 196 10*3/MM3 (ref 140–450)
PMV BLD AUTO: 10.8 FL (ref 6–12)
POTASSIUM SERPL-SCNC: 4.1 MMOL/L (ref 3.5–5.2)
PROT SERPL-MCNC: 6.7 G/DL (ref 6–8.5)
PROTHROMBIN TIME: 14.4 SECONDS (ref 11.7–14.2)
QT INTERVAL: 395 MS
RBC # BLD AUTO: 4.29 10*6/MM3 (ref 3.77–5.28)
SODIUM SERPL-SCNC: 142 MMOL/L (ref 136–145)
TROPONIN T DELTA: -11 NG/L
TROPONIN T SERPL HS-MCNC: 34 NG/L
WBC NRBC COR # BLD: 7.22 10*3/MM3 (ref 3.4–10.8)
WHOLE BLOOD HOLD COAG: NORMAL
WHOLE BLOOD HOLD SPECIMEN: NORMAL

## 2023-02-14 PROCEDURE — 85025 COMPLETE CBC W/AUTO DIFF WBC: CPT

## 2023-02-14 PROCEDURE — 80053 COMPREHEN METABOLIC PANEL: CPT

## 2023-02-14 PROCEDURE — 93005 ELECTROCARDIOGRAM TRACING: CPT | Performed by: EMERGENCY MEDICINE

## 2023-02-14 PROCEDURE — 71045 X-RAY EXAM CHEST 1 VIEW: CPT

## 2023-02-14 PROCEDURE — 83735 ASSAY OF MAGNESIUM: CPT | Performed by: EMERGENCY MEDICINE

## 2023-02-14 PROCEDURE — 25010000002 FUROSEMIDE PER 20 MG: Performed by: EMERGENCY MEDICINE

## 2023-02-14 PROCEDURE — 83880 ASSAY OF NATRIURETIC PEPTIDE: CPT

## 2023-02-14 PROCEDURE — 84484 ASSAY OF TROPONIN QUANT: CPT

## 2023-02-14 PROCEDURE — 84484 ASSAY OF TROPONIN QUANT: CPT | Performed by: EMERGENCY MEDICINE

## 2023-02-14 PROCEDURE — 85610 PROTHROMBIN TIME: CPT | Performed by: EMERGENCY MEDICINE

## 2023-02-14 PROCEDURE — 99285 EMERGENCY DEPT VISIT HI MDM: CPT

## 2023-02-14 PROCEDURE — 36415 COLL VENOUS BLD VENIPUNCTURE: CPT

## 2023-02-14 PROCEDURE — 93010 ELECTROCARDIOGRAM REPORT: CPT | Performed by: INTERNAL MEDICINE

## 2023-02-14 PROCEDURE — 85730 THROMBOPLASTIN TIME PARTIAL: CPT | Performed by: EMERGENCY MEDICINE

## 2023-02-14 RX ORDER — SODIUM CHLORIDE 0.9 % (FLUSH) 0.9 %
10 SYRINGE (ML) INJECTION AS NEEDED
Status: DISCONTINUED | OUTPATIENT
Start: 2023-02-14 | End: 2023-02-16 | Stop reason: HOSPADM

## 2023-02-14 RX ORDER — MULTIPLE VITAMINS W/ MINERALS TAB 9MG-400MCG
1 TAB ORAL DAILY
Status: DISCONTINUED | OUTPATIENT
Start: 2023-02-15 | End: 2023-02-16 | Stop reason: HOSPADM

## 2023-02-14 RX ORDER — OLANZAPINE 2.5 MG/1
2.5 TABLET ORAL 2 TIMES DAILY PRN
Status: DISCONTINUED | OUTPATIENT
Start: 2023-02-14 | End: 2023-02-14

## 2023-02-14 RX ORDER — SODIUM CHLORIDE 9 MG/ML
40 INJECTION, SOLUTION INTRAVENOUS AS NEEDED
Status: DISCONTINUED | OUTPATIENT
Start: 2023-02-14 | End: 2023-02-16 | Stop reason: HOSPADM

## 2023-02-14 RX ORDER — DEXTROSE MONOHYDRATE 25 G/50ML
25 INJECTION, SOLUTION INTRAVENOUS
Status: DISCONTINUED | OUTPATIENT
Start: 2023-02-14 | End: 2023-02-16 | Stop reason: HOSPADM

## 2023-02-14 RX ORDER — FUROSEMIDE 10 MG/ML
80 INJECTION INTRAMUSCULAR; INTRAVENOUS ONCE
Status: COMPLETED | OUTPATIENT
Start: 2023-02-14 | End: 2023-02-14

## 2023-02-14 RX ORDER — FERROUS SULFATE 7.5 MG/0.5
1 SYRINGE (EA) ORAL
Status: DISCONTINUED | OUTPATIENT
Start: 2023-02-15 | End: 2023-02-16 | Stop reason: HOSPADM

## 2023-02-14 RX ORDER — NICOTINE POLACRILEX 4 MG
15 LOZENGE BUCCAL
Status: DISCONTINUED | OUTPATIENT
Start: 2023-02-14 | End: 2023-02-16 | Stop reason: HOSPADM

## 2023-02-14 RX ORDER — IBUPROFEN 600 MG/1
1 TABLET ORAL
Status: DISCONTINUED | OUTPATIENT
Start: 2023-02-14 | End: 2023-02-16 | Stop reason: HOSPADM

## 2023-02-14 RX ORDER — BUMETANIDE 1 MG/1
1 TABLET ORAL DAILY
Status: DISCONTINUED | OUTPATIENT
Start: 2023-02-15 | End: 2023-02-16 | Stop reason: HOSPADM

## 2023-02-14 RX ORDER — PANTOPRAZOLE SODIUM 40 MG/1
40 TABLET, DELAYED RELEASE ORAL
Status: DISCONTINUED | OUTPATIENT
Start: 2023-02-15 | End: 2023-02-16 | Stop reason: HOSPADM

## 2023-02-14 RX ORDER — ATORVASTATIN CALCIUM 20 MG/1
20 TABLET, FILM COATED ORAL DAILY
Status: DISCONTINUED | OUTPATIENT
Start: 2023-02-15 | End: 2023-02-16 | Stop reason: HOSPADM

## 2023-02-14 RX ORDER — ALBUTEROL SULFATE 2.5 MG/3ML
2.5 SOLUTION RESPIRATORY (INHALATION) EVERY 4 HOURS PRN
Status: DISCONTINUED | OUTPATIENT
Start: 2023-02-14 | End: 2023-02-16 | Stop reason: HOSPADM

## 2023-02-14 RX ORDER — NITROGLYCERIN 0.4 MG/1
0.4 TABLET SUBLINGUAL
Status: DISCONTINUED | OUTPATIENT
Start: 2023-02-14 | End: 2023-02-16 | Stop reason: HOSPADM

## 2023-02-14 RX ORDER — SODIUM CHLORIDE 0.9 % (FLUSH) 0.9 %
10 SYRINGE (ML) INJECTION EVERY 12 HOURS SCHEDULED
Status: DISCONTINUED | OUTPATIENT
Start: 2023-02-14 | End: 2023-02-16 | Stop reason: HOSPADM

## 2023-02-14 RX ORDER — INSULIN LISPRO 100 [IU]/ML
0-9 INJECTION, SOLUTION INTRAVENOUS; SUBCUTANEOUS
Status: DISCONTINUED | OUTPATIENT
Start: 2023-02-15 | End: 2023-02-16 | Stop reason: HOSPADM

## 2023-02-14 RX ORDER — MEMANTINE HYDROCHLORIDE 10 MG/1
10 TABLET ORAL EVERY 12 HOURS SCHEDULED
Status: DISCONTINUED | OUTPATIENT
Start: 2023-02-15 | End: 2023-02-16 | Stop reason: HOSPADM

## 2023-02-14 RX ADMIN — FUROSEMIDE 80 MG: 10 INJECTION, SOLUTION INTRAMUSCULAR; INTRAVENOUS at 17:55

## 2023-02-14 RX ADMIN — APIXABAN 2.5 MG: 2.5 TABLET, FILM COATED ORAL at 22:57

## 2023-02-14 NOTE — ED TRIAGE NOTES
Patient family brings oh in, states patinet has been short of breath fort 3 days now, patient is no in distress at this time. Patient states that she had some wheezing. Patient family also reports she has some swelling in her lower feet.

## 2023-02-14 NOTE — TELEPHONE ENCOUNTER
Patient's daughter is calling because the patient is SOA and her oxygen level has been low. I asked what he oxygen level is and she said it has been running below 90. I told her that the patient needed to go ahead and go to the ER. She said well even though she goes up when she wears oxygen? I asked if the patient has an order to be on continuous oxygen. The daughter said no she is only suppose to wear it at night. I told her that is a big change that warrants the patient needs immediate attention. She then asked if I could just go ahead and schedule the patient an appointment for this week? I told her that she needs to go to the ER first to be evaluated and then depending on their assessment we can discuss an appointment. She verbalized understanding.    Cherie Greer RN  Triage MG

## 2023-02-14 NOTE — TELEPHONE ENCOUNTER
Spoke to patient's daughter again and she said the patient just woke up and her oxygen level is still low while wearing her oxygen, so the patient is going to the ER.     Cherie Greer RN  [place] [facility]

## 2023-02-14 NOTE — ED PROVIDER NOTES
EMERGENCY DEPARTMENT ENCOUNTER  I wore full protective equipment throughout this patient encounter including a N95 mask, eye shield, gown and gloves. Hand hygiene was performed before donning protective equipment and after removal when leaving the room.    Room Number:  31/31  Date of encounter:  2/14/2023  PCP: Everette Rubio MD  Patient Care Team:  Everette Rubio MD as PCP - General (Internal Medicine)  Elissa Mcmahan MD as Consulting Physician (Cardiology)     HPI:  Context: Mary Shi is a 89 y.o. female who presents to the ED c/o chief complaint of shortness of breath.  Patient has a history of dementia, history supplied by patient as well as patient's family members.  Patient has been having shortness of breath for the last 3 to 4 days.  Patient complains of dyspnea on exertion, orthopnea.  Patient has had swelling of lower extremities as well as unexplained weight gain.  Patient does have a history of heart failure, is on oxygen at nighttime, 2.5 L, not on oxygen during daytime.  Patient has been requiring oxygen at daytime.  Patient is on a diuretic, Bumex, taking once daily.  Family reports that for the last 2 days patient has taken a extra half dose with minimal improvement.  Patient denies any chest pain, no cough, no fever shakes chills or night sweats.    MEDICAL HISTORY REVIEW  Reviewed in EPIC    PAST MEDICAL HISTORY  Active Ambulatory Problems     Diagnosis Date Noted   • Anemia, chronic disease 05/24/2016   • Cognitive disorder 05/24/2016   • Diabetes mellitus (HCC) 05/24/2016   • Generalized osteoarthritis 05/24/2016   • Gastroesophageal reflux disease 05/24/2016   • Hyperlipidemia 05/24/2016   • Essential hypertension 05/24/2016   • Carotid artery stenosis 06/06/2016   • Polyneuropathy associated with underlying disease ([facility]) 06/06/2016   • S/P TAVR (transcatheter aortic valve replacement) 02/03/2017   • Type 2 diabetes mellitus ([facility]) 04/06/2017   • Rheumatic mitral  stenosis 10/19/2017   • PVC (premature ventricular contraction) 05/16/2018   • Acute on chronic diastolic CHF (congestive heart failure) ([facility]) 06/23/2018   • Weakness 09/09/2019   • Altered mental status 09/09/2019   • Atrial fibrillation ([facility]) 09/11/2019   • Stroke (CMS/[facility]) - history of 02/25/2020   • Longstanding persistent atrial fibrillation (CMS/[facility]) 03/09/2020   • Rheumatic aortic stenosis 11/22/2020   • Pulmonary hypertension ([facility]) 11/22/2020   • Hallucinations 06/29/2021   • Cellulitis of right upper extremity 06/29/2021   • Dementia ([facility])    • Fall 12/09/2021   • Acute bilateral low back pain without sciatica 12/09/2021   • Acute cystitis without hematuria 12/09/2021   • Iron deficiency anemia due to chronic blood loss 05/24/2022   • Acute hypoxemic respiratory failure ([facility]) 05/27/2022   • Chronic anticoagulation 05/27/2022   • Pleural effusion on right 05/27/2022   • Primary generalized (osteo)arthritis 05/31/2022     Resolved Ambulatory Problems     Diagnosis Date Noted   • Amaurosis fugax 05/24/2016   • Disorder of aorta ([facility]) 05/24/2016   • Arteriosclerotic vascular disease 05/24/2016   • Diastolic heart failure ([facility]) 05/24/2016   • Diastolic dysfunction 05/24/2016   • Mitral valve stenosis 05/24/2016   • Lesion of nose 05/24/2016   • Nonrheumatic aortic valve stenosis 06/06/2016   • Angina at rest ([facility]) 12/13/2016   • VHD (valvular heart disease) 12/27/2016   • H/O aortic valvuloplasty 01/04/2017   • Acute confusion 04/04/2018   • Hypoxemia 06/23/2018   • Pain of left hand 11/14/2018   • Bronchitis 11/29/2018   • Nausea and vomiting 03/15/2019   • Acute ischemic left MCA stroke ([facility]) 09/10/2019   • Hospital discharge follow-up 09/24/2019   • CAP (community acquired pneumonia) 02/25/2020   • Pneumonia due to infectious organism 02/26/2020     Past Medical History:   Diagnosis Date   • 'light-for-dates' infant with signs of fetal malnutrition    • Abnormal ECG    • Anemia    • Aortic stenosis    •  Aortic valve replaced    • Asthma    • Cancer (HCC)    • Cervical spine disease    • Congestive heart failure (HCC)    • Coronary artery disease    • Dyspnea on exertion    • Generalized osteoarthritis of multiple sites    • GERD (gastroesophageal reflux disease)    • Gout    • Health care maintenance    • Heart murmur    • Hiatal hernia    • HL (hearing loss)    • Hypertension    • Memory loss    • Mitral valve prolapse    • Nasal lesion    • Nasal stenosis    • 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[facility])    • Osteoarthritis    • PAF (paroxysmal atrial fibrillation) ([facility])    • Paroxysmal atrial fibrillation ([facility]) 09/11/2019   • Patient had no falls in past year    • Peripheral neuropathy    • Peripheral vascular disease ([facility])    • PONV (postoperative nausea and vomiting)    • Shingles    • Sleep apnea    • Syncope    • Systolic hypertension    • Varicose veins        PAST SURGICAL HISTORY  Past Surgical History:   Procedure Laterality Date   • AORTIC VALVE REPAIR/REPLACEMENT N/A 12/27/2016    Procedure: Transfemoral LEFT Transcatheter Aortic Valve Replacement TRANSESOPHAGEAL ECHOCARDIOGRAM WITH ANESTHESIA;  Surgeon: Eduardo Brown MD;  Location: [facility] OR 18/19;  Service:    • AORTIC VALVE REPAIR/REPLACEMENT  12/27/2016   • AORTIC VALVE SURGERY      TAVR   • BLADDER REPAIR     • CARDIAC CATHETERIZATION N/A 12/14/2016    Procedure: Right Heart Cath;  Surgeon: Eduardo Brown MD;  Location: [facility] INVASIVE LOCATION;  Service:    • CARDIAC CATHETERIZATION N/A 12/14/2016    Procedure: Coronary angiography;  Surgeon: Eduardo Brown MD;  Location: [facility] INVASIVE LOCATION;  Service:    • HYSTERECTOMY     • KNEE SURGERY         FAMILY HISTORY  Family History   Problem Relation Age of Onset   • Heart disease Mother    • Coronary artery disease Mother    • Hyperlipidemia Mother    • Hypertension Mother    • Cancer Sister    • Cancer Brother    • Diabetes Daughter    • Diabetes Daughter    • Cancer Sister    • Cancer  Sister    • Cancer Sister    • Diabetes Son        SOCIAL HISTORY  Social History     Socioeconomic History   • Marital status:    • Number of children: 15   • Years of education: 8th grade   Tobacco Use   • Smoking status: Never   • Smokeless tobacco: Never   Vaping Use   • Vaping Use: Never used   Substance and Sexual Activity   • Alcohol use: Never     Comment: no caffeine    • Drug use: Never   • Sexual activity: Not Currently     Partners: Female       ALLERGIES  Codeine and Morphine and related    The patient's allergies have been reviewed    REVIEW OF SYSTEMS  All systems reviewed and negative except for those discussed in HPI.     PHYSICAL EXAM  I have reviewed the triage vital signs and nursing notes.  ED Triage Vitals   Temp Heart Rate Resp BP SpO2   02/14/23 1547 02/14/23 1547 02/14/23 1608 02/14/23 1612 02/14/23 1547   98.1 °F (36.7 °C) 81 16 112/65 91 %      Temp src Heart Rate Source Patient Position BP Location FiO2 (%)   02/14/23 1547 02/14/23 1547 -- -- --   Tympanic Monitor          General: No acute distress.  HENT: NCAT, PERRL, Nares patent.  Eyes: no scleral icterus.  Neck: trachea midline, no ROM limitations.  CV: regular rhythm, regular rate.  Positive JVD  Respiratory: normal effort, extremely diminished at bilateral lung bases with diffuse crackles  Abdomen: soft, nondistended, NTTP, no rebound tenderness, no guarding or rigidity.  Musculoskeletal: no deformity.  Neuro: alert, moves all extremities, follows commands.  Skin: warm, dry.  1+ pitting edema    LAB RESULTS  Recent Results (from the past 24 hour(s))   Comprehensive Metabolic Panel    Collection Time: 02/14/23  4:41 PM    Specimen: Blood   Result Value Ref Range    Glucose 161 (H) 65 - 99 mg/dL    BUN 33 (H) 8 - 23 mg/dL    Creatinine 0.78 0.57 - 1.00 mg/dL    Sodium 142 136 - 145 mmol/L    Potassium 4.1 3.5 - 5.2 mmol/L    Chloride 102 98 - 107 mmol/L    CO2 29.0 22.0 - 29.0 mmol/L    Calcium 9.3 8.2 - 9.6 mg/dL    Total  Protein 6.7 6.0 - 8.5 g/dL    Albumin 3.8 3.5 - 5.2 g/dL    ALT (SGPT) 10 1 - 33 U/L    AST (SGOT) 20 1 - 32 U/L    Alkaline Phosphatase 113 39 - 117 U/L    Total Bilirubin 0.5 0.0 - 1.2 mg/dL    Globulin 2.9 gm/dL    A/G Ratio 1.3 g/dL    BUN/Creatinine Ratio 42.3 (H) 7.0 - 25.0    Anion Gap 11.0 5.0 - 15.0 mmol/L    eGFR 69.2 >60.0 mL/min/1.73   BNP    Collection Time: 02/14/23  4:41 PM    Specimen: Blood   Result Value Ref Range    proBNP 1,726.0 0.0 - 1,800.0 pg/mL   High Sensitivity Troponin T    Collection Time: 02/14/23  4:41 PM    Specimen: Blood   Result Value Ref Range    HS Troponin T 34 (H) <10 ng/L   Green Top (Gel)    Collection Time: 02/14/23  4:41 PM   Result Value Ref Range    Extra Tube Hold for add-ons.    Lavender Top    Collection Time: 02/14/23  4:41 PM   Result Value Ref Range    Extra Tube hold for add-on    Gold Top - SST    Collection Time: 02/14/23  4:41 PM   Result Value Ref Range    Extra Tube Hold for add-ons.    Light Blue Top    Collection Time: 02/14/23  4:41 PM   Result Value Ref Range    Extra Tube Hold for add-ons.    CBC Auto Differential    Collection Time: 02/14/23  4:41 PM    Specimen: Blood   Result Value Ref Range    WBC 7.22 3.40 - 10.80 10*3/mm3    RBC 4.29 3.77 - 5.28 10*6/mm3    Hemoglobin 12.3 12.0 - 15.9 g/dL    Hematocrit 37.6 34.0 - 46.6 %    MCV 87.6 79.0 - 97.0 fL    MCH 28.7 26.6 - 33.0 pg    MCHC 32.7 31.5 - 35.7 g/dL    RDW 13.5 12.3 - 15.4 %    RDW-SD 42.3 37.0 - 54.0 fl    MPV 10.8 6.0 - 12.0 fL    Platelets 196 140 - 450 10*3/mm3    Neutrophil % 64.1 42.7 - 76.0 %    Lymphocyte % 25.6 19.6 - 45.3 %    Monocyte % 9.3 5.0 - 12.0 %    Eosinophil % 0.3 0.3 - 6.2 %    Basophil % 0.4 0.0 - 1.5 %    Immature Grans % 0.3 0.0 - 0.5 %    Neutrophils, Absolute 4.63 1.70 - 7.00 10*3/mm3    Lymphocytes, Absolute 1.85 0.70 - 3.10 10*3/mm3    Monocytes, Absolute 0.67 0.10 - 0.90 10*3/mm3    Eosinophils, Absolute 0.02 0.00 - 0.40 10*3/mm3    Basophils, Absolute 0.03 0.00 -  0.20 10*3/mm3    Immature Grans, Absolute 0.02 0.00 - 0.05 10*3/mm3    nRBC 0.0 0.0 - 0.2 /100 WBC   Protime-INR    Collection Time: 02/14/23  4:41 PM    Specimen: Blood   Result Value Ref Range    Protime 14.4 (H) 11.7 - 14.2 Seconds    INR 1.10 0.90 - 1.10   aPTT    Collection Time: 02/14/23  4:41 PM    Specimen: Blood   Result Value Ref Range    PTT 29.4 22.7 - 35.4 seconds   Magnesium    Collection Time: 02/14/23  4:41 PM    Specimen: Blood   Result Value Ref Range    Magnesium 2.4 (H) 1.7 - 2.3 mg/dL   ECG 12 Lead ED Triage Standing Order; SOA    Collection Time: 02/14/23  5:42 PM   Result Value Ref Range    QT Interval 395 ms       I ordered the above labs and reviewed the results.    RADIOLOGY  XR Chest 1 View    Result Date: 2/14/2023  XR CHEST 1 VW-  HISTORY:  Shortness of breath.  COMPARISON:  Chest radiograph 08/27/2022  FINDINGS:  A single view of the chest was obtained. The cardiac silhouette and mediastinal and hilar contours are partially obscured. There is an aortic valve prosthesis. There is calcific aortic atherosclerosis. There is central pulmonary venous congestion. There are at least moderate right and trace left pleural effusions, slightly progressed on the right and new/progressed on the left. Bibasilar airspace opacities have also slightly progressed and may be due to atelectasis and edema with superimposed aspiration or pneumonia not excluded. There is multilevel degenerative disc disease.  This report was finalized on 2/14/2023 5:31 PM by Dr. Ree Morse M.D.        I ordered the above noted radiological studies. I reviewed the images and results. I agree with the radiologist interpretation.    PROCEDURES  Procedures    MEDICATIONS GIVEN IN ER  Medications   sodium chloride 0.9 % flush 10 mL (has no administration in time range)   furosemide (LASIX) injection 80 mg (80 mg Intravenous Given 2/14/23 1755)       PROGRESS, DATA ANALYSIS, CONSULTS, AND MEDICAL DECISION MAKING  A complete  history and physical exam have been performed.  All available laboratory and imaging results have been reviewed by myself prior to disposition.    MDM  After the initial H&P, I discussed pertinent information from history and physical exam with patient/family.  Discussed differential diagnosis.  Discussed plan for ED evaluation/workup/treatment.  All questions answered.  Patient/family is agreeable with plan.  ED Course as of 02/14/23 1842 Tue Feb 14, 2023   1725 My differential diagnosis for dyspnea includes but is not limited to:  Asthma, COPD, pneumonia, pulmonary embolus, acute respiratory distress syndrome, pneumothorax, pleural effusion, pulmonary fibrosis, congestive heart failure, myocardial infarction, DKA, uremia, acidosis, sepsis, anemia, drug related, hyperventilation, CNS disease     [JG]   1744 EKG independently viewed and contemporaneously interpreted by ED physician. Time: 1742.  Rate 81.  Interpretation: Atrial fibrillation, normal axis, Q waves V1 through V3 with delayed R wave progression, no acute ST changes. [JG]   1745 Patient's symptoms and ED work-up consistent with acute heart failure exacerbation, patient hypoxic, no signs of infection.  High-sensitivity troponin minimally elevated, likely secondary to heart failure, obtaining repeat to ensure is not uptrending.  Giving diuretic.  Given patient's age with multiple comorbidities, consulting hospitalist for admission. [JG]   1745 When compared with prior EKG on 8/27/2022, no acute changes are present. [JG]   1803 Patient reassessed.  Discussed ED findings, differential diagnosis, and the need for admission for evaluation/treatment.  They are agreeable to admission and all questions were answered.     [JG]   1841 Phone call with BETTY Hammond.  Discussed the patient, relevant history, exam, diagnostics, ED findings/progress, and concerns. They agree to admit the patient to telemetry. Care assumed by the admitting physician at this  time.     [JG]      ED Course User Index  [JG] Momo Sanders MD       AS OF 18:42 EST VITALS:    BP - 138/63  HR - 74  TEMP - 97.9 °F (36.6 °C) (Oral)  O2 SATS - 98%    DIAGNOSIS  Final diagnoses:   Acute on chronic congestive heart failure, unspecified heart failure type (HCC)   Hypoxia   Bilateral pleural effusion   Hyperglycemia   Hypermagnesemia   Elevated troponin   History of dementia         DISPOSITION  ADMISSION    Discussed treatment plan and reason for admission with pt/family and admitting physician.  Pt/family voiced understanding of the plan for admission for further testing/treatment as needed.          Momo Sanders MD  02/14/23 [id number]

## 2023-02-14 NOTE — TELEPHONE ENCOUNTER
It's a difficult situation because of Ms. Shi's age and frailty. She also lives in [place], so I don't think I'd be able to see her today.     This O2 sat is worrisome. Would her PCP be able to see her today? If not, it would be safer for her to be evaluated in the ER. I don't think I have any openings tomorrow or Thursday. I might be able to double book tomorrow morning, or she could be seen by another NP.     Thanks!  RAYRAY Fatima

## 2023-02-15 ENCOUNTER — APPOINTMENT (OUTPATIENT)
Dept: CARDIOLOGY | Facility: HOSPITAL | Age: 88
DRG: 292 | End: 2023-02-15
Payer: MEDICARE

## 2023-02-15 PROBLEM — E11.65 TYPE 2 DIABETES MELLITUS WITH HYPERGLYCEMIA, WITHOUT LONG-TERM CURRENT USE OF INSULIN: Status: ACTIVE | Noted: 2017-04-06

## 2023-02-15 LAB
ANION GAP SERPL CALCULATED.3IONS-SCNC: 12 MMOL/L (ref 5–15)
BASOPHILS # BLD AUTO: 0.04 10*3/MM3 (ref 0–0.2)
BASOPHILS NFR BLD AUTO: 0.6 % (ref 0–1.5)
BUN SERPL-MCNC: 34 MG/DL (ref 8–23)
BUN/CREAT SERPL: 38.6 (ref 7–25)
CALCIUM SPEC-SCNC: 9.5 MG/DL (ref 8.2–9.6)
CHLORIDE SERPL-SCNC: 101 MMOL/L (ref 98–107)
CO2 SERPL-SCNC: 30 MMOL/L (ref 22–29)
CREAT SERPL-MCNC: 0.88 MG/DL (ref 0.57–1)
DEPRECATED RDW RBC AUTO: 44.4 FL (ref 37–54)
EGFRCR SERPLBLD CKD-EPI 2021: 59.9 ML/MIN/1.73
EOSINOPHIL # BLD AUTO: 0.01 10*3/MM3 (ref 0–0.4)
EOSINOPHIL NFR BLD AUTO: 0.2 % (ref 0.3–6.2)
ERYTHROCYTE [DISTWIDTH] IN BLOOD BY AUTOMATED COUNT: 13.7 % (ref 12.3–15.4)
GLUCOSE BLDC GLUCOMTR-MCNC: 124 MG/DL (ref 70–130)
GLUCOSE BLDC GLUCOMTR-MCNC: 138 MG/DL (ref 70–130)
GLUCOSE BLDC GLUCOMTR-MCNC: 181 MG/DL (ref 70–130)
GLUCOSE BLDC GLUCOMTR-MCNC: 237 MG/DL (ref 70–130)
GLUCOSE SERPL-MCNC: 129 MG/DL (ref 65–99)
HCT VFR BLD AUTO: 36.8 % (ref 34–46.6)
HGB BLD-MCNC: 11.9 G/DL (ref 12–15.9)
IMM GRANULOCYTES # BLD AUTO: 0.02 10*3/MM3 (ref 0–0.05)
IMM GRANULOCYTES NFR BLD AUTO: 0.3 % (ref 0–0.5)
LYMPHOCYTES # BLD AUTO: 1.92 10*3/MM3 (ref 0.7–3.1)
LYMPHOCYTES NFR BLD AUTO: 29 % (ref 19.6–45.3)
MCH RBC QN AUTO: 28.8 PG (ref 26.6–33)
MCHC RBC AUTO-ENTMCNC: 32.3 G/DL (ref 31.5–35.7)
MCV RBC AUTO: 89.1 FL (ref 79–97)
MONOCYTES # BLD AUTO: 0.74 10*3/MM3 (ref 0.1–0.9)
MONOCYTES NFR BLD AUTO: 11.2 % (ref 5–12)
NEUTROPHILS NFR BLD AUTO: 3.9 10*3/MM3 (ref 1.7–7)
NEUTROPHILS NFR BLD AUTO: 58.7 % (ref 42.7–76)
NRBC BLD AUTO-RTO: 0 /100 WBC (ref 0–0.2)
PLATELET # BLD AUTO: 173 10*3/MM3 (ref 140–450)
PMV BLD AUTO: 11.4 FL (ref 6–12)
POTASSIUM SERPL-SCNC: 4.3 MMOL/L (ref 3.5–5.2)
RBC # BLD AUTO: 4.13 10*6/MM3 (ref 3.77–5.28)
SODIUM SERPL-SCNC: 143 MMOL/L (ref 136–145)
WBC NRBC COR # BLD: 6.63 10*3/MM3 (ref 3.4–10.8)

## 2023-02-15 PROCEDURE — 82962 GLUCOSE BLOOD TEST: CPT

## 2023-02-15 PROCEDURE — 97161 PT EVAL LOW COMPLEX 20 MIN: CPT

## 2023-02-15 PROCEDURE — 97116 GAIT TRAINING THERAPY: CPT

## 2023-02-15 PROCEDURE — 85025 COMPLETE CBC W/AUTO DIFF WBC: CPT | Performed by: NURSE PRACTITIONER

## 2023-02-15 PROCEDURE — 99222 1ST HOSP IP/OBS MODERATE 55: CPT | Performed by: INTERNAL MEDICINE

## 2023-02-15 PROCEDURE — 80048 BASIC METABOLIC PNL TOTAL CA: CPT | Performed by: NURSE PRACTITIONER

## 2023-02-15 PROCEDURE — 63710000001 INSULIN LISPRO (HUMAN) PER 5 UNITS: Performed by: NURSE PRACTITIONER

## 2023-02-15 RX ADMIN — Medication 10 ML: at 08:56

## 2023-02-15 RX ADMIN — MEMANTINE HYDROCHLORIDE 10 MG: 10 TABLET, FILM COATED ORAL at 20:12

## 2023-02-15 RX ADMIN — MULTIPLE VITAMINS W/ MINERALS TAB 1 TABLET: TAB at 08:56

## 2023-02-15 RX ADMIN — Medication 15 MG: at 08:56

## 2023-02-15 RX ADMIN — ATORVASTATIN CALCIUM 20 MG: 20 TABLET, FILM COATED ORAL at 20:12

## 2023-02-15 RX ADMIN — MEMANTINE HYDROCHLORIDE 10 MG: 10 TABLET, FILM COATED ORAL at 08:56

## 2023-02-15 RX ADMIN — Medication 10 ML: at 01:15

## 2023-02-15 RX ADMIN — APIXABAN 2.5 MG: 2.5 TABLET, FILM COATED ORAL at 10:46

## 2023-02-15 RX ADMIN — Medication 10 ML: at 20:12

## 2023-02-15 RX ADMIN — PANTOPRAZOLE SODIUM 40 MG: 40 TABLET, DELAYED RELEASE ORAL at 08:56

## 2023-02-15 RX ADMIN — APIXABAN 2.5 MG: 2.5 TABLET, FILM COATED ORAL at 20:12

## 2023-02-15 RX ADMIN — BUMETANIDE 1 MG: 1 TABLET ORAL at 08:56

## 2023-02-15 RX ADMIN — INSULIN LISPRO 2 UNITS: 100 INJECTION, SOLUTION INTRAVENOUS; SUBCUTANEOUS at 14:34

## 2023-02-15 NOTE — H&P
Patient Name:  Mary Shi  YOB: 1925  MRN:  [id number]  Admit Date:  2/14/2023  Patient Care Team:  Everette Rubio MD as PCP - General (Internal Medicine)  Elissa Mcmahan MD as Consulting Physician (Cardiology)      Subjective   History Present Illness     Chief Complaint   Patient presents with   • Shortness of Breath     History of Present Illness   Ms. Shi is a 89 y o female with a history of pulmonary hypertension, persistent A-fib, CHF, type 2 diabetes mellitus, CVA, and RUBEN, who presents to [facility] with her family who report the patient has been having increasing shortness of breath over the last 3-4 days.  Patient has dementia and is unable to provide any meaningful past medical history, or HPI.  When asked if she had any shortness of breath she says not right now.  But does feel like she gets short of breath with activity, and when she lays flat in the bed.  Family had reported patient had unexplained weight gain, but upon review of patient's blood glucose, and weight log patient has gained 1/2 pound in the last week.  Family at bedside states that she is on Bumex, that she takes daily and as needed when she has episodes of shortness of breath or lower extremity swelling.  She states for the last 2 days the patient has actually taken her daily dose of Bumex as well as an additional half dose both days, and they have noticed minimal improvement in her breathing.  Patient denies any other acute distress, and family at bedside confirm she has not complained of any chest pain palpitations, lightheadedness abdominal pain nausea, vomiting, diarrhea, constipation, or  symptoms.  At time of exam patient has had 80 mg of furosemide and appears to be approximately 800 cc of urine in external catheter container, and patient is in no distress, she is on her baseline 2-1/2 L of oxygen, is able to carry on a conversation with out any noted dyspnea.    Her  initial evaluation in the emergency department is remarkable for hyperglycemia with glucose of 161, elevated high-sensitivity troponin of 34 with a recheck of 23, and a delta of -11  Her chest x-ray remarkable for pulmonary venous congestion, bilateral moderate right and trace left pleural effusions.  Left bibasilar airspace opacities questionable for possible aspiration pneumonia and/or progressing edema.  Electrocardiogram interpreted as atrial fibrillation with controlled rate of 81.    Patient will be admitted to the hospitalist group for further evaluation and treatment of shortness of breath, and other acute and or chronic conditions.      Review of Systems   Unable to perform ROS: Dementia        Personal History     Past Medical History:   Diagnosis Date   • 'light-for-dates' infant with signs of fetal malnutrition    • Abnormal ECG    • Amaurosis fugax    • Anemia    • Aortic stenosis     s/p TAVR    • Aortic valve replaced     Had TAVR   • Asthma     SOB   • Atrial fibrillation (HCC)    • Cancer (HCC)     skin   • CAP (community acquired pneumonia)    • Carotid artery stenosis     Per duplex 12/16/2016-proximal right internal carotid artery mild stenosis and proximal left moderate stenosis   • Cervical spine disease    • Cognitive disorder    • Congestive heart failure (HCC)    • Coronary artery disease    • Dementia (HCC)    • Diabetes mellitus (HCC)    • Diastolic dysfunction    • Dyspnea on exertion    • Generalized osteoarthritis of multiple sites    • GERD (gastroesophageal reflux disease)    • Gout    • Health care maintenance    • Heart murmur    • Hiatal hernia    • HL (hearing loss)    • Hyperlipidemia    • Hypertension    • Memory loss    • Mitral valve prolapse    • Mitral valve stenosis     Moderate per echocardiogram 2/2017; severe mitral annular calcification   • Nasal lesion    • Nasal stenosis    • 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HCC)    • Osteoarthritis     multiple sites   • PAF (paroxysmal  atrial fibrillation) ([facility])    • Paroxysmal atrial fibrillation ([facility]) 09/11/2019   • Patient had no falls in past year    • Peripheral neuropathy    • Peripheral vascular disease ([facility])    • PONV (postoperative nausea and vomiting)    • Rheumatic mitral stenosis    • Shingles    • Sleep apnea    • Stroke ([facility])    • Syncope    • Systolic hypertension    • Type 2 diabetes mellitus with hyperglycemia, without long-term current use of insulin ([facility]) 4/6/2017   • Varicose veins      Past Surgical History:   Procedure Laterality Date   • AORTIC VALVE REPAIR/REPLACEMENT N/A 12/27/2016    Procedure: Transfemoral LEFT Transcatheter Aortic Valve Replacement TRANSESOPHAGEAL ECHOCARDIOGRAM WITH ANESTHESIA;  Surgeon: Eduardo Brown MD;  Location: [facility] OR 18/19;  Service:    • AORTIC VALVE REPAIR/REPLACEMENT  12/27/2016   • AORTIC VALVE SURGERY      TAVR   • BLADDER REPAIR     • CARDIAC CATHETERIZATION N/A 12/14/2016    Procedure: Right Heart Cath;  Surgeon: Eduardo Brown MD;  Location: [facility] CATH INVASIVE LOCATION;  Service:    • CARDIAC CATHETERIZATION N/A 12/14/2016    Procedure: Coronary angiography;  Surgeon: Eduardo Brown MD;  Location: [facility] CATH INVASIVE LOCATION;  Service:    • HYSTERECTOMY     • KNEE SURGERY       Family History   Problem Relation Age of Onset   • Heart disease Mother    • Coronary artery disease Mother    • Hyperlipidemia Mother    • Hypertension Mother    • Cancer Sister    • Cancer Brother    • Diabetes Daughter    • Diabetes Daughter    • Cancer Sister    • Cancer Sister    • Cancer Sister    • Diabetes Son      Social History     Tobacco Use   • Smoking status: Never     Passive exposure: Never   • Smokeless tobacco: Never   Vaping Use   • Vaping Use: Never used   Substance Use Topics   • Alcohol use: Never     Comment: no caffeine    • Drug use: Never     No current facility-administered medications on file prior to encounter.     Current Outpatient Medications on File Prior to  Encounter   Medication Sig Dispense Refill   • albuterol sulfate  (90 Base) MCG/ACT inhaler Inhale 2 puffs Every 4 (Four) Hours As Needed for Wheezing (shortness of breath). 6.7 g 0   • atorvastatin (LIPITOR) 20 MG tablet TAKE 1 TABLET BY MOUTH  DAILY 90 tablet 3   • bumetanide (BUMEX) 1 MG tablet Take 1 tablet BY MOUTH ONCE DAILY 90 tablet 0   • Eliquis 2.5 MG tablet tablet TAKE 1 TABLET BY MOUTH  EVERY 12 HOURS 180 tablet 3   • Ferrous Sulfate (IRON SUPPLEMENT PO) Take  by mouth.     • memantine (NAMENDA XR) 28 MG capsule sustained-release 24 hr extended release capsule TAKE 1 CAPSULE BY MOUTH  DAILY 90 capsule 3   • metFORMIN (GLUCOPHAGE) 500 MG tablet TAKE ONE-HALF TABLET BY  MOUTH TWICE DAILY 90 tablet 3   • Multiple Vitamins-Minerals (MULTIVITAMIN WITH MINERALS) tablet tablet Take 1 tablet by mouth Daily.     • omeprazole (priLOSEC) 20 MG capsule TAKE 1 CAPSULE BY MOUTH  DAILY 90 capsule 3   • polyethyl glycol-propyl glycol (SYSTANE) 0.4-0.3 % solution ophthalmic solution (artificial tears) 2 drops Every 1 (One) Hour As Needed.     • acetaminophen (TYLENOL) 650 MG 8 hr tablet Take 650 mg by mouth Every 8 (Eight) Hours As Needed for Mild Pain .     • dexamethasone (DECADRON) 4 MG tablet Take 1 tablet by mouth 2 (Two) Times a Day With Meals. 8 tablet 0   • Lancets (freestyle) lancets USE TO TEST BLOOD SUGAR DAILY 100 each 4   • OLANZapine (zyPREXA) 2.5 MG tablet Take 2.5 mg by mouth 2 (Two) Times a Day As Needed.     • True Metrix Blood Glucose Test test strip TEST TWO TIMES A  each 11     Allergies   Allergen Reactions   • Codeine Itching and GI Intolerance   • Morphine And Related Itching       Objective    Objective     Vital Signs  Temp:  [97.4 °F (36.3 °C)-98.1 °F (36.7 °C)] 97.5 °F (36.4 °C)  Heart Rate:  [69-82] 75  Resp:  [16-22] 20  BP: (107-152)/(59-92) 107/61  SpO2:  [90 %-98 %] 90 %  on  Flow (L/min):  [2.5] 2.5;   Device (Oxygen Therapy): NPPV/NIV  Body mass index is 20.37  kg/m².    Physical Exam  Vitals and nursing note reviewed.   Constitutional:       Interventions: Nasal cannula in place.   HENT:      Mouth/Throat:      Mouth: Mucous membranes are dry.   Eyes:      Conjunctiva/sclera: Conjunctivae normal.   Cardiovascular:      Rate and Rhythm: Normal rate. Rhythm irregular.      Pulses:           Radial pulses are 2+ on the right side and 2+ on the left side.        Dorsalis pedis pulses are 2+ on the right side and 2+ on the left side.        Posterior tibial pulses are 2+ on the right side and 2+ on the left side.      Heart sounds: Murmur heard.   Pulmonary:      Effort: Pulmonary effort is normal.      Breath sounds: Examination of the right-lower field reveals decreased breath sounds. Examination of the left-lower field reveals decreased breath sounds. Decreased breath sounds present.   Abdominal:      General: Bowel sounds are normal.      Palpations: Abdomen is soft.   Musculoskeletal:         General: Normal range of motion.      Right lower leg: No edema.      Left lower leg: No edema.   Skin:     General: Skin is warm and dry.      Capillary Refill: Capillary refill takes less than 2 seconds.   Neurological:      General: No focal deficit present.      Mental Status: She is alert and oriented to person, place, and time. Mental status is at baseline.   Psychiatric:         Mood and Affect: Mood and affect normal.         Behavior: Behavior is cooperative.         Cognition and Memory: Cognition is impaired. Memory is impaired.         Results Review:  I reviewed the patient's new clinical results.  I reviewed the patient's new imaging results and agree with the interpretation.  I reviewed the patient's other test results and agree with the interpretation  I personally viewed and interpreted the patient's EKG/Telemetry data  Discussed with ED provider.    Lab Results (last 24 hours)     Procedure Component Value Units Date/Time    CBC & Differential [[id number]]  (Normal)  Collected: 02/14/23 1641    Specimen: Blood Updated: 02/14/23 1651    Narrative:      The following orders were created for panel order CBC & Differential.  Procedure                               Abnormality         Status                     ---------                               -----------         ------                     CBC Auto Differential[[id number]]        Normal              Final result                 Please view results for these tests on the individual orders.    Comprehensive Metabolic Panel [[id number]]  (Abnormal) Collected: 02/14/23 1641    Specimen: Blood Updated: 02/14/23 1709     Glucose 161 mg/dL      BUN 33 mg/dL      Creatinine 0.78 mg/dL      Sodium 142 mmol/L      Potassium 4.1 mmol/L      Chloride 102 mmol/L      CO2 29.0 mmol/L      Calcium 9.3 mg/dL      Total Protein 6.7 g/dL      Albumin 3.8 g/dL      ALT (SGPT) 10 U/L      AST (SGOT) 20 U/L      Alkaline Phosphatase 113 U/L      Total Bilirubin 0.5 mg/dL      Globulin 2.9 gm/dL      A/G Ratio 1.3 g/dL      BUN/Creatinine Ratio 42.3     Anion Gap 11.0 mmol/L      eGFR 69.2 mL/min/1.73     Narrative:      GFR Normal >60  Chronic Kidney Disease <60  Kidney Failure <15    The GFR formula is only valid for adults with stable renal function between ages 18 and 70.    BNP [[id number]]  (Normal) Collected: 02/14/23 1641    Specimen: Blood Updated: 02/14/23 1711     proBNP 1,726.0 pg/mL     Narrative:      Among patients with dyspnea, NT-proBNP is highly sensitive for the detection of acute congestive heart failure. In addition NT-proBNP of <300 pg/ml effectively rules out acute congestive heart failure with 99% negative predictive value.    Results may be falsely decreased if patient taking Biotin.      High Sensitivity Troponin T [[id number]]  (Abnormal) Collected: 02/14/23 1641    Specimen: Blood Updated: 02/14/23 1711     HS Troponin T 34 ng/L     Narrative:      High Sensitive Troponin T Reference Range:  <10.0 ng/L- Negative Female for  AMI  <15.0 ng/L- Negative Male for AMI  >=10 - Abnormal Female indicating possible myocardial injury.  >=15 - Abnormal Male indicating possible myocardial injury.   Clinicians would have to utilize clinical acumen, EKG, Troponin, and serial changes to determine if it is an Acute Myocardial Infarction or myocardial injury due to an underlying chronic condition.         CBC Auto Differential [[id number]]  (Normal) Collected: 02/14/23 1641    Specimen: Blood Updated: 02/14/23 1651     WBC 7.22 10*3/mm3      RBC 4.29 10*6/mm3      Hemoglobin 12.3 g/dL      Hematocrit 37.6 %      MCV 87.6 fL      MCH 28.7 pg      MCHC 32.7 g/dL      RDW 13.5 %      RDW-SD 42.3 fl      MPV 10.8 fL      Platelets 196 10*3/mm3      Neutrophil % 64.1 %      Lymphocyte % 25.6 %      Monocyte % 9.3 %      Eosinophil % 0.3 %      Basophil % 0.4 %      Immature Grans % 0.3 %      Neutrophils, Absolute 4.63 10*3/mm3      Lymphocytes, Absolute 1.85 10*3/mm3      Monocytes, Absolute 0.67 10*3/mm3      Eosinophils, Absolute 0.02 10*3/mm3      Basophils, Absolute 0.03 10*3/mm3      Immature Grans, Absolute 0.02 10*3/mm3      nRBC 0.0 /100 WBC     Protime-INR [[id number]]  (Abnormal) Collected: 02/14/23 1641    Specimen: Blood Updated: 02/14/23 1802     Protime 14.4 Seconds      INR 1.10    aPTT [[id number]]  (Normal) Collected: 02/14/23 1641    Specimen: Blood Updated: 02/14/23 1802     PTT 29.4 seconds     Magnesium [[id number]]  (Abnormal) Collected: 02/14/23 1641    Specimen: Blood Updated: 02/14/23 1756     Magnesium 2.4 mg/dL     High Sensitivity Troponin T 2Hr [[id number]]  (Abnormal) Collected: 02/14/23 1836    Specimen: Blood Updated: 02/14/23 1913     HS Troponin T 23 ng/L      Troponin T Delta -11 ng/L     Narrative:      High Sensitive Troponin T Reference Range:  <10.0 ng/L- Negative Female for AMI  <15.0 ng/L- Negative Male for AMI  >=10 - Abnormal Female indicating possible myocardial injury.  >=15 - Abnormal Male indicating possible  myocardial injury.   Clinicians would have to utilize clinical acumen, EKG, Troponin, and serial changes to determine if it is an Acute Myocardial Infarction or myocardial injury due to an underlying chronic condition.               Imaging Results (Last 24 Hours)     Procedure Component Value Units Date/Time    XR Chest 1 View [[id number]] Collected: 02/14/23 1729     Updated: 02/14/23 1734    Narrative:      XR CHEST 1 VW-     HISTORY:  Shortness of breath.     COMPARISON:  Chest radiograph 08/27/2022     FINDINGS:    A single view of the chest was obtained. The cardiac silhouette and  mediastinal and hilar contours are partially obscured. There is an  aortic valve prosthesis. There is calcific aortic atherosclerosis. There  is central pulmonary venous congestion. There are at least moderate  right and trace left pleural effusions, slightly progressed on the right  and new/progressed on the left. Bibasilar airspace opacities have also  slightly progressed and may be due to atelectasis and edema with  superimposed aspiration or pneumonia not excluded. There is multilevel  degenerative disc disease.     This report was finalized on 2/14/2023 5:31 PM by Dr. Ree Morse M.D.             Results for orders placed during the hospital encounter of 05/26/22    Adult Transthoracic Echo Complete W/ Cont if Necessary Per Protocol    Interpretation Summary  · Calculated left ventricular EF = 64% Estimated left ventricular EF was in agreement with the calculated left ventricular EF. Left ventricular systolic function is normal.  · Left ventricular diastolic function is consistent with (grade II w/high LAP) pseudonormalization.  · There is severe mitral annular calcification. Moderate mitral valve stenosis is present. The mitral valve mean gradient is 7.5 mmHg.  · Calculated right ventricular systolic pressure from tricuspid regurgitation is 58.2 mmHg.Moderate to severe pulmonary hypertension is present.  · TAVR valve present.  The aortic valve peak and mean gradients are within defined limits. The prosthetic aortic valve is not well visualized.Peak velocity of the flow distal to the aortic valve is 254.1 cm/s. Aortic valve maximum pressure gradient is 25.8 mmHg. Aortic valve mean pressure gradient is 16 mmHg. Aortic valve dimensionless index is 0.5 .      ECG 12 Lead ED Triage Standing Order; SOA   Final Result   HEART RATE= 81  bpm   RR Interval= 741  ms   OK Interval=   ms   P Horizontal Axis=   deg   P Front Axis=   deg   QRSD Interval= 90  ms   QT Interval= 395  ms   QRS Axis= 14  deg   T Wave Axis= 19  deg   - ABNORMAL ECG -   Atrial fibrillation   Anterior infarct, old   No change from previous tracing   Electronically Signed By: Ami Cloud ([facility]) 14-Feb-2023 18:28:12   Date and Time of Study: 2023-02-14 17:42:23           Assessment/Plan     Active Hospital Problems    Diagnosis  POA   • **Acute on chronic congestive heart failure, unspecified heart failure type (HCC) [I50.9]  Yes   • Sleep apnea [G47.30]  Yes   • Chronic anticoagulation [Z79.01]  Not Applicable   • Dementia (HCC) [F03.90]  Yes   • Pulmonary hypertension (HCC) [I27.20]  Yes   • Longstanding persistent atrial fibrillation (CMS/HCC) [I48.11]  Yes   • Stroke (CMS/HCC) - history of [I63.9]  Yes   • Type 2 diabetes mellitus with hyperglycemia, without long-term current use of insulin (HCC) [E11.65]  Yes   • S/P TAVR (transcatheter aortic valve replacement) [Z95.2]  Not Applicable      Resolved Hospital Problems   No resolved problems to display.     Acute on chronic diastolic congestive heart failure, with preserved EF  S/P TAVR (transcatheter aortic valve replacement)  Pulmonary hypertension  Bilateral pleural effusions on chest x-ray  BNP 1726  80 mg furosemide ordered in ED  Monitor BMP and replace electrolytes as needed  Patient on 2-1/2 L oxygen at all times  Supplemental oxygen as needed for hypoxia/respiratory distress to maintain oxygen saturation greater  than 90%  Monitor daily weights  Monitor I's and O's  Vital signs per policy  Last echocardiogram May 26, 2022 estimated LVEF-64%    Longstanding persistent atrial fibrillation  Chronic anticoagulation  EKG interpreted as atrial fibrillation  Chronic/Controlled   AC Eliquis  NO RC medication at baseline   Continuous cardiac monitoring and pulse oximetry    Type 2 diabetes mellitus with hyperglycemia, without long-term current use of insulin   Chronic/Stable  Patient hyperglycemic at time of admission  Accu-Cheks ACHS  SSI ordered for hyperglycemia  Hold home metformin secondary to increased risk of medication interaction during hospitalization    Stroke - history of  Chronic  Continue home statin  No obvious deficit noted, however unable to determine secondary to dementia.    Iron deficiency anemia  Chronic/stable continue home  Iron supplement    Dementia   Chronic   Patient able to carry on conversation, able to report her name, and date of birth  Patient unable to recall location, place, situation or date  She does verbalize not like being in the hospital.-And would likely benefit from discharge soon as quickly as possible to get her back in her environment/routine  Continue home memantine    Sleep apnea  Patient home CPAP at bedside  Chronic   Order placed for patient to use home CPAP  Supplemental oxygen as needed for hypoxia/respiratory distress to maintain oxygen saturation greater than 90%.      · I discussed the patient's findings and my recommendations with patient and family.    VTE Prophylaxis - Eliquis (home med).  Code Status - DNR.       RAYRAY Miller  [facility]  02/15/23  00:48 EST

## 2023-02-15 NOTE — THERAPY EVALUATION
Patient Name: Mary Shi  : 3/3/1925    MRN: [id number]                              Today's Date: 2/15/2023       Admit Date: 2023    Visit Dx:     ICD-10-CM ICD-9-CM   1. Acute on chronic congestive heart failure, unspecified heart failure type ([facility])  I50.9 428.0   2. Hypoxia  R09.02 799.02   3. Bilateral pleural effusion  J90 511.9   4. Hyperglycemia  R73.9 790.29   5. Hypermagnesemia  E83.41 275.2   6. Elevated troponin  R77.8 790.6   7. History of dementia  Z86.59 V11.8     Patient Active Problem List   Diagnosis   • Anemia, chronic disease   • Cognitive disorder   • Diabetes mellitus ([facility])   • Generalized osteoarthritis   • Gastroesophageal reflux disease   • Hyperlipidemia   • Essential hypertension   • Carotid artery stenosis   • Polyneuropathy associated with underlying disease ([facility])   • S/P TAVR (transcatheter aortic valve replacement)   • Type 2 diabetes mellitus with hyperglycemia, without long-term current use of insulin ([facility])   • Rheumatic mitral stenosis   • PVC (premature ventricular contraction)   • Acute on chronic diastolic CHF (congestive heart failure) ([facility])   • Weakness   • Altered mental status   • Atrial fibrillation ([facility])   • Stroke (CMS/[facility]) - history of   • Longstanding persistent atrial fibrillation (CMS/[facility])   • Rheumatic aortic stenosis   • Pulmonary hypertension ([facility])   • Hallucinations   • Cellulitis of right upper extremity   • Dementia ([facility])   • Fall   • Acute bilateral low back pain without sciatica   • Acute cystitis without hematuria   • Iron deficiency anemia due to chronic blood loss   • Acute hypoxemic respiratory failure ([facility])   • Chronic anticoagulation   • Pleural effusion on right   • Primary generalized (osteo)arthritis   • Acute on chronic congestive heart failure, unspecified heart failure type ([facility])   • Sleep apnea     Past Medical History:   Diagnosis Date   • 'light-for-dates' infant with signs of fetal malnutrition    • Abnormal ECG    • Amaurosis  fugax    • Anemia    • Aortic stenosis     s/p TAVR    • Aortic valve replaced     Had TAVR   • Asthma     SOB   • Atrial fibrillation ([facility])    • Cancer ([facility])     skin   • CAP (community acquired pneumonia)    • Carotid artery stenosis     Per duplex 12/16/2016-proximal right internal carotid artery mild stenosis and proximal left moderate stenosis   • Cervical spine disease    • Cognitive disorder    • Congestive heart failure ([facility])    • Coronary artery disease    • Dementia ([facility])    • Diabetes mellitus ([facility])    • Diastolic dysfunction    • Dyspnea on exertion    • Generalized osteoarthritis of multiple sites    • GERD (gastroesophageal reflux disease)    • Gout    • Health care maintenance    • Heart murmur    • Hiatal hernia    • HL (hearing loss)    • Hyperlipidemia    • Hypertension    • Memory loss    • Mitral valve prolapse    • Mitral valve stenosis     Moderate per echocardiogram 2/2017; severe mitral annular calcification   • Nasal lesion    • Nasal stenosis    • 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[facility])    • Osteoarthritis     multiple sites   • PAF (paroxysmal atrial fibrillation) ([facility])    • Paroxysmal atrial fibrillation ([facility]) 09/11/2019   • Patient had no falls in past year    • Peripheral neuropathy    • Peripheral vascular disease ([facility])    • PONV (postoperative nausea and vomiting)    • Rheumatic mitral stenosis    • Shingles    • Sleep apnea    • Stroke ([facility])    • Syncope    • Systolic hypertension    • Type 2 diabetes mellitus with hyperglycemia, without long-term current use of insulin ([facility]) 4/6/2017   • Varicose veins      Past Surgical History:   Procedure Laterality Date   • AORTIC VALVE REPAIR/REPLACEMENT N/A 12/27/2016    Procedure: Transfemoral LEFT Transcatheter Aortic Valve Replacement TRANSESOPHAGEAL ECHOCARDIOGRAM WITH ANESTHESIA;  Surgeon: Eduardo Brown MD;  Location: [facility] 18/19;  Service:    • AORTIC VALVE REPAIR/REPLACEMENT  12/27/2016   • AORTIC VALVE SURGERY      TAVR   • BLADDER  REPAIR     • CARDIAC CATHETERIZATION N/A 12/14/2016    Procedure: Right Heart Cath;  Surgeon: Eduardo Brown MD;  Location:  [facility];  Service:    • CARDIAC CATHETERIZATION N/A 12/14/2016    Procedure: Coronary angiography;  Surgeon: Eduardo Brown MD;  Location:  [facility];  Service:    • HYSTERECTOMY     • KNEE SURGERY        General Information     Row Name 02/15/23 0909          Physical Therapy Time and Intention    Document Type evaluation  -     Mode of Treatment individual therapy;physical therapy  -     Row Name 02/15/23 0909          General Information    Patient Profile Reviewed yes  -     Prior Level of Function independent:;gait;transfer;bed mobility  -     Existing Precautions/Restrictions fall  -     Barriers to Rehab cognitive status  -     Row Name 02/15/23 0909          Living Environment    People in Home child(kd), adult  Daughter present and reports pt has 24/7 care between all her children  -     Row Name 02/15/23 0909          Home Main Entrance    Number of Stairs, Main Entrance three  -     Row Name 02/15/23 0909          Stairs Within Home, Primary    Number of Stairs, Within Home, Primary none  -     Row Name 02/15/23 0909          Cognition    Orientation Status (Cognition) oriented to;person  -     Row Name 02/15/23 0909          Safety Issues, Functional Mobility    Impairments Affecting Function (Mobility) balance;cognition;strength;endurance/activity tolerance  -           User Key  (r) = Recorded By, (t) = Taken By, (c) = Cosigned By    Initials Name Provider Type     Oanh Groves PT Physical Therapist               Mobility     Row Name 02/15/23 0910          Bed Mobility    Bed Mobility supine-sit  -     Supine-Sit [place] (Bed Mobility) minimum assist (75% patient effort);1 person assist;verbal cues  -     Assistive Device (Bed Mobility) head of bed elevated;bed rails  -     Row Name 02/15/23 0910           Sit-Stand Transfer    Sit-Stand [place] (Transfers) contact guard;verbal cues  -     Assistive Device (Sit-Stand Transfers) walker, front-wheeled  -[facility] Name 02/15/23 0910          Gait/Stairs (Locomotion)    [place] Level (Gait) contact guard;verbal cues  -     Assistive Device (Gait) walker, front-wheeled  -     Distance in Feet (Gait) 60ft  -     Deviations/Abnormal Patterns (Gait) gait speed decreased  -     Bilateral Gait Deviations forward flexed posture  -     Comment, (Gait/Stairs) Slow pace, short step length  -           User Key  (r) = Recorded By, (t) = Taken By, (c) = Cosigned By    Initials Name Provider Type     Oanh Groves PT Physical Therapist               Obj/Interventions     [facility] Name 02/15/23 0911          Range of Motion Comprehensive    General Range of Motion bilateral lower extremity ROM WFL  -BH     Row Name 02/15/23 0911          Strength Comprehensive (MMT)    General Manual Muscle Testing (MMT) Assessment lower extremity strength deficits identified  -     Comment, General Manual Muscle Testing (MMT) Assessment Generalized weakness  -BH     Row Name 02/15/23 0911          Balance    Balance Assessment sitting static balance;sitting dynamic balance;standing static balance;standing dynamic balance  -     Static Sitting Balance standby assist  -     Dynamic Sitting Balance standby assist  -     Position, Sitting Balance unsupported;sitting edge of bed  -     Static Standing Balance contact guard;verbal cues  -     Dynamic Standing Balance contact guard;verbal cues  -     Position/Device Used, Standing Balance supported;walker, front-wheeled  -     Balance Interventions sitting;standing;sit to stand;supported;static;dynamic  -BH     Row Name 02/15/23 0911          Sensory Assessment (Somatosensory)    Sensory Assessment (Somatosensory) LE sensation intact  -           User Key  (r) = Recorded By, (t) = Taken By, (c) = Cosigned  By    Initials Name Provider Type     Oanh Groves PT Physical Therapist               Goals/Plan     Row Name 02/15/23 0917          Bed Mobility Goal 1 (PT)    Activity/Assistive Device (Bed Mobility Goal 1, PT) bed mobility activities, all  -     [place] Level/Cues Needed (Bed Mobility Goal 1, PT) standby assist  -     Time Frame (Bed Mobility Goal 1, PT) 1 week  -     Row Name 02/15/23 0917          Transfer Goal 1 (PT)    Activity/Assistive Device (Transfer Goal 1, PT) transfers, all  -     [place] Level/Cues Needed (Transfer Goal 1, PT) standby assist  -BH     Time Frame (Transfer Goal 1, PT) 1 week  -     Row Name 02/15/23 0917          Gait Training Goal 1 (PT)    Activity/Assistive Device (Gait Training Goal 1, PT) gait (walking locomotion)  -     [place] Level (Gait Training Goal 1, PT) standby assist  -     Distance (Gait Training Goal 1, PT) 120ft  -     Time Frame (Gait Training Goal 1, PT) 1 week  -     Row Name 02/15/23 0917          Therapy Assessment/Plan (PT)    Planned Therapy Interventions (PT) balance training;bed mobility training;gait training;home exercise program;patient/family education;strengthening;transfer training  -           User Key  (r) = Recorded By, (t) = Taken By, (c) = Cosigned By    Initials Name Provider Type     Oanh Groves PT Physical Therapist               Clinical Impression     Row Name 02/15/23 0912          Pain    Pretreatment Pain Rating 0/10 - no pain  -     Posttreatment Pain Rating 0/10 - no pain  -     Row Name 02/15/23 0912          Plan of Care Review    Plan of Care Reviewed With patient;daughter  -     Outcome Evaluation Pt is a 89 yo F admitted from home with SOA, wheezing, and B foot swelling - work-up revealed mild volume overload and B pleural effusions. Pt with BL dementia, pt's daughter present on eval and helpful with hx. Pt has 24/7 care at home between her children and is normally independent  with a rollator. Pt has 3 KT with has had 1 recent fall 2/2 tripping. Pt presents to PT with impaired strength, endurance, and balance limiting overall mobility. Pt transferred to EOB with min A x1, STS with CGA, and ambulated 60ft with rwx and CGA. Pt required assist with rwx navigation as pt with difficulty turning - used to a rollator. Pt returned to room and agreeable to sitting UIC to eat breakfast. Pt set up with chair alarm pad under her, but pt's daughter present and attentive - educated to call out for assist and alert nsg if daughter leaves room while pt sitting up, RN aware. PT will continue to follow to progress mobility as tolerated. Anticipate DC back home with 24/7 care and HHPT.  -     Row Name 02/15/23 0912          Therapy Assessment/Plan (PT)    Patient/Family Therapy Goals Statement (PT) Return to OF  -     Rehab Potential (PT) good, to achieve stated therapy goals  -     Criteria for Skilled Interventions Met (PT) yes  -     Therapy Frequency (PT) 5 times/wk  -     Row Name 02/15/23 0912          Vital Signs    O2 Delivery Pre Treatment room air  -     O2 Delivery Intra Treatment room air  -     O2 Delivery Post Treatment room air  -     Row Name 02/15/23 0912          Positioning and Restraints    Pre-Treatment Position in bed  -     Post Treatment Position chair  -     In Chair notified nsg;call light within reach;sitting;encouraged to call for assist;with family/caregiver  -           User Key  (r) = Recorded By, (t) = Taken By, (c) = Cosigned By    Initials Name Provider Type     Oanh Groves, PT Physical Therapist               Outcome Measures     Row Name 02/15/23 0917 02/14/23 7301       How much help from another person do you currently need...    Turning from your back to your side while in flat bed without using bedrails? 3  - 3  -JG    Moving from lying on back to sitting on the side of a flat bed without bedrails? 2  - 3  -JG    Moving to and from  a bed to a chair (including a wheelchair)? 3  - 3  -JG    Standing up from a chair using your arms (e.g., wheelchair, bedside chair)? 3  - 3  -JG    Climbing 3-5 steps with a railing? 2  - 2  -JG    To walk in hospital room? 3  - 3  -JG    AM-PAC 6 Clicks Score (PT) 16  - 17  -JG    Highest level of mobility 5 --> Static standing  - 5 --> Static standing  -    Row Name 02/15/23 0917          Functional Assessment    Outcome Measure Options AM-PAC 6 Clicks Basic Mobility (PT)  -           User Key  (r) = Recorded By, (t) = Taken By, (c) = Cosigned By    Initials Name Provider Type    Alix David, RN Registered Nurse     Oanh Groves PT Physical Therapist                             Physical Therapy Education     Title: PT OT SLP Therapies (Done)     Topic: Physical Therapy (Done)     Point: Mobility training (Done)     Learning Progress Summary           Patient Acceptance, E,TB,D, VU,NR by  at 2/15/2023 0918                   Point: Home exercise program (Done)     Learning Progress Summary           Patient Acceptance, E,TB,D, VU,NR by  at 2/15/2023 0918                   Point: Body mechanics (Done)     Learning Progress Summary           Patient Acceptance, E,TB,D, VU,NR by  at 2/15/2023 0918                   Point: Precautions (Done)     Learning Progress Summary           Patient Acceptance, E,TB,D, VU,NR by  at 2/15/2023 0918                               User Key     Initials Effective Dates Name Provider Type Discipline     04/08/22 -  Oanh Groves PT Physical Therapist PT              PT Recommendation and Plan  Planned Therapy Interventions (PT): balance training, bed mobility training, gait training, home exercise program, patient/family education, strengthening, transfer training  Plan of Care Reviewed With: patient, daughter  Outcome Evaluation: Pt is a 89 yo F admitted from home with SOA, wheezing, and B foot swelling - work-up revealed mild volume  overload and B pleural effusions. Pt with BL dementia, pt's daughter present on eval and helpful with hx. Pt has 24/7 care at home between her children and is normally independent with a rollator. Pt has 3 KT with has had 1 recent fall 2/2 tripping. Pt presents to PT with impaired strength, endurance, and balance limiting overall mobility. Pt transferred to EOB with min A x1, STS with CGA, and ambulated 60ft with rwx and CGA. Pt required assist with rwx navigation as pt with difficulty turning - used to a rollator. Pt returned to room and agreeable to sitting UIC to eat breakfast. Pt set up with chair alarm pad under her, but pt's daughter present and attentive - educated to call out for assist and alert nsg if daughter leaves room while pt sitting up, RN aware. PT will continue to follow to progress mobility as tolerated. Anticipate DC back home with 24/7 care and HHPT.     Time Calculation:    PT Charges     Row Name 02/15/23 0918             Time Calculation    Start Time 0817  -      Stop Time 0836  -      Time Calculation (min) 19 min  -      PT Received On 02/15/23  -      PT - Next Appointment 02/16/23  -      PT Goal Re-Cert Due Date 02/22/23  -         Time Calculation- PT    Total Timed Code Minutes- PT 15 minute(s)  -         Timed Charges    17679 - Gait Training Minutes  8  -BH      84435 - PT Therapeutic Activity Minutes 7  -BH         Untimed Charges    PT Eval/Re-eval Minutes 5  -BH         Total Minutes    Timed Charges Total Minutes 15  -BH      Untimed Charges Total Minutes 5  -BH       Total Minutes 20  -BH            User Key  (r) = Recorded By, (t) = Taken By, (c) = Cosigned By    Initials Name Provider Type     Oanh Groves, PT Physical Therapist              Therapy Charges for Today     Code Description Service Date Service Provider Modifiers Qty    68259401936 HC GAIT TRAINING EA 15 MIN 2/15/2023 Oanh Groves PT GP 1    20383429498 HC PT EVAL LOW COMPLEXITY 3  2/15/2023 Oanh Groves, PT GP 1          PT G-Codes  Outcome Measure Options: AM-PAC 6 Clicks Basic Mobility (PT)  AM-PAC 6 Clicks Score (PT): 16  PT Discharge Summary  Anticipated Discharge Disposition (PT): home with 24/7 care, home with home health    Oanh Groves, PT  2/15/2023

## 2023-02-15 NOTE — PLAN OF CARE
Goal Outcome Evaluation:  Plan of Care Reviewed With: patient, family        Progress: improving  Outcome Evaluation: VSS; no complaints of pain or discomfort; pt has been off oxygen during the day today; pt has been up to the chair and BSC with the help of staff and family; Is and Os monitored; echo to be done at bedside tomorrow; pt refused getting echo done in the echo department despite giving reasoning of why it should be done; safety maintained    Daily Care Plan Summary: Heart Failure    Diuretic in use (IV or PO):  p.o.        Daily weight (up or down):    down      Output > Intake (yes/no):yes      O2 Requirements (current, any change?): room air during the day      Symptoms noted with Activity (Respiratory Tolerance, functional state):    none      Anticipated Discharge Plans:    home with family

## 2023-02-15 NOTE — ED NOTES
"Nursing report ED to floor  Mary Shi  89 y.o.  female    HPI :   Chief Complaint   Patient presents with    Shortness of Breath       Admitting doctor:   Jameel Sepulveda MD    Admitting diagnosis:   The primary encounter diagnosis was Acute on chronic congestive heart failure, unspecified heart failure type (HCC). Diagnoses of Hypoxia, Bilateral pleural effusion, Hyperglycemia, Hypermagnesemia, Elevated troponin, and History of dementia were also pertinent to this visit.    Code status:   Current Code Status       Date Active Code Status Order ID Comments User Context       Prior            Allergies:   Codeine and Morphine and related    Isolation:   No active isolations    Intake and Output  No intake or output data in the 24 hours ending 02/14/23 1906    Weight:       02/14/23  1608   Weight: 52.2 kg (115 lb)       Most recent vitals:   Vitals:    02/14/23 1608 02/14/23 1612 02/14/23 1755 02/14/23 1837   BP:  112/65 138/63    Pulse:   74    Resp: 16   22   Temp:    97.9 °F (36.6 °C)   TempSrc:    Oral   SpO2:    98%   Weight: 52.2 kg (115 lb)      Height: 160 cm (63\")          Active LDAs/IV Access:   Lines, Drains & Airways       Active LDAs       Name Placement date Placement time Site Days    Peripheral IV 02/14/23 1753 Right Forearm 02/14/23 1753  Forearm  less than 1    External Urinary Catheter 02/14/23 1759  --  less than 1                    Labs (abnormal labs have a star):   Labs Reviewed   COMPREHENSIVE METABOLIC PANEL - Abnormal; Notable for the following components:       Result Value    Glucose 161 (*)     BUN 33 (*)     BUN/Creatinine Ratio 42.3 (*)     All other components within normal limits    Narrative:     GFR Normal >60  Chronic Kidney Disease <60  Kidney Failure <15    The GFR formula is only valid for adults with stable renal function between ages 18 and 70.   TROPONIN - Abnormal; Notable for the following components:    HS Troponin T 34 (*)     All other components within " normal limits    Narrative:     High Sensitive Troponin T Reference Range:  <10.0 ng/L- Negative Female for AMI  <15.0 ng/L- Negative Male for AMI  >=10 - Abnormal Female indicating possible myocardial injury.  >=15 - Abnormal Male indicating possible myocardial injury.   Clinicians would have to utilize clinical acumen, EKG, Troponin, and serial changes to determine if it is an Acute Myocardial Infarction or myocardial injury due to an underlying chronic condition.        PROTIME-INR - Abnormal; Notable for the following components:    Protime 14.4 (*)     All other components within normal limits   MAGNESIUM - Abnormal; Notable for the following components:    Magnesium 2.4 (*)     All other components within normal limits   BNP (IN-HOUSE) - Normal    Narrative:     Among patients with dyspnea, NT-proBNP is highly sensitive for the detection of acute congestive heart failure. In addition NT-proBNP of <300 pg/ml effectively rules out acute congestive heart failure with 99% negative predictive value.    Results may be falsely decreased if patient taking Biotin.     CBC WITH AUTO DIFFERENTIAL - Normal   APTT - Normal   RAINBOW DRAW    Narrative:     The following orders were created for panel order [place] Draw.  Procedure                               Abnormality         Status                     ---------                               -----------         ------                     Green Top (Gel)[[id number]]                                  Final result               Lavender Top[[id number]]                                     Final result               Gold Top - SST[[id number]]                                   Final result               Light Blue Top[[id number]]                                   Final result                 Please view results for these tests on the individual orders.   HIGH SENSITIVITIY TROPONIN T 2HR   CBC AND DIFFERENTIAL    Narrative:     The following orders were created for panel order CBC &  Differential.  Procedure                               Abnormality         Status                     ---------                               -----------         ------                     CBC Auto Differential[[id number]]        Normal              Final result                 Please view results for these tests on the individual orders.   GREEN TOP   LAVENDER TOP   GOLD TOP - SST   LIGHT BLUE TOP       EKG:   ECG 12 Lead ED Triage Standing Order; SOA   Final Result   HEART RATE= 81  bpm   RR Interval= 741  ms   ND Interval=   ms   P Horizontal Axis=   deg   P Front Axis=   deg   QRSD Interval= 90  ms   QT Interval= 395  ms   QRS Axis= 14  deg   T Wave Axis= 19  deg   - ABNORMAL ECG -   Atrial fibrillation   Anterior infarct, old   No change from previous tracing   Electronically Signed By: Ami Cloud ([facility]) 14-Feb-2023 18:28:12   Date and Time of Study: 2023-02-14 17:42:23          Meds given in ED:   Medications   sodium chloride 0.9 % flush 10 mL (has no administration in time range)   furosemide (LASIX) injection 80 mg (80 mg Intravenous Given 2/14/23 0790)       Imaging results:  No radiology results for the last day    Ambulatory status:   - rollator     Social issues:   Social History     Socioeconomic History    Marital status:     Number of children: 15    Years of education: 8th grade   Tobacco Use    Smoking status: Never    Smokeless tobacco: Never   Vaping Use    Vaping Use: Never used   Substance and Sexual Activity    Alcohol use: Never     Comment: no caffeine     Drug use: Never    Sexual activity: Not Currently     Partners: Female       NIH Stroke Scale:         Trice Perez RN  02/14/23 19:06 EST

## 2023-02-15 NOTE — DISCHARGE PLACEMENT REQUEST
"Mary Figueroa (89 y.o. Female)     Date of Birth   03/03/1925    Social Security Number       Address   [address] [place] [address]    Home Phone   [phone]    MRN   [id number]       [employer]    Marital Status                               Admission Date   2/14/23    Admission Type   Emergency    Admitting Provider   Jameel Sepulveda MD    Attending Provider   Asim Holguin MD    Department, Room/Bed   [address], [address]1       Discharge Date       Discharge Disposition       Discharge Destination                               Attending Provider: Asim Holguin MD    Allergies: Codeine, Morphine And Related    Isolation: None   Infection: None   Code Status: No CPR    Ht: 160 cm (63\")   Wt: 56 kg (123 lb 9 oz)    Admission Cmt: None   Principal Problem: Acute on chronic congestive heart failure, unspecified heart failure type (HCC) [I50.9]                 Active Insurance as of 2/14/2023     Primary Coverage     Payor Plan Insurance Group Employer/Plan Group    MEDICARE MEDICARE A & B      Payor Plan Address Payor Plan Phone Number Payor Plan Fax Number Effective Dates    [address] [phone]  3/1/1990 - None Entered    [facility] [address]       Subscriber Name Subscriber Birth Date Member ID       MARY FIGUEROA 3/3/1925 [id number]           Secondary Coverage     Payor Plan Insurance Group Employer/Plan Group    AARP MC SUP AARP HEALTH CARE OPTIONS      Payor Plan Address Payor Plan Phone Number Payor Plan Fax Number Effective Dates    [facility] [phone]  1/1/2016 - None Entered    [address]       [facility] [address]       Subscriber Name Subscriber Birth Date Member ID       MARY FIGUEROA 3/3/1925 [id number]                 Emergency Contacts      (Rel.) Home Phone Work Phone Mobile Phone    Liat Patel (Daughter) [phone] -- --    GagandeepAiyana (Daughter) [phone] -- " [phone]    Megan Snow (Daughter) [phone] -- [phone]

## 2023-02-15 NOTE — CASE MANAGEMENT/SOCIAL WORK
Discharge Planning Assessment  [facility]     Patient Name: Mary Shi  MRN: [id number]  Today's Date: 2/15/2023    Admit Date: 2/14/2023    Plan: Return home with family assist. Referral pending to [facility]   Discharge Needs Assessment     Row Name 02/15/23 1612       Living Environment    People in Home child(kd), adult    Current Living Arrangements home    Provides Primary Care For no one, unable/limited ability to care for self    Family Caregiver if Needed child(kd), adult    Quality of Family Relationships helpful;involved;supportive    Able to Return to Prior Arrangements yes       Resource/Environmental Concerns    Resource/Environmental Concerns none       Transition Planning    Patient/Family Anticipates Transition to home with help/services;home with family    Patient/Family Anticipated Services at Transition home health care    Transportation Anticipated family or friend will provide       Discharge Needs Assessment    Readmission Within the Last 30 Days no previous admission in last 30 days    Equipment Currently Used at Home pulse ox;oxygen;rollator;cpap;bath bench;grab bar;wheelchair;commode    Concerns to be Addressed discharge planning    Anticipated Changes Related to Illness none    Discharge Facility/Level of Care Needs home with home health               Discharge Plan     Row Name 02/15/23 1613       Plan    Plan Return home with family assist. Referral pending to [facility]    Patient/Family in Agreement with Plan yes    Plan Comments [facility] met with patient and daughter Aiyana at bedside. Introduced self and explained role. Patient lives in a single story home with a ramp to enter. Her children Elver and Megan stay with her, but all of patient's children assist in her care and transportation as needed. Patient typically uses a rollator to ambulate. She fills prescriptions at [facility] pharmacy, sees Dr. Mcbride for PCP, and has LW/AD documents on file in epic. For DME patient has 2.5 L o2  from [facility], CPAP, bath bench, glucometer, pulse ox, grab bars, rollator, wheelchair, and bedside commode. She has used [facility] in the past and denies any REED history. At discharge her family plans for her to return home. They request a referral to [facility]- pending. Aiyana denies any additional discharge planning needs at this time. Trini LARSEN RN/CCP              Continued Care and Services - Admitted Since 2/14/2023     Home Medical Care     Service Provider Request Status Selected Services Address Phone Fax Patient Preferred    [facility] Pending - Request Sent N/A [address] [address] [phone] --              Expected Discharge Date and Time     Expected Discharge Date Expected Discharge Time    Feb 16, 2023          Demographic Summary     Row Name 02/15/23 1611       General Information    Admission Type inpatient    Arrived From home    Required Notices Provided Important Message from Medicare    Referral Source admission list    Reason for Consult discharge planning               Functional Status     Row Name 02/15/23 1611       Functional Status    Usual Activity Tolerance moderate    Current Activity Tolerance fair       Functional Status, IADL    Medications assistive person    Meal Preparation assistive person    Housekeeping assistive person    Laundry assistive person    Shopping assistive person               Psychosocial    No documentation.                Abuse/Neglect    No documentation.                Legal    No documentation.                Substance Abuse    No documentation.                Patient Forms    No documentation.                   Lexi Saab

## 2023-02-15 NOTE — PLAN OF CARE
Goal Outcome Evaluation:  Plan of Care Reviewed With: patient        Progress: improving  Outcome Evaluation: Admit from ER with soa and weight gain. Failed increased dose of po bumex at home. Diuresing well from IV lasix in ER. Daughter at bedside. HX of dementia, pleasant and cooperative, disoriented x3. Maintained on 2.5L N/C while awake, home Cpap in use when sleeping. Telemetry Aflutter. Safety maintained.  Diuretic in Use: Lasix  Response to Diuretics (Output greater than intake): yes  Daily Weight (up or down): family reports weight is up  O2 Requirements: 2.5 N/C , CPAP at night  Functional Status (Activity level, tolerance and respiratory symptoms): poor, fatigues easily  Discharge Plans:  Back home with family

## 2023-02-15 NOTE — PLAN OF CARE
Goal Outcome Evaluation:  Plan of Care Reviewed With: patient, daughter           Outcome Evaluation: Pt is a 89 yo F admitted from home with SOA, wheezing, and B foot swelling - work-up revealed mild volume overload and B pleural effusions. Pt with BL dementia, pt's daughter present on eval and helpful with hx. Pt has 24/7 care at home between her children and is normally independent with a rollator. Pt has 3 KT with has had 1 recent fall 2/2 tripping. Pt presents to PT with impaired strength, endurance, and balance limiting overall mobility. Pt transferred to EOB with min A x1, STS with CGA, and ambulated 60ft with rwx and CGA. Pt required assist with rwx navigation as pt with difficulty turning - used to a rollator. Pt returned to room and agreeable to sitting UIC to eat breakfast. Pt set up with chair alarm pad under her, but pt's daughter present and attentive - educated to call out for assist and alert nsg if daughter leaves room while pt sitting up, RN aware. PT will continue to follow to progress mobility as tolerated. Anticipate DC back home with 24/7 care and HHPT.

## 2023-02-15 NOTE — PROGRESS NOTES
Name: Mary Shi ADMIT: 2023   : 3/3/1925  PCP: Everette Rubio MD    MRN: [id number] LOS: 1 days   AGE/SEX: 89 y.o. female  ROOM: [facility]     Subjective   Subjective   No acute events overnight.  Patient sitting up in a chair, eating breakfast.  Denies shortness of breath, fevers, chills, nausea, vomiting..  Asked.  Daughter at bedside, states she is feeling much better.  Was able to walk with physical therapy.    Review of Systems   As above  Objective   Objective   Vital Signs  Temp:  [97.4 °F (36.3 °C)-98.1 °F (36.7 °C)] 97.5 °F (36.4 °C)  Heart Rate:  [69-82] 74  Resp:  [16-22] 18  BP: (107-152)/(59-92) 129/63  SpO2:  [90 %-98 %] 96 %  on  Flow (L/min):  [2.5] 2.5;   Device (Oxygen Therapy): CPAP  Body mass index is 21.89 kg/m².  Physical Exam    General: Alert, sitting up in the chair, eating breakfast oriented to name only, knows that she is in the hospital.  HEENT: Normocephalic, atraumatic  CV: Irregular rhythm, normal rate   lungs: Decreased at bases, no wheezing,   Abdomen: Soft, nontender, nondistended  Extremities: No significant peripheral edema , no cyanosis   Results Review     I reviewed the patient's new clinical results.  Results from last 7 days   Lab Units 02/15/23  0427 23  1641   WBC 10*3/mm3 6.63 7.22   HEMOGLOBIN g/dL 11.9* 12.3   PLATELETS 10*3/mm3 173 196     Results from last 7 days   Lab Units 02/15/23  0427 23  1641   SODIUM mmol/L 143 142   POTASSIUM mmol/L 4.3 4.1   CHLORIDE mmol/L 101 102   CO2 mmol/L 30.0* 29.0   BUN mg/dL 34* 33*   CREATININE mg/dL 0.88 0.78   GLUCOSE mg/dL 129* 161*   Estimated Creatinine Clearance: 32.3 mL/min (by C-G formula based on SCr of 0.88 mg/dL).  Results from last 7 days   Lab Units 23  1641   ALBUMIN g/dL 3.8   BILIRUBIN mg/dL 0.5   ALK PHOS U/L 113   AST (SGOT) U/L 20   ALT (SGPT) U/L 10     Results from last 7 days   Lab Units 02/15/23  0427 23  1641   CALCIUM mg/dL 9.5 9.3   ALBUMIN g/dL  --  3.8    MAGNESIUM mg/dL  --  2.4*       COVID19   Date Value Ref Range Status   08/27/2022 Detected (C) Not Detected - Ref. Range Final   05/26/2022 Not Detected Not Detected - Ref. Range Final     Glucose   Date/Time Value Ref Range Status   02/15/2023 1142 237 (H) 70 - 130 mg/dL Final     Comment:     Meter: [id number] : [id number] Yamileth Horton NA   02/15/2023 0641 124 70 - 130 mg/dL Final     Comment:     Meter: [id number] : [id number] Troy Yañez CNA       XR Chest 1 View  XR CHEST 1 VW-     HISTORY:  Shortness of breath.     COMPARISON:  Chest radiograph 08/27/2022     FINDINGS:    A single view of the chest was obtained. The cardiac silhouette and  mediastinal and hilar contours are partially obscured. There is an  aortic valve prosthesis. There is calcific aortic atherosclerosis. There  is central pulmonary venous congestion. There are at least moderate  right and trace left pleural effusions, slightly progressed on the right  and new/progressed on the left. Bibasilar airspace opacities have also  slightly progressed and may be due to atelectasis and edema with  superimposed aspiration or pneumonia not excluded. There is multilevel  degenerative disc disease.     This report was finalized on 2/14/2023 5:31 PM by Dr. Ree Morse M.D.       Scheduled Medications  apixaban, 2.5 mg, Oral, Q12H  atorvastatin, 20 mg, Oral, Daily  bumetanide, 1 mg, Oral, Daily  ferrous sulfate, 1 mL, Oral, Daily With Breakfast  insulin lispro, 0-9 Units, Subcutaneous, TID AC  memantine, 10 mg, Oral, Q12H  multivitamin with minerals, 1 tablet, Oral, Daily  pantoprazole, 40 mg, Oral, Q AM  sodium chloride, 10 mL, Intravenous, Q12H    Infusions   Diet  Diet: Cardiac Diets, Diabetic Diets; Healthy Heart (2-3 Na+); Consistent Carbohydrate; Texture: Regular Texture (IDDSI 7); Fluid Consistency: Thin (IDDSI 0)       Assessment/Plan     Active Hospital Problems    Diagnosis  POA   • **Acute on chronic congestive heart  failure, unspecified heart failure type (HCC) [I50.9]  Yes   • Sleep apnea [G47.30]  Yes   • Chronic anticoagulation [Z79.01]  Not Applicable   • Dementia (HCC) [F03.90]  Yes   • Pulmonary hypertension (HCC) [I27.20]  Yes   • Longstanding persistent atrial fibrillation (CMS/HCC) [I48.11]  Yes   • Stroke (CMS/HCC) - history of [I63.9]  Yes   • Type 2 diabetes mellitus with hyperglycemia, without long-term current use of insulin ([facility]) [E11.65]  Yes   • S/P TAVR (transcatheter aortic valve replacement) [Z95.2]  Not Applicable      Resolved Hospital Problems   No resolved problems to display.       Acute on chronic diastolic congestive heart failure, with preserved EFS/P TAVR (transcatheter aortic valve replacement)Pulmonary hypertension  Bilateral pleural effusions on chest x-ray,BNP 1726  Patient on 2-1/2 L oxygen at all times  Supplemental oxygen as needed for hypoxia/respiratory distress to maintain oxygen saturation greater than 90%  Status post IV Lasix.  Home Bumex resumed.  Cardiology consulted.     Longstanding persistent atrial fibrillation: Continue Eliquis, not on rate control at baseline.  Heart rate stable.  Monitor on telemetry.       Type 2 diabetes mellitus with hyperglycemia, without long-term current use of insulin   Continue sliding scale insulin, hypoglycemic protocol.  Last blood  glucose level elevated, however overall controlled.  If continues to be elevated, will start on low-dose long-acting.  Continue to monitor.      History of CVA:Continue home statin       Iron deficiency anemia: Chronic/stable, continue home Iron supplement     Dementia   Chronic   Currently at baseline.e  Continue home memantine     Sleep apnea  Patient home CPAP at bedside  Chronic   Order placed for patient to use home CPAP  Supplemental oxygen as needed for hypoxia/respiratory distress to maintain oxygen saturation greater than 90%.     Discussed with patient  Discussed with daughter  Discussing multidisciplinary  rounds with nursing staff, CCP, pharmacy  DVT prophylax: Apixaban  CODE STATUS: DNR  Disposition to be determined pending clinical progress.  Likely stable to be medically discharged tomorrow.      I wore protective equipment throughout this patient encounter including a face mask, gloves and protective eyewear.  Hand hygiene was performed before donning protective equipment and after removal when leaving the room.      Dictated utilizing Dragon dictation        Asim Holguin MD  [facility]  02/15/23  14:28 EST

## 2023-02-15 NOTE — CONSULTS
Date of Hospital Visit: [unfilled]ENC@  Encounter Provider: Elissa Mcmahan MD  Place of Service: [facility] CARDIOLOGY  Patient Name: Mary Shi  :3/3/1925  Referral Provider: Jameel Sepulveda MD    Chief complaint    Consult for heart failure.    History of Present Illness    She has a history of carotid artery stenosis, left ventricular hypertrophy, hypertension, diabetes mellitus aortic stenosis status post TAVR done in 2016, left MCA stroke in 2019, multiple strokes noted on her MRI at that time in the left occipital and left MCA regions-on Eliquis for this, severe mitral stenosis, chronic HFpEF and dementia.  She is here for increasing short windedness-admitted 2023.    She was having short windedness for 3 to 4 days prior to presentation as well as orthopnea.  Short windedness was mostly exertional and she was taking her Bumex at home.  She had been using intermittently but took it for couple days prior to admission.  Initial vital signs showed blood pressure 112/65 with a heart rate of 81, saturation 1% on room air.  Weight was 123 pounds.  Her initial laboratory values show high-sensitivity troponin 34-23- -11 delta, normal proBNP, glucose 161, otherwise normal CMP, magnesium 2.4, normal CBC.  Chest x-ray showed poor inspiratory effort, there is atelectasis and pulmonary edema.  She was given Lasix and she diuresed a liter of fluid.     Her last echocardiogram done 2022 showed ejection fraction of 64% with grade 2 diastolic dysfunction, moderate mitral stenosis with mean gradient 7.5 mmHg, RVSP 58 mmHg, TAVR in place which appeared normal.    Her breathing is better.  She has no chest pain, nausea or dizziness.  Her appetite is good.      Past Medical History:   Diagnosis Date   • 'light-for-dates' infant with signs of fetal malnutrition    • Abnormal ECG    • Amaurosis fugax    • Anemia    • Aortic stenosis     s/p TAVR    • Aortic valve replaced     Had  TAVR   • Asthma     SOB   • Atrial fibrillation (HCC)    • Cancer ([facility])     skin   • CAP (community acquired pneumonia)    • Carotid artery stenosis     Per duplex 12/16/2016-proximal right internal carotid artery mild stenosis and proximal left moderate stenosis   • Cervical spine disease    • Cognitive disorder    • Congestive heart failure (HCC)    • Coronary artery disease    • Dementia ([facility])    • Diabetes mellitus ([facility])    • Diastolic dysfunction    • Dyspnea on exertion    • Generalized osteoarthritis of multiple sites    • GERD (gastroesophageal reflux disease)    • Gout    • Health care maintenance    • Heart murmur    • Hiatal hernia    • HL (hearing loss)    • Hyperlipidemia    • Hypertension    • Memory loss    • Mitral valve prolapse    • Mitral valve stenosis     Moderate per echocardiogram 2/2017; severe mitral annular calcification   • Nasal lesion    • Nasal stenosis    • 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[facility])    • Osteoarthritis     multiple sites   • PAF (paroxysmal atrial fibrillation) ([facility])    • Paroxysmal atrial fibrillation ([facility]) 09/11/2019   • Patient had no falls in past year    • Peripheral neuropathy    • Peripheral vascular disease ([facility])    • PONV (postoperative nausea and vomiting)    • Rheumatic mitral stenosis    • Shingles    • Sleep apnea    • Stroke ([facility])    • Syncope    • Systolic hypertension    • Type 2 diabetes mellitus with hyperglycemia, without long-term current use of insulin ([facility]) 4/6/2017   • Varicose veins        Past Surgical History:   Procedure Laterality Date   • AORTIC VALVE REPAIR/REPLACEMENT N/A 12/27/2016    Procedure: Transfemoral LEFT Transcatheter Aortic Valve Replacement TRANSESOPHAGEAL ECHOCARDIOGRAM WITH ANESTHESIA;  Surgeon: Eduardo Brown MD;  Location: [facility] 18/19;  Service:    • AORTIC VALVE REPAIR/REPLACEMENT  12/27/2016   • AORTIC VALVE SURGERY      TAVR   • BLADDER REPAIR     • CARDIAC CATHETERIZATION N/A 12/14/2016    Procedure: Right Heart Cath;   Surgeon: Eduardo Brown MD;  Location: [facility] CATH INVASIVE LOCATION;  Service:    • CARDIAC CATHETERIZATION N/A 12/14/2016    Procedure: Coronary angiography;  Surgeon: Eduardo Brown MD;  Location: [facility] CATH INVASIVE LOCATION;  Service:    • HYSTERECTOMY     • KNEE SURGERY         Medications Prior to Admission   Medication Sig Dispense Refill Last Dose   • albuterol sulfate  (90 Base) MCG/ACT inhaler Inhale 2 puffs Every 4 (Four) Hours As Needed for Wheezing (shortness of breath). 6.7 g 0    • atorvastatin (LIPITOR) 20 MG tablet TAKE 1 TABLET BY MOUTH  DAILY 90 tablet 3 2/13/2023 at 2100   • bumetanide (BUMEX) 1 MG tablet Take 1 tablet BY MOUTH ONCE DAILY 90 tablet 0 2/14/2023 at 0900   • Eliquis 2.5 MG tablet tablet TAKE 1 TABLET BY MOUTH  EVERY 12 HOURS 180 tablet 3 2/13/2023   • Ferrous Sulfate (IRON SUPPLEMENT PO) Take  by mouth.      • memantine (NAMENDA XR) 28 MG capsule sustained-release 24 hr extended release capsule TAKE 1 CAPSULE BY MOUTH  DAILY 90 capsule 3 2/14/2023 at 0800   • metFORMIN (GLUCOPHAGE) 500 MG tablet TAKE ONE-HALF TABLET BY  MOUTH TWICE DAILY 90 tablet 3 2/14/2023 at 0900   • Multiple Vitamins-Minerals (MULTIVITAMIN WITH MINERALS) tablet tablet Take 1 tablet by mouth Daily.      • omeprazole (priLOSEC) 20 MG capsule TAKE 1 CAPSULE BY MOUTH  DAILY 90 capsule 3    • polyethyl glycol-propyl glycol (SYSTANE) 0.4-0.3 % solution ophthalmic solution (artificial tears) 2 drops Every 1 (One) Hour As Needed.      • acetaminophen (TYLENOL) 650 MG 8 hr tablet Take 650 mg by mouth Every 8 (Eight) Hours As Needed for Mild Pain .      • dexamethasone (DECADRON) 4 MG tablet Take 1 tablet by mouth 2 (Two) Times a Day With Meals. 8 tablet 0    • Lancets (freestyle) lancets USE TO TEST BLOOD SUGAR DAILY 100 each 4    • OLANZapine (zyPREXA) 2.5 MG tablet Take 2.5 mg by mouth 2 (Two) Times a Day As Needed.      • True Metrix Blood Glucose Test test strip TEST TWO TIMES A  each 11   "      Current Meds  Scheduled Meds:apixaban, 2.5 mg, Oral, Q12H  atorvastatin, 20 mg, Oral, Daily  bumetanide, 1 mg, Oral, Daily  ferrous sulfate, 1 mL, Oral, Daily With Breakfast  insulin lispro, 0-9 Units, Subcutaneous, TID AC  memantine, 10 mg, Oral, Q12H  multivitamin with minerals, 1 tablet, Oral, Daily  pantoprazole, 40 mg, Oral, Q AM  sodium chloride, 10 mL, Intravenous, Q12H      Continuous Infusions:   PRN Meds:.•  albuterol  •  dextrose  •  dextrose  •  glucagon (human recombinant)  •  nitroglycerin  •  sodium chloride  •  sodium chloride  •  sodium chloride    Allergies as of 02/14/2023 - Reviewed 02/14/2023   Allergen Reaction Noted   • Codeine Itching and GI Intolerance 05/21/2016   • Morphine and related Itching 05/21/2016       Social History     Socioeconomic History   • Marital status:    • Number of children: 15   • Years of education: 8th grade   Tobacco Use   • Smoking status: Never     Passive exposure: Never   • Smokeless tobacco: Never   Vaping Use   • Vaping Use: Never used   Substance and Sexual Activity   • Alcohol use: Never     Comment: no caffeine    • Drug use: Never   • Sexual activity: Not Currently     Partners: Female       Family History   Problem Relation Age of Onset   • Heart disease Mother    • Coronary artery disease Mother    • Hyperlipidemia Mother    • Hypertension Mother    • Cancer Sister    • Cancer Brother    • Diabetes Daughter    • Diabetes Daughter    • Cancer Sister    • Cancer Sister    • Cancer Sister    • Diabetes Son        REVIEW OF SYSTEMS:   12 point ROS was performed and is negative except as outlined in HPI        Objective:   Temp:  [97.4 °F (36.3 °C)-98.1 °F (36.7 °C)] 97.5 °F (36.4 °C)  Heart Rate:  [69-82] 75  Resp:  [16-22] 20  BP: (107-152)/(59-92) 107/61  Body mass index is 21.89 kg/m².  Flowsheet Rows    Flowsheet Row First Filed Value   Admission Height 160 cm (63\") Documented at 02/14/2023 1608   Admission Weight 52.2 kg (115 lb) Documented " at 02/14/2023 1608        Vitals:    02/14/23 2321   BP: 107/61   Pulse: 75   Resp: 20   Temp: 97.5 °F (36.4 °C)   SpO2: 90%       General Appearance:    Alert, cooperative, in no acute distress   Head:    Normocephalic, without obvious abnormality, atraumatic   Eyes:            Lids and lashes normal, conjunctivae and sclerae normal, no   icterus, no pallor, corneas clear   Ears:    Ears appear intact with no abnormalities noted   Throat:   No oral lesions, no thrush, oral mucosa moist   Neck:   No adenopathy, supple, trachea midline, no thyromegaly, no   carotid bruit, no JVD   Back:     No kyphosis present, no scoliosis present, no skin lesions, erythema or scars, no tenderness to palpation, range of motion normal   Lungs:     Clear to auscultation,respirations regular, even and unlabored    Heart:    Regular rhythm and normal rate, normal S1 and S2, no murmur, no gallop, no rub, no click   Chest Wall:    No abnormalities observed   Abdomen:     Normal bowel sounds, no masses, no organomegaly, soft, nontender, nondistended, no guarding, no rebound  tenderness   Extremities:   Moves all extremities well, no edema, no cyanosis, no redness   Pulses:   Pulses palpable and equal bilaterally. Normal radial, carotid, dorsalis pedis and posterior tibial pulses bilaterally.    Skin:  Psychiatric:   No bleeding, bruising or rash    Alert and oriented x 3, normal mood and affect                 Lab Review:      Results from last 7 days   Lab Units 02/15/23  0427 02/14/23  1641   SODIUM mmol/L 143 142   POTASSIUM mmol/L 4.3 4.1   CHLORIDE mmol/L 101 102   CO2 mmol/L 30.0* 29.0   BUN mg/dL 34* 33*   CREATININE mg/dL 0.88 0.78   CALCIUM mg/dL 9.5 9.3   BILIRUBIN mg/dL  --  0.5   ALK PHOS U/L  --  113   ALT (SGPT) U/L  --  10   AST (SGOT) U/L  --  20   GLUCOSE mg/dL 129* 161*     Results from last 7 days   Lab Units 02/14/23  1836 02/14/23  1641   HSTROP T ng/L 23* 34*     @LABRCNTbnp@  Results from last 7 days   Lab Units  02/15/23  0427 02/14/23  1641   WBC 10*3/mm3 6.63 7.22   HEMOGLOBIN g/dL 11.9* 12.3   HEMATOCRIT % 36.8 37.6   PLATELETS 10*3/mm3 173 196     Results from last 7 days   Lab Units 02/14/23  1641   INR  1.10   APTT seconds 29.4     Results from last 7 days   Lab Units 02/14/23  1641   MAGNESIUM mg/dL 2.4*     @LABRCNTIP(chol,trig,hdl,ldl)    I personally viewed and interpreted the patient's EKG/Telemetry data  )  Patient Active Problem List   Diagnosis   • Anemia, chronic disease   • Cognitive disorder   • Diabetes mellitus ([facility])   • Generalized osteoarthritis   • Gastroesophageal reflux disease   • Hyperlipidemia   • Essential hypertension   • Carotid artery stenosis   • Polyneuropathy associated with underlying disease ([facility])   • S/P TAVR (transcatheter aortic valve replacement)   • Type 2 diabetes mellitus with hyperglycemia, without long-term current use of insulin ([facility])   • Rheumatic mitral stenosis   • PVC (premature ventricular contraction)   • Acute on chronic diastolic CHF (congestive heart failure) ([facility])   • Weakness   • Altered mental status   • Atrial fibrillation ([facility])   • Stroke (CMS/[facility]) - history of   • Longstanding persistent atrial fibrillation (CMS/[facility])   • Rheumatic aortic stenosis   • Pulmonary hypertension ([facility])   • Hallucinations   • Cellulitis of right upper extremity   • Dementia ([facility])   • Fall   • Acute bilateral low back pain without sciatica   • Acute cystitis without hematuria   • Iron deficiency anemia due to chronic blood loss   • Acute hypoxemic respiratory failure ([facility])   • Chronic anticoagulation   • Pleural effusion on right   • Primary generalized (osteo)arthritis   • Acute on chronic congestive heart failure, unspecified heart failure type ([facility])   • Sleep apnea     Assessment and Plan:    1. Dyspnea, appears to be due to decompensated diastolic heart failure-worsened by valvular heart disease with atrial fibrillation.  She is responded nicely to diuresis.  Will check echocardiogram.   Her vital have been well controlled.  Renal function is normal.  2. Chronic HFpEF  3. Permanent atrial fibrillation, on Eliquis  4. TAVR, stable  5. Mitral stenosis  6. Frailty  7. Dementia      Check echo, no changes, will follow.  Has turned around quickly.       Elissa Mcmahan MD  02/15/23  13:06 EST.  Time spent in reviewing chart, discussion and examination:

## 2023-02-16 ENCOUNTER — READMISSION MANAGEMENT (OUTPATIENT)
Dept: CALL CENTER | Facility: HOSPITAL | Age: 88
End: 2023-02-16
Payer: MEDICARE

## 2023-02-16 ENCOUNTER — APPOINTMENT (OUTPATIENT)
Dept: CARDIOLOGY | Facility: HOSPITAL | Age: 88
DRG: 292 | End: 2023-02-16
Payer: MEDICARE

## 2023-02-16 VITALS
OXYGEN SATURATION: 96 % | BODY MASS INDEX: 21.62 KG/M2 | HEART RATE: 88 BPM | SYSTOLIC BLOOD PRESSURE: 164 MMHG | RESPIRATION RATE: 16 BRPM | TEMPERATURE: 97.8 F | WEIGHT: 122 LBS | DIASTOLIC BLOOD PRESSURE: 74 MMHG | HEIGHT: 63 IN

## 2023-02-16 LAB
ANION GAP SERPL CALCULATED.3IONS-SCNC: 12.2 MMOL/L (ref 5–15)
AORTIC DIMENSIONLESS INDEX: 0.5 (DI)
BH CV ECHO MEAS - AI P1/2T: 402.5 MSEC
BH CV ECHO MEAS - AO MAX PG: 17.4 MMHG
BH CV ECHO MEAS - AO MEAN PG: 9.2 MMHG
BH CV ECHO MEAS - AO V2 MAX: 208.7 CM/SEC
BH CV ECHO MEAS - AO V2 VTI: 42.3 CM
BH CV ECHO MEAS - AVA(I,D): 1.21 CM2
BH CV ECHO MEAS - EDV(CUBED): 64.7 ML
BH CV ECHO MEAS - EDV(MOD-SP2): 60 ML
BH CV ECHO MEAS - EDV(MOD-SP4): 42 ML
BH CV ECHO MEAS - EF(MOD-BP): 67.7 %
BH CV ECHO MEAS - EF(MOD-SP2): 71.7 %
BH CV ECHO MEAS - EF(MOD-SP4): 61.9 %
BH CV ECHO MEAS - ESV(CUBED): 10.7 ML
BH CV ECHO MEAS - ESV(MOD-SP2): 17 ML
BH CV ECHO MEAS - ESV(MOD-SP4): 16 ML
BH CV ECHO MEAS - FS: 45.2 %
BH CV ECHO MEAS - IVS/LVPW: 1.07 CM
BH CV ECHO MEAS - IVSD: 0.81 CM
BH CV ECHO MEAS - LAT PEAK E' VEL: 3.2 CM/SEC
BH CV ECHO MEAS - LV DIASTOLIC VOL/BSA (35-75): 26.8 CM2
BH CV ECHO MEAS - LV MASS(C)D: 92 GRAMS
BH CV ECHO MEAS - LV MAX PG: 4 MMHG
BH CV ECHO MEAS - LV MEAN PG: 1.94 MMHG
BH CV ECHO MEAS - LV SYSTOLIC VOL/BSA (12-30): 10.2 CM2
BH CV ECHO MEAS - LV V1 MAX: 100.1 CM/SEC
BH CV ECHO MEAS - LV V1 VTI: 20 CM
BH CV ECHO MEAS - LVIDD: 4 CM
BH CV ECHO MEAS - LVIDS: 2.2 CM
BH CV ECHO MEAS - LVOT AREA: 2.6 CM2
BH CV ECHO MEAS - LVOT DIAM: 1.81 CM
BH CV ECHO MEAS - LVPWD: 0.76 CM
BH CV ECHO MEAS - MED PEAK E' VEL: 3 CM/SEC
BH CV ECHO MEAS - MR MAX PG: 105.9 MMHG
BH CV ECHO MEAS - MR MAX VEL: 514.6 CM/SEC
BH CV ECHO MEAS - MV A MAX VEL: 112.9 CM/SEC
BH CV ECHO MEAS - MV DEC SLOPE: 965.9 CM/SEC2
BH CV ECHO MEAS - MV DEC TIME: 176 MSEC
BH CV ECHO MEAS - MV E MAX VEL: 201 CM/SEC
BH CV ECHO MEAS - MV E/A: 1.78
BH CV ECHO MEAS - MV MAX PG: 17.1 MMHG
BH CV ECHO MEAS - MV MEAN PG: 7.4 MMHG
BH CV ECHO MEAS - MV P1/2T: 63.6 MSEC
BH CV ECHO MEAS - MV V2 VTI: 49.4 CM
BH CV ECHO MEAS - MVA(P1/2T): 3.5 CM2
BH CV ECHO MEAS - MVA(VTI): 1.04 CM2
BH CV ECHO MEAS - PA ACC TIME: 0.11 SEC
BH CV ECHO MEAS - PA PR(ACCEL): 31.5 MMHG
BH CV ECHO MEAS - PA V2 MAX: 76.7 CM/SEC
BH CV ECHO MEAS - RAP SYSTOLE: 3 MMHG
BH CV ECHO MEAS - RV MAX PG: 1.86 MMHG
BH CV ECHO MEAS - RV V1 MAX: 68.1 CM/SEC
BH CV ECHO MEAS - RV V1 VTI: 14.1 CM
BH CV ECHO MEAS - RVSP: 50 MMHG
BH CV ECHO MEAS - SI(MOD-SP2): 27.4 ML/M2
BH CV ECHO MEAS - SI(MOD-SP4): 16.6 ML/M2
BH CV ECHO MEAS - SV(LVOT): 51.4 ML
BH CV ECHO MEAS - SV(MOD-SP2): 43 ML
BH CV ECHO MEAS - SV(MOD-SP4): 26 ML
BH CV ECHO MEAS - TAPSE (>1.6): 1.9 CM
BH CV ECHO MEAS - TR MAX PG: 42.1 MMHG
BH CV ECHO MEAS - TR MAX VEL: 324.3 CM/SEC
BH CV ECHO MEASUREMENTS AVERAGE E/E' RATIO: 64.84
BH CV XLRA - TDI S': 8.9 CM/SEC
BUN SERPL-MCNC: 41 MG/DL (ref 8–23)
BUN/CREAT SERPL: 45.6 (ref 7–25)
CALCIUM SPEC-SCNC: 9.4 MG/DL (ref 8.2–9.6)
CHLORIDE SERPL-SCNC: 104 MMOL/L (ref 98–107)
CO2 SERPL-SCNC: 29.8 MMOL/L (ref 22–29)
CREAT SERPL-MCNC: 0.9 MG/DL (ref 0.57–1)
DEPRECATED RDW RBC AUTO: 43.1 FL (ref 37–54)
EGFRCR SERPLBLD CKD-EPI 2021: 58.3 ML/MIN/1.73
ERYTHROCYTE [DISTWIDTH] IN BLOOD BY AUTOMATED COUNT: 13.4 % (ref 12.3–15.4)
GLUCOSE BLDC GLUCOMTR-MCNC: 139 MG/DL (ref 70–130)
GLUCOSE SERPL-MCNC: 129 MG/DL (ref 65–99)
HCT VFR BLD AUTO: 34.2 % (ref 34–46.6)
HGB BLD-MCNC: 11.1 G/DL (ref 12–15.9)
MAGNESIUM SERPL-MCNC: 2.2 MG/DL (ref 1.7–2.3)
MAXIMAL PREDICTED HEART RATE: 123 BPM
MCH RBC QN AUTO: 28.6 PG (ref 26.6–33)
MCHC RBC AUTO-ENTMCNC: 32.5 G/DL (ref 31.5–35.7)
MCV RBC AUTO: 88.1 FL (ref 79–97)
PLATELET # BLD AUTO: 181 10*3/MM3 (ref 140–450)
PMV BLD AUTO: 11.1 FL (ref 6–12)
POTASSIUM SERPL-SCNC: 3.6 MMOL/L (ref 3.5–5.2)
RBC # BLD AUTO: 3.88 10*6/MM3 (ref 3.77–5.28)
SODIUM SERPL-SCNC: 146 MMOL/L (ref 136–145)
STRESS TARGET HR: 105 BPM
WBC NRBC COR # BLD: 6.03 10*3/MM3 (ref 3.4–10.8)

## 2023-02-16 PROCEDURE — 82962 GLUCOSE BLOOD TEST: CPT

## 2023-02-16 PROCEDURE — 85027 COMPLETE CBC AUTOMATED: CPT | Performed by: STUDENT IN AN ORGANIZED HEALTH CARE EDUCATION/TRAINING PROGRAM

## 2023-02-16 PROCEDURE — 80048 BASIC METABOLIC PNL TOTAL CA: CPT | Performed by: STUDENT IN AN ORGANIZED HEALTH CARE EDUCATION/TRAINING PROGRAM

## 2023-02-16 PROCEDURE — 25010000002 PERFLUTREN (DEFINITY) 8.476 MG IN SODIUM CHLORIDE (PF) 0.9 % 10 ML INJECTION: Performed by: INTERNAL MEDICINE

## 2023-02-16 PROCEDURE — 99232 SBSQ HOSP IP/OBS MODERATE 35: CPT | Performed by: INTERNAL MEDICINE

## 2023-02-16 PROCEDURE — 93306 TTE W/DOPPLER COMPLETE: CPT | Performed by: INTERNAL MEDICINE

## 2023-02-16 PROCEDURE — 83735 ASSAY OF MAGNESIUM: CPT | Performed by: STUDENT IN AN ORGANIZED HEALTH CARE EDUCATION/TRAINING PROGRAM

## 2023-02-16 PROCEDURE — 93306 TTE W/DOPPLER COMPLETE: CPT

## 2023-02-16 RX ORDER — METOPROLOL SUCCINATE 25 MG/1
12.5 TABLET, EXTENDED RELEASE ORAL NIGHTLY
Status: DISCONTINUED | OUTPATIENT
Start: 2023-02-16 | End: 2023-02-16 | Stop reason: HOSPADM

## 2023-02-16 RX ORDER — METOPROLOL SUCCINATE 25 MG/1
12.5 TABLET, EXTENDED RELEASE ORAL NIGHTLY
Qty: 15 TABLET | Refills: 0 | Status: SHIPPED | OUTPATIENT
Start: 2023-02-16 | End: 2023-03-09 | Stop reason: SDUPTHER

## 2023-02-16 RX ADMIN — PERFLUTREN 3 ML: 6.52 INJECTION, SUSPENSION INTRAVENOUS at 10:39

## 2023-02-16 RX ADMIN — APIXABAN 2.5 MG: 2.5 TABLET, FILM COATED ORAL at 09:01

## 2023-02-16 RX ADMIN — BUMETANIDE 1 MG: 1 TABLET ORAL at 09:01

## 2023-02-16 RX ADMIN — PANTOPRAZOLE SODIUM 40 MG: 40 TABLET, DELAYED RELEASE ORAL at 09:01

## 2023-02-16 RX ADMIN — Medication 10 ML: at 09:02

## 2023-02-16 RX ADMIN — MULTIPLE VITAMINS W/ MINERALS TAB 1 TABLET: TAB at 09:01

## 2023-02-16 RX ADMIN — Medication 15 MG: at 09:00

## 2023-02-16 RX ADMIN — MEMANTINE HYDROCHLORIDE 10 MG: 10 TABLET, FILM COATED ORAL at 09:01

## 2023-02-16 NOTE — DISCHARGE SUMMARY
Patient Name: Mary Shi  : 3/3/1925  MRN: [id number]    Date of Admission: 2023  Date of Discharge:  2023  Primary Care Physician: Everette Rubio MD      Chief Complaint:   Shortness of Breath      Discharge Diagnoses     Active Hospital Problems    Diagnosis  POA   • **Acute on chronic congestive heart failure, unspecified heart failure type (HCC) [I50.9]  Yes   • Sleep apnea [G47.30]  Yes   • Chronic anticoagulation [Z79.01]  Not Applicable   • Dementia (HCC) [F03.90]  Yes   • Pulmonary hypertension (HCC) [I27.20]  Yes   • Longstanding persistent atrial fibrillation (CMS/HCC) [I48.11]  Yes   • Stroke (CMS/HCC) - history of [I63.9]  Yes   • Type 2 diabetes mellitus with hyperglycemia, without long-term current use of insulin (HCC) [E11.65]  Yes   • S/P TAVR (transcatheter aortic valve replacement) [Z95.2]  Not Applicable      Resolved Hospital Problems   No resolved problems to display.        Hospital Course     Ms. Shi is a 89 y o female with a history of pulmonary hypertension, persistent A-fib, CHF, type 2 diabetes mellitus, CVA, and RUBEN, who presents to [facility] with her family who report the patient has been having increasing shortness of breath over the last 3-4 days.  Chest x-ray showed bilateral pleural effusions on chest x-ray,BNP 1726 Patient was diagnosed with acute on chronic diastolic congestive heart failure exacerbation.  Was initiated on IV Lasix drip, with very good response initially, and transition to home Bumex.  Cardiology was consulted, patient underwent repeat echocardiogram on  which showed EF of 70%, moderate pulmonary hypertension.  Patient was initiated on low-dose metoprolol XL, 12.5 mg nightly.  Previously was stopped secondary to fatigue.  Patient to follow-up with cardiology in 1 month.      Longstanding persistent atrial fibrillation: EKG showed atrial fibrillation.  Patient was on Eliquis at home which was continued.   Previously on metoprolol but it was discontinued secondary to fatigue.  Metoprolol XL resumed on at low-dose at 12.5 mg nightly.  Follow-up with cardiology outpatient in 1 month.      At the time of discharge patient was told to take all medications as prescribed, keep all follow-up appointments, and call their doctor or return to the hospital with any worsening or concerning symptoms.           Day of Discharge     Subjective:  No changes.  Remains on room air.  No chest pain, palpitations.  No shortness of breath.    Physical Exam:  Temp:  [97.5 °F (36.4 °C)-97.9 °F (36.6 °C)] 97.8 °F (36.6 °C)  Heart Rate:  [69-88] 88  Resp:  [16] 16  BP: (119-164)/(60-99) 164/74  Body mass index is 21.61 kg/m².  Physical Exam    General: Alert, not in distress, elderly, frail  HEENT: Normocephalic, atraumatic  CV: Irregular rhythm, normal rate  Lungs: Decreased at bases, no wheezing, nonlabored breathing,  Abdomen: Soft, nontender, nondistended  Extremities: No significant peripheral edema , no cyanosis     Consultants     Consult Orders (all) (From admission, onward)     Start     Ordered    02/15/23 1210  Inpatient Cardiology Consult  Once,   Status:  Canceled        Specialty:  Cardiology  Provider:  (Not yet assigned)    02/15/23 1209    02/15/23 1210  Inpatient Cardiology Consult  Once        Specialty:  Cardiology  Provider:  Elissa Mcmahan MD    02/15/23 1209    02/14/23 2302  Inpatient Spiritual Care Consult  Once        Provider:  (Not yet assigned)    02/14/23 2301    02/14/23 1737  [facility] (on-call MD unless specified) Details  Once        Specialty:  Hospitalist  Provider:  Jameel Sepulveda MD    02/14/23 1736              Procedures     * Surgery not found *      Imaging Results (All)     Procedure Component Value Units Date/Time    XR Chest 1 View [[id number]] Collected: 02/14/23 1729     Updated: 02/14/23 1734    Narrative:      XR CHEST 1 VW-     HISTORY:  Shortness of breath.     COMPARISON:  Chest  radiograph 08/27/2022     FINDINGS:    A single view of the chest was obtained. The cardiac silhouette and  mediastinal and hilar contours are partially obscured. There is an  aortic valve prosthesis. There is calcific aortic atherosclerosis. There  is central pulmonary venous congestion. There are at least moderate  right and trace left pleural effusions, slightly progressed on the right  and new/progressed on the left. Bibasilar airspace opacities have also  slightly progressed and may be due to atelectasis and edema with  superimposed aspiration or pneumonia not excluded. There is multilevel  degenerative disc disease.     This report was finalized on 2/14/2023 5:31 PM by Dr. Ree Morse M.D.           Results for orders placed during the hospital encounter of 12/16/16    Duplex Carotid Ultrasound CAR    Interpretation Summary  · Proximal right internal carotid artery mild stenosis.  · Proximal left internal carotid artery moderate stenosis.    Results for orders placed during the hospital encounter of 02/14/23    Adult Transthoracic Echo Complete W/ Cont if Necessary Per Protocol    Interpretation Summary  •  Left ventricular systolic function is hyperdynamic (EF > 70%). Left ventricular ejection fraction appears to be greater than 70%.  •  The left ventricular cavity is small in size.  •  Left ventricular wall thickness is consistent with hypertrophy. Sigmoid-shaped ventricular septum is present.  •  Left ventricular diastolic function was indeterminate.  •  Mildly reduced right ventricular systolic function noted.  •  The right ventricular cavity is mild to moderately dilated.  •  Left atrial volume is severely increased.  •  The right atrial cavity is severely  dilated.  •  Moderate mitral valve stenosis is present.  •  Estimated right ventricular systolic pressure from tricuspid regurgitation is moderately elevated (45-55 mmHg).  •  Moderate pulmonary hypertension is present.  •  There is a moderate sized  right pleural effusion.  •  Trace aortic valve regurgitation is present. 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 The aortic valve peak and mean gradients are within defined limits. The prosthetic aortic valve is normal. [email or web address]    Pertinent Labs     Results from last 7 days   Lab Units 02/16/23  0347 02/15/23  0427 02/14/23  1641   WBC 10*3/mm3 6.03 6.63 7.22   HEMOGLOBIN g/dL 11.1* 11.9* 12.3   PLATELETS 10*3/mm3 181 173 196     Results from last 7 days   Lab Units 02/16/23  0348 02/15/23  0427 02/14/23  1641   SODIUM mmol/L 146* 143 142   POTASSIUM mmol/L 3.6 4.3 4.1   CHLORIDE mmol/L 104 101 102   CO2 mmol/L 29.8* 30.0* 29.0   BUN mg/dL 41* 34* 33*   CREATININE mg/dL 0.90 0.88 0.78   GLUCOSE mg/dL 129* 129* 161*   Estimated Creatinine Clearance: 31.2 mL/min (by C-G formula based on SCr of 0.9 mg/dL).  Results from last 7 days   Lab Units 02/14/23  1641   ALBUMIN g/dL 3.8   BILIRUBIN mg/dL 0.5   ALK PHOS U/L 113   AST (SGOT) U/L 20   ALT (SGPT) U/L 10     Results from last 7 days   Lab Units 02/16/23  0348 02/15/23  0427 02/14/23  1641   CALCIUM mg/dL 9.4 9.5 9.3   ALBUMIN g/dL  --   --  3.8   MAGNESIUM mg/dL 2.2  --  2.4*       Results from last 7 days   Lab Units 02/14/23  1836 02/14/23  1641   HSTROP T ng/L 23* 34*   PROBNP pg/mL  --  1,726.0           Invalid input(s): LDLCALC          Test Results Pending at Discharge       Discharge Details        Discharge Medications      New Medications      Instructions Start Date   metoprolol succinate XL 25 MG 24 hr tablet  Commonly known as: TOPROL-XL   12.5 mg, Oral, Nightly         Continue These Medications      Instructions Start Date   acetaminophen 650 MG 8 hr tablet  Commonly known as: TYLENOL   650 mg, Oral, Every 8 Hours PRN      albuterol sulfate  (90 Base) MCG/ACT inhaler  Commonly known as: PROVENTIL HFA;VENTOLIN HFA;PROAIR HFA   2 puffs, Inhalation, Every 4 Hours PRN      atorvastatin 20 MG tablet  Commonly known as: LIPITOR   20 mg, Oral, Daily       bumetanide 1 MG tablet  Commonly known as: BUMEX   Take 1 tablet BY MOUTH ONCE DAILY      Eliquis 2.5 MG tablet tablet  Generic drug: apixaban   TAKE 1 TABLET BY MOUTH  EVERY 12 HOURS      freestyle lancets   USE TO TEST BLOOD SUGAR DAILY      IRON SUPPLEMENT PO   Oral      memantine 28 MG capsule sustained-release 24 hr extended release capsule  Commonly known as: NAMENDA XR   28 mg, Oral, Daily      metFORMIN 500 MG tablet  Commonly known as: GLUCOPHAGE   TAKE ONE-HALF TABLET BY  MOUTH TWICE DAILY      multivitamin with minerals tablet tablet   1 tablet, Oral, Daily      OLANZapine 2.5 MG tablet  Commonly known as: zyPREXA   2.5 mg, Oral, 2 Times Daily PRN      omeprazole 20 MG capsule  Commonly known as: priLOSEC   20 mg, Oral, Daily      polyethyl glycol-propyl glycol 0.4-0.3 % solution ophthalmic solution (artificial tears)  Commonly known as: SYSTANE   2 drops, Every 1 Hour PRN      True Metrix Blood Glucose Test test strip  Generic drug: glucose blood   TEST TWO TIMES A DAY         Stop These Medications    dexamethasone 4 MG tablet  Commonly known as: DECADRON            Allergies   Allergen Reactions   • Codeine Itching and GI Intolerance   • Morphine And Related Itching       Discharge Disposition:  Home-Health Care c      Discharge Diet:  Diet Order   Procedures   • Diet: Cardiac Diets, Diabetic Diets; Healthy Heart (2-3 Na+); Consistent Carbohydrate; Texture: Regular Texture (IDDSI 7); Fluid Consistency: Thin (IDDSI 0)       Discharge Activity:       CODE STATUS:    Code Status and Medical Interventions:   Ordered at: 02/15/23 0047     Medical Intervention Limits:    NO intubation (DNI)     Level Of Support Discussed With:    Patient    Next of Kin (If No Surrogate)     Code Status (Patient has no pulse and is not breathing):    No CPR (Do Not Attempt to Resuscitate)     Medical Interventions (Patient has pulse or is breathing):    Limited Support     Release to patient:    Routine Release        Future Appointments   Date Time Provider Department Center   2/23/2023  9:30 AM Jerman Burgess MD [facility]   3/14/2023  1:40 PM Elissa Mcmahan MD [facility]     Additional Instructions for the Follow-ups that You Need to Schedule     Ambulatory Referral to Home Health ([facility])   As directed      Face to Face Visit Date: 2/16/2023    Follow-up provider for Plan of Care?: I treated the patient in an acute care facility and will not continue treatment after discharge.    Follow-up provider: JALIL RUBIO [[id number]]    Reason/Clinical Findings: Heart failure exacerbation.    Describe mobility limitations that make leaving home difficult: Generalized weakness.    Nursing/Therapeutic Services Requested: Skilled Nursing Physical Therapy    Skilled nursing orders: CHF management    PT orders: Therapeutic exercise Gait Training Transfer training Strengthening    Weight Bearing Status: As Tolerated    Frequency: 1 Week 1            Contact information for follow-up providers     [facility] .    Specialty: Cardiology  Contact information:  [address]  [facility] [address] [phone]           Jalil Rubio MD. Go on 2/23/2023.    Specialty: Internal Medicine  Why: Appointment on Thursday 2/23/22 at 9:30 AM with Dr. Burgess  Contact information:  [address] [facility] [id number] [phone]             Elissa Mcmahan MD Follow up in 1 month(s).    Specialty: Cardiology  Contact information:  [address]  [facility] [address]  [phone]                   Contact information for after-discharge care     Home Medical Care     [facility] .    Service: Home Health Services  Contact information:  [address]  [facility] [address]  [phone]                             Additional Instructions for the Follow-ups that You Need to Schedule     Ambulatory Referral to Home Health  (Hospital)   As directed      Face to Face Visit Date: 2/16/2023    Follow-up provider for Plan of Care?: I treated the patient in an acute care facility and will not continue treatment after discharge.    Follow-up provider: JALIL BAXTER [[id number]]    Reason/Clinical Findings: Heart failure exacerbation.    Describe mobility limitations that make leaving home difficult: Generalized weakness.    Nursing/Therapeutic Services Requested: Skilled Nursing Physical Therapy    Skilled nursing orders: CHF management    PT orders: Therapeutic exercise Gait Training Transfer training Strengthening    Weight Bearing Status: As Tolerated    Frequency: 1 Week 1           Time Spent on Discharge:  Greater than 30 minutes      Asim Holguin MD  [facility]  02/16/23  14:33 EST

## 2023-02-16 NOTE — CASE MANAGEMENT/SOCIAL WORK
Continued Stay Note  [facility]     Patient Name: Mary Shi  MRN: [id number]  Today's Date: 2/16/2023    Admit Date: 2/14/2023    Plan: Home w/ [facility], family to transport   Discharge Plan     Row Name 02/16/23 1026       Plan    Plan Home w/ [facility], family to transport    Plan Comments [facility] spoke with patient's daughter at bedside regarding updates on the dc plan. Plan remains home w/ [facility]. Amandanet's daughter denies any further dc needs and reports family will be able to transport home. RAINE JEFFRIES               Discharge Codes    No documentation.               Expected Discharge Date and Time     Expected Discharge Date Expected Discharge Time    Feb 16, 2023             RAINE Lindsey

## 2023-02-16 NOTE — PLAN OF CARE
Goal Outcome Evaluation:  Plan of Care Reviewed With: patient        Progress: no change  Outcome Evaluation: Maintained on home CPAP while sleeping, family at bedside. Pt with hx of dementia, family at bedside. Pt  is pleasant and cooperative, alert to self and family. BSC with assist, leonard well. Echo to be done at bedside today.   Safety maintained.Diuretic in Use: Bumex  Response to Diuretics (Output greater than intake): yes  Daily Weight (up or down): down slightly  O2 Requirements: 2.5L N/C, CPAP at night  Functional Status (Activity level, tolerance and respiratory symptoms): fair  Discharge Plans: Home with family, poss today.

## 2023-02-17 ENCOUNTER — TRANSITIONAL CARE MANAGEMENT TELEPHONE ENCOUNTER (OUTPATIENT)
Dept: CALL CENTER | Facility: HOSPITAL | Age: 88
End: 2023-02-17
Payer: MEDICARE

## 2023-02-17 NOTE — CASE MANAGEMENT/SOCIAL WORK
Case Management Discharge Note      Final Note: Home w/ [facility], family transport         Selected Continued Care - Discharged on 2/16/2023 Admission date: 2/14/2023 - Discharge disposition: Home-Health Care Svc    Destination    No services have been selected for the patient.              Durable Medical Equipment    No services have been selected for the patient.              Dialysis/Infusion    No services have been selected for the patient.              Home Medical Care Coordination complete.    Service Provider Selected Services Address Phone Fax Patient Preferred    [facility] [place] [address] [phone] --          Therapy    No services have been selected for the patient.              Community Resources    No services have been selected for the patient.              Community & DME    No services have been selected for the patient.                  Transportation Services  Private: Car    Final Discharge Disposition Code: 06 - home with home health care

## 2023-02-17 NOTE — OUTREACH NOTE
Call Center TCM Note    Flowsheet Row Responses   [facility] patient discharged from? [place]   Does the patient have one of the following disease processes/diagnoses(primary or secondary)? CHF   TCM attempt successful? Yes   Call start time 1549   Call end time 1602   Person spoke with today (if not patient) and relationship Daughter-Aiyana by verbal release of patient.   Meds reviewed with patient/caregiver? Yes   Is the patient having any side effects they believe may be caused by any medication additions or changes? No   Does the patient have all medications ordered at discharge? Yes   Is the patient taking all medications as directed (includes completed medication regime)? Yes   Comments PCP appt 02/27/23 at 1:00 PM.   Does the patient have an appointment with their PCP within 7 days of discharge? Yes   Has home health visited the patient within 72 hours of discharge? Yes   Home health comments  nurse visited today.   DME comments Uses O2 at night with CPAP.   Pulse Ox monitoring Intermittent   Pulse Ox device source Patient   O2 Sat comments 93% on RA currently.   O2 Sat: education provided Sat levels   Psychosocial issues? No   Psychosocial comments Family stays with patient in her own home.   Did the patient receive a copy of their discharge instructions? Yes   Nursing interventions Reviewed instructions with patient   What is the patient's perception of their health status since discharge? Improving   Nursing interventions Nurse provided patient education   Is the patient able to teach back signs and symptoms of worsening condition? (i.e. weight gain, shortness of air, etc.) Yes   If the patient is a current smoker, are they able to teach back resources for cessation? Not a smoker   Is the patient/caregiver able to teach back the hierarchy of who to call/visit for symptoms/problems? PCP, Specialist, Home health nurse, Urgent Care, ED, 911 Yes   Is the patient able to teach back Heart Failure  Zones? Yes   CHF Zone this Call Green Zone   Green Zone Patient reports doing well, No new or worsening shortness of breath, Physical activity level is normal for you, No new swelling -  feet, ankles and legs look normal for you, Weight check stable, No chest pain   Green Zone Interventions Daily weight check, Follow up visits planned, Low sodium diet, Meds as directed   TCM call completed? Yes   Wrap up additional comments Daughter states patient is improving. States has lost 2# since discharge. Denies any edema, SOA, or chest pain. Reports /66 today- nurse visited today. Denies any needs. PCP appt changed to 02/27/23 at 1300. TCM complete.   Call end time 1602   Would this patient benefit from a Referral to [facility] Social Work? No   Is the patient interested in additional calls from an ambulatory ?  NOTE:  applies to high risk patients requiring additional follow-up. No           Mandie Griffith RN    2/17/2023, 16:03 EST

## 2023-02-17 NOTE — OUTREACH NOTE
Call Center TCM Note    Flowsheet Row Responses   [facility] patient discharged from? [place]   Does the patient have one of the following disease processes/diagnoses(primary or secondary)? CHF   TCM attempt successful? No  [No verbal release. Given [phone] (daughter, Aiyana) to reach patient-no answer.]   Unsuccessful attempts Attempt 1   Call Status --  [Left very vague message.]           Mandie Griffith RN    2/17/2023, 15:00 EST

## 2023-02-17 NOTE — OUTREACH NOTE
Prep Survey    Flowsheet Row Responses   [facility] patient discharged from? [place]   Is LACE score < 7 ? No   Eligibility [facility]   Date of Admission 02/14/23   Date of Discharge 02/16/23   Discharge diagnosis Acute on chronic congestive heart failure, unspecified heart failure type    Does the patient have one of the following disease processes/diagnoses(primary or secondary)? CHF   Does the patient have Home health ordered? Yes   What [facility] health agency?  [facility]   Is there a DME ordered? No   Prep survey completed? Yes          Katelyn VIDAL - Registered Nurse

## 2023-02-20 ENCOUNTER — TELEPHONE (OUTPATIENT)
Dept: NEUROLOGY | Facility: CLINIC | Age: 88
End: 2023-02-20

## 2023-02-20 NOTE — TELEPHONE ENCOUNTER
Provider: KENJI INFANTE PA-C    Caller: KIERSTEN    Relationship to Patient: ELIUD    Phone Number: [phone]      Reason for Call: CALLING TO LET CHIVO INFANTE KNOW THAT THEY ADMITTED PT AND IS SENDING ORDERS FOR THE PROVIDER TO SIGN.  ADVISED IF SHE IS UNABLE TO GET A HOLD OF KIERSTEN SHE CAN CALL THE OFFICE [phone].    DOCUMENTING PER [facility] PROTOCOL.

## 2023-02-27 ENCOUNTER — OFFICE VISIT (OUTPATIENT)
Dept: FAMILY MEDICINE CLINIC | Facility: CLINIC | Age: 88
End: 2023-02-27
Payer: MEDICARE

## 2023-02-27 VITALS
WEIGHT: 126.8 LBS | HEIGHT: 63 IN | SYSTOLIC BLOOD PRESSURE: 140 MMHG | BODY MASS INDEX: 22.47 KG/M2 | TEMPERATURE: 98.4 F | RESPIRATION RATE: 18 BRPM | OXYGEN SATURATION: 95 % | HEART RATE: 62 BPM | DIASTOLIC BLOOD PRESSURE: 69 MMHG

## 2023-02-27 DIAGNOSIS — I50.33 ACUTE ON CHRONIC DIASTOLIC CHF (CONGESTIVE HEART FAILURE): ICD-10-CM

## 2023-02-27 DIAGNOSIS — Z09 HOSPITAL DISCHARGE FOLLOW-UP: Primary | ICD-10-CM

## 2023-02-27 DIAGNOSIS — K21.9 GASTROESOPHAGEAL REFLUX DISEASE WITHOUT ESOPHAGITIS: ICD-10-CM

## 2023-02-27 DIAGNOSIS — F03.90 DEMENTIA, UNSPECIFIED DEMENTIA SEVERITY, UNSPECIFIED DEMENTIA TYPE, UNSPECIFIED WHETHER BEHAVIORAL, PSYCHOTIC, OR MOOD DISTURBANCE OR ANXIETY: ICD-10-CM

## 2023-02-27 DIAGNOSIS — E11.9 TYPE 2 DIABETES MELLITUS WITHOUT COMPLICATION, WITHOUT LONG-TERM CURRENT USE OF INSULIN: ICD-10-CM

## 2023-02-27 DIAGNOSIS — I27.20 PULMONARY HYPERTENSION: ICD-10-CM

## 2023-02-27 DIAGNOSIS — E78.49 OTHER HYPERLIPIDEMIA: ICD-10-CM

## 2023-02-27 DIAGNOSIS — Z95.2 S/P TAVR (TRANSCATHETER AORTIC VALVE REPLACEMENT): ICD-10-CM

## 2023-02-27 DIAGNOSIS — I48.21 PERMANENT ATRIAL FIBRILLATION: ICD-10-CM

## 2023-02-27 DIAGNOSIS — I10 ESSENTIAL HYPERTENSION: ICD-10-CM

## 2023-02-27 PROCEDURE — 1111F DSCHRG MED/CURRENT MED MERGE: CPT | Performed by: STUDENT IN AN ORGANIZED HEALTH CARE EDUCATION/TRAINING PROGRAM

## 2023-02-27 PROCEDURE — 99495 TRANSJ CARE MGMT MOD F2F 14D: CPT | Performed by: STUDENT IN AN ORGANIZED HEALTH CARE EDUCATION/TRAINING PROGRAM

## 2023-02-27 NOTE — PROGRESS NOTES
"Chief Complaint  Hospital Follow Up Visit    Subjective        Mary Shi presents to [facility]  History of Present Illness  For hospital follow-up.  Patient was accompanied with her daughter.  Patient was saying that she is feeling much better today.  Denies having any complaint today.  Daughter did have concern regarding her need for oxygen during daytime also.  Patient daughter stated before going to the hospital she was not using oxygen during daytime as much as she is using now.  Review of system is negative for fever, headache, chest pain, shortness of breath, palpitation, nausea, vomiting, any recent change in bladder habits.        Objective   Vital Signs:  /69   Pulse 62   Temp 98.4 °F (36.9 °C)   Resp 18   Ht 160 cm (62.99\")   Wt 57.5 kg (126 lb 12.8 oz)   SpO2 95%   BMI 22.47 kg/m²   Estimated body mass index is 22.47 kg/m² as calculated from the following:    Height as of this encounter: 160 cm (62.99\").    Weight as of this encounter: 57.5 kg (126 lb 12.8 oz).       BMI is within normal parameters. No other follow-up for BMI required.      Physical Exam  HENT:      Head: Normocephalic and atraumatic.   Eyes:      Extraocular Movements: Extraocular movements intact.      Conjunctiva/sclera: Conjunctivae normal.      Pupils: Pupils are equal, round, and reactive to light.   Cardiovascular:      Rate and Rhythm: Normal rate.   Pulmonary:      Effort: Pulmonary effort is normal.      Breath sounds: Normal breath sounds.      Comments: Mild crepitations heard at the lung bases.  Was on continuous 2 L oxygen via nasal cannula  Abdominal:      General: Bowel sounds are normal.      Palpations: Abdomen is soft.   Musculoskeletal:      Cervical back: Normal range of motion.      Comments: Was on wheelchair   Skin:     General: Skin is warm.      Capillary Refill: Capillary refill takes less than 2 seconds.   Neurological:      Mental Status: She is alert. " Mental status is at baseline.   Psychiatric:         Mood and Affect: Mood normal.        Result Review :    CMP    CMP 2/14/23 2/15/23 2/16/23   Glucose 161 (A) 129 (A) 129 (A)   BUN 33 (A) 34 (A) 41 (A)   Creatinine 0.78 0.88 0.90   eGFR 69.2 59.9 (A) 58.3 (A)   Sodium 142 143 146 (A)   Potassium 4.1 4.3 3.6   Chloride 102 101 104   Calcium 9.3 9.5 9.4   Total Protein 6.7     Albumin 3.8     Globulin 2.9     Total Bilirubin 0.5     Alkaline Phosphatase 113     AST (SGOT) 20     ALT (SGPT) 10     Albumin/Globulin Ratio 1.3     BUN/Creatinine Ratio 42.3 (A) 38.6 (A) 45.6 (A)   Anion Gap 11.0 12.0 12.2   (A) Abnormal value       Comments are available for some flowsheets but are not being displayed.           CBC    CBC 2/14/23 2/15/23 2/16/23   WBC 7.22 6.63 6.03   RBC 4.29 4.13 3.88   Hemoglobin 12.3 11.9 (A) 11.1 (A)   Hematocrit 37.6 36.8 34.2   MCV 87.6 89.1 88.1   MCH 28.7 28.8 28.6   MCHC 32.7 32.3 32.5   RDW 13.5 13.7 13.4   Platelets 196 173 181   (A) Abnormal value                TSH    TSH 7/6/22   TSH 3.530                       Current outpatient and discharge medications have been reconciled for the patient.  Reviewed by: Jerman Burgess MD  Assessment and Plan   Diagnoses and all orders for this visit:    1. Hospital discharge follow-up (Primary)  Comments:  Discharge date 2/16/2023, medication, labs all reviewed    2. Gastroesophageal reflux disease without esophagitis    3. Other hyperlipidemia  Comments:  On Lipitor 20 mg p.o. daily    4. Essential hypertension  Comments:  On metoprolol 12.5 mg p.o. daily    5. Type 2 diabetes mellitus without complication, without long-term current use of insulin ([facility])  Comments:  Taking metformin 1/2 tablet twice daily    6. S/P TAVR (transcatheter aortic valve replacement)    7. Acute on chronic diastolic CHF (congestive heart failure) ([facility])  Comments:  Resolved, patient is taking Bumex 1 mg p.o. daily, based on the symptoms, daughter informed they gave half  extra pill and also if she gains weight    8. Permanent atrial fibrillation (HCC)  Comments:  Metoprolol 12.5 mg p.o. daily, continue same    9. Pulmonary hypertension (HCC)    10. Dementia, unspecified dementia severity, unspecified dementia type, unspecified whether behavioral, psychotic, or mood disturbance or anxiety (HCC)  Comments:  On memantine, olanzapine    After reviewing patient medications, labs, hospital notes, patient daughter is informed that there is no need to repeat any labs today.  Patient can come back to see Dr. Jiménez as scheduled.  Advised patient to do spirometry exercises and range of motion exercises if possible         Follow Up   No follow-ups on file.  Patient was given instructions and counseling regarding her condition or for health maintenance advice. Please see specific information pulled into the AVS if appropriate.

## 2023-03-09 RX ORDER — METOPROLOL SUCCINATE 25 MG/1
12.5 TABLET, EXTENDED RELEASE ORAL NIGHTLY
Qty: 15 TABLET | Refills: 0 | Status: SHIPPED | OUTPATIENT
Start: 2023-03-09 | End: 2023-04-08

## 2023-03-09 NOTE — TELEPHONE ENCOUNTER
Caller: [facility] [address] [phone] [facility] [phone] FX    Relationship: Pharmacy    Best call back number: [phone]    Requested Prescriptions:   Requested Prescriptions     Pending Prescriptions Disp Refills   • metoprolol succinate XL (TOPROL-XL) 25 MG 24 hr tablet 15 tablet 0     Sig: Take 0.5 tablets by mouth Every Night for 30 days.        Pharmacy where request should be sent: [facility] [place] [address] [facility] [phone] [facility] [phone] FX     Additional details provided by patient: PHARMACY STATED THEY HAVE NEVER FILLED THIS YET.    Would you like a call back once the refill request has been completed: [] Yes [x] No    If the office needs to give you a call back, can they leave a voicemail: [] Yes [x] No    Fara Cortez Rep   03/09/23 11:54 EST

## 2023-03-19 ENCOUNTER — DOCUMENTATION (OUTPATIENT)
Dept: FAMILY MEDICINE CLINIC | Facility: CLINIC | Age: 88
End: 2023-03-19
Payer: MEDICARE

## 2023-03-19 RX ORDER — NITROFURANTOIN 25; 75 MG/1; MG/1
100 CAPSULE ORAL 2 TIMES DAILY
Qty: 10 CAPSULE | Refills: 0 | Status: SHIPPED | OUTPATIENT
Start: 2023-03-19 | End: 2023-03-24

## 2023-03-20 ENCOUNTER — HOSPITAL ENCOUNTER (OUTPATIENT)
Dept: CARDIOLOGY | Facility: HOSPITAL | Age: 88
Discharge: HOME | End: 2023-03-20
Payer: MEDICARE

## 2023-03-20 VITALS
WEIGHT: 123.6 LBS | BODY MASS INDEX: 22.74 KG/M2 | HEIGHT: 62 IN | HEART RATE: 76 BPM | SYSTOLIC BLOOD PRESSURE: 100 MMHG | DIASTOLIC BLOOD PRESSURE: 50 MMHG

## 2023-03-20 VITALS
BODY MASS INDEX: 22.74 KG/M2 | HEIGHT: 62 IN | WEIGHT: 123.6 LBS | SYSTOLIC BLOOD PRESSURE: 100 MMHG | HEART RATE: 76 BPM | DIASTOLIC BLOOD PRESSURE: 50 MMHG

## 2023-03-20 DIAGNOSIS — I50.32 CHRONIC HEART FAILURE WITH PRESERVED EJECTION FRACTION: ICD-10-CM

## 2023-03-20 DIAGNOSIS — Q20.8 ABNORMALITY OF RIGHT VENTRICLE OF HEART: ICD-10-CM

## 2023-03-20 DIAGNOSIS — I27.20 PULMONARY HYPERTENSION: Primary | ICD-10-CM

## 2023-03-20 DIAGNOSIS — I50.32 CHRONIC HEART FAILURE WITH PRESERVED EJECTION FRACTION: Primary | ICD-10-CM

## 2023-03-20 PROBLEM — I50.9 ACUTE ON CHRONIC CONGESTIVE HEART FAILURE, UNSPECIFIED HEART FAILURE TYPE: Status: RESOLVED | Noted: 2023-02-14 | Resolved: 2023-03-20

## 2023-03-20 PROBLEM — J96.01 ACUTE HYPOXEMIC RESPIRATORY FAILURE: Status: RESOLVED | Noted: 2022-05-27 | Resolved: 2023-03-20

## 2023-03-20 PROCEDURE — 99214 OFFICE O/P EST MOD 30 MIN: CPT | Performed by: INTERNAL MEDICINE

## 2023-03-20 NOTE — PROGRESS NOTES
Heart Failure & Pulmonary Arterial Hypertension Clinic  [facility]  Miko Mota M.D., Ph.D., [facility]       Asim Holguin MD  [address]  [facility] [address]  [place] [address]    Thank you for asking me to see Mary Shi.     History of Present Illness  This is a 89 y.o. female with:    1. PAH  A. Right Atrial:  5  Right Ventricle : 49/8  Pulmonary Artery: 48/15 mean 33  PCWP:   22 prominent V wave  Cardiac output  3.39  Cardiac index  1.98  PVR-266 dynes  2. HFpEF      Mary Shi is a 89 y.o. female who presents today.  The patient is typically seen by Everette Rubio MD.        Patient is referred today for pulmonary hypertension and HFpEF.  Of note, she has a history of severe AS, s/p 23 mm JOSIE TAVR.  He has concomitant moderate mitral stenosis.  She is remained with elevated pulmonary pressures.  He was diagnosed with HFpEF. She was admitted with ADHF in 2/2023. She was on Bumex prior to that admission. She is now weighing daily and getting extra 0.5 tab of Bumex. Her daughters monitor her sodium intake. She has a UTI. She had worsening confusion and hyperactive delirium. Home test was positive, so she started on abx. She doesn't have a lot of UTIs recently. They report it has been 1-1.5 years since she had UTI. They do report she had some diarrhea. They think this was the etiology. She does have dementia. She gets up around 0900. Her daughters wake her up. She eats breakfast. She does feed herself some. They do feed her as much as she needs. She drinks a protein drink in the morning. She then naps with HOB elevated. She has CPAP and O2. She naps from 1-3. She does have lunch and dinner. She does get dyspnea when she eats sometimes. She does communicate during the day. She loves to watch birds and TV. She has afternoon visitors. She is awake from 6-9 PM. She mainly sleeps through the night. She is incontinent. She has a sit in shower and handheld.     Review of  Systems - Review of Systems   Cardiovascular: Positive for dyspnea on exertion. Negative for chest pain, claudication and cyanosis.   Respiratory: Positive for shortness of breath. Negative for cough, hemoptysis, sleep disturbances due to breathing, snoring, sputum production and wheezing.    All other systems reviewed and are negative.        All other systems were reviewed and were negative.    Patient Active Problem List   Diagnosis   • Anemia, chronic disease   • Cognitive disorder   • Diabetes mellitus ([facility])   • Generalized osteoarthritis   • Gastroesophageal reflux disease   • Hyperlipidemia   • Essential hypertension   • Carotid artery stenosis   • Polyneuropathy associated with underlying disease ([facility])   • S/P TAVR (transcatheter aortic valve replacement)   • Type 2 diabetes mellitus with hyperglycemia, without long-term current use of insulin ([facility])   • Rheumatic mitral stenosis   • PVC (premature ventricular contraction)   • Acute on chronic diastolic CHF (congestive heart failure) ([facility])   • Weakness   • Altered mental status   • Atrial fibrillation ([facility])   • Stroke (CMS/[facility]) - history of   • Longstanding persistent atrial fibrillation (CMS/[facility])   • Rheumatic aortic stenosis   • Pulmonary hypertension ([facility])   • Hallucinations   • Cellulitis of right upper extremity   • Dementia ([facility])   • Fall   • Acute bilateral low back pain without sciatica   • Acute cystitis without hematuria   • Iron deficiency anemia due to chronic blood loss   • Chronic anticoagulation   • Pleural effusion on right   • Primary generalized (osteo)arthritis   • Sleep apnea   • Chronic heart failure with preserved ejection fraction ([facility])   • Abnormality of right ventricle of heart       family history includes Cancer in her brother, sister, sister, sister, and sister; Coronary artery disease in her mother; Diabetes in her daughter, daughter, and son; Heart disease in her mother; Hyperlipidemia in her mother; Hypertension in her  mother.     reports that she has never smoked. She has never been exposed to tobacco smoke. She has never used smokeless tobacco. She reports that she does not drink alcohol and does not use drugs.    Allergies   Allergen Reactions   • Codeine Itching and GI Intolerance   • Morphine And Related Itching       Social Determinants of Health     Tobacco Use: Low Risk    • Smoking Tobacco Use: Never   • Smokeless Tobacco Use: Never   • Passive Exposure: Never   Alcohol Use: Not At Risk   • Frequency of Alcohol Consumption: Never   • Average Number of Drinks: Patient does not drink   • Frequency of Binge Drinking: Never   Financial Resource Strain: Low Risk    • Difficulty of Paying Living Expenses: Not hard at all   Food Insecurity: No Food Insecurity   • Worried About Running Out of Food in the Last Year: Never true   • Ran Out of Food in the Last Year: Never true   Transportation Needs: No Transportation Needs   • Lack of Transportation (Medical): No   • Lack of Transportation (Non-Medical): No   Physical Activity: Not on file   Stress: Not on file   Social Connections: Not on file   Intimate Partner Violence: Not on file   Depression: Not at risk   • PHQ-2 Score: 0   Housing Stability: Not on file        Physical Activity: Not on file          Current Outpatient Medications:   •  acetaminophen (TYLENOL) 650 MG 8 hr tablet, Take 1 tablet by mouth Every 8 (Eight) Hours As Needed for Mild Pain., Disp: , Rfl:   •  albuterol sulfate  (90 Base) MCG/ACT inhaler, Inhale 2 puffs Every 4 (Four) Hours As Needed for Wheezing (shortness of breath)., Disp: 6.7 g, Rfl: 0  •  atorvastatin (LIPITOR) 20 MG tablet, TAKE 1 TABLET BY MOUTH  DAILY, Disp: 90 tablet, Rfl: 3  •  bumetanide (BUMEX) 1 MG tablet, Take 1 tablet BY MOUTH ONCE DAILY, Disp: 90 tablet, Rfl: 0  •  Eliquis 2.5 MG tablet tablet, TAKE 1 TABLET BY MOUTH  EVERY 12 HOURS, Disp: 180 tablet, Rfl: 3  •  Ferrous Sulfate (IRON SUPPLEMENT PO), Take  by mouth., Disp: , Rfl:    •  Lancets (freestyle) lancets, USE TO TEST BLOOD SUGAR DAILY, Disp: 100 each, Rfl: 4  •  memantine (NAMENDA XR) 28 MG capsule sustained-release 24 hr extended release capsule, TAKE 1 CAPSULE BY MOUTH  DAILY, Disp: 90 capsule, Rfl: 3  •  metFORMIN (GLUCOPHAGE) 500 MG tablet, TAKE ONE-HALF TABLET BY  MOUTH TWICE DAILY, Disp: 90 tablet, Rfl: 3  •  metoprolol succinate XL (TOPROL-XL) 25 MG 24 hr tablet, Take 0.5 tablets by mouth Every Night for 30 days., Disp: 15 tablet, Rfl: 0  •  Multiple Vitamins-Minerals (MULTIVITAMIN WITH MINERALS) tablet tablet, Take 1 tablet by mouth Daily., Disp: , Rfl:   •  nitrofurantoin, macrocrystal-monohydrate, (Macrobid) 100 MG capsule, Take 1 capsule by mouth 2 (Two) Times a Day for 5 days., Disp: 10 capsule, Rfl: 0  •  omeprazole (priLOSEC) 20 MG capsule, TAKE 1 CAPSULE BY MOUTH  DAILY, Disp: 90 capsule, Rfl: 3  •  polyethyl glycol-propyl glycol (SYSTANE) 0.4-0.3 % solution ophthalmic solution (artificial tears), 2 drops Every 1 (One) Hour As Needed., Disp: , Rfl:   •  True Metrix Blood Glucose Test test strip, TEST TWO TIMES A DAY, Disp: 100 each, Rfl: 11  •  OLANZapine (zyPREXA) 2.5 MG tablet, Take 2.5 mg by mouth 2 (Two) Times a Day As Needed. (Patient not taking: Reported on 3/20/2023), Disp: , Rfl:     Vital Sign Review:     Vitals:    03/20/23 1412   BP: 100/50   Pulse: 76     PP:high  Pulse Ox: normal oxygenation on room air    Body mass index is 22.61 kg/m².      03/20/23  1412   Weight: 56.1 kg (123 lb 9.6 oz)       Physical Exam:    Vitals reviewed.   Constitutional:       General: Not in acute distress.     Appearance: Normal appearance. Frail. Chronically ill-appearing. Not ill-appearing, toxic-appearing or diaphoretic.   Eyes:      Conjunctiva/sclera: Conjunctivae normal.      Pupils: Pupils are equal, round, and reactive to light.   Neck:      Thyroid: No thyromegaly.      Vascular: JVD and JVR present. No hepatojugular reflux. JVD elevated with 8 cm of water.       Lymphadenopathy: No cervical adenopathy.   Pulmonary:      Effort: Pulmonary effort is normal. No tachypnea, bradypnea, accessory muscle usage, prolonged expiration or respiratory distress.      Breath sounds: Normal breath sounds and air entry. No stridor, decreased air movement or transmitted upper airway sounds. No wheezing. No rhonchi. No rales.   Cardiovascular:      PMI at left midclavicular line. Normal rate. Regular rhythm. Normal S1 with normal intensity. loud S2.      Murmurs: There is no murmur.      No gallop. No click. No rub.   Pulses:     Intact distal pulses.   Edema:     Peripheral edema absent.   Neurological:      Mental Status: Alert and oriented to person, place and time. Mental status is at baseline. Disoriented.   Psychiatric:         Behavior: Behavior is cooperative.            DATA REVIEWED:     EKG:  I personally reviewed and interpreted the EKG.      I personally interpreted the EKG.  Atrial fibrillation with anterior infarction.      ---------------------------------------------------  TTE/HENRIQUE:    Results for orders placed during the hospital encounter of 02/14/23    Adult Transthoracic Echo Complete W/ Cont if Necessary Per Protocol    Interpretation Summary  •  Left ventricular systolic function is hyperdynamic (EF > 70%). Left ventricular ejection fraction appears to be greater than 70%.  •  The left ventricular cavity is small in size.  •  Left ventricular wall thickness is consistent with hypertrophy. Sigmoid-shaped ventricular septum is present.  •  Left ventricular diastolic function was indeterminate.  •  Mildly reduced right ventricular systolic function noted.  •  The right ventricular cavity is mild to moderately dilated.  •  Left atrial volume is severely increased.  •  The right atrial cavity is severely  dilated.  •  Moderate mitral valve stenosis is present.  •  Estimated right ventricular systolic pressure from tricuspid regurgitation is moderately elevated (45-55  mmHg).  •  Moderate pulmonary hypertension is present.  •  There is a moderate sized right pleural effusion.  •  Trace aortic valve regurgitation is present. 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 The aortic valve peak and mean gradients are within defined limits. The prosthetic aortic valve is normal. [id number]      My interpretation of the TTE is below.   LVEF greater than 70%.  Right ventricle is dilated with impaired systolic function.  Moderate mitral stenosis.  Pulmonary hypertension.  Pleural effusion.  23 mm TAVR sapient 3 with gradient acceptable.  -----------------------------------------------------  [facility]  I personally reviewed the cardiac catheterization.  Results for orders placed during the hospital encounter of 12/14/16    Cardiac Catheterization/Vascular Study    Narrative  CARDIAC CATHETERIZATION REPORT    Procedure:Left and Right heart catheterization    DATE OF PROCEDURE: 12/14/16    PROCEDURE PERFORMED BY: Eduardo Brown MD, [facility]    INDICATION FOR PROCEDURE: Echo aortic stenosis      FINDINGS:  RIGHT HEART CATHETERIZATION:  Pressures:  Right Atrial:  5  Right Ventricle : 49/8  Pulmonary Artery: 48/15 mean 33  PCWP:   22 prominent V wave  Cardiac output  3.39  Cardiac index  1.98    LEFT HEART CATHETERIZATION:  LEFT VENTRICULOGRAPHY: Not performed.  Severe mitral annular calcification severe aortic valvular calcification  CORONARY ANGIOGRAPHY:  Left main: Normal  Left anterior descending: Normal  Ramus intermedius: Not present  Circumflex: Normal and left dominant  RCA: .Small nondominant normal           ---------------------------------------------------------------------------  CXR/Imaging:         I personally reviewed and interpreted the CXR.  Obtained February 14, 2023.  Cardiac silhouette, hilum, VPW, and mediastinal contours are obscured.  Aortic valve prosthesis.  Moderate right pleural effusion.  Bibasilar airspace  opacity.      --------------------------------------------------------------------------------------------------------------    Laboratory evaluations:    Lab Results   Component Value Date    GLUCOSE 129 (H) 02/16/2023    BUN 41 (H) 02/16/2023    CREATININE 0.90 02/16/2023    EGFRIFNONA 40 (L) 12/16/2021    EGFRIFAFRI 46 (L) 12/16/2021    BCR 45.6 (H) 02/16/2023    K 3.6 02/16/2023    CO2 29.8 (H) 02/16/2023    CALCIUM 9.4 02/16/2023    PROTENTOTREF 7.2 12/16/2021    ALBUMIN 3.8 02/14/2023    LABIL2 1.5 12/16/2021    AST 20 02/14/2023    ALT 10 02/14/2023     Lab Results   Component Value Date    WBC 6.03 02/16/2023    HGB 11.1 (L) 02/16/2023    HCT 34.2 02/16/2023    MCV 88.1 02/16/2023     02/16/2023     Lab Results   Component Value Date    CHOL 167 06/24/2018    CHLPL 155 06/11/2019    TRIG 321 (H) 06/11/2019    HDL 31 (L) 06/11/2019    LDL 60 06/11/2019     Lab Results   Component Value Date    TSH 3.530 07/06/2022     Lab Results   Component Value Date    CKTOTAL 39 10/08/2014    TROPONINT 23 (H) 02/14/2023     Lab Results   Component Value Date    HGBA1C 7.70 (H) 05/28/2022     No results found for: DDIMER  Lab Results   Component Value Date    ALT 10 02/14/2023     Lab Results   Component Value Date    HGBA1C 7.70 (H) 05/28/2022    HGBA1C 7.0 (H) 08/04/2021    HGBA1C 7.0 (H) 01/13/2021     Lab Results   Component Value Date    MICROALBUR 7.3 12/07/2018    CREATININE 0.90 02/16/2023     Lab Results   Component Value Date    IRON 56 06/10/2022    TIBC 383 06/10/2022    FERRITIN 297.00 (H) 05/23/2022     Lab Results   Component Value Date    INR 1.10 02/14/2023    INR 1.30 (H) 02/25/2020    INR 1.00 12/28/2016    PROTIME 14.4 (H) 02/14/2023    PROTIME 15.9 (H) 02/25/2020    PROTIME 12.8 12/28/2016       PAH RISK ASSESSMENT:    Assessment & Plan      1. Pulmonary hypertension (HCC)    2. Chronic heart failure with preserved ejection fraction (HCC)    3. Abnormality of right ventricle of heart      She  presents with a likely WHO-group-2 pulmonary venous hypertension and HFpEF.  She is NYHA stage C; functional class III.  Today, she is slightly hypervolemic  (probably baseline ) and perfused.  She did have a recent decompensation.  However, she has excellent care at home with her family.  She currently has a UTI.  For now, I would like to continue her current dose of Bumex and Toprol XL without additional changes.  We certainly do not want to introduce any medications that could introduce adverse effects.  However, she is experiencing significant exercise intolerance primarily from pulmonary hypertension.  I do not think SGLT2 inhibitor would be the most ideal medication in this situation.  The family is going to weigh her daily and contact us if she has any increasing weight gain or worsening symptoms.  Certainly, we could consider a low-dose PDE 5 inhibitor if she continues to get dyspnea during eating.  For now, we will continue current medications and reassess in 1 month.    ORDERS PLACED TODAY:  No orders of the defined types were placed in this encounter.         MEDS ORDERED TODAY:    No orders of the defined types were placed in this encounter.       MEDS DISCONTINUED TODAY:    There are no discontinued medications.           Return in about 4 weeks (around 4/17/2023).        This document has been electronically signed by Miko Mota MD PhD on March 20, 2023 14:50 EDT          Miko Mota M.D., Ph.D., [facility]  System Medical Director for HF and PAH

## 2023-03-20 NOTE — PROGRESS NOTES
[facility]      Patient Name: Mary Shi  :3/3/1925  Age: 89 y.o.  Sex: female  Referring Provider: Dr. Holguin  Primary Cardiologist: Dr. Mcmahan  Encounter Provider: Cindy Vasquez, PharmD      Chief Complaint: No chief complaint on file.      HPI:  Patient presents with her daughter for their initial visit. PMH is significant for HFpEF (EF > 70%), pulmonary hypertension, aortic stenosis s/p TAVR, afib, asthma, carotid artery stenosis, T2DM, anemia, HLD, HTN, and dementia. She was admitted in the middle of 2023 for increasing shortness of breath and was found to have an acute HF exacerbation. She was diuresed and initiated on metoprolol 12.5 mg nightly. She was on this previously and it was stopped due to fatigue. She has been tolerating this well since being home and switching it to bedtime. Her daughters take wonderful care of her. She weighs daily and has a routine of meals, naps, interaction with guests/birdwatching, etc. Her daughters watch her sodium intake and her symptoms. If she is becoming more symptomatic, they will give her an extra half tablet of Bumex. They usually have her eat more bananas if they do as she is not currently taking a potassium supplement. They did begin to notice more confusion over the last couple of days and called PCP. Symptoms consistent with a UTI and patient was started on Macrobid 2 days ago. No other questions/concerns regarding her medications.      Current Regimen:  Heart Failure  Bumetanide 1 mg daily  Metoprolol succinate 25 mg (0.5 tablet) nightly      Other CV Meds  Atorvastatin 20 mg daily  Eliquis 2.5 mg BID      Medication Use:  Adherence: Good. Missed 0 doses in the last week. Adherence tools used include: family member. Barriers for adherence include: none  Past hx of medication use/intolerance:   Affordability: Traditional Medicare and Supplement      Diet:  Daughters do a great job monitoring sodium intake.  "Reviewed limiting sodium to <2000 mg/day      Social History:  Tobacco use: non-smoker  EtOH use: none  Illicit drug use: no history of illicit drug use  Exercise: limited physical activity due to health/physical limitations      Immunization Status:  Pneumococcal: PCV13 (10/2019)  Influenza: obtains annually  COVID-19: 2/2/21 and 2/23/21  booster 10/26/21      OBJECTIVE:    /50   Pulse 76   Ht 157.5 cm (62\")   Wt 56.1 kg (123 lb 9.6 oz)   BMI 22.61 kg/m²     Body mass index is 22.61 kg/m².  Wt Readings from Last 1 Encounters:   03/20/23 56.1 kg (123 lb 9.6 oz)       Lab Review:  Renal Function: CrCl cannot be calculated (Patient's most recent lab result is older than the maximum 30 days allowed.).    Lab Results   Component Value Date    PROBNP 1,726.0 02/14/2023       Results for orders placed during the hospital encounter of 02/14/23    Adult Transthoracic Echo Complete W/ Cont if Necessary Per Protocol    Interpretation Summary  •  Left ventricular systolic function is hyperdynamic (EF > 70%). Left ventricular ejection fraction appears to be greater than 70%.  •  The left ventricular cavity is small in size.  •  Left ventricular wall thickness is consistent with hypertrophy. Sigmoid-shaped ventricular septum is present.  •  Left ventricular diastolic function was indeterminate.  •  Mildly reduced right ventricular systolic function noted.  •  The right ventricular cavity is mild to moderately dilated.  •  Left atrial volume is severely increased.  •  The right atrial cavity is severely  dilated.  •  Moderate mitral valve stenosis is present.  •  Estimated right ventricular systolic pressure from tricuspid regurgitation is moderately elevated (45-55 mmHg).  •  Moderate pulmonary hypertension is present.  •  There is a moderate sized right pleural effusion.  •  Trace aortic valve regurgitation is present. 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 The aortic valve peak and mean gradients are within defined " limits. The prosthetic aortic valve is normal. [email or web address]      ASSESSMENT/PLAN OF CARE:    1. HFpEF (EF > 70%)  • Patient has been stable since being discharged mid February for an acute HF exacerbation. Daughters weigh her daily and monitor her symptoms/sodium intake. If they feel she begins to become a bit volume overloaded they give an extra 0.5 tablet of Bumex. She has not needed this since being home  • Continue metoprolol succinate 12.5 mg daily. Goal heart rate is 50s to 60s. HR is not at goal. Up-titration limited to soft BP. They do check her BP daily before taking her medications. I question if her BP in clinic today would be consistent at home after she takes her medications. Asked her daughters to alternate checking before her meds and 1-2 hours after. May be able to up-titrate in the future based upon results  • Patient indicated for SGLT2i, however, she is currently being treated for a UTI. Given her age and altered mental status with UTIs, I would hesitate initiating in patient as long as she stays stable. Can reassess at future visit  • Advised patient to call clinic with 2-3 lb weight gain in 24 hrs or >5 lb weight gain in 1 week        Thank you for allowing me to participate in the care of your patient,         Cindy Vasquez, PharmD  [facility]  03/20/23  15:44 EDT

## 2023-03-20 NOTE — PROGRESS NOTES
Patient's family called on Sunday on call, stating Patient had not been acting herself, and took an otc test that showed UTI. Patient's family stating they had left over nitrofurantoin from previous script  and had already started medication for Patient having 4 tabs (twice daily). Script for 5 days sent to pharmacy. Patient's family informed that if Patient not doing better in 24-48 hours to have Patient come into office. Patient's family agreed.

## 2023-03-27 ENCOUNTER — TELEPHONE (OUTPATIENT)
Dept: CARDIOLOGY | Facility: HOSPITAL | Age: 88
End: 2023-03-27
Payer: MEDICARE

## 2023-03-27 NOTE — TELEPHONE ENCOUNTER
Called and spoke to patient's daughter. She states she was having trouble sleeping and so they gave a low dose Benadryl. She states it knocked her out and she has been more agitated today and is now having some problems with sleep. She has taken melatonin historically. They are going to try to get some extended release melatonin OTC today and see if she will go to sleep earlier than normal using this. Discussed that if this does not work then to call PCP for further suggestions.    Cindy Vasquez, PharmD, BCACP  [facility]  Phone: [phone]

## 2023-03-27 NOTE — TELEPHONE ENCOUNTER
----- Message from Fara Edwards Rep sent at 3/27/2023 12:04 PM EDT -----  Patient's daughter Liat called. She would like to speak with you. She mentioned you spoke with her and the patient at the last visit and would like to ask you something.    Liat's call back [phone]    Thanks,  Madhuri PEDERSON [facility]

## 2023-03-28 ENCOUNTER — LAB REQUISITION (OUTPATIENT)
Dept: LAB | Facility: HOSPITAL | Age: 88
End: 2023-03-28
Payer: MEDICARE

## 2023-03-28 ENCOUNTER — TELEPHONE (OUTPATIENT)
Dept: FAMILY MEDICINE CLINIC | Facility: CLINIC | Age: 88
End: 2023-03-28

## 2023-03-28 DIAGNOSIS — N39.0 URINARY TRACT INFECTION, SITE NOT SPECIFIED: ICD-10-CM

## 2023-03-28 LAB
BILIRUB UR QL STRIP: NEGATIVE
CLARITY UR: CLEAR
COLOR UR: YELLOW
GLUCOSE UR STRIP-MCNC: NEGATIVE MG/DL
HGB UR QL STRIP.AUTO: NEGATIVE
KETONES UR QL STRIP: NEGATIVE
LEUKOCYTE ESTERASE UR QL STRIP.AUTO: NEGATIVE
NITRITE UR QL STRIP: NEGATIVE
PH UR STRIP.AUTO: 6 [PH] (ref 5–8)
PROT UR QL STRIP: NEGATIVE
SP GR UR STRIP: 1.01 (ref 1–1.03)
UROBILINOGEN UR QL STRIP: NORMAL

## 2023-03-28 PROCEDURE — 87086 URINE CULTURE/COLONY COUNT: CPT | Performed by: INTERNAL MEDICINE

## 2023-03-28 PROCEDURE — 81003 URINALYSIS AUTO W/O SCOPE: CPT | Performed by: INTERNAL MEDICINE

## 2023-03-28 NOTE — TELEPHONE ENCOUNTER
Spoke rosmery regarding UTI symptoms. Urinalys and culture was sent in by Rosmery. Rosmery stated she would get in touch once the lab report comes back         Ernestina Woodall APRN (Nurse Practitioner)   Family Medicine  Patient's family called on Sunday on call, stating Patient had not been acting herself, and took an otc test that showed UTI. Patient's family stating they had left over nitrofurantoin from previous script  and had already started medication for Patient having 4 tabs (twice daily). Script for 5 days sent to pharmacy. Patient's family informed that if Patient not doing better in 24-48 hours to have Patient come into office. Patient's family agreed

## 2023-03-28 NOTE — TELEPHONE ENCOUNTER
Caller: DARRIAN- [employer]    Relationship: [facility]    Best call back number:[phone]    What orders are you requesting (i.e. lab or imaging): URINE CULTURE    In what timeframe would the patient need to come in: AS SOON AS POSSIBLE    Where will you receive your lab/imaging services:[facility] ALREADY HAS SAMPLE.    Additional notes:

## 2023-03-30 LAB — BACTERIA SPEC AEROBE CULT: NO GROWTH

## 2023-04-05 ENCOUNTER — TELEPHONE (OUTPATIENT)
Dept: FAMILY MEDICINE CLINIC | Facility: CLINIC | Age: 88
End: 2023-04-05

## 2023-04-05 NOTE — TELEPHONE ENCOUNTER
Caller: Aiyana Donis    Relationship: Emergency Contact    Best call back number: [phone]    What medication are you requesting: SEROQUEL, LOWEST DOSAGE    What are your current symptoms: HALLUCINATIONS    Have you had these symptoms before:    [x] Yes  [] No    Have you been treated for these symptoms before:   [x] Yes  [] No    If a prescription is needed, what is your preferred pharmacy and phone number: [facility] [address] [place] [facility] [phone] [facility] [phone] FX     Additional notes: PATIENT'S DAUGHTER STATES PATIENT HAS BEEN EXPERIENCING HALLUCINATIONS HERE RECENTLY AND WAS RECOMMENDED THIS MEDICATION.     PLEASE CALL AND ADVISE.

## 2023-04-05 NOTE — TELEPHONE ENCOUNTER
Daughter says if a video visit is possible to approve med, that will be fine. Mom is too weak to get in car to come for in office appointment

## 2023-04-05 NOTE — TELEPHONE ENCOUNTER
PT'S DAUGHTER MONROE MOHAMUD CALLING TO SEE IF THE PT CAN HAVE SOME MORE SAMPLES OF THE MEDICATION apixaban (ELIQUIS) 2.5 MG tablet tablet? SHE STATED THAT THEY THOUGHT THEY GOT ENOUGH SAMPLES OF THE MEDICATION TO DO THE PT UNTIL THE END OF THE YEAR. PT'S OTHER DAUGHTER ARTHUR AVILA WILL BE PICKING UP THE SAMPLES.    PLEASE CALL HER BACK [phone] TO LET HER KNOW IF THEY CAN GET THE SAMPLES.   Detail Level: Zone

## 2023-04-06 RX ORDER — QUETIAPINE FUMARATE 25 MG/1
25 TABLET, FILM COATED ORAL NIGHTLY
Qty: 30 TABLET | Refills: 5 | Status: SHIPPED | OUTPATIENT
Start: 2023-04-06

## 2023-04-12 RX ORDER — METOPROLOL SUCCINATE 25 MG/1
TABLET, EXTENDED RELEASE ORAL
Qty: 15 TABLET | Refills: 0 | Status: SHIPPED | OUTPATIENT
Start: 2023-04-12

## 2023-04-24 ENCOUNTER — TELEPHONE (OUTPATIENT)
Dept: CARDIOLOGY | Facility: HOSPITAL | Age: 88
End: 2023-04-24
Payer: MEDICARE

## 2023-04-24 DIAGNOSIS — I50.32 CHRONIC HEART FAILURE WITH PRESERVED EJECTION FRACTION: Primary | ICD-10-CM

## 2023-04-24 NOTE — TELEPHONE ENCOUNTER
Updated daughter with the Palliative consult information.        Helder Monroy DNP, MSN, RN  RN Clinical Coordiantor  Psychiatric

## 2023-04-24 NOTE — TELEPHONE ENCOUNTER
Spoke with patient's daughter Liat regarding her mother's blood pressures and overall fatigue.  Daughter reports that weights have not increased and her blood pressures remain consistent.  Daughter requested a palliative care consult for further evaluation of her mother's ongoing treatment plan for her heart failure and dementia.  I consulted with Dr. Mota regarding the wishes of the daughter. A palliative care consult was placed.        Helder Monroy DNP, MSN, RN  RN Clinical Coordiantor  [facility]

## 2023-05-01 ENCOUNTER — HOSPITAL ENCOUNTER (OUTPATIENT)
Dept: CARDIOLOGY | Facility: HOSPITAL | Age: 88
Discharge: HOME | End: 2023-05-01
Admitting: INTERNAL MEDICINE
Payer: MEDICARE

## 2023-05-01 VITALS
SYSTOLIC BLOOD PRESSURE: 120 MMHG | HEART RATE: 75 BPM | DIASTOLIC BLOOD PRESSURE: 50 MMHG | HEIGHT: 62 IN | BODY MASS INDEX: 22.6 KG/M2

## 2023-05-01 DIAGNOSIS — I50.32 CHRONIC HEART FAILURE WITH PRESERVED EJECTION FRACTION: ICD-10-CM

## 2023-05-01 DIAGNOSIS — Q20.8 ABNORMALITY OF RIGHT VENTRICLE OF HEART: ICD-10-CM

## 2023-05-01 DIAGNOSIS — I27.20 PULMONARY HYPERTENSION: Primary | ICD-10-CM

## 2023-05-01 DIAGNOSIS — Z71.89 ADVANCED CARE PLANNING/COUNSELING DISCUSSION: ICD-10-CM

## 2023-05-01 PROCEDURE — G0463 HOSPITAL OUTPT CLINIC VISIT: HCPCS

## 2023-05-01 RX ORDER — UREA 10 %
2.5 LOTION (ML) TOPICAL NIGHTLY PRN
COMMUNITY

## 2023-05-01 RX ORDER — METOPROLOL SUCCINATE 25 MG/1
12.5 TABLET, EXTENDED RELEASE ORAL NIGHTLY
Qty: 45 TABLET | Refills: 0 | Status: SHIPPED | OUTPATIENT
Start: 2023-05-01

## 2023-05-01 RX ORDER — BUMETANIDE 1 MG/1
1 TABLET ORAL DAILY
Qty: 90 TABLET | Refills: 0 | Status: SHIPPED | OUTPATIENT
Start: 2023-05-01

## 2023-05-01 NOTE — PROGRESS NOTES
Heart Failure & Pulmonary Arterial Hypertension Clinic  [facility]  Miko Mota M.D., Ph.D., [facility]       Asim Holguin MD  [address]  [facility] [address]  [place] [address]    Thank you for asking me to see Mary Shi.     History of Present Illness  This is a 89 y.o. female with:    1. PAH  A. Right Atrial:  5  Right Ventricle : 49/8  Pulmonary Artery: 48/15 mean 33  PCWP:   22 prominent V wave  Cardiac output  3.39  Cardiac index  1.98  PVR-266 dynes  2. HFpEF      Mary Shi is a 89 y.o. female who presents today.  The patient is typically seen by Everette Rubio MD.         Of note, she has a history of severe AS, s/p 23 mm JOSIE TAVR.  He has concomitant moderate mitral stenosis.  She has remained with elevated pulmonary pressures.  He was diagnosed with HFpEF. She was admitted with ADHF in 2/2023. She was on Bumex prior to that admission. She is now weighing daily and getting extra 0.5 tab of Bumex. Her daughters monitor her sodium intake. She has a UTI. She had worsening confusion and hyperactive delirium. Home test was positive, so she started on abx. She doesn't have a lot of UTIs recently. They report it has been 1-1.5 years since she had UTI. They do report she had some diarrhea. They think this was the etiology. She does have dementia. She gets up around 0900. Her daughters wake her up. She eats breakfast. She does feed herself some. They do feed her as much as she needs. She drinks a protein drink in the morning. She then naps with HOB elevated. She has CPAP and O2. She naps from 1-3. She does have lunch and dinner. She does get dyspnea when she eats sometimes. She does communicate during the day. She loves to watch birds and TV. She has afternoon visitors. She is awake from 6-9 PM. She mainly sleeps through the night. She is incontinent. She has a sit in shower and handheld.     At her last visit, I continued Bumex and Toprol-XL.  She returns today in  follow-up. She has been more somnolent. At times, they have to wake her up to eat.     Review of Systems - Review of Systems   Cardiovascular: Positive for dyspnea on exertion. Negative for chest pain, claudication and cyanosis.   Respiratory: Positive for shortness of breath. Negative for cough, hemoptysis, sleep disturbances due to breathing, snoring, sputum production and wheezing.    All other systems reviewed and are negative.        All other systems were reviewed and were negative.    Patient Active Problem List   Diagnosis   • Anemia, chronic disease   • Cognitive disorder   • Diabetes mellitus   • Generalized osteoarthritis   • Gastroesophageal reflux disease   • Hyperlipidemia   • Essential hypertension   • Carotid artery stenosis   • Polyneuropathy associated with underlying disease   • S/P TAVR (transcatheter aortic valve replacement)   • Type 2 diabetes mellitus with hyperglycemia, without long-term current use of insulin   • Rheumatic mitral stenosis   • PVC (premature ventricular contraction)   • Acute on chronic diastolic CHF (congestive heart failure) (HCC)   • Weakness   • Altered mental status   • Atrial fibrillation (HCC)   • Stroke (CMS/HCC) - history of   • Longstanding persistent atrial fibrillation (CMS/HCC)   • Rheumatic aortic stenosis   • Pulmonary hypertension (HCC)   • Hallucinations   • Cellulitis of right upper extremity   • Dementia   • Fall   • Acute bilateral low back pain without sciatica   • Acute cystitis without hematuria   • Iron deficiency anemia due to chronic blood loss   • Chronic anticoagulation   • Pleural effusion on right   • Primary generalized (osteo)arthritis   • Sleep apnea   • Chronic heart failure with preserved ejection fraction   • Abnormality of right ventricle of heart       family history includes Cancer in her brother, sister, sister, sister, and sister; Coronary artery disease in her mother; Diabetes in her daughter, daughter, and son; Heart disease in her  mother; Hyperlipidemia in her mother; Hypertension in her mother.     reports that she has never smoked. She has never been exposed to tobacco smoke. She has never used smokeless tobacco. She reports that she does not drink alcohol and does not use drugs.    Allergies   Allergen Reactions   • Codeine Itching and GI Intolerance   • Morphine And Related Itching       Social Determinants of Health     Tobacco Use: Low Risk    • Smoking Tobacco Use: Never   • Smokeless Tobacco Use: Never   • Passive Exposure: Never   Alcohol Use: Not At Risk   • Frequency of Alcohol Consumption: Never   • Average Number of Drinks: Patient does not drink   • Frequency of Binge Drinking: Never   Financial Resource Strain: Low Risk    • Difficulty of Paying Living Expenses: Not hard at all   Food Insecurity: No Food Insecurity   • Worried About Running Out of Food in the Last Year: Never true   • Ran Out of Food in the Last Year: Never true   Transportation Needs: No Transportation Needs   • Lack of Transportation (Medical): No   • Lack of Transportation (Non-Medical): No   Physical Activity: Not on file   Stress: Not on file   Social Connections: Not on file   Intimate Partner Violence: Not on file   Depression: Not at risk   • PHQ-2 Score: 0   Housing Stability: Not on file        Physical Activity: Not on file          Current Outpatient Medications:   •  acetaminophen (TYLENOL) 650 MG 8 hr tablet, Take 1 tablet by mouth Every 8 (Eight) Hours As Needed for Mild Pain., Disp: , Rfl:   •  albuterol sulfate  (90 Base) MCG/ACT inhaler, Inhale 2 puffs Every 4 (Four) Hours As Needed for Wheezing (shortness of breath)., Disp: 6.7 g, Rfl: 0  •  atorvastatin (LIPITOR) 20 MG tablet, TAKE 1 TABLET BY MOUTH  DAILY, Disp: 90 tablet, Rfl: 3  •  bumetanide (BUMEX) 1 MG tablet, Take 1 tablet BY MOUTH ONCE DAILY, Disp: 90 tablet, Rfl: 0  •  Eliquis 2.5 MG tablet tablet, TAKE 1 TABLET BY MOUTH  EVERY 12 HOURS, Disp: 180 tablet, Rfl: 3  •  Ferrous  Sulfate (IRON SUPPLEMENT PO), Take  by mouth., Disp: , Rfl:   •  Lancets (freestyle) lancets, USE TO TEST BLOOD SUGAR DAILY, Disp: 100 each, Rfl: 4  •  memantine (NAMENDA XR) 28 MG capsule sustained-release 24 hr extended release capsule, TAKE 1 CAPSULE BY MOUTH  DAILY, Disp: 90 capsule, Rfl: 3  •  metFORMIN (GLUCOPHAGE) 500 MG tablet, TAKE ONE-HALF TABLET BY  MOUTH TWICE DAILY, Disp: 90 tablet, Rfl: 3  •  metoprolol succinate XL (TOPROL-XL) 25 MG 24 hr tablet, TAKE 1/2 TABLET BY MOUTH NIGHTLY, Disp: 15 tablet, Rfl: 0  •  Multiple Vitamins-Minerals (MULTIVITAMIN WITH MINERALS) tablet tablet, Take 1 tablet by mouth Daily., Disp: , Rfl:   •  OLANZapine (zyPREXA) 2.5 MG tablet, Take 2.5 mg by mouth 2 (Two) Times a Day As Needed. (Patient not taking: Reported on 3/20/2023), Disp: , Rfl:   •  omeprazole (priLOSEC) 20 MG capsule, TAKE 1 CAPSULE BY MOUTH  DAILY, Disp: 90 capsule, Rfl: 3  •  polyethyl glycol-propyl glycol (SYSTANE) 0.4-0.3 % solution ophthalmic solution (artificial tears), 2 drops Every 1 (One) Hour As Needed., Disp: , Rfl:   •  QUEtiapine (SEROquel) 25 MG tablet, Take 1 tablet by mouth Every Night., Disp: 30 tablet, Rfl: 5  •  True Metrix Blood Glucose Test test strip, TEST TWO TIMES A DAY, Disp: 100 each, Rfl: 11    Vital Sign Review:     Vitals:    05/01/23 1403   BP: 120/50   Pulse: 75     PP:high  Pulse Ox: normal oxygenation on room air    Body mass index is 22.6 kg/m².    Physical Exam:    Vitals reviewed.   Constitutional:       General: Not in acute distress.     Appearance: Normal appearance. Frail. Chronically ill-appearing. Not ill-appearing, toxic-appearing or diaphoretic.   Eyes:      Conjunctiva/sclera: Conjunctivae normal.      Pupils: Pupils are equal, round, and reactive to light.   Neck:      Thyroid: No thyromegaly.      Vascular: JVR present. No hepatojugular reflux or JVD. JVD elevated with 7 cm of water.      Lymphadenopathy: No cervical adenopathy.   Pulmonary:      Effort: Pulmonary  effort is normal. No tachypnea, bradypnea, accessory muscle usage, prolonged expiration or respiratory distress.      Breath sounds: Normal breath sounds and air entry. No stridor, decreased air movement or transmitted upper airway sounds. No wheezing. No rhonchi. No rales.   Cardiovascular:      PMI at left midclavicular line. Normal rate. Regular rhythm. S1 with normal intensity. loud S2.      Murmurs: There is no murmur.      No gallop. No click. No rub.   Pulses:     Intact distal pulses.   Edema:     Peripheral edema absent.   Neurological:      General: No focal deficit present.      Mental Status: Mental status is at baseline. Lethargic and disoriented. Exhibits altered mental status.   Psychiatric:         Behavior: Behavior is cooperative.            DATA REVIEWED:     EKG:  I personally reviewed and interpreted the EKG.      I personally interpreted the EKG.     Atrial fibrillation with anterior infarction.      ---------------------------------------------------  TTE/HENRIQUE:    Results for orders placed during the hospital encounter of 02/14/23    Adult Transthoracic Echo Complete W/ Cont if Necessary Per Protocol    Interpretation Summary  •  Left ventricular systolic function is hyperdynamic (EF > 70%). Left ventricular ejection fraction appears to be greater than 70%.  •  The left ventricular cavity is small in size.  •  Left ventricular wall thickness is consistent with hypertrophy. Sigmoid-shaped ventricular septum is present.  •  Left ventricular diastolic function was indeterminate.  •  Mildly reduced right ventricular systolic function noted.  •  The right ventricular cavity is mild to moderately dilated.  •  Left atrial volume is severely increased.  •  The right atrial cavity is severely  dilated.  •  Moderate mitral valve stenosis is present.  •  Estimated right ventricular systolic pressure from tricuspid regurgitation is moderately elevated (45-55 mmHg).  •  Moderate pulmonary hypertension is  present.  •  There is a moderate sized right pleural effusion.  •  Trace aortic valve regurgitation is present. 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 The aortic valve peak and mean gradients are within defined limits. The prosthetic aortic valve is normal. [id number]      My interpretation of the TTE is below.     LVEF is greater than 70%.  RV is dilated with impaired systolic function.  Moderate mitral stenosis.  Pulmonary hypertension.  Pleural effusion.  23 mm TAVR sapient 3 with gradient acceptable.    -----------------------------------------------------  C/[facility]  I personally reviewed the cardiac catheterization.  Results for orders placed during the hospital encounter of 12/14/16    Cardiac Catheterization/Vascular Study    Narrative  CARDIAC CATHETERIZATION REPORT    Procedure:Left and Right heart catheterization    DATE OF PROCEDURE: 12/14/16    PROCEDURE PERFORMED BY: Eduardo Brown MD, [facility]    INDICATION FOR PROCEDURE: Echo aortic stenosis      FINDINGS:  RIGHT HEART CATHETERIZATION:  Pressures:  Right Atrial:  5  Right Ventricle : 49/8  Pulmonary Artery: 48/15 mean 33  PCWP:   22 prominent V wave  Cardiac output  3.39  Cardiac index  1.98    LEFT HEART CATHETERIZATION:  LEFT VENTRICULOGRAPHY: Not performed.  Severe mitral annular calcification severe aortic valvular calcification  CORONARY ANGIOGRAPHY:  Left main: Normal  Left anterior descending: Normal  Ramus intermedius: Not present  Circumflex: Normal and left dominant  RCA: .Small nondominant normal        ---------------------------------------------------------------------------      --------------------------------------------------------------------------------------------------------------    Laboratory evaluations:    Lab Results   Component Value Date    GLUCOSE 129 (H) 02/16/2023    BUN 41 (H) 02/16/2023    CREATININE 0.90 02/16/2023    EGFRIFNONA 40 (L) 12/16/2021    EGFRIFAFRI 46 (L) 12/16/2021    BCR 45.6 (H) 02/16/2023    K 3.6  02/16/2023    CO2 29.8 (H) 02/16/2023    CALCIUM 9.4 02/16/2023    PROTENTOTREF 7.2 12/16/2021    ALBUMIN 3.8 02/14/2023    LABIL2 1.5 12/16/2021    AST 20 02/14/2023    ALT 10 02/14/2023     Lab Results   Component Value Date    WBC 6.03 02/16/2023    HGB 11.1 (L) 02/16/2023    HCT 34.2 02/16/2023    MCV 88.1 02/16/2023     02/16/2023     Lab Results   Component Value Date    CHOL 167 06/24/2018    CHLPL 155 06/11/2019    TRIG 321 (H) 06/11/2019    HDL 31 (L) 06/11/2019    LDL 60 06/11/2019     Lab Results   Component Value Date    TSH 3.530 07/06/2022     Lab Results   Component Value Date    CKTOTAL 39 10/08/2014    TROPONINT 23 (H) 02/14/2023     Lab Results   Component Value Date    HGBA1C 7.70 (H) 05/28/2022     No results found for: DDIMER  Lab Results   Component Value Date    ALT 10 02/14/2023     Lab Results   Component Value Date    HGBA1C 7.70 (H) 05/28/2022    HGBA1C 7.0 (H) 08/04/2021    HGBA1C 7.0 (H) 01/13/2021     Lab Results   Component Value Date    MICROALBUR 7.3 12/07/2018    CREATININE 0.90 02/16/2023     Lab Results   Component Value Date    IRON 56 06/10/2022    TIBC 383 06/10/2022    FERRITIN 297.00 (H) 05/23/2022     Lab Results   Component Value Date    INR 1.10 02/14/2023    INR 1.30 (H) 02/25/2020    INR 1.00 12/28/2016    PROTIME 14.4 (H) 02/14/2023    PROTIME 15.9 (H) 02/25/2020    PROTIME 12.8 12/28/2016       PAH RISK ASSESSMENT:    Assessment & Plan      1. Pulmonary hypertension (HCC)    2. Abnormality of right ventricle of heart    3. Chronic heart failure with preserved ejection fraction    4. Advanced care planning/counseling discussion      1.  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  Favor ongoing treatment of HFpEF, as below.    2/3.  She is NYHA stage C; functional class III.  Today, slightly hypervolemic (baseline) and perfused.  She did have a recent decompensation.  She is receiving ongoing excellent care at home.  She is incontinent, so it is unclear if an SGLT2 inhibitor  would be ideal in this particular situation.  Certainly, we could consider a low-dose PDE 5 inhibitor if she continues to have dyspnea during eating.  Otherwise, continue Bumex and Toprol-XL.    Lifestyle Advice:   - call office if I gain more than 2 pounds in one day or 5 pounds in one week  - keep legs up while sitting  - use salt in moderation  - watch for swelling in feet, ankles and legs every day  - weigh myself daily  -call for concerning s/sx    4. They didn't hear from the [facility] care referral. We will call over and see if they can go ahead and see her.     ORDERS PLACED TODAY:  No orders of the defined types were placed in this encounter.         MEDS ORDERED TODAY:    New Medications Ordered This Visit   Medications   • metoprolol succinate XL (TOPROL-XL) 25 MG 24 hr tablet     Sig: Take 0.5 tablets by mouth Every Night.     Dispense:  45 tablet     Refill:  0   • bumetanide (BUMEX) 1 MG tablet     Sig: Take 1 tablet by mouth Daily.     Dispense:  90 tablet     Refill:  0        MEDS DISCONTINUED TODAY:    Medications Discontinued During This Encounter   Medication Reason   • bumetanide (BUMEX) 1 MG tablet Reorder   • metoprolol succinate XL (TOPROL-XL) 25 MG 24 hr tablet Reorder              Return in about 3 months (around 8/1/2023).          This document has been electronically signed by Miko Mota MD PhD on May 1, 2023 14:27 EDT        Miko Mota M.D., Ph.D., [facility] Heart Failure and Pulmonary Hypertension [facility]  System Medical Director for HF and PAH

## 2023-05-19 ENCOUNTER — DOCUMENTATION (OUTPATIENT)
Dept: FAMILY MEDICINE CLINIC | Facility: CLINIC | Age: 88
End: 2023-05-19
Payer: MEDICARE

## 2023-05-19 RX ORDER — NITROFURANTOIN 25; 75 MG/1; MG/1
100 CAPSULE ORAL 2 TIMES DAILY
Qty: 14 CAPSULE | Refills: 0 | Status: SHIPPED | OUTPATIENT
Start: 2023-05-19 | End: 2023-05-22 | Stop reason: ALTCHOICE

## 2023-05-22 ENCOUNTER — OFFICE VISIT (OUTPATIENT)
Dept: CARDIOLOGY | Facility: CLINIC | Age: 88
End: 2023-05-22
Payer: MEDICARE

## 2023-05-22 VITALS
HEART RATE: 64 BPM | SYSTOLIC BLOOD PRESSURE: 110 MMHG | WEIGHT: 123.2 LBS | DIASTOLIC BLOOD PRESSURE: 60 MMHG | HEIGHT: 62 IN | OXYGEN SATURATION: 94 % | BODY MASS INDEX: 22.67 KG/M2

## 2023-05-22 DIAGNOSIS — I50.32 CHRONIC HEART FAILURE WITH PRESERVED EJECTION FRACTION: ICD-10-CM

## 2023-05-22 DIAGNOSIS — I65.23 BILATERAL CAROTID ARTERY STENOSIS: ICD-10-CM

## 2023-05-22 DIAGNOSIS — I05.0 RHEUMATIC MITRAL STENOSIS: ICD-10-CM

## 2023-05-22 DIAGNOSIS — I63.511 CEREBROVASCULAR ACCIDENT (CVA) DUE TO OCCLUSION OF RIGHT MIDDLE CEREBRAL ARTERY: ICD-10-CM

## 2023-05-22 DIAGNOSIS — F03.90 DEMENTIA, UNSPECIFIED DEMENTIA SEVERITY, UNSPECIFIED DEMENTIA TYPE, UNSPECIFIED WHETHER BEHAVIORAL, PSYCHOTIC, OR MOOD DISTURBANCE OR ANXIETY: ICD-10-CM

## 2023-05-22 DIAGNOSIS — I48.21 PERMANENT ATRIAL FIBRILLATION: ICD-10-CM

## 2023-05-22 DIAGNOSIS — I10 ESSENTIAL HYPERTENSION: ICD-10-CM

## 2023-05-22 DIAGNOSIS — I06.0 RHEUMATIC AORTIC STENOSIS: Primary | ICD-10-CM

## 2023-05-22 DIAGNOSIS — Z95.2 S/P TAVR (TRANSCATHETER AORTIC VALVE REPLACEMENT): ICD-10-CM

## 2023-05-22 NOTE — PROGRESS NOTES
Patient's family called over weekend, stating Patient having UTI symptoms and had an otc positive strip for possible UTI. Patient's family requesting Macrobid. Patient's family informed Patient needs to start coming into office for appropriate rule out of UTI with urinalysis. Script for Macrobid sent to Patient's pharmacy.

## 2023-05-22 NOTE — PROGRESS NOTES
[facility] CARDIOLOGY  [address]  [facility] [address]  [facility] [address]  Phone: [phone]  Fax: [phone]      Patient Name: Mary Shi  :3/3/1925  Age: 89 y.o.  Primary Cardiologist: Elissa Mcmahan MD  Encounter Provider:  RAYRAY Rodarte      Chief Complaint     Fatigue     SUBJECTIVE     History of Present Illness:  Mary Shi is a 89 y.o.  female whose medical history includes dementia, type 2 diabetes, hypertension, hyperlipidemia, obstructive sleep apnea, multiple right MCA infarcts.. They are followed in our office by Dr. Mcmahan for valvular disease, diastolic heart failure, and atrial fibrillation.     Follow-up:  She is here for 6-month follow-up.  In 2023 she was admitted with acute on chronic heart failure; she was diuresed and then discharged to follow-up with the heart failure clinic for moderate pulmonary hypertension.  She has been following with Dr. Mota and everything is stable.  She has been referred to palliative care and her family is in agreement with this.  She tends to sleep most of the time, especially now that she takes 12.5 mg metoprolol succinate nightly.  About every 2 weeks she does have 1 night when she will stay awake and talks most of the night and also does not sleep the following day.  Her family notices that this makes her incredibly weak and so now they have medications to use to try to help her maintain a sleep schedule.  She is also having intermittent diarrhea and constipation; probiotics and prunes seem to help.  She denies chest pain or palpitations.  If she feels a little short of breath they will give her an extra half dose of bumetanide which seems to resolve this.    Below is a summary of pertinent cardiology findings:  • The plan for 2012 echocardiogram showed moderate aortic stenosis, mild mitral stenosis, moderate left atrial dilatation, grade 1A diastolic dysfunction,  moderate concentric left ventricular hypertrophy, and EF 65 to 70%.  Carotid artery Doppler study at that time showed 50 to 60% stenosis of the right internal carotid artery.  • Echocardiogram November 1, 2013 showed EF 63%, moderate concentric left ventricular hypertrophy, grade 1A diastolic dysfunction, moderate mitral insufficiency, moderate mitral stenosis, and moderate aortic stenosis.  • Admitted October 8, 2014 for chest pain and dyspnea.  Chest pain felt to be related to hiatal hernia.  May have been some heart failure although her BNP was not markedly elevated.  Echocardiogram showed grade 2 diastolic dysfunction, EF 67%, moderate concentric left ventricular hypertrophy, moderate mitral stenosis, moderate aortic stenosis, and trivial aortic regurgitation.  • June 2014 stress nuclear perfusion study showed no evidence of ischemia  • 20. 16 she had TAVR with a #23 S3 sapient valve for severe aortic valvular stenosis.  • June 24, 2018 echocardiogram showed EF 60%, severe left atrial enlargement, mild to moderate tricuspid insufficiency, RVSP elevated at 55 mmHg, severe mitral stenosis, mild mitral insufficiency, and the mean mitral valve gradient was 10 mmHg.  This was done when hospitalized for acute on chronic diastolic heart failure.  She responded well to IV diuretics and was discharged home on oral bumetanide.  • Is paralyzed September 2019 with acute left MCA ischemic stroke.  Echocardiogram done September 10, 2019 showed EF 71%, moderate mitral valvular stenosis, mild mitral valve insufficiency, mild aortic insufficiency but TAVR otherwise appeared normal, and mild to moderate pulmonary hypertension.  MRI of the brain showed multiple acute infarcts of the left MCA territory as well as a remote left occipital infarct; there was mild to moderate small vessel disease.  There is no high-grade stenosis or aneurysm.  She was discharged on apixaban.  • Echocardiogram March 20, 2020 showed EF 66% with  normal-appearing TAVR, moderate severe mitral stenosis, severe MAC, mild tricuspid insufficiency and RVSP 62 mmHg.  This was done to evaluate for heart failure.  • Atenolol was stopped in January 2022 for low blood pressure.   • February 2023 she was admitted with acute on chronic heart failure; she was diuresed and then discharged to follow-up with the heart failure clinic for moderate pulmonary hypertension.  She has been following with Dr. Mota and everything is stable.  She has been referred to palliative care and her family is in agreement with this.  • February 2023 echocardiogram showed EF greater than 70%, LV cavity was small in size indeterminate LV diastolic function, mildly reduced RV systolic function with mild to moderately dilated RV cavity, severe biatrial enlargement, moderate mitral valve stenosis, RVSP 45 to 55 mmHg, moderate size right pleural effusion, and 23 mm TAVR valve present with gradients in defined limits.      Past Medical History:   Diagnosis Date   • 'light-for-dates' infant with signs of fetal malnutrition    • Abnormal ECG    • Amaurosis fugax    • Anemia    • Aortic stenosis    • Aortic valve replaced    • Asthma    • Atrial fibrillation    • Cancer    • CAP (community acquired pneumonia)    • Carotid artery stenosis    • Cervical spine disease    • Cognitive disorder    • Congestive heart failure    • Coronary artery disease    • Dementia    • Diabetes mellitus    • Diastolic dysfunction    • Dyspnea on exertion    • Generalized osteoarthritis of multiple sites    • GERD (gastroesophageal reflux disease)    • Gout    • Health care maintenance    • Heart murmur    • Hiatal hernia    • HL (hearing loss)    • Hyperlipidemia    • Hypertension    • Memory loss    • Mitral valve prolapse    • Mitral valve stenosis    • Nasal lesion    • Nasal stenosis    • Neuropathy in diabetes    • Osteoarthritis    • PAF (paroxysmal atrial fibrillation)    • Paroxysmal atrial fibrillation  09/11/2019   • Patient had no falls in past year    • Peripheral neuropathy    • Peripheral vascular disease    • PONV (postoperative nausea and vomiting)    • Rheumatic mitral stenosis    • Shingles    • Sleep apnea    • Stroke    • Syncope    • Systolic hypertension    • Type 2 diabetes mellitus with hyperglycemia, without long-term current use of insulin 4/6/2017   • Varicose veins          Past Surgical History:   Procedure Laterality Date   • AORTIC VALVE REPAIR/REPLACEMENT N/A 12/27/2016    Procedure: Transfemoral LEFT Transcatheter Aortic Valve Replacement TRANSESOPHAGEAL ECHOCARDIOGRAM WITH ANESTHESIA;  Surgeon: Eduardo Brown MD;  Location: [facility] OR 18/19;  Service:    • AORTIC VALVE REPAIR/REPLACEMENT  12/27/2016   • AORTIC VALVE SURGERY      TAVR   • BLADDER REPAIR     • CARDIAC CATHETERIZATION N/A 12/14/2016    Procedure: Right Heart Cath;  Surgeon: Eduardo Brown MD;  Location: [facility] CATH INVASIVE LOCATION;  Service:    • CARDIAC CATHETERIZATION N/A 12/14/2016    Procedure: Coronary angiography;  Surgeon: Eduardo Brown MD;  Location: [facility] CATH INVASIVE LOCATION;  Service:    • HYSTERECTOMY     • KNEE SURGERY           Social History     Socioeconomic History   • Marital status:    • Number of children: 15   • Years of education: 8th grade   Tobacco Use   • Smoking status: Never     Passive exposure: Never   • Smokeless tobacco: Never   Vaping Use   • Vaping Use: Never used   Substance and Sexual Activity   • Alcohol use: Never     Comment: no caffeine    • Drug use: Never   • Sexual activity: Not Currently     Partners: Female         Review of Systems     Review of Systems   Constitutional: Positive for decreased appetite and malaise/fatigue. Negative for fever.   Cardiovascular: Negative for chest pain, claudication, cyanosis, dyspnea on exertion, irregular heartbeat, leg swelling, near-syncope, orthopnea, palpitations, paroxysmal nocturnal dyspnea and syncope.   Respiratory:  Negative for shortness of breath.    Genitourinary: Negative.          Medications     Allergies as of 05/22/2023 - Reviewed 05/22/2023   Allergen Reaction Noted   • Codeine Itching and GI Intolerance 05/21/2016   • Morphine and related Itching 05/21/2016         Current Outpatient Medications:   •  acetaminophen (TYLENOL) 650 MG 8 hr tablet, Take 1 tablet by mouth Every 8 (Eight) Hours As Needed for Mild Pain., Disp: , Rfl:   •  albuterol sulfate  (90 Base) MCG/ACT inhaler, Inhale 2 puffs Every 4 (Four) Hours As Needed for Wheezing (shortness of breath)., Disp: 6.7 g, Rfl: 0  •  atorvastatin (LIPITOR) 20 MG tablet, TAKE 1 TABLET BY MOUTH  DAILY, Disp: 90 tablet, Rfl: 3  •  bumetanide (BUMEX) 1 MG tablet, Take 1 tablet by mouth Daily., Disp: 90 tablet, Rfl: 0  •  Eliquis 2.5 MG tablet tablet, TAKE 1 TABLET BY MOUTH  EVERY 12 HOURS, Disp: 180 tablet, Rfl: 3  •  Ferrous Sulfate (IRON SUPPLEMENT PO), Take  by mouth., Disp: , Rfl:   •  Lancets (freestyle) lancets, USE TO TEST BLOOD SUGAR DAILY, Disp: 100 each, Rfl: 4  •  memantine (NAMENDA XR) 28 MG capsule sustained-release 24 hr extended release capsule, TAKE 1 CAPSULE BY MOUTH  DAILY, Disp: 90 capsule, Rfl: 3  •  metFORMIN (GLUCOPHAGE) 500 MG tablet, TAKE ONE-HALF TABLET BY  MOUTH TWICE DAILY, Disp: 90 tablet, Rfl: 3  •  metoprolol succinate XL (TOPROL-XL) 25 MG 24 hr tablet, Take 0.5 tablets by mouth Every Night., Disp: 45 tablet, Rfl: 0  •  Multiple Vitamins-Minerals (MULTIVITAMIN WITH MINERALS) tablet tablet, Take 1 tablet by mouth Daily., Disp: , Rfl:   •  OLANZapine (zyPREXA) 2.5 MG tablet, Take 1 tablet by mouth 2 (Two) Times a Day As Needed., Disp: , Rfl:   •  omeprazole (priLOSEC) 20 MG capsule, TAKE 1 CAPSULE BY MOUTH  DAILY, Disp: 90 capsule, Rfl: 3  •  polyethyl glycol-propyl glycol (SYSTANE) 0.4-0.3 % solution ophthalmic solution (artificial tears), 2 drops Every 1 (One) Hour As Needed., Disp: , Rfl:   •  True Metrix Blood Glucose Test test strip,  "TEST TWO TIMES A DAY, Disp: 100 each, Rfl: 11        OBJECTIVE     Vital Signs:   /60   Pulse 64   Ht 157.5 cm (62.01\")   Wt 55.9 kg (123 lb 3.2 oz)   SpO2 94%   BMI 22.53 kg/m²       Weight:  Wt Readings from Last 3 Encounters:   05/22/23 55.9 kg (123 lb 3.2 oz)   03/20/23 56.1 kg (123 lb 9.6 oz)   03/20/23 56.1 kg (123 lb 9.6 oz)     Body mass index is 22.53 kg/m².        Physical Exam     Physical Exam  Constitutional:       General: She is not in acute distress.     Appearance: She is ill-appearing.   HENT:      Head: Normocephalic and atraumatic.      Mouth/Throat:      Mouth: Mucous membranes are moist.   Eyes:      General: No scleral icterus.     Extraocular Movements: Extraocular movements intact.      Conjunctiva/sclera: Conjunctivae normal.      Pupils: Pupils are equal, round, and reactive to light.   Cardiovascular:      Rate and Rhythm: Normal rate. Rhythm irregularly irregular.      Pulses: Normal pulses.      Heart sounds: S1 normal and S2 normal. Murmur heard.      Comments: II/VI murmur  Pulmonary:      Effort: No respiratory distress.      Breath sounds: Normal breath sounds. No wheezing, rhonchi or rales.   Abdominal:      General: Bowel sounds are normal. There is no distension.      Palpations: Abdomen is soft.      Tenderness: There is no abdominal tenderness.   Musculoskeletal:         General: Normal range of motion.      Cervical back: Normal range of motion and neck supple.      Right lower leg: No edema.      Left lower leg: No edema.   Skin:     General: Skin is warm and dry.      Coloration: Skin is not jaundiced.   Neurological:      Mental Status: She is alert and oriented to person, place, and time.   Psychiatric:         Mood and Affect: Mood normal.         Reviewed Data     Result Review :   The following data was reviewed by: RAYRAY Rodarte on 06/14/23:  · Labs on December 16, 2021 showed creatinine 1.2, potassium 4.3,  but other LFTs normal, " and hemoglobin 10.5 but CBC otherwise unremarkable.    · Labs 08/27/2022:  cr 0.7, K 4.4, otherwise unremarkable CMP, proBNP 1346, troponin less than 0.010, hemoglobin 10.9,   · 2/16/2023: cr 0.9, K 3.6, otherwise unremarkable BMP, Hgb 11.1,         ECG 12 Lead    Date/Time: 5/22/2023 2:22 PM  Performed by: Hortensia Grajeda APRN  Authorized by: Hortensia Grajeda APRN   Comparison: compared with previous ECG from 2/14/2023  Similar to previous ECG  Rhythm: atrial fibrillation  Ectopy: unifocal PVCs  Rate: normal  BPM: 62  Q waves: V1, V2 and V3      Clinical impression: abnormal EKG            Assessment and Plan        Assessment and Plan      Assessment:  1. Rheumatic aortic stenosis    2. S/P TAVR (transcatheter aortic valve replacement)    3. Chronic heart failure with preserved ejection fraction    4. Rheumatic mitral stenosis    5. Cerebrovascular accident (CVA) due to occlusion of right middle cerebral artery    6. Bilateral carotid artery stenosis    7. Essential hypertension    8. Permanent atrial fibrillation    9. Dementia, unspecified dementia severity, unspecified dementia type, unspecified whether behavioral, psychotic, or mood disturbance or anxiety         1. Aortic stenosis: S/P TAVR (#23 S3 Rubina) for aortic stenosis in December 2016.  Echocardiogram in May 2022 showed EF 64%, grade 2 with high LAP LV diastolic dysfunction, severe MAC with moderate mitral valve stenosis and mean gradient 7.5 mmHg.  Moderate to severe pulmonary hypertension, TAVR valve present and gradients are within defined limits.  She is sleeping more often but appears stable.  2. Chronic diastolic heart failure: Bumex was stopped in May 2022 to see if her fatigue improved, however she had acute on chronic diastolic heart failure and required hospitalization.  She remains on 1 mg Bumex daily.  Focus is on keeping her comfortable now.  3. Mitral valve stenosis: moderate to severe on  echocardiogram on 3/20/22; was stable on echocardiogram from May 2022.  Murmur appreciated today.  4. History of Right MCA stroke: she is on apixaban.   5. Bilateral carotid artery disease: She remains on Lipitor and apixaban.  6. Hypertension: controlled on metoprolol tartrate.  7. Dementia: She lives with family.  She is pleasant and oriented to person; she is sleeping more and there are plans for hospice evaluation. She is treated with Namenda.  8. Atrial fibrillation: HR controlled on Toprol. She is anticoagulated with apixaban 2.5 mg BID.     Plan:  1. Her family is doing a very good job of managing her symptoms and I agree that Hospice will only help provide them with additional resources; I will make no medication changes today.  2. She will follow-up with Dr. Mcmahan in 6 months.    Atrial Fibrillation and Atrial Flutter  Assessment  • The patient has permanent atrial fibrillation  • This is valvular in etiology  • The patient's CHADS2-VASc score is 9  • A [id number] score of 2 or more is considered a high risk for a thromboembolic event  • Apixaban prescribed    Plan  • Continue in atrial fibrillation with rate control  • Continue apixaban for antithrombotic therapy, bleeding issues discussed     Heart Failure  Assessment  • NYHA class III-A - There is limitation of physical activity. The patient is comfortable at rest, but ordinary activity causes fatigue, palpitations or shortness of breath.  • Diuretics prescribed  • The most recent ejection fraction is 60%  • Left ventricular function is normal by qualitative assessment  • The left ventricle was last assessed on 3/20/2020    Plan  • The patient has received heart failure education on the following topics: prognosis/end-of-life issues, dietary sodium restriction, minimizing or avoiding NSAID use, symptom management, physical activity and weight monitoring  • The heart failure care plan was discussed with the patient today including: continuing the  current program and lifestyle modifications  •  The patient was not counseled about ICD or CRT-D implantation    Subjective/Objective    • Physical exam findings negative for rales.          Follow Up:   Return in about 6 months (around 11/22/2023) for Follow-up with Dr. Mcmahan.  Orders Placed This Encounter   Procedures   • Compression Stockings   • ECG 12 Lead          I appreciate the opportunity to participate in this patient's care.      Thank you,  RAYRAY Fatima

## 2023-05-23 ENCOUNTER — TELEPHONE (OUTPATIENT)
Dept: FAMILY MEDICINE CLINIC | Facility: CLINIC | Age: 88
End: 2023-05-23

## 2023-05-23 NOTE — TELEPHONE ENCOUNTER
Caller: Monroe Donis    Relationship: Emergency Contact    Best call back number: [phone]    What is the best time to reach you: ANYTIME    Who are you requesting to speak with (clinical staff, provider,  specific staff member): CLINICAL STAFF    Do you know the name of the person who called: MONROE    What was the call regarding: THE PATIENT'S DAUGHTER WOULD LIKE TO KNOW WHEN OR IF THE PATIENT NEEDS TO COME IN SOON FOR A CHECK-UP WITH LABS. THE PATIENT WAS SEEN BACK IN February OF THIS YEAR FOR A HOSPITAL FOLLOW-UP. THE PATIENT'S DAUGHTER STATES THAT THEY WANT TO SCHEDULE THE PATIENT FOR ANY NECESSARY APPOINTMENTS IN THE SUMMER MONTHS SO THAT SHE DOES NOT FALL AND INJURE HERSELF DURING WINTER.    Do you require a callback: YES

## 2023-07-05 ENCOUNTER — TELEPHONE (OUTPATIENT)
Dept: PEDIATRICS | Facility: OTHER | Age: 88
End: 2023-07-05

## 2023-07-05 NOTE — TELEPHONE ENCOUNTER
Attempted to call pt regarding popeye's message, told pt she needs to call and make an appt for fasting labs.

## 2023-07-05 NOTE — TELEPHONE ENCOUNTER
Caller: Aiyana Donis    Relationship: Emergency Contact    Best call back number: [phone]     What orders are you requesting (i.e. lab or imaging): BLOOD WORK    In what timeframe would the patient need to come in: AS SOON AS POSSIBLE    Where will you receive your lab/imaging services: [facility]    Additional notes: PATIENT'S DAUGHTER STATES THEY HAD REQUESTED FOR THE ORDERS FOR BLOOD WORK TO BE SENT TO THE [facility] IN [place], FOR THE 07/11/23 APPOINTMENT. PATIENTS DAUGHTER WAS INFORMED BY THEM, THAT THEY HAVE NOT RECEIVED THE ORDERS YET.     PATIENT'S DAUGHTER IS REQUESTING FOR  CALL BACK ONCE THE ORDERS ARE PLACED AND SENT OVER.

## 2023-08-07 ENCOUNTER — HOSPITAL ENCOUNTER (OUTPATIENT)
Dept: CARDIOLOGY | Facility: HOSPITAL | Age: 88
Discharge: HOME | End: 2023-08-07
Admitting: INTERNAL MEDICINE
Payer: MEDICARE

## 2023-08-07 VITALS
HEIGHT: 62 IN | WEIGHT: 119.8 LBS | BODY MASS INDEX: 22.05 KG/M2 | DIASTOLIC BLOOD PRESSURE: 70 MMHG | HEART RATE: 60 BPM | SYSTOLIC BLOOD PRESSURE: 117 MMHG | OXYGEN SATURATION: 93 %

## 2023-08-07 DIAGNOSIS — I27.20 PULMONARY HYPERTENSION: Primary | ICD-10-CM

## 2023-08-07 DIAGNOSIS — I50.32 CHRONIC HEART FAILURE WITH PRESERVED EJECTION FRACTION: ICD-10-CM

## 2023-08-07 DIAGNOSIS — Z66 DNR (DO NOT RESUSCITATE): ICD-10-CM

## 2023-08-07 PROCEDURE — 99214 OFFICE O/P EST MOD 30 MIN: CPT | Performed by: INTERNAL MEDICINE

## 2023-08-07 PROCEDURE — G0463 HOSPITAL OUTPT CLINIC VISIT: HCPCS

## 2023-08-07 RX ORDER — METOPROLOL SUCCINATE 25 MG/1
12.5 TABLET, EXTENDED RELEASE ORAL NIGHTLY
Qty: 45 TABLET | Refills: 1 | Status: SHIPPED | OUTPATIENT
Start: 2023-08-07

## 2023-08-07 RX ORDER — BUMETANIDE 1 MG/1
TABLET ORAL
Qty: 90 TABLET | Refills: 0 | Status: SHIPPED | OUTPATIENT
Start: 2023-08-07

## 2023-08-07 NOTE — PROGRESS NOTES
Heart Failure & Pulmonary Arterial Hypertension Clinic  [facility]  Miko Mota M.D., Ph.D., [facility]       Aism Holguin MD  [address]  [facility] [address]  [place] [address]    Thank you for asking me to see Mary Shi.     History of Present Illness  This is a 89 y.o. female with:    1. PAH  A. Right Atrial:  5  Right Ventricle : 49/8  Pulmonary Artery: 48/15 mean 33  PCWP:   22 prominent V wave  Cardiac output  3.39  Cardiac index  1.98  PVR-266 dynes  2. HFpEF      Mary Shi is a 89 y.o. female who presents today.  The patient is typically seen by Everette Rubio MD.         Of note, she has a history of severe AS, s/p 23 mm JOSIE TAVR.  He has concomitant moderate mitral stenosis.  She has remained with elevated pulmonary pressures.  He was diagnosed with HFpEF. She was admitted with ADHF in 2/2023. She was on Bumex prior to that admission. She is now weighing daily and getting extra 0.5 tab of Bumex. Her daughters monitor her sodium intake. She has a UTI. She had worsening confusion and hyperactive delirium. Home test was positive, so she started on abx. She doesn't have a lot of UTIs recently. They report it has been 1-1.5 years since she had UTI. They do report she had some diarrhea. They think this was the etiology. She does have dementia. She gets up around 0900. Her daughters wake her up. She eats breakfast. She does feed herself some. They do feed her as much as she needs. She drinks a protein drink in the morning. She then naps with HOB elevated. She has CPAP and O2. She naps from 1-3. She does have lunch and dinner. She does get dyspnea when she eats sometimes. She does communicate during the day. She loves to watch birds and TV. She has afternoon visitors. She is awake from 6-9 PM. She mainly sleeps through the night. She is incontinent. She has a sit in shower and handheld.       She returns to clinic today.  She is accompanied by her family.  She  remains on Bumex and Toprol.  She continues to be more somnolent than she was in the past.  They continue to have to or wake her up sometimes to eat.  She is taking her medications without any issues.  No heart failure admissions or emergency department visits.  They are now seeing palliative care.  She remains DNR/DNI.  Goals at this point are QoL and to avoid emergency department and hospitalizations.  Her Bumex is reduced to 0.5 mg as needed.      Review of Systems - Review of Systems   Unable to perform ROS: Dementia       All other systems were reviewed and were negative.    Patient Active Problem List   Diagnosis    Anemia, chronic disease    Cognitive disorder    Diabetes mellitus    Generalized osteoarthritis    Gastroesophageal reflux disease    Hyperlipidemia    Essential hypertension    Carotid artery stenosis    Polyneuropathy associated with underlying disease    S/P TAVR (transcatheter aortic valve replacement)    Type 2 diabetes mellitus with hyperglycemia, without long-term current use of insulin    Rheumatic mitral stenosis    PVC (premature ventricular contraction)    Acute on chronic diastolic CHF (congestive heart failure)    Weakness    Altered mental status    Atrial fibrillation    Stroke    Longstanding persistent atrial fibrillation    Rheumatic aortic stenosis    Pulmonary hypertension    Hallucinations    Cellulitis of right upper extremity    Dementia    Fall    Acute bilateral low back pain without sciatica    Acute cystitis without hematuria    Iron deficiency anemia due to chronic blood loss    Chronic anticoagulation    Pleural effusion on right    Primary generalized (osteo)arthritis    Sleep apnea    Chronic heart failure with preserved ejection fraction    Abnormality of right ventricle of heart    Advanced care planning/counseling discussion    DNR (do not resuscitate)       family history includes Cancer in her brother, sister, sister, sister, and sister; Coronary artery disease  in her mother; Diabetes in her daughter, daughter, and son; Heart disease in her mother; Hyperlipidemia in her mother; Hypertension in her mother.     reports that she has never smoked. She has never been exposed to tobacco smoke. She has never used smokeless tobacco. She reports that she does not drink alcohol and does not use drugs.    Allergies   Allergen Reactions    Codeine Itching and GI Intolerance    Morphine And Related Itching       Social Determinants of Health     Tobacco Use: Low Risk     Smoking Tobacco Use: Never    Smokeless Tobacco Use: Never    Passive Exposure: Never   Alcohol Use: Not At Risk    Frequency of Alcohol Consumption: Never    Average Number of Drinks: Patient does not drink    Frequency of Binge Drinking: Never   Financial Resource Strain: Low Risk     Difficulty of Paying Living Expenses: Not hard at all   Food Insecurity: No Food Insecurity    Worried About Running Out of Food in the Last Year: Never true    Ran Out of Food in the Last Year: Never true   Transportation Needs: No Transportation Needs    Lack of Transportation (Medical): No    Lack of Transportation (Non-Medical): No   Physical Activity: Not on file   Stress: Not on file   Social Connections: Not on file   Intimate Partner Violence: Not on file   Depression: Not at risk    PHQ-2 Score: 0   Housing Stability: Not on file        Physical Activity: Not on file          Current Outpatient Medications:     acetaminophen (TYLENOL) 650 MG 8 hr tablet, Take 1 tablet by mouth Every 8 (Eight) Hours As Needed for Mild Pain., Disp: , Rfl:     albuterol sulfate  (90 Base) MCG/ACT inhaler, Inhale 2 puffs Every 4 (Four) Hours As Needed for Wheezing (shortness of breath)., Disp: 6.7 g, Rfl: 0    ALPRAZolam (XANAX) 0.25 MG tablet, Take 1 tablet by mouth At Night As Needed for Anxiety., Disp: 30 tablet, Rfl: 5    atorvastatin (LIPITOR) 20 MG tablet, TAKE 1 TABLET BY MOUTH  DAILY, Disp: 90 tablet, Rfl: 3    bumetanide (BUMEX) 1  MG tablet, Take 1 tablet by mouth Daily. May also take 0.5 tablets Daily As Needed (Swelling and fluid weight gain)., Disp: 90 tablet, Rfl: 0    Eliquis 2.5 MG tablet tablet, TAKE 1 TABLET BY MOUTH  EVERY 12 HOURS, Disp: 180 tablet, Rfl: 3    Ferrous Sulfate (IRON SUPPLEMENT PO), Take  by mouth., Disp: , Rfl:     Lancets (freestyle) lancets, USE TO TEST BLOOD SUGAR DAILY, Disp: 100 each, Rfl: 4    MAGNESIUM GLUCONATE PO, Take 27 mg by mouth 3 (Three) Times a Day. Patient uses OTC Magnesium supplement., Disp: , Rfl:     memantine (NAMENDA XR) 28 MG capsule sustained-release 24 hr extended release capsule, TAKE 1 CAPSULE BY MOUTH  DAILY, Disp: 90 capsule, Rfl: 3    metFORMIN (GLUCOPHAGE) 500 MG tablet, TAKE ONE-HALF TABLET BY  MOUTH TWICE DAILY, Disp: 90 tablet, Rfl: 3    metoprolol succinate XL (TOPROL-XL) 25 MG 24 hr tablet, Take 0.5 tablets by mouth Every Night., Disp: 45 tablet, Rfl: 1    Multiple Vitamins-Minerals (MULTIVITAMIN WITH MINERALS) tablet tablet, Take 1 tablet by mouth Daily., Disp: , Rfl:     omeprazole (priLOSEC) 20 MG capsule, TAKE 1 CAPSULE BY MOUTH  DAILY, Disp: 90 capsule, Rfl: 3    polyethyl glycol-propyl glycol (SYSTANE) 0.4-0.3 % solution ophthalmic solution (artificial tears), 2 drops Every 1 (One) Hour As Needed., Disp: , Rfl:     True Metrix Blood Glucose Test test strip, TEST TWO TIMES A DAY, Disp: 100 each, Rfl: 11    Melatonin 3 MG capsule, Take 1 capsule by mouth At Night As Needed (Insomnia)., Disp: 30 capsule, Rfl: 0    Vital Sign Review:     Vitals:    08/07/23 1412   BP: 117/70   Pulse: 60   SpO2: 93%     PP:high  Pulse Ox: normal oxygenation on room air    Body mass index is 21.91 kg/mý.    Physical Exam:    Vitals reviewed.   Constitutional:       General: Awake. Not in acute distress.     Appearance: Normal appearance. Frail. Chronically ill-appearing. Not ill-appearing, toxic-appearing or diaphoretic.   Eyes:      Conjunctiva/sclera: Conjunctivae normal.      Pupils: Pupils are  equal, round, and reactive to light.   Neck:      Vascular: No hepatojugular reflux, JVD or JVR. JVD normal with 6 cm of water.   Pulmonary:      Effort: Pulmonary effort is normal. No tachypnea, bradypnea, accessory muscle usage, prolonged expiration or respiratory distress.      Breath sounds: Normal breath sounds and air entry. No stridor, decreased air movement or transmitted upper airway sounds. No wheezing. No rhonchi. No rales.   Cardiovascular:      PMI at left midclavicular line. Normal rate. Regular rhythm. S1 with normal intensity. loud S2.       Murmurs: There is a grade 2/6 blowing early systolic murmur at the ULSB.      No gallop.  No click. No rub.   Neurological:      General: No focal deficit present.      Mental Status: Alert. Mental status is at baseline. Disoriented. Exhibits altered mental status.   Psychiatric:         Behavior: Behavior is cooperative.          DATA REVIEWED:     EKG:  I personally reviewed and interpreted the EKG.      I personally interpreted the EKG.     Atrial fibrillation with anterior infarction      ---------------------------------------------------  TTE/HENRIQUE:    Results for orders placed during the hospital encounter of 02/14/23    Adult Transthoracic Echo Complete W/ Cont if Necessary Per Protocol    Interpretation Summary    Left ventricular systolic function is hyperdynamic (EF > 70%). Left ventricular ejection fraction appears to be greater than 70%.    The left ventricular cavity is small in size.    Left ventricular wall thickness is consistent with hypertrophy. Sigmoid-shaped ventricular septum is present.    Left ventricular diastolic function was indeterminate.    Mildly reduced right ventricular systolic function noted.    The right ventricular cavity is mild to moderately dilated.    Left atrial volume is severely increased.    The right atrial cavity is severely  dilated.    Moderate mitral valve stenosis is present.    Estimated right ventricular systolic  pressure from tricuspid regurgitation is moderately elevated (45-55 mmHg).    Moderate pulmonary hypertension is present.    There is a moderate sized right pleural effusion.    Trace aortic valve regurgitation is present. 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 The aortic valve peak and mean gradients are within defined limits. The prosthetic aortic valve is normal. [email or web address]      My interpretation of the TTE is below.     LVEF is greater than 70%.  RV is dilated with impaired systolic function.  Moderate mitral stenosis.  Pulmonary hypertension.  Pleural effusion.  23 mm TAVR sapient 3 with gradient acceptable    -----------------------------------------------------  [facility]  I personally reviewed the cardiac catheterization.  Results for orders placed during the hospital encounter of 12/14/16    Cardiac Catheterization/Vascular Study    Narrative  CARDIAC CATHETERIZATION REPORT    Procedure:Left and Right heart catheterization    DATE OF PROCEDURE: 12/14/16    PROCEDURE PERFORMED BY: Eduardo Brown MD, [facility]    INDICATION FOR PROCEDURE: Echo aortic stenosis      FINDINGS:  RIGHT HEART CATHETERIZATION:  Pressures:  Right Atrial:  5  Right Ventricle : 49/8  Pulmonary Artery: 48/15 mean 33  PCWP:   22 prominent V wave  Cardiac output  3.39  Cardiac index  1.98    LEFT HEART CATHETERIZATION:  LEFT VENTRICULOGRAPHY: Not performed.  Severe mitral annular calcification severe aortic valvular calcification  CORONARY ANGIOGRAPHY:  Left main: Normal  Left anterior descending: Normal  Ramus intermedius: Not present  Circumflex: Normal and left dominant  RCA: .Small nondominant normal        ---------------------------------------------------------------------------      --------------------------------------------------------------------------------------------------------------    Laboratory evaluations:    Lab Results   Component Value Date    GLUCOSE 123 (H) 07/06/2023    BUN 37 (H) 07/06/2023    CREATININE 0.92 07/06/2023     EGFRIFNONA 40 (L) 12/16/2021    EGFRIFAFRI 46 (L) 12/16/2021    BCR 40 (H) 07/06/2023    K 4.3 07/06/2023    CO2 25 07/06/2023    CALCIUM 9.4 07/06/2023    PROTENTOTREF 6.6 07/06/2023    ALBUMIN 4.0 07/06/2023    LABIL2 1.5 07/06/2023    AST 18 07/06/2023    ALT 12 07/06/2023     Lab Results   Component Value Date    WBC 6.2 07/06/2023    HGB 11.0 (L) 07/06/2023    HCT 34.4 07/06/2023    MCV 88 07/06/2023     07/06/2023     Lab Results   Component Value Date    CHOL 167 06/24/2018    CHLPL 115 07/06/2023    TRIG 125 07/06/2023    HDL 38 (L) 07/06/2023    LDL 55 07/06/2023     Lab Results   Component Value Date    TSH 3.530 07/06/2022     Lab Results   Component Value Date    CKTOTAL 39 10/08/2014    TROPONINT 23 (H) 02/14/2023     Lab Results   Component Value Date    HGBA1C 7.5 (H) 07/06/2023     No results found for: DDIMER  Lab Results   Component Value Date    ALT 12 07/06/2023     Lab Results   Component Value Date    HGBA1C 7.5 (H) 07/06/2023    HGBA1C 7.70 (H) 05/28/2022    HGBA1C 7.0 (H) 08/04/2021     Lab Results   Component Value Date    MICROALBUR 25.2 07/06/2023    CREATININE 0.92 07/06/2023     Lab Results   Component Value Date    IRON 56 06/10/2022    TIBC 383 06/10/2022    FERRITIN 297.00 (H) 05/23/2022     Lab Results   Component Value Date    INR 1.10 02/14/2023    INR 1.30 (H) 02/25/2020    INR 1.00 12/28/2016    PROTIME 14.4 (H) 02/14/2023    PROTIME 15.9 (H) 02/25/2020    PROTIME 12.8 12/28/2016       PAH RISK ASSESSMENT:    Assessment & Plan      1. Pulmonary hypertension    2. Chronic heart failure with preserved ejection fraction    3. DNR (do not resuscitate)      WHO-group-2 PVH and HFpEF.  Continue treatment of HFpEF.  No indication for PAH-specific medicines.    2.  NYHA stage C; functional class III.  Today, euvolemic and perfused.  She continues to receive excellent care at home.  We have not considered SGLT2 inhibitor because she is incontinent.  Recommended that we continue  Bumex 1mg daily; and 0.5mg as needed in the PM. Continue  Toprol-XL.  Focus is on QoL and to prevent emergency department visits or inpatient admissions.    3. Noted today. She will continue palliative care.           ORDERS PLACED TODAY:  No orders of the defined types were placed in this encounter.         MEDS ORDERED TODAY:    New Medications Ordered This Visit   Medications    metoprolol succinate XL (TOPROL-XL) 25 MG 24 hr tablet     Sig: Take 0.5 tablets by mouth Every Night.     Dispense:  45 tablet     Refill:  1    bumetanide (BUMEX) 1 MG tablet     Sig: Take 1 tablet by mouth Daily. May also take 0.5 tablets Daily As Needed (Swelling and fluid weight gain).     Dispense:  90 tablet     Refill:  0    Melatonin 3 MG capsule     Sig: Take 1 capsule by mouth At Night As Needed (Insomnia).     Dispense:  30 capsule     Refill:  0        MEDS DISCONTINUED TODAY:    Medications Discontinued During This Encounter   Medication Reason    metoprolol succinate XL (TOPROL-XL) 25 MG 24 hr tablet Reorder    bumetanide (BUMEX) 1 MG tablet Reorder                Return in about 3 months (around 11/7/2023) for Video visit.          This document has been electronically signed by Miko Mota MD PhD on August 7, 2023 14:27 EDT      Miko Mota M.D., Ph.D., [facility] Heart Failure and Pulmonary Hypertension Clinic  System Medical Director for HF and PAH

## 2023-08-07 NOTE — ADDENDUM NOTE
Encounter addended by: Ernestina Calixto MA on: 8/7/2023 3:54 PM   Actions taken: Charge Capture section accepted

## 2023-08-21 ENCOUNTER — TELEPHONE (OUTPATIENT)
Dept: FAMILY MEDICINE CLINIC | Facility: CLINIC | Age: 88
End: 2023-08-21
Payer: MEDICARE

## 2023-08-21 NOTE — TELEPHONE ENCOUNTER
Caller: Aiyana Donis    Relationship: Emergency Contact    Best call back number: [phone]     What orders are you requesting (i.e. lab or imaging): XRAY LEFT ARM    In what timeframe would the patient need to come in: AS SOON AS POSSIBLE    Where will you receive your lab/imaging services: [facility]    Additional notes: DAUGHTER (ON  VERBAL) STATES THAT PATIENT FELL ON SATURDAY 8/19/23 AND HER LEFT ARM IS HURTING.  SHE WANTS TO KNOW IF DR. JALIL OSULLIVAN WILL PUT IN AN ORDER FOR AN XRAY BECAUSE IT IS DIFFICULT TO GET PATIENT INTO A CAR AND TO THE OFFICE.  SHE ALSO REQUESTS A CALLBACK TO DISCUSS HOME HEALTH.    PLEASE ADVISE.

## 2023-08-21 NOTE — TELEPHONE ENCOUNTER
Had to call and cancel pt's appt for popeye today, hub scheduled pt a my chart visit for a fall, called and advised pt to either go to closest urgent care or come in to see popeye, per Popeye Woodall. Pt verbally understood and is going to urgent care.

## 2023-08-25 ENCOUNTER — TELEPHONE (OUTPATIENT)
Dept: FAMILY MEDICINE CLINIC | Facility: CLINIC | Age: 88
End: 2023-08-25

## 2023-08-25 NOTE — TELEPHONE ENCOUNTER
WANTS A REFERREAL FOR HOME HEALTH NURSING [facility] AT HOME PLEASE ADVISE. SHE FELL/ROLLED OUT OF BED. NO BROKEN BONES BUT STILL HAVING A HARD TIME.   [phone]    DOES NOT WANT [facility].

## 2023-08-28 ENCOUNTER — HOSPITAL ENCOUNTER (OUTPATIENT)
Dept: CARDIOLOGY | Facility: HOSPITAL | Age: 88
Discharge: HOME | End: 2023-08-28
Payer: MEDICARE

## 2023-08-28 ENCOUNTER — LAB (OUTPATIENT)
Dept: LAB | Facility: HOSPITAL | Age: 88
End: 2023-08-28
Payer: MEDICARE

## 2023-08-28 ENCOUNTER — HOSPITAL ENCOUNTER (OUTPATIENT)
Dept: GENERAL RADIOLOGY | Facility: HOSPITAL | Age: 88
Discharge: HOME | End: 2023-08-28
Payer: MEDICARE

## 2023-08-28 VITALS
SYSTOLIC BLOOD PRESSURE: 151 MMHG | OXYGEN SATURATION: 96 % | BODY MASS INDEX: 22.6 KG/M2 | DIASTOLIC BLOOD PRESSURE: 76 MMHG | WEIGHT: 122.8 LBS | HEIGHT: 62 IN | HEART RATE: 66 BPM

## 2023-08-28 DIAGNOSIS — R06.09 DOE (DYSPNEA ON EXERTION): Primary | ICD-10-CM

## 2023-08-28 DIAGNOSIS — I48.11 LONGSTANDING PERSISTENT ATRIAL FIBRILLATION: ICD-10-CM

## 2023-08-28 DIAGNOSIS — I10 ESSENTIAL HYPERTENSION: ICD-10-CM

## 2023-08-28 DIAGNOSIS — E78.49 OTHER HYPERLIPIDEMIA: ICD-10-CM

## 2023-08-28 DIAGNOSIS — R41.82 ALTERED MENTAL STATUS, UNSPECIFIED ALTERED MENTAL STATUS TYPE: ICD-10-CM

## 2023-08-28 DIAGNOSIS — J90 PLEURAL EFFUSION ON RIGHT: ICD-10-CM

## 2023-08-28 DIAGNOSIS — R07.89 CHEST PAIN, ATYPICAL: ICD-10-CM

## 2023-08-28 DIAGNOSIS — I50.32 CHRONIC HEART FAILURE WITH PRESERVED EJECTION FRACTION: ICD-10-CM

## 2023-08-28 DIAGNOSIS — I27.20 PULMONARY HYPERTENSION: ICD-10-CM

## 2023-08-28 DIAGNOSIS — R06.09 DOE (DYSPNEA ON EXERTION): ICD-10-CM

## 2023-08-28 PROBLEM — M54.50 ACUTE BILATERAL LOW BACK PAIN WITHOUT SCIATICA: Status: RESOLVED | Noted: 2021-12-09 | Resolved: 2023-08-28

## 2023-08-28 PROBLEM — R44.3 HALLUCINATIONS: Status: RESOLVED | Noted: 2021-06-29 | Resolved: 2023-08-28

## 2023-08-28 PROBLEM — I50.33 ACUTE ON CHRONIC DIASTOLIC CHF (CONGESTIVE HEART FAILURE): Status: RESOLVED | Noted: 2018-06-23 | Resolved: 2023-08-28

## 2023-08-28 PROBLEM — N30.00 ACUTE CYSTITIS WITHOUT HEMATURIA: Status: RESOLVED | Noted: 2021-12-09 | Resolved: 2023-08-28

## 2023-08-28 PROBLEM — L03.113 CELLULITIS OF RIGHT UPPER EXTREMITY: Status: RESOLVED | Noted: 2021-06-29 | Resolved: 2023-08-28

## 2023-08-28 PROBLEM — I48.91 ATRIAL FIBRILLATION: Status: RESOLVED | Noted: 2019-09-11 | Resolved: 2023-08-28

## 2023-08-28 PROBLEM — Z66 DNR (DO NOT RESUSCITATE): Status: RESOLVED | Noted: 2023-08-07 | Resolved: 2023-08-28
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
## 2023-08-28 PROBLEM — R53.1 WEAKNESS: Status: RESOLVED | Noted: 2019-09-09 | Resolved: 2023-08-28

## 2023-08-28 PROBLEM — Z95.2 S/P TAVR (TRANSCATHETER AORTIC VALVE REPLACEMENT): Status: RESOLVED | Noted: 2017-02-03 | Resolved: 2023-08-28

## 2023-08-28 PROBLEM — Q20.8 ABNORMALITY OF RIGHT VENTRICLE OF HEART: Status: RESOLVED | Noted: 2023-03-20 | Resolved: 2023-08-28

## 2023-08-28 PROBLEM — Z71.89 ADVANCED CARE PLANNING/COUNSELING DISCUSSION: Status: RESOLVED | Noted: 2023-05-01 | Resolved: 2023-08-28

## 2023-08-28 LAB
ALBUMIN SERPL-MCNC: 3.9 G/DL (ref 3.5–5.2)
ALBUMIN/GLOB SERPL: 1.3 G/DL
ALP SERPL-CCNC: 128 U/L (ref 39–117)
ALT SERPL W P-5'-P-CCNC: 13 U/L (ref 1–33)
ANION GAP SERPL CALCULATED.3IONS-SCNC: 13 MMOL/L (ref 5–15)
AST SERPL-CCNC: 19 U/L (ref 1–32)
BASOPHILS # BLD AUTO: 0.03 10*3/MM3 (ref 0–0.2)
BASOPHILS NFR BLD AUTO: 0.4 % (ref 0–1.5)
BILIRUB SERPL-MCNC: 0.4 MG/DL (ref 0–1.2)
BUN SERPL-MCNC: 33 MG/DL (ref 8–23)
BUN/CREAT SERPL: 33.3 (ref 7–25)
CALCIUM SPEC-SCNC: 9.5 MG/DL (ref 8.2–9.6)
CHLORIDE SERPL-SCNC: 101 MMOL/L (ref 98–107)
CO2 SERPL-SCNC: 27 MMOL/L (ref 22–29)
CREAT SERPL-MCNC: 0.99 MG/DL (ref 0.57–1)
DEPRECATED RDW RBC AUTO: 43.6 FL (ref 37–54)
EGFRCR SERPLBLD CKD-EPI 2021: 51.6 ML/MIN/1.73
EOSINOPHIL # BLD AUTO: 0.03 10*3/MM3 (ref 0–0.4)
EOSINOPHIL NFR BLD AUTO: 0.4 % (ref 0.3–6.2)
ERYTHROCYTE [DISTWIDTH] IN BLOOD BY AUTOMATED COUNT: 13.9 % (ref 12.3–15.4)
GLOBULIN UR ELPH-MCNC: 3 GM/DL
GLUCOSE SERPL-MCNC: 94 MG/DL (ref 65–99)
HCT VFR BLD AUTO: 34.6 % (ref 34–46.6)
HGB BLD-MCNC: 11.6 G/DL (ref 12–15.9)
IMM GRANULOCYTES # BLD AUTO: 0.03 10*3/MM3 (ref 0–0.05)
IMM GRANULOCYTES NFR BLD AUTO: 0.4 % (ref 0–0.5)
LYMPHOCYTES # BLD AUTO: 2.28 10*3/MM3 (ref 0.7–3.1)
LYMPHOCYTES NFR BLD AUTO: 30 % (ref 19.6–45.3)
MCH RBC QN AUTO: 29 PG (ref 26.6–33)
MCHC RBC AUTO-ENTMCNC: 33.5 G/DL (ref 31.5–35.7)
MCV RBC AUTO: 86.5 FL (ref 79–97)
MONOCYTES # BLD AUTO: 0.71 10*3/MM3 (ref 0.1–0.9)
MONOCYTES NFR BLD AUTO: 9.4 % (ref 5–12)
NEUTROPHILS NFR BLD AUTO: 4.51 10*3/MM3 (ref 1.7–7)
NEUTROPHILS NFR BLD AUTO: 59.4 % (ref 42.7–76)
NRBC BLD AUTO-RTO: 0 /100 WBC (ref 0–0.2)
NT-PROBNP SERPL-MCNC: 4076 PG/ML (ref 0–1800)
PLATELET # BLD AUTO: 203 10*3/MM3 (ref 140–450)
PMV BLD AUTO: 10.6 FL (ref 6–12)
POTASSIUM SERPL-SCNC: 4.7 MMOL/L (ref 3.5–5.2)
PROT SERPL-MCNC: 6.9 G/DL (ref 6–8.5)
RBC # BLD AUTO: 4 10*6/MM3 (ref 3.77–5.28)
SODIUM SERPL-SCNC: 141 MMOL/L (ref 136–145)
WBC NRBC COR # BLD: 7.59 10*3/MM3 (ref 3.4–10.8)

## 2023-08-28 PROCEDURE — G0463 HOSPITAL OUTPT CLINIC VISIT: HCPCS

## 2023-08-28 PROCEDURE — 83880 ASSAY OF NATRIURETIC PEPTIDE: CPT | Performed by: NURSE PRACTITIONER

## 2023-08-28 PROCEDURE — 85025 COMPLETE CBC W/AUTO DIFF WBC: CPT | Performed by: NURSE PRACTITIONER

## 2023-08-28 PROCEDURE — 71046 X-RAY EXAM CHEST 2 VIEWS: CPT

## 2023-08-28 PROCEDURE — 80053 COMPREHEN METABOLIC PANEL: CPT | Performed by: NURSE PRACTITIONER

## 2023-08-28 PROCEDURE — 36415 COLL VENOUS BLD VENIPUNCTURE: CPT | Performed by: NURSE PRACTITIONER

## 2023-08-28 NOTE — PROGRESS NOTES
\[facility] [place] [facility]\"" HEART FAILURE      Patient Name: Mary Shi  :3/3/1925  Age: 89 y.o.  Sex: female  Referring Provider: Asim Holguin MD   Primary Cardiologist: Elissa Mcmahan MD  Encounter Provider:  RAYRAY Benavidez      Chief Complaint:   Chief Complaint   Patient presents with    Congestive Heart Failure         History of Present Illness this 89-year-old female, new to this provider, comes today for further evaluation regarding her chronic diastolic heart failure.  Current diagnoses to include pulmonary hypertension, chronic diastolic heart failure, dementia, rheumatic aortic stenosis, hypertension, hyperlipidemia, persistent atrial fibrillation longstanding, carotid artery stenosis, history of stroke, obstructive sleep apnea.  Records have been reviewed by me.    Per chart review echo in  revealed an LVEF of 65 to 70% with moderate left atrial dilation and grade 1A diastolic dysfunction as well as moderate concentric LVH.  Hospitalization in 2014 for chest pain was felt to be secondary to hiatal hernia.    It appears that in  she was worked up for a ERICKSON at  and it was found that she was not a candidate for this.  Repeat echocardiogram in 2016 revealed severe aortic stenosis with a mean gradient of 42 mmHg and an JANICE of 0.92 cmý, LVEF of 60%.  In 2016 she underwent a TAVR.    She was hospitalized in 2019 with an acute left MCA ischemic stroke.  Echocardiogram performed at that time revealed an LVEF of 71%, moderate mitral valve stenosis, moderate mitral valve insufficiency, and mild aortic insufficiency with normal and mild to moderate pH.  Apixaban was started at that time.    She presents today with her daughter letting us know that she woke up with left-sided chest pain described as an ache that radiated down her right arm and was associated with worsening shortness of breath.  This is since resolved since taking her medication.  She  "also took a Xanax and stated that she was \"all shook up.\"  They believe it also may be related to her anxiety.      The following portions of the patient's history were reviewed and updated as appropriate: allergies, current medications, past family history, past medical history, past social history, past surgical history and problem list.    Current Outpatient Medications   Medication Sig Dispense Refill    acetaminophen (TYLENOL) 650 MG 8 hr tablet Take 1 tablet by mouth Every 8 (Eight) Hours As Needed for Mild Pain.      albuterol sulfate  (90 Base) MCG/ACT inhaler Inhale 2 puffs Every 4 (Four) Hours As Needed for Wheezing (shortness of breath). 6.7 g 0    ALPRAZolam (XANAX) 0.25 MG tablet Take 1 tablet by mouth At Night As Needed for Anxiety. 30 tablet 5    atorvastatin (LIPITOR) 20 MG tablet TAKE 1 TABLET BY MOUTH  DAILY 90 tablet 3    bumetanide (BUMEX) 1 MG tablet Take 1 tablet by mouth Daily. May also take 0.5 tablets Daily As Needed (Swelling and fluid weight gain). (Patient taking differently: Take 1 tablet by mouth Daily. May also take 0.5 tablets Daily As Needed (Swelling and fluid weight gain).  Extra dose was given 08/27/2023 for SOA. ) 90 tablet 0    Eliquis 2.5 MG tablet tablet TAKE 1 TABLET BY MOUTH  EVERY 12 HOURS 180 tablet 3    Ferrous Sulfate (IRON SUPPLEMENT PO) Take  by mouth.      Lancets (freestyle) lancets USE TO TEST BLOOD SUGAR DAILY 100 each 4    MAGNESIUM GLUCONATE PO Take 27 mg by mouth 3 (Three) Times a Day. Patient uses OTC Magnesium supplement.      Melatonin 3 MG capsule Take 1 capsule by mouth At Night As Needed (Insomnia). 30 capsule 0    memantine (NAMENDA XR) 28 MG capsule sustained-release 24 hr extended release capsule TAKE 1 CAPSULE BY MOUTH  DAILY 90 capsule 3    metFORMIN (GLUCOPHAGE) 500 MG tablet TAKE ONE-HALF TABLET BY  MOUTH TWICE DAILY 90 tablet 3    metoprolol succinate XL (TOPROL-XL) 25 MG 24 hr tablet Take 0.5 tablets by mouth Every Night. 45 tablet 1    " Multiple Vitamins-Minerals (MULTIVITAMIN WITH MINERALS) tablet tablet Take 1 tablet by mouth Daily.      omeprazole (priLOSEC) 20 MG capsule TAKE 1 CAPSULE BY MOUTH  DAILY 90 capsule 3    polyethyl glycol-propyl glycol (SYSTANE) 0.4-0.3 % solution ophthalmic solution (artificial tears) 2 drops Every 1 (One) Hour As Needed.      True Metrix Blood Glucose Test test strip TEST TWO TIMES A  each 11     No current facility-administered medications for this encounter.       Past Medical History:   Diagnosis Date    'light-for-dates' infant with signs of fetal malnutrition     Abnormal ECG     Amaurosis fugax     Anemia     Aortic stenosis     s/p TAVR     Aortic valve replaced     Had TAVR    Asthma     SOB    Atrial fibrillation     Cancer     skin    CAP (community acquired pneumonia)     Carotid artery stenosis     Per duplex 12/16/2016-proximal right internal carotid artery mild stenosis and proximal left moderate stenosis    Cervical spine disease     Cognitive disorder     Congestive heart failure     Coronary artery disease     Dementia     Diabetes mellitus     Diastolic dysfunction     Dyspnea on exertion     Generalized osteoarthritis of multiple sites     GERD (gastroesophageal reflux disease)     Gout     Health care maintenance     Heart murmur     Hiatal hernia     HL (hearing loss)     Hyperlipidemia     Hypertension     Memory loss     Mitral valve prolapse     Mitral valve stenosis     Moderate per echocardiogram 2/2017; severe mitral annular calcification    Nasal lesion     Nasal stenosis     Neuropathy in diabetes     Osteoarthritis     multiple sites    PAF (paroxysmal atrial fibrillation)     Paroxysmal atrial fibrillation 09/11/2019    Patient had no falls in past year     Peripheral neuropathy     Peripheral vascular disease     PONV (postoperative nausea and vomiting)     Rheumatic mitral stenosis     Shingles     Sleep apnea     Stroke     Syncope     Systolic hypertension     Type 2  diabetes mellitus with hyperglycemia, without long-term current use of insulin 4/6/2017    Varicose veins        Past Surgical History:   Procedure Laterality Date    AORTIC VALVE REPAIR/REPLACEMENT N/A 12/27/2016    Procedure: Transfemoral LEFT Transcatheter Aortic Valve Replacement TRANSESOPHAGEAL ECHOCARDIOGRAM WITH ANESTHESIA;  Surgeon: Eduardo Brown MD;  Location: [facility] OR 18/19;  Service:     AORTIC VALVE REPAIR/REPLACEMENT  12/27/2016    AORTIC VALVE SURGERY      TAVR    BLADDER REPAIR      CARDIAC CATHETERIZATION N/A 12/14/2016    Procedure: Right Heart Cath;  Surgeon: Eduardo Brown MD;  Location: [facility] CATH INVASIVE LOCATION;  Service:     CARDIAC CATHETERIZATION N/A 12/14/2016    Procedure: Coronary angiography;  Surgeon: Eduardo Brown MD;  Location: [facility] CATH INVASIVE LOCATION;  Service:     HYSTERECTOMY      KNEE SURGERY         Physical Exam  Vitals and nursing note reviewed.   Constitutional:       General: She is not in acute distress.     Appearance: She is well-developed and normal weight. She is not ill-appearing.   HENT:      Head: Normocephalic and atraumatic.   Eyes:      Conjunctiva/sclera: Conjunctivae normal.      Pupils: Pupils are equal, round, and reactive to light.   Neck:      Vascular: No JVD.   Cardiovascular:      Rate and Rhythm: Normal rate. Rhythm irregular.      Heart sounds: Normal heart sounds. No murmur heard.    No friction rub. No gallop.   Pulmonary:      Effort: Pulmonary effort is normal. No respiratory distress.      Breath sounds: Normal breath sounds.   Abdominal:      General: Bowel sounds are normal. There is no distension.      Palpations: Abdomen is soft.   Musculoskeletal:         General: No swelling or deformity.   Skin:     General: Skin is warm and dry.      Capillary Refill: Capillary refill takes less than 2 seconds.   Neurological:      Mental Status: She is alert and oriented to person, place, and time. Mental status is at baseline.  "  Psychiatric:         Mood and Affect: Mood normal.         Behavior: Behavior normal.        Review of Systems   Constitutional:  Positive for fatigue. Negative for unexpected weight change.   HENT:  Negative for congestion and nosebleeds.    Eyes:  Negative for photophobia and visual disturbance.   Respiratory:  Positive for shortness of breath. Negative for cough and chest tightness.    Cardiovascular:  Positive for chest pain. Negative for palpitations and leg swelling.   Gastrointestinal:  Negative for abdominal distention and blood in stool.   Endocrine: Negative for polyphagia and polyuria.   Genitourinary:  Positive for frequency. Negative for urgency.   Musculoskeletal:  Negative for joint swelling and myalgias.   Skin:  Negative for pallor and rash.   Neurological:  Positive for weakness. Negative for dizziness, syncope, light-headedness, numbness and headaches.   Hematological:  Does not bruise/bleed easily.   Psychiatric/Behavioral:  Positive for confusion and sleep disturbance.       OBJECTIVE:  /76 (BP Location: Right arm, Patient Position: Sitting, Cuff Size: Adult)   Pulse 66   Ht 157.5 cm (62.01\")   Wt 55.7 kg (122 lb 12.8 oz)   SpO2 96%   BMI 22.45 kg/mý      Body mass index is 22.45 kg/mý.  Wt Readings from Last 1 Encounters:   08/28/23 55.7 kg (122 lb 12.8 oz)       Lab Review:  Renal Function: CrCl cannot be calculated (Patient's most recent lab result is older than the maximum 30 days allowed.).    Lab Results   Component Value Date    PROBNP 1,726.0 02/14/2023       Results for orders placed during the hospital encounter of 02/14/23    Adult Transthoracic Echo Complete W/ Cont if Necessary Per Protocol    Interpretation Summary    Left ventricular systolic function is hyperdynamic (EF > 70%). Left ventricular ejection fraction appears to be greater than 70%.    The left ventricular cavity is small in size.    Left ventricular wall thickness is consistent with hypertrophy. " Sigmoid-shaped ventricular septum is present.    Left ventricular diastolic function was indeterminate.    Mildly reduced right ventricular systolic function noted.    The right ventricular cavity is mild to moderately dilated.    Left atrial volume is severely increased.    The right atrial cavity is severely  dilated.    Moderate mitral valve stenosis is present.    Estimated right ventricular systolic pressure from tricuspid regurgitation is moderately elevated (45-55 mmHg).    Moderate pulmonary hypertension is present.    There is a moderate sized right pleural effusion.    Trace aortic valve regurgitation is present. 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 The aortic valve peak and mean gradients are within defined limits. The prosthetic aortic valve is normal. [email or web address]      Procedures      6 MINUTE WALK                      Cardiac Procedures:  1. 12/14/2016 cardiac catheterization  FINDINGS:  RIGHT HEART CATHETERIZATION:  Pressures:  Right Atrial:                     5  Right Ventricle   :            49/8  Pulmonary Artery:           48/15 mean 33  PCWP:                            22 prominent V wave  Cardiac output                3.39  Cardiac index                 1.98     LEFT HEART CATHETERIZATION:  LEFT VENTRICULOGRAPHY: Not performed.  Severe mitral annular calcification severe aortic valvular calcification  CORONARY ANGIOGRAPHY:  Left main: Normal  Left anterior descending: Normal  Ramus intermedius: Not present  Circumflex: Normal and left dominant  RCA: .Small nondominant normal     SUMMARY: She has an elderly woman with severe aortic stenosis with rapid progression of symptoms she does not have significant coronary disease she does have elevated pulmonary capillary wedge pressure        RECOMMENDATIONS: We will initiate some medical therapy as well as a TAVR evaluation and consultation with CV surgery          Previously trialed diuretics  Lasix  Bumex      Previously trialed GDMT    Metoprolol  succinate      ASSESSMENT:     Diagnosis Plan   1. FLORES (dyspnea on exertion)  ECG 12 Lead    CBC & Differential    Comprehensive Metabolic Panel    BNP    XR Chest 2 View      2. Chest pain, atypical  ECG 12 Lead    CBC & Differential    Comprehensive Metabolic Panel    BNP    XR Chest 2 View      3. Altered mental status, unspecified altered mental status type  Urinalysis With Microscopic - Urine, Clean Catch    Urine Culture - Urine, Urine, Clean Catch      4. Chronic heart failure with preserved ejection fraction        5. Pleural effusion on right        6. Pulmonary hypertension        7. Longstanding persistent atrial fibrillation        8. Essential hypertension        9. Other hyperlipidemia              PLAN OF CARE:  1.  HFpEF-NYHA class III.  Current GDMT to include metoprolol succinate and diuresis on Bumex.    She has no overt signs of volume overload on exam today, however, her daughter states that she was much more short of breath this morning prior to taking her medication when experiencing an episode of chest pain.  See dictation below regarding this incident.  She does have a history relatively recently of bilateral pleural effusions, and she has increased need for her oxygen use over the last several days so I will recheck a chest x-ray and labs as below.  If it appears that volume overload is the driving force behind her symptoms we will adjust her medication and bring her back in within 2 days.  She is to continue following a low-sodium diet of 2000 mg daily or less, fluid restriction of 1500 mL daily, as well as remain adherent with daily weights.    Directions for when to call the clinic reviewed with the patient to include weight gain of 2 to 3 pounds in 24 hours, weight gain of 5 to 10 pounds within 7 days; worsening shortness of breath; worsening lower extremity edema or abdominal distention.    2.  Chest pain-noted early this morning around 5 AM.  Left-sided, described as aching and going  down her left arm.  This resolved with getting up out of the bed.  She stated she also had some GI discomfort, although she is confused today and unable to describe this in greater detail.  She is not currently having any chest pain.  We will check a chest x-ray, labs as below, as well as an ECG.  ECG today reveals atrial flutter with 4-1 block, no ST elevation noted.    3.  Hypertension-currently stable and controlled.    4.  Persistent atrial fibrillation-rate controlled on auscultation today.  Managed by primary cardiology team.    5.  Pulmonary hypertension-managed by Dr. Mota.      CBC/CMP/BNP; ECG; chest x-ray; UA and CNS; follow-up dependent upon results      Advance Care Planning   ACP discussion was held with the patient during this visit. Patient has an advance directive in EMR which is still valid.       Thank you for allowing me to participate in the care of your patient,         Rosmery Son APRJENIFFER  [facility] HEART FAILURE  08/28/23  14:19 EDT      **Anastasiya Disclaimer:**  Much of this encounter note is an electronic transcription/translation of spoken language to printed text. The electronic translation of spoken language may permit erroneous, or at times, nonsensical words or phrases to be inadvertently transcribed. Although I have reviewed the note for such errors, some may still exist.

## 2023-08-28 NOTE — LETTER
2023       No Recipients    Patient: Mary Shi   YOB: 1925   Date of Visit: 2023     Dear Dr. Gerard Recipients:    Thank you for referring Mary Shi to me for evaluation. Below are the relevant portions of my assessment and plan of care.    If you have questions, please do not hesitate to call me. I look forward to following Mary along with you.         Sincerely,        RAYRAY Benavidez        CC:   No Recipients      Progress Notes:          [facility] [place] HEART FAILURE      Patient Name: Mary Shi  :3/3/1925  Age: 89 y.o.  Sex: female  Referring Provider: Asim Holguin MD   Primary Cardiologist: Elissa Mcmahan MD  Encounter Provider:  RAYRAY Benavidez      Chief Complaint:   Chief Complaint   Patient presents with    Congestive Heart Failure         History of Present Illness this 89-year-old female, new to this provider, comes today for further evaluation regarding her chronic diastolic heart failure.  Current diagnoses to include pulmonary hypertension, chronic diastolic heart failure, dementia, rheumatic aortic stenosis, hypertension, hyperlipidemia, persistent atrial fibrillation longstanding, carotid artery stenosis, history of stroke, obstructive sleep apnea.  Records have been reviewed by me.    Per chart review echo in  revealed an LVEF of 65 to 70% with moderate left atrial dilation and grade 1A diastolic dysfunction as well as moderate concentric LVH.  Hospitalization in 2014 for chest pain was felt to be secondary to hiatal hernia.    It appears that in  she was worked up for a ERICKSON at  and it was found that she was not a candidate for this.  Repeat echocardiogram in 2016 revealed severe aortic stenosis with a mean gradient of 42 mmHg and an JANICE of 0.92 cmý, LVEF of 60%.  In 2016 she underwent a TAVR.    She was hospitalized in 2019 with an acute left MCA ischemic stroke.   "Echocardiogram performed at that time revealed an LVEF of 71%, moderate mitral valve stenosis, moderate mitral valve insufficiency, and mild aortic insufficiency with normal and mild to moderate pH.  Apixaban was started at that time.    She presents today with her daughter letting us know that she woke up with left-sided chest pain described as an ache that radiated down her right arm and was associated with worsening shortness of breath.  This is since resolved since taking her medication.  She also took a Xanax and stated that she was \"all shook up.\"  They believe it also may be related to her anxiety.      The following portions of the patient's history were reviewed and updated as appropriate: allergies, current medications, past family history, past medical history, past social history, past surgical history and problem list.    Current Outpatient Medications   Medication Sig Dispense Refill    acetaminophen (TYLENOL) 650 MG 8 hr tablet Take 1 tablet by mouth Every 8 (Eight) Hours As Needed for Mild Pain.      albuterol sulfate  (90 Base) MCG/ACT inhaler Inhale 2 puffs Every 4 (Four) Hours As Needed for Wheezing (shortness of breath). 6.7 g 0    ALPRAZolam (XANAX) 0.25 MG tablet Take 1 tablet by mouth At Night As Needed for Anxiety. 30 tablet 5    atorvastatin (LIPITOR) 20 MG tablet TAKE 1 TABLET BY MOUTH  DAILY 90 tablet 3    bumetanide (BUMEX) 1 MG tablet Take 1 tablet by mouth Daily. May also take 0.5 tablets Daily As Needed (Swelling and fluid weight gain). (Patient taking differently: Take 1 tablet by mouth Daily. May also take 0.5 tablets Daily As Needed (Swelling and fluid weight gain).  Extra dose was given 08/27/2023 for SOA. ) 90 tablet 0    Eliquis 2.5 MG tablet tablet TAKE 1 TABLET BY MOUTH  EVERY 12 HOURS 180 tablet 3    Ferrous Sulfate (IRON SUPPLEMENT PO) Take  by mouth.      Lancets (freestyle) lancets USE TO TEST BLOOD SUGAR DAILY 100 each 4    MAGNESIUM GLUCONATE PO Take 27 mg " by mouth 3 (Three) Times a Day. Patient uses OTC Magnesium supplement.      Melatonin 3 MG capsule Take 1 capsule by mouth At Night As Needed (Insomnia). 30 capsule 0    memantine (NAMENDA XR) 28 MG capsule sustained-release 24 hr extended release capsule TAKE 1 CAPSULE BY MOUTH  DAILY 90 capsule 3    metFORMIN (GLUCOPHAGE) 500 MG tablet TAKE ONE-HALF TABLET BY  MOUTH TWICE DAILY 90 tablet 3    metoprolol succinate XL (TOPROL-XL) 25 MG 24 hr tablet Take 0.5 tablets by mouth Every Night. 45 tablet 1    Multiple Vitamins-Minerals (MULTIVITAMIN WITH MINERALS) tablet tablet Take 1 tablet by mouth Daily.      omeprazole (priLOSEC) 20 MG capsule TAKE 1 CAPSULE BY MOUTH  DAILY 90 capsule 3    polyethyl glycol-propyl glycol (SYSTANE) 0.4-0.3 % solution ophthalmic solution (artificial tears) 2 drops Every 1 (One) Hour As Needed.      True Metrix Blood Glucose Test test strip TEST TWO TIMES A  each 11     No current facility-administered medications for this encounter.       Past Medical History:   Diagnosis Date    'light-for-dates' infant with signs of fetal malnutrition     Abnormal ECG     Amaurosis fugax     Anemia     Aortic stenosis     s/p TAVR     Aortic valve replaced     Had TAVR    Asthma     SOB    Atrial fibrillation     Cancer     skin    CAP (community acquired pneumonia)     Carotid artery stenosis     Per duplex 12/16/2016-proximal right internal carotid artery mild stenosis and proximal left moderate stenosis    Cervical spine disease     Cognitive disorder     Congestive heart failure     Coronary artery disease     Dementia     Diabetes mellitus     Diastolic dysfunction     Dyspnea on exertion     Generalized osteoarthritis of multiple sites     GERD (gastroesophageal reflux disease)     Gout     Health care maintenance     Heart murmur     Hiatal hernia     HL (hearing loss)     Hyperlipidemia     Hypertension     Memory loss     Mitral valve prolapse      Mitral valve stenosis     Moderate per echocardiogram 2/2017; severe mitral annular calcification    Nasal lesion     Nasal stenosis     Neuropathy in diabetes     Osteoarthritis     multiple sites    PAF (paroxysmal atrial fibrillation)     Paroxysmal atrial fibrillation 09/11/2019    Patient had no falls in past year     Peripheral neuropathy     Peripheral vascular disease     PONV (postoperative nausea and vomiting)     Rheumatic mitral stenosis     Shingles     Sleep apnea     Stroke     Syncope     Systolic hypertension     Type 2 diabetes mellitus with hyperglycemia, without long-term current use of insulin 4/6/2017    Varicose veins        Past Surgical History:   Procedure Laterality Date    AORTIC VALVE REPAIR/REPLACEMENT N/A 12/27/2016    Procedure: Transfemoral LEFT Transcatheter Aortic Valve Replacement TRANSESOPHAGEAL ECHOCARDIOGRAM WITH ANESTHESIA;  Surgeon: Eduardo Brown MD;  Location: [facility] HYBRID OR 18/19;  Service:     AORTIC VALVE REPAIR/REPLACEMENT  12/27/2016    AORTIC VALVE SURGERY      TAVR    BLADDER REPAIR      CARDIAC CATHETERIZATION N/A 12/14/2016    Procedure: Right Heart Cath;  Surgeon: Eduardo Brown MD;  Location: [facility] CATH INVASIVE LOCATION;  Service:     CARDIAC CATHETERIZATION N/A 12/14/2016    Procedure: Coronary angiography;  Surgeon: Eduardo Brown MD;  Location: [facility] CATH INVASIVE LOCATION;  Service:     HYSTERECTOMY      KNEE SURGERY         Physical Exam  Vitals and nursing note reviewed.   Constitutional:       General: She is not in acute distress.     Appearance: She is well-developed and normal weight. She is not ill-appearing.   HENT:      Head: Normocephalic and atraumatic.   Eyes:      Conjunctiva/sclera: Conjunctivae normal.      Pupils: Pupils are equal, round, and reactive to light.   Neck:      Vascular: No JVD.   Cardiovascular:      Rate and Rhythm: Normal rate. Rhythm irregular.      Heart sounds: Normal heart sounds. No murmur  "heard.    No friction rub. No gallop.   Pulmonary:      Effort: Pulmonary effort is normal. No respiratory distress.      Breath sounds: Normal breath sounds.   Abdominal:      General: Bowel sounds are normal. There is no distension.      Palpations: Abdomen is soft.   Musculoskeletal:         General: No swelling or deformity.   Skin:     General: Skin is warm and dry.      Capillary Refill: Capillary refill takes less than 2 seconds.   Neurological:      Mental Status: She is alert and oriented to person, place, and time. Mental status is at baseline.   Psychiatric:         Mood and Affect: Mood normal.         Behavior: Behavior normal.        Review of Systems   Constitutional:  Positive for fatigue. Negative for unexpected weight change.   HENT:  Negative for congestion and nosebleeds.    Eyes:  Negative for photophobia and visual disturbance.   Respiratory:  Positive for shortness of breath. Negative for cough and chest tightness.    Cardiovascular:  Positive for chest pain. Negative for palpitations and leg swelling.   Gastrointestinal:  Negative for abdominal distention and blood in stool.   Endocrine: Negative for polyphagia and polyuria.   Genitourinary:  Positive for frequency. Negative for urgency.   Musculoskeletal:  Negative for joint swelling and myalgias.   Skin:  Negative for pallor and rash.   Neurological:  Positive for weakness. Negative for dizziness, syncope, light-headedness, numbness and headaches.   Hematological:  Does not bruise/bleed easily.   Psychiatric/Behavioral:  Positive for confusion and sleep disturbance.       OBJECTIVE:  /76 (BP Location: Right arm, Patient Position: Sitting, Cuff Size: Adult)   Pulse 66   Ht 157.5 cm (62.01\")   Wt 55.7 kg (122 lb 12.8 oz)   SpO2 96%   BMI 22.45 kg/mý      Body mass index is 22.45 kg/mý.  Wt Readings from Last 1 Encounters:   08/28/23 55.7 kg (122 lb 12.8 oz)       Lab Review:  Renal Function: CrCl cannot be calculated (Patient's " most recent lab result is older than the maximum 30 days allowed.).    Lab Results   Component Value Date    PROBNP 1,726.0 02/14/2023       Results for orders placed during the hospital encounter of 02/14/23    Adult Transthoracic Echo Complete W/ Cont if Necessary Per Protocol    Interpretation Summary    Left ventricular systolic function is hyperdynamic (EF > 70%). Left ventricular ejection fraction appears to be greater than 70%.    The left ventricular cavity is small in size.    Left ventricular wall thickness is consistent with hypertrophy. Sigmoid-shaped ventricular septum is present.    Left ventricular diastolic function was indeterminate.    Mildly reduced right ventricular systolic function noted.    The right ventricular cavity is mild to moderately dilated.    Left atrial volume is severely increased.    The right atrial cavity is severely  dilated.    Moderate mitral valve stenosis is present.    Estimated right ventricular systolic pressure from tricuspid regurgitation is moderately elevated (45-55 mmHg).    Moderate pulmonary hypertension is present.    There is a moderate sized right pleural effusion.    Trace aortic valve regurgitation is present. 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 The aortic valve peak and mean gradients are within defined limits. The prosthetic aortic valve is normal. [email or web address]      Procedures      6 MINUTE WALK                      Cardiac Procedures:  1. 12/14/2016 cardiac catheterization  FINDINGS:  RIGHT HEART CATHETERIZATION:  Pressures:  Right Atrial:                     5  Right Ventricle   :            49/8  Pulmonary Artery:           48/15 mean 33  PCWP:                            22 prominent V wave  Cardiac output                3.39  Cardiac index                 1.98     LEFT HEART CATHETERIZATION:  LEFT VENTRICULOGRAPHY: Not performed.  Severe mitral annular calcification severe aortic valvular calcification  CORONARY ANGIOGRAPHY:  Left main:  Normal  Left anterior descending: Normal  Ramus intermedius: Not present  Circumflex: Normal and left dominant  RCA: .Small nondominant normal     SUMMARY: She has an elderly woman with severe aortic stenosis with rapid progression of symptoms she does not have significant coronary disease she does have elevated pulmonary capillary wedge pressure        RECOMMENDATIONS: We will initiate some medical therapy as well as a TAVR evaluation and consultation with CV surgery          Previously trialed diuretics  Lasix  Bumex      Previously trialed GDMT    Metoprolol succinate      ASSESSMENT:     Diagnosis Plan   1. FLORES (dyspnea on exertion)  ECG 12 Lead    CBC & Differential    Comprehensive Metabolic Panel    BNP    XR Chest 2 View      2. Chest pain, atypical  ECG 12 Lead    CBC & Differential    Comprehensive Metabolic Panel    BNP    XR Chest 2 View      3. Altered mental status, unspecified altered mental status type  Urinalysis With Microscopic - Urine, Clean Catch    Urine Culture - Urine, Urine, Clean Catch      4. Chronic heart failure with preserved ejection fraction        5. Pleural effusion on right        6. Pulmonary hypertension        7. Longstanding persistent atrial fibrillation        8. Essential hypertension        9. Other hyperlipidemia              PLAN OF CARE:  1.  HFpEF-NYHA class III.  Current GDMT to include metoprolol succinate and diuresis on Bumex.    She has no overt signs of volume overload on exam today, however, her daughter states that she was much more short of breath this morning prior to taking her medication when experiencing an episode of chest pain.  See dictation below regarding this incident.  She does have a history relatively recently of bilateral pleural effusions, and she has increased need for her oxygen use over the last several days so I will recheck a chest x-ray and labs as below.  If it appears that volume overload is the driving force behind her symptoms we will  adjust her medication and bring her back in within 2 days.  She is to continue following a low-sodium diet of 2000 mg daily or less, fluid restriction of 1500 mL daily, as well as remain adherent with daily weights.    Directions for when to call the clinic reviewed with the patient to include weight gain of 2 to 3 pounds in 24 hours, weight gain of 5 to 10 pounds within 7 days; worsening shortness of breath; worsening lower extremity edema or abdominal distention.    2.  Chest pain-noted early this morning around 5 AM.  Left-sided, described as aching and going down her left arm.  This resolved with getting up out of the bed.  She stated she also had some GI discomfort, although she is confused today and unable to describe this in greater detail.  She is not currently having any chest pain.  We will check a chest x-ray, labs as below, as well as an ECG.  ECG today reveals atrial flutter with 4-1 block, no ST elevation noted.    3.  Hypertension-currently stable and controlled.    4.  Persistent atrial fibrillation-rate controlled on auscultation today.  Managed by primary cardiology team.    5.  Pulmonary hypertension-managed by Dr. Mota.      CBC/CMP/BNP; ECG; chest x-ray; UA and CNS; follow-up dependent upon results      Advance Care Planning  ACP discussion was held with the patient during this visit. Patient has an advance directive in EMR which is still valid.       Thank you for allowing me to participate in the care of your patient,         RAYRAY Benavidez  \[facility] [place] [facility]\"" HEART FAILURE  08/28/23  14:19 EDT      **Anastasiya Disclaimer:**  Much of this encounter note is an electronic transcription/translation of spoken language to printed text. The electronic translation of spoken language may permit erroneous, or at times, nonsensical words or phrases to be inadvertently transcribed. Although I have reviewed the note for such errors, some may still exist.

## 2023-08-28 NOTE — ADDENDUM NOTE
Encounter addended by: Ernestina Calixto MA on: 8/28/2023 3:45 PM   Actions taken: Charge Capture section accepted

## 2023-08-29 ENCOUNTER — TELEPHONE (OUTPATIENT)
Dept: FAMILY MEDICINE CLINIC | Facility: CLINIC | Age: 88
End: 2023-08-29

## 2023-08-29 NOTE — ADDENDUM NOTE
Encounter addended by: Helder Monroy RN on: 8/29/2023 9:59 AM   Actions taken: Child order released for a procedure order

## 2023-08-29 NOTE — TELEPHONE ENCOUNTER
Hub staff attempted to follow warm transfer process and was unsuccessful     Caller: CHRISTOPHER ([facility])    Relationship to patient: Other    Best call back number: [phone]     Patient is needing: CHRISTOPHER WOULD LIKE TO SPEAK WITH CLINICAL REGARDING A REFERRAL.

## 2023-08-30 ENCOUNTER — TELEPHONE (OUTPATIENT)
Dept: CARDIOLOGY | Facility: HOSPITAL | Age: 88
End: 2023-08-30
Payer: MEDICARE

## 2023-08-30 DIAGNOSIS — I50.32 CHRONIC HEART FAILURE WITH PRESERVED EJECTION FRACTION: Primary | ICD-10-CM

## 2023-08-30 NOTE — TELEPHONE ENCOUNTER
Per Jennie SEO due to results patient to increase Bumex to 1 mg AM and 1 mg PM for 3 days and recheck labs after.     Spoke with patient daughter informed of instruction.   Patient will have labs rechecked at [facility] in [place], [place]

## 2023-08-30 NOTE — TELEPHONE ENCOUNTER
Faxed Lab Order to [facility] for BMP, Per RAYRAY Conway.    Fax Number [phone]    Thank you,  Madhuri MARTINEZ

## 2023-08-31 ENCOUNTER — TELEPHONE (OUTPATIENT)
Dept: FAMILY MEDICINE CLINIC | Facility: CLINIC | Age: 88
End: 2023-08-31

## 2023-08-31 DIAGNOSIS — R29.6 FREQUENT FALLS: ICD-10-CM

## 2023-08-31 DIAGNOSIS — R53.1 WEAKNESS: Primary | ICD-10-CM

## 2023-08-31 NOTE — TELEPHONE ENCOUNTER
Caller: FAB    Relationship:   [facility]    Best call back number:     What orders are you requesting (i.e. lab or imaging): PHYSICAL THERAPY AND OCCUPATIONAL THERAPY    In what timeframe would the patient need to come in:     Where will you receive your lab/imaging services:     Additional notes: FAB WITH [facility] IS CALLING IN TO REQUEST ORDERS FOR THE PATIENT TO HAVE PHYSICAL THERAPY AND OCCUPATIONAL THERAPY  SHE WANTS TO BE CALLED BACK TO DISCUSS

## 2023-09-06 ENCOUNTER — TELEPHONE (OUTPATIENT)
Dept: CARDIOLOGY | Facility: HOSPITAL | Age: 88
End: 2023-09-06
Payer: MEDICARE

## 2023-09-06 LAB
BUN SERPL-MCNC: 39 MG/DL (ref 10–36)
BUN/CREAT SERPL: 41 (ref 12–28)
CALCIUM SERPL-MCNC: 10.1 MG/DL (ref 8.7–10.3)
CHLORIDE SERPL-SCNC: 102 MMOL/L (ref 96–106)
CO2 SERPL-SCNC: 26 MMOL/L (ref 20–29)
CREAT SERPL-MCNC: 0.94 MG/DL (ref 0.57–1)
EGFRCR SERPLBLD CKD-EPI 2021: 55 ML/MIN/1.73
GLUCOSE SERPL-MCNC: 215 MG/DL (ref 70–99)
POTASSIUM SERPL-SCNC: 4.4 MMOL/L (ref 3.5–5.2)
SODIUM SERPL-SCNC: 144 MMOL/L (ref 134–144)

## 2023-09-06 NOTE — TELEPHONE ENCOUNTER
Called spoke with patient daughter gave lab results per Jennie SEO    Patient daughter verbalized understanding

## 2023-09-06 NOTE — TELEPHONE ENCOUNTER
PATIENT'S DAUGHTER MONROE MOHAMUD (ON  [place]) WOULD LIKE AN UPDATE ON ORDERS FOR HOME HEALTH CARE FOR PATIENT.  SHE SAYS NOBODY HAS CONTACTED PATIENT TO SETUP HOME HEALTH.  PHONE:  [phone]    PLEASE ADVISE.

## 2023-09-06 NOTE — TELEPHONE ENCOUNTER
----- Message from RAYRAY Benavidez sent at 9/6/2023  9:50 AM EDT -----  Please let the patient know that labs are stable.  Continue with current plan of care.    Thanks,  Jennie  ----- Message -----  From: Sivakumar, Reflab Results In  Sent: 9/6/2023   8:38 AM EDT  To: RAYRAY Benavidez

## 2023-09-24 DIAGNOSIS — E11.59 TYPE 2 DIABETES MELLITUS WITH OTHER CIRCULATORY COMPLICATION, WITHOUT LONG-TERM CURRENT USE OF INSULIN: ICD-10-CM

## 2023-09-25 RX ORDER — ATORVASTATIN CALCIUM 20 MG/1
TABLET, FILM COATED ORAL
Qty: 90 TABLET | Refills: 3 | Status: SHIPPED | OUTPATIENT
Start: 2023-09-25

## 2023-09-25 RX ORDER — OMEPRAZOLE 20 MG/1
20 CAPSULE, DELAYED RELEASE ORAL DAILY
Qty: 90 CAPSULE | Refills: 3 | Status: SHIPPED | OUTPATIENT
Start: 2023-09-25

## 2023-09-25 RX ORDER — APIXABAN 2.5 MG/1
TABLET, FILM COATED ORAL
Qty: 180 TABLET | Refills: 3 | Status: SHIPPED | OUTPATIENT
Start: 2023-09-25

## 2023-10-03 ENCOUNTER — TELEPHONE (OUTPATIENT)
Dept: FAMILY MEDICINE CLINIC | Facility: CLINIC | Age: 88
End: 2023-10-03

## 2023-10-03 DIAGNOSIS — R19.7 DIARRHEA, UNSPECIFIED TYPE: Primary | ICD-10-CM

## 2023-10-03 NOTE — TELEPHONE ENCOUNTER
Caller: Aiyana Donis    Relationship: Emergency Contact    Best call back number: [phone]    What orders are you requesting (i.e. lab or imaging): TEST FOR C.DIFF    In what timeframe would the patient need to come in: ASAP    Where will you receive your lab/imaging services:   [facility]  [address] [place] [address]  Phone: [phone]      Additional notes: PATIENT'S DAUGHTER STATED THAT PATIENT WAS ON A LOT OF ANTIBIOTICS (3 TIMES) PREVIOUSLY FOR A TOOTH THAT WAS INFECTED BAND THEN HAD TO TAKE 4 BEFORE SHE HAD IT REMOVED ON 9/26/23. PATIENT'S DAUGHTER STATED ON 9/30/23 AND 10/2/23 PATIENT HAD FOUL SMELLING POOP AND SHE IS WANTING TO GET HER TESTED FIR C.DIFF AND IS REQUESTING IF THE ORDERS COULD BE SENT TO THE [facility] IN [place]. PATIENT'S DAUGHTER IS REQUESTING CALLBACK WITH UPDATES.

## 2023-10-04 ENCOUNTER — TELEPHONE (OUTPATIENT)
Dept: FAMILY MEDICINE CLINIC | Facility: CLINIC | Age: 88
End: 2023-10-04

## 2023-10-04 NOTE — TELEPHONE ENCOUNTER
Caller: TATY    Relationship: [facility]    Best call back number: [phone]    What is the best time to reach you: ANYTIME    Who are you requesting to speak with (clinical staff, provider,  specific staff member): CLINICAL    What was the call regarding: CANDANCE FROM [facility] STATES THAT SHE FAXED OVER THE HEALTH PLAN OF CARE REGARDING THE PATIENT ON 9/27/23 AND IS JUST NEEDING DR BAXTER'S SIGNATURE AND IS JUST WANTING TO KNOW IF IT HAD BEEN RECEIVED  AN/OR SIGNED.

## 2023-10-07 LAB — C DIFF TOX A+B STL QL IA: NEGATIVE

## 2023-10-10 ENCOUNTER — TELEPHONE (OUTPATIENT)
Dept: FAMILY MEDICINE CLINIC | Facility: CLINIC | Age: 88
End: 2023-10-10

## 2023-10-10 NOTE — TELEPHONE ENCOUNTER
Called spoke with Aiyana Rubio to stop metformin for a week and half to two weeks. Call back if diarrhea stops. If sugars get above 160 to 180 to call us back

## 2023-10-10 NOTE — TELEPHONE ENCOUNTER
Caller: Aiyana Donis    Relationship: Emergency Contact    Best call back number:     What is the best time to reach you:     Who are you requesting to speak with (clinical staff, provider,  specific staff member): JALIL BAXTER        What was the call regarding: PATIENTS DAUGHTER CALLED IN AND EXPRESSED CONCERN PATIENT IS HAVING DIARRHEA AND SHE WAS PUT ON A DIET AND SHE DIDN'T HAVE IT FOR ALMOST 2 DAYS, AND THEN IT STARTED BACK.    PATIENT WAS TESTED FOR C DIFFICILE AND IT WASN'T THAT. THEY ARE WONDERING IF IT COULD BE FROM THE METFORMIN AND IF IT IS THAT WHAT CAN BE DONE?    PLEASE CALL

## 2023-10-19 RX ORDER — MEMANTINE HYDROCHLORIDE 28 MG/1
28 CAPSULE, EXTENDED RELEASE ORAL DAILY
Qty: 90 CAPSULE | Refills: 3 | Status: SHIPPED | OUTPATIENT
Start: 2023-10-19

## 2023-10-31 ENCOUNTER — TELEPHONE (OUTPATIENT)
Dept: CARDIOLOGY | Facility: HOSPITAL | Age: 88
End: 2023-10-31
Payer: MEDICARE

## 2023-10-31 RX ORDER — BUMETANIDE 1 MG/1
TABLET ORAL
Qty: 90 TABLET | Refills: 0 | Status: SHIPPED | OUTPATIENT
Start: 2023-10-31

## 2023-10-31 NOTE — TELEPHONE ENCOUNTER
Patient's daughter Aiyana called [facility] today & left a voicemail. She states patient is needing a refill on her Bumex 1 mg. Please send to [facility] on file.    Thanks,  Madhuri [facility]ERSON [facility]

## 2023-11-02 ENCOUNTER — TELEPHONE (OUTPATIENT)
Dept: FAMILY MEDICINE CLINIC | Facility: CLINIC | Age: 88
End: 2023-11-02
Payer: MEDICARE

## 2023-11-02 NOTE — TELEPHONE ENCOUNTER
Caller: Aiyana Donis    Relationship to patient: Emergency Contact    Best call back number: [phone]    Patient is needing: THEY STOPPED THE METFORMIN AS SHE WAS HAVING HEAVY DIARRHEA.  HER BLOOD SUGAR HAS BEEN RUNNING REALLY GOOD AT ABOUT 110.  THEY DON'T FEEL LIKE SHE NEEDS TO BE BACK ON IT.  IS IT OK TO STOP TAKING IT?

## 2023-11-02 NOTE — TELEPHONE ENCOUNTER
Called spoke with patient daughter. Per dr. Rubio she does not need to take anything else to replace her Metformin. Just to keep an eye on her sugar levels

## 2023-11-09 ENCOUNTER — OFFICE VISIT (OUTPATIENT)
Dept: CARDIOLOGY | Facility: CLINIC | Age: 88
End: 2023-11-09
Payer: MEDICARE

## 2023-11-09 VITALS
HEART RATE: 62 BPM | HEIGHT: 62 IN | SYSTOLIC BLOOD PRESSURE: 112 MMHG | BODY MASS INDEX: 22.46 KG/M2 | DIASTOLIC BLOOD PRESSURE: 60 MMHG

## 2023-11-09 DIAGNOSIS — I48.11 LONGSTANDING PERSISTENT ATRIAL FIBRILLATION: ICD-10-CM

## 2023-11-09 DIAGNOSIS — I10 ESSENTIAL HYPERTENSION: ICD-10-CM

## 2023-11-09 DIAGNOSIS — I50.32 CHRONIC HEART FAILURE WITH PRESERVED EJECTION FRACTION: Primary | ICD-10-CM

## 2023-11-09 DIAGNOSIS — E78.49 OTHER HYPERLIPIDEMIA: ICD-10-CM

## 2023-11-09 NOTE — PROGRESS NOTES
Date of Office Visit: 2023  Encounter Provider: Elsisa Mcmahan MD  Place of Service: [facility] CARDIOLOGY  Patient Name: Mary Shi  :3/3/1925      Patient ID:  Mary Shi is a 89 y.o. female is here for  followup for HFpEF, s/p TAVR.         History of Present Illness    She has a history of aortic stenosis with TAVR, HFpEF, hypertension, hyperlipidemia, moderate mitral stenosis    She had an echocardiogram done 2012. This showed moderate aortic stenosis,  mild mitral stenosis, moderate left atrial dilation, grade I-A diastolic dysfunction,  moderate concentric left ventricular hypertrophy, ejection fraction of 65-70%. She also  had a carotid duplex study showing 50-60% stenosis of the right internal carotid artery.   She feels somewhat short-winded when she bends over because of the hiatal hernia.      She was in the hospital with chest pain and difficulty breathing on 10/08/2014. She had had a prior stress nuclear perfusion study, which was done 2014 for chest pain and this showed no evidence of ischemia. During the hospitalization in October we felt the chest pain was probably mostly related to a hiatal hernia, somewhat related to her valvular heart disease, and possibly a small amount of heart failure as well, although her BNP was not markedly elevated. We did repeat her echocardiogram, which was done 2013, showing ejection fraction of 63%, moderate concentric left ventricular hypertrophy, grade 1A diastolic dysfunction, moderate mitral insufficiency, moderate mitral stenosis, moderate aortic stenosis. This is essentially unchanged from her echocardiograms both in 2013 and 2012. She had another echocardiogram done 10/2014, which showed a grade 2 diastolic dysfunction, ejection fraction of 67%, moderate concentric left ventricular hypertrophy, moderate mitral and moderate aortic stenosis, trivial aortic regurgitation. Again,  this is unchanged from echoes dated 11/2012 and 11/2013.     In December 2016, she underwent TAVR with a #23 S3 Rubina valve for severe aortic valvular stenosis.     She had an echocardiogram done 6/24/18 showing LVEF 60%, severe left atrial enlargement, mild to moderate tricuspid insufficiency, RVSP elevated gradient of 55 mmHg, severe mitral stenosis, mild mitral insufficiency.  The mean mitral valve gradient was 10 mmHg.     She was hospitalized in June 2018 for shortness of breath felt to be secondary to acute on chronic diastolic heart failure.  She responded well to IV diuresis and was transitioned back to her oral bumetanide.  This exacerbation was felt to be secondary to eating a high sodium diet and going out to eat with family visiting her.       She was hospitalized September 2019 with an acute left MCA ischemic stroke.  she had an echocardiogram done 9/10/2019 showing ejection fraction 71%, moderate mitral valvular stenosis, mild mitral valve insufficiency, mild aortic insufficiency but TAVR otherwise appeared normal and mild to moderate pulmonary hypertension.  She was started on Eliquis.  MRI of the brain showed multiple acute infarcts the left MCA territory as well as a remote left occipital infarct.  There was mild to moderate small vessel disease.  There were no focal high-grade stenoses or aneurysms.        Echo done 3/20/2020 showed ejection fraction of 66% with normal appearing TAVR, moderate severe mitral stenosis, severe MAC, mild tricuspid insufficiency.  RVSP was 62 mmHg.  This was done for heart failure.     She lives with her daughter in [place].  Echo done 2/16/2023 shows ejection.  Greater than 70% with mildly reduced right ventricular systolic function, severe biatrial enlargement, moderate mitral stenosis, moderate size right pleural effusion, RVSP 50 mmHg, 23 mm TAVR which was normal.    Labs on 9/5/2023 shows glucose 215 with otherwise normal BMP.  Labs in 8/28/2023 show proBNP  4076, unremarkable CMP and CBC.  She weighs 98 pounds today.  She has no difficulty breathing.  She does not feel her heart racing or skipping.  She has no chest pain.  She is very weak and dizzy and not transfer well.  She is still seeing the heart failure clinic.  She is on metoprolol but metformin was recently discontinued.    Past Medical History:   Diagnosis Date    'light-for-dates' infant with signs of fetal malnutrition     Abnormal ECG     Amaurosis fugax     Anemia     Aortic stenosis     s/p TAVR     Aortic valve replaced     Had TAVR    Asthma     SOB    Atrial fibrillation     Cancer     skin    CAP (community acquired pneumonia)     Carotid artery stenosis     Per duplex 12/16/2016-proximal right internal carotid artery mild stenosis and proximal left moderate stenosis    Cervical spine disease     Cognitive disorder     Congestive heart failure     Coronary artery disease     Dementia     Diabetes mellitus     Diastolic dysfunction     Dyspnea on exertion     Generalized osteoarthritis of multiple sites     GERD (gastroesophageal reflux disease)     Gout     Health care maintenance     Heart murmur     Hiatal hernia     HL (hearing loss)     Hyperlipidemia     Hypertension     Memory loss     Mitral valve prolapse     Mitral valve stenosis     Moderate per echocardiogram 2/2017; severe mitral annular calcification    Nasal lesion     Nasal stenosis     Neuropathy in diabetes     Osteoarthritis     multiple sites    PAF (paroxysmal atrial fibrillation)     Paroxysmal atrial fibrillation 09/11/2019    Patient had no falls in past year     Peripheral neuropathy     Peripheral vascular disease     PONV (postoperative nausea and vomiting)     Rheumatic mitral stenosis     Shingles     Sleep apnea     Stroke     Syncope     Systolic hypertension     Type 2 diabetes mellitus with hyperglycemia, without long-term current use of insulin 4/6/2017    Varicose veins          Past Surgical History:   Procedure  Laterality Date    AORTIC VALVE REPAIR/REPLACEMENT N/A 12/27/2016    Procedure: Transfemoral LEFT Transcatheter Aortic Valve Replacement TRANSESOPHAGEAL ECHOCARDIOGRAM WITH ANESTHESIA;  Surgeon: Eduardo Brown MD;  Location: [facility] OR 18/19;  Service:     AORTIC VALVE REPAIR/REPLACEMENT  12/27/2016    AORTIC VALVE SURGERY      TAVR    BLADDER REPAIR      CARDIAC CATHETERIZATION N/A 12/14/2016    Procedure: Right Heart Cath;  Surgeon: Eduardo Brown MD;  Location: [facility] CATH INVASIVE LOCATION;  Service:     CARDIAC CATHETERIZATION N/A 12/14/2016    Procedure: Coronary angiography;  Surgeon: Eduardo Brown MD;  Location: [facility] CATH INVASIVE LOCATION;  Service:     HYSTERECTOMY      KNEE SURGERY         Current Outpatient Medications on File Prior to Visit   Medication Sig Dispense Refill    acetaminophen (TYLENOL) 650 MG 8 hr tablet Take 1 tablet by mouth Every 8 (Eight) Hours As Needed for Mild Pain.      ALPRAZolam (XANAX) 0.25 MG tablet Take 1 tablet by mouth At Night As Needed for Anxiety. 30 tablet 5    atorvastatin (LIPITOR) 20 MG tablet TAKE 1 TABLET BY MOUTH ONCE DAILY 90 tablet 3    bumetanide (BUMEX) 1 MG tablet Take 1 tablet by mouth Daily. May also take 0.5 tablets Daily As Needed (Swelling and fluid weight gain). 90 tablet 0    Eliquis 2.5 MG tablet tablet TAKE 1 TABLET BY MOUTH  EVERY 12 HOURS 180 tablet 3    Ferrous Sulfate (IRON SUPPLEMENT PO) Take  by mouth.      Lancets (freestyle) lancets USE TO TEST BLOOD SUGAR DAILY 100 each 4    MAGNESIUM GLUCONATE PO Take 27 mg by mouth 3 (Three) Times a Day. Patient uses OTC Magnesium supplement.      Melatonin 3 MG capsule Take 1 capsule by mouth At Night As Needed (Insomnia). 30 capsule 0    memantine (NAMENDA XR) 28 MG capsule sustained-release 24 hr extended release capsule TAKE 1 CAPSULE BY MOUTH  DAILY 90 capsule 3    Multiple Vitamins-Minerals (MULTIVITAMIN WITH MINERALS) tablet tablet Take 1 tablet by mouth Daily.      omeprazole (priLOSEC)  "20 MG capsule TAKE 1 CAPSULE BY MOUTH  DAILY 90 capsule 3    polyethyl glycol-propyl glycol (SYSTANE) 0.4-0.3 % solution ophthalmic solution (artificial tears) 2 drops Every 1 (One) Hour As Needed.      True Metrix Blood Glucose Test test strip TEST TWO TIMES A  each 11    [DISCONTINUED] metoprolol succinate XL (TOPROL-XL) 25 MG 24 hr tablet Take 0.5 tablets by mouth Every Night. 45 tablet 1    albuterol sulfate  (90 Base) MCG/ACT inhaler Inhale 2 puffs Every 4 (Four) Hours As Needed for Wheezing (shortness of breath). (Patient not taking: Reported on 11/9/2023) 6.7 g 0    [DISCONTINUED] metFORMIN (GLUCOPHAGE) 500 MG tablet TAKE ONE-HALF TABLET BY  MOUTH TWICE DAILY (Patient not taking: Reported on 11/9/2023) 90 tablet 3     No current facility-administered medications on file prior to visit.       Social History     Socioeconomic History    Marital status:     Number of children: 15    Years of education: 8th grade   Tobacco Use    Smoking status: Never     Passive exposure: Never    Smokeless tobacco: Never   Vaping Use    Vaping Use: Never used   Substance and Sexual Activity    Alcohol use: Never     Comment: no caffeine     Drug use: Never    Sexual activity: Not Currently     Partners: Male     Birth control/protection: Hysterectomy           ROS    Procedures    ECG 12 Lead    Date/Time: 11/9/2023 2:43 PM  Performed by: Elissa Mcmahan MD    Authorized by: Elissa Mcmahan MD  Comparison: compared with previous ECG   Similar to previous ECG  Rhythm: atrial flutter  Q waves: V2 and V3      Clinical impression: abnormal EKG              Objective:      Vitals:    11/09/23 1428   BP: 112/60   Pulse: 62   Height: 157.5 cm (62\")     Body mass index is 22.46 kg/m².    Vitals reviewed.   Constitutional:       General: Not in acute distress.     Appearance: Well-developed. Not diaphoretic.   Eyes:      General: No scleral icterus.     Conjunctiva/sclera: Conjunctivae normal.   HENT:    "   Head: Normocephalic and atraumatic.   Neck:      Thyroid: No thyromegaly.      Vascular: No carotid bruit or JVD.      Lymphadenopathy: No cervical adenopathy.   Pulmonary:      Effort: Pulmonary effort is normal. No respiratory distress.      Breath sounds: Normal breath sounds. No wheezing. No rhonchi. No rales.   Chest:      Chest wall: Not tender to palpatation.   Cardiovascular:      Normal rate. Regular rhythm.      Murmurs: There is a grade 2/6 midsystolic murmur.      No gallop.  No rub.   Pulses:     Intact distal pulses.      Carotid: 2+ bilaterally.     Radial: 2+ bilaterally.     Dorsalis pedis: 2+ bilaterally.     Posterior tibial: 2+ bilaterally.  Edema:     Peripheral edema absent.   Abdominal:      General: Bowel sounds are normal. There is no distension or abdominal bruit.      Palpations: Abdomen is soft. There is no abdominal mass.      Tenderness: There is no abdominal tenderness.   Musculoskeletal:         General: No deformity.      Extremities: No clubbing present.     Cervical back: Neck supple. Skin:     General: Skin is warm and dry. There is no cyanosis.      Coloration: Skin is not pale.      Findings: No rash.   Neurological:      Mental Status: Alert and oriented to person, place, and time.      Cranial Nerves: No cranial nerve deficit.   Psychiatric:         Judgment: Judgment normal.         Lab Review:       Assessment:      Diagnosis Plan   1. Chronic heart failure with preserved ejection fraction  Ambulatory Referral to Home Health (Outpatient)      2. Essential hypertension  Ambulatory Referral to Home Health (Outpatient)      3. Other hyperlipidemia  Ambulatory Referral to Home Health (Outpatient)      4. Longstanding persistent atrial fibrillation  Ambulatory Referral to Home Health (Outpatient)        1. Severe AS. S/p TAVR 12/2016.   2. Right carotid artery stenosis, 50-60%.   3. Hypertension. BP  is now low.  4. Hyperlipidemia.  On atorvastatin  5. Varicose veins; stable.    6. Diabetes mellitus type 2.   7. Dementia, fairly advanced.  8. Moderate to severe MS.  9. Grade 2 diastolic dysfunction.  10. Hiatal hernia which causes her dyspnea and chest pain.  11. Venous insufficiency, use compression stockings.  12. Gout.   13. CVA 9/2019, on eliquis as was due to atrial fibrillation.  She is not on warfarin as she is not a candidate for this given her frailty.  14.  Iron deficiency.      Plan:       See me in 6 months, stop metoprolol.  A [facility] to check her vitals and some stopping metoprolol and she has a history of heart failure and atrial fibrillation.  Home physical therapy for her transfers and ambulation/gait.  She does not need to follow-up in the heart failure clinic.  No other testing at this time.

## 2023-12-04 DIAGNOSIS — E11.9 TYPE 2 DIABETES MELLITUS WITHOUT COMPLICATION, WITHOUT LONG-TERM CURRENT USE OF INSULIN: ICD-10-CM

## 2023-12-04 RX ORDER — CALCIUM CITRATE/VITAMIN D3 200MG-6.25
TABLET ORAL
Qty: 100 EACH | Refills: 1 | Status: SHIPPED | OUTPATIENT
Start: 2023-12-04

## 2024-01-18 ENCOUNTER — TELEPHONE (OUTPATIENT)
Dept: FAMILY MEDICINE CLINIC | Facility: CLINIC | Age: 89
End: 2024-01-18

## 2024-02-13 RX ORDER — BUMETANIDE 1 MG/1
TABLET ORAL
Qty: 90 TABLET | Refills: 0 | Status: SHIPPED | OUTPATIENT
Start: 2024-02-13

## 2024-02-13 RX ORDER — BUMETANIDE 1 MG/1
TABLET ORAL
Qty: 90 TABLET | Refills: 0 | OUTPATIENT
Start: 2024-02-13

## 2024-02-27 RX ORDER — LANCETS 28 GAUGE
EACH MISCELLANEOUS
Qty: 100 EACH | Refills: 4 | Status: SHIPPED | OUTPATIENT
Start: 2024-02-27

## 2024-03-21 ENCOUNTER — TELEPHONE (OUTPATIENT)
Dept: CARDIOLOGY | Facility: CLINIC | Age: 89
End: 2024-03-21
Payer: MEDICARE

## 2024-03-21 NOTE — TELEPHONE ENCOUNTER
Patients daughter called on behalf of her mother. She stated her mother was experiencing some swelling last night in her feet and gave her 0.5 mg of Bumex. She is wondering if she needs to continue to give her mother this for 3 nights in a row. Would like a call back.

## 2024-04-12 ENCOUNTER — TELEPHONE (OUTPATIENT)
Dept: FAMILY MEDICINE CLINIC | Facility: CLINIC | Age: 89
End: 2024-04-12

## 2024-04-12 NOTE — TELEPHONE ENCOUNTER
Caller: Monroe Donis    Relationship: Emergency Contact    Best call back number: [phone]     What is the medical concern/diagnosis: POSSIBLE BEDSORE ON BUTT IS STARTING TO FORM     What specialty or service is being requested: [facility]   FAX NUMBER: [phone]    Any additional details: MONROE WOULD LIKE TO KNOW IF THIS COULD PUT IN ASAP SO THAT THEY CAN GET THIS LOOKED AT BY A NURSE. SHE STATES SHE IS UNABLE TO BRING THE PATIENT INTO THE OFFICE

## 2024-04-15 NOTE — TELEPHONE ENCOUNTER
Caller: Aiyana Donis    Relationship: Emergency Contact    Best call back number: [phone]    Who are you requesting to speak with (clinical staff, provider,  specific staff member): CLINICAL STAFF    What was the call regarding: STATED THAT THEY WOULD LIKE TO SEE ABOUT GETTING THIS TODAY IF POSSIBLE. PLEASE CALL AND ADVISE

## 2024-04-16 DIAGNOSIS — I63.511 CEREBROVASCULAR ACCIDENT (CVA) DUE TO OCCLUSION OF RIGHT MIDDLE CEREBRAL ARTERY: ICD-10-CM

## 2024-04-16 DIAGNOSIS — F03.90 DEMENTIA, UNSPECIFIED DEMENTIA SEVERITY, UNSPECIFIED DEMENTIA TYPE, UNSPECIFIED WHETHER BEHAVIORAL, PSYCHOTIC, OR MOOD DISTURBANCE OR ANXIETY: Primary | ICD-10-CM

## 2024-04-16 DIAGNOSIS — L89.309 PRESSURE INJURY OF SKIN OF BUTTOCK, UNSPECIFIED INJURY STAGE, UNSPECIFIED LATERALITY: ICD-10-CM

## 2024-04-16 NOTE — TELEPHONE ENCOUNTER
Caller: Aiyana Donis    Relationship: Emergency Contact    Best call back number:     [phone]       What was the call regarding: PATIENT'S DAUGHTER IS CALLING TO CHECK STATUS OF ORDER. SHE IS CONCERNED. SHE WENT TO WOUND CARE AND THEY PUT A BANDAGE ON IT BUT IT HAS ALREADY COME OFF.     SHE STATES IT NEEDS TO BE [facility] IN [place]     PLEASE CALL AND ADVISE

## 2024-05-02 RX ORDER — BUMETANIDE 1 MG/1
TABLET ORAL
Qty: 90 TABLET | Refills: 0 | Status: SHIPPED | OUTPATIENT
Start: 2024-05-02

## 2024-05-08 ENCOUNTER — TELEPHONE (OUTPATIENT)
Dept: FAMILY MEDICINE CLINIC | Facility: CLINIC | Age: 89
End: 2024-05-08

## 2024-05-13 ENCOUNTER — TELEPHONE (OUTPATIENT)
Dept: CARDIOLOGY | Facility: CLINIC | Age: 89
End: 2024-05-13
Payer: MEDICARE

## 2024-05-13 NOTE — TELEPHONE ENCOUNTER
Called and spoke with Aiyana.  Told her the above.  She verbalized understanding.  She stated that we can call her or her brother  [phone] Cates

## 2024-05-13 NOTE — TELEPHONE ENCOUNTER
Patient caregiver, Aiyana Jaffe, called and stated that RAYRAY Naidu is coming out to see the patient in the patient home tomorrow and wanted to know if she really needs to come in the office to be seen?  She stated that the patient is not able to travel in her personal vehicle and they are going to have to rent a vehicle ($150 round trip) to bring her to the appointment.  They are wanting to know if you would be willing to do a telephone visit with her, she has an appointment with you on Wednesday in the office?  Please advise.    CB: [phone]    Thanks,  Loan

## 2024-05-15 ENCOUNTER — TELEMEDICINE (OUTPATIENT)
Dept: CARDIOLOGY | Facility: CLINIC | Age: 89
End: 2024-05-15
Payer: MEDICARE

## 2024-05-15 VITALS
WEIGHT: 117 LBS | SYSTOLIC BLOOD PRESSURE: 139 MMHG | BODY MASS INDEX: 21.53 KG/M2 | HEART RATE: 75 BPM | DIASTOLIC BLOOD PRESSURE: 68 MMHG | HEIGHT: 62 IN

## 2024-05-15 DIAGNOSIS — I48.11 LONGSTANDING PERSISTENT ATRIAL FIBRILLATION: ICD-10-CM

## 2024-05-15 DIAGNOSIS — I50.32 CHRONIC HEART FAILURE WITH PRESERVED EJECTION FRACTION: Primary | ICD-10-CM

## 2024-05-15 PROCEDURE — 99212 OFFICE O/P EST SF 10 MIN: CPT | Performed by: INTERNAL MEDICINE

## 2024-05-15 PROCEDURE — 1160F RVW MEDS BY RX/DR IN RCRD: CPT | Performed by: INTERNAL MEDICINE

## 2024-05-15 PROCEDURE — 1159F MED LIST DOCD IN RCRD: CPT | Performed by: INTERNAL MEDICINE

## 2024-05-15 NOTE — TELEPHONE ENCOUNTER
HUB UNABLE TO TRANSFER CALL.     Caller: MONROE MOHAMUD     Relationship to patient:     Best call back number:[phone]    Patient is needing: PATIENT'S DAUGHTER MONROE STATED THAT PATIENT HAS A TELEHEALTH VISIT WITH DR. GUEVARA TODAY AT 2 PM AND WANTS THE PHONE # [phone] TO BE CALLED FOR THE TELEHEALTH VISIT. PLEASE CONTACT MONROE IF THERE IS ANY QUESTIONS .[phone].

## 2024-05-15 NOTE — PROGRESS NOTES
Date of Office Visit: 05/15/2024  Encounter Provider: Elissa Mcmahan MD  Place of Service: [facility] CARDIOLOGY  Patient Name: Mary Shi  :3/3/1925      Patient ID:  Mary Shi is a 89 y.o. female is here for  followup for         History of Present Illness    She has a history of aortic stenosis with TAVR, HFpEF, hypertension, hyperlipidemia, moderate mitral stenosis     She had an echocardiogram done 2012. This showed moderate aortic stenosis,  mild mitral stenosis, moderate left atrial dilation, grade I-A diastolic dysfunction,  moderate concentric left ventricular hypertrophy, ejection fraction of 65-70%. She also  had a carotid duplex study showing 50-60% stenosis of the right internal carotid artery.   She feels somewhat short-winded when she bends over because of the hiatal hernia.      She was in the hospital with chest pain and difficulty breathing on 10/08/2014. She had had a prior stress nuclear perfusion study, which was done 2014 for chest pain and this showed no evidence of ischemia. During the hospitalization in October we felt the chest pain was probably mostly related to a hiatal hernia, somewhat related to her valvular heart disease, and possibly a small amount of heart failure as well, although her BNP was not markedly elevated. We did repeat her echocardiogram, which was done 2013, showing ejection fraction of 63%, moderate concentric left ventricular hypertrophy, grade 1A diastolic dysfunction, moderate mitral insufficiency, moderate mitral stenosis, moderate aortic stenosis. This is essentially unchanged from her echocardiograms both in 2013 and 2012. She had another echocardiogram done 10/2014, which showed a grade 2 diastolic dysfunction, ejection fraction of 67%, moderate concentric left ventricular hypertrophy, moderate mitral and moderate aortic stenosis, trivial aortic regurgitation-unchanged from echoes dated  11/2012 and 11/2013.     In December 2016, she underwent TAVR with a #23 S3 Rubina valve for severe aortic valvular stenosis.     She had an echocardiogram done 6/24/18 showing LVEF 60%, severe left atrial enlargement, mild to moderate tricuspid insufficiency, RVSP elevated gradient of 55 mmHg, severe mitral stenosis, mild mitral insufficiency.  The mean mitral valve gradient was 10 mmHg.     She was hospitalized in June 2018 for shortness of breath felt to be secondary to acute on chronic diastolic heart failure.  She responded well to IV diuresis and was transitioned back to her oral bumetanide.  This exacerbation was felt to be secondary to eating a high sodium diet and going out to eat with family visiting her.       She was hospitalized September 2019 with an acute left MCA ischemic stroke.  she had an echocardiogram done 9/10/2019 showing ejection fraction 71%, moderate mitral valvular stenosis, mild mitral valve insufficiency, mild aortic insufficiency but TAVR otherwise appeared normal and mild to moderate pulmonary hypertension.  She was started on Eliquis.  MRI of the brain showed multiple acute infarcts the left MCA territory as well as a remote left occipital infarct.  There was mild to moderate small vessel disease.  There were no focal high-grade stenoses or aneurysms.        Echo done 3/20/2020 showed ejection fraction of 66% with normal appearing TAVR, moderate severe mitral stenosis, severe MAC, mild tricuspid insufficiency.  RVSP was 62 mmHg.  This was done for heart failure.     She lives with her daughter in [place].  Echo done 2/16/2023 shows ejection fraction >70% with mildly reduced right ventricular systolic function, severe biatrial enlargement, moderate mitral stenosis, moderate size right pleural effusion, RVSP 50 mmHg, 23 mm TAVR which was normal.     Spoke with Elver - she is confused and sleeps a lot.  She seems stable when she is awake - no chest pain, no dyspnea. She enjoys  interacting with her family - she has 15 children.  She has mild LE edema.  She has a pressure sore on her left heel- they are working on this sore at home.  She lives in her home - her children come and help her there. She walks in the house and does take her pills.       Past Medical History:   Diagnosis Date    'Light-for-dates' infant with signs of fetal malnutrition     Abnormal ECG     Amaurosis fugax     Anemia     Aortic stenosis     s/p TAVR     Aortic valve replaced     Had TAVR    Asthma     SOB    Atrial fibrillation     Cancer     skin    CAP (community acquired pneumonia)     Carotid artery stenosis     Per duplex 12/16/2016-proximal right internal carotid artery mild stenosis and proximal left moderate stenosis    Cervical spine disease     Cognitive disorder     Congestive heart failure     Coronary artery disease     Dementia     Diabetes mellitus     Diastolic dysfunction     Dyspnea on exertion     Generalized osteoarthritis of multiple sites     GERD (gastroesophageal reflux disease)     Gout     Health care maintenance     Heart murmur     Hiatal hernia     HL (hearing loss)     Hyperlipidemia     Hypertension     Memory loss     Mitral valve prolapse     Mitral valve stenosis     Moderate per echocardiogram 2/2017; severe mitral annular calcification    Nasal lesion     Nasal stenosis     Neuropathy in diabetes     Osteoarthritis     multiple sites    PAF (paroxysmal atrial fibrillation)     Paroxysmal atrial fibrillation 09/11/2019    Patient had no falls in past year     Peripheral neuropathy     Peripheral vascular disease     PONV (postoperative nausea and vomiting)     Rheumatic mitral stenosis     Shingles     Sleep apnea     Stroke     Syncope     Systolic hypertension     Type 2 diabetes mellitus with hyperglycemia, without long-term current use of insulin 4/6/2017    Varicose veins          Past Surgical History:   Procedure Laterality Date    AORTIC VALVE REPAIR/REPLACEMENT N/A  12/27/2016    Procedure: Transfemoral LEFT Transcatheter Aortic Valve Replacement TRANSESOPHAGEAL ECHOCARDIOGRAM WITH ANESTHESIA;  Surgeon: Eduardo Brown MD;  Location: [facility] HYBRID OR 18/19;  Service:     AORTIC VALVE REPAIR/REPLACEMENT  12/27/2016    AORTIC VALVE SURGERY      TAVR    BLADDER REPAIR      CARDIAC CATHETERIZATION N/A 12/14/2016    Procedure: Right Heart Cath;  Surgeon: Eduardo Brown MD;  Location: [facility] CATH INVASIVE LOCATION;  Service:     CARDIAC CATHETERIZATION N/A 12/14/2016    Procedure: Coronary angiography;  Surgeon: Eduardo Brown MD;  Location: [facility] CATH INVASIVE LOCATION;  Service:     HYSTERECTOMY      KNEE SURGERY         Current Outpatient Medications on File Prior to Visit   Medication Sig Dispense Refill    atorvastatin (LIPITOR) 20 MG tablet TAKE 1 TABLET BY MOUTH ONCE DAILY 90 tablet 3    bumetanide (BUMEX) 1 MG tablet TAKE 1 TABLET BY MOUTH ONCE DAILY. MAY also TAKE 1/2 TABLET BY MOUTH ONCE DAILY AS needed FOR swelling/fluid weight gain 90 tablet 0    Eliquis 2.5 MG tablet tablet TAKE 1 TABLET BY MOUTH  EVERY 12 HOURS 180 tablet 3    Ferrous Sulfate (IRON SUPPLEMENT PO) Take  by mouth.      glucose blood (True Metrix Blood Glucose Test) test strip USE TO TEST BLOOD SUGAR TWO TIMES A DAY AS DIRECTED 100 each 1    Lancets (freestyle) lancets USE TO TEST BLOOD SUGAR DAILY 100 each 4    MAGNESIUM GLUCONATE PO Take 27 mg by mouth 3 (Three) Times a Day. Patient uses OTC Magnesium supplement.      Melatonin 3 MG capsule Take 1 capsule by mouth At Night As Needed (Insomnia). 30 capsule 0    memantine (NAMENDA XR) 28 MG capsule sustained-release 24 hr extended release capsule TAKE 1 CAPSULE BY MOUTH  DAILY 90 capsule 3    Multiple Vitamins-Minerals (MULTIVITAMIN WITH MINERALS) tablet tablet Take 1 tablet by mouth Daily.      omeprazole (priLOSEC) 20 MG capsule TAKE 1 CAPSULE BY MOUTH  DAILY 90 capsule 3    polyethyl glycol-propyl glycol (SYSTANE) 0.4-0.3 % solution ophthalmic  "solution (artificial tears) 2 drops Every 1 (One) Hour As Needed.      acetaminophen (TYLENOL) 650 MG 8 hr tablet Take 1 tablet by mouth Every 8 (Eight) Hours As Needed for Mild Pain. (Patient not taking: Reported on 5/15/2024)      albuterol sulfate  (90 Base) MCG/ACT inhaler Inhale 2 puffs Every 4 (Four) Hours As Needed for Wheezing (shortness of breath). (Patient not taking: Reported on 11/9/2023) 6.7 g 0    ALPRAZolam (XANAX) 0.25 MG tablet Take 1 tablet by mouth At Night As Needed for Anxiety. (Patient not taking: Reported on 5/15/2024) 30 tablet 5     No current facility-administered medications on file prior to visit.       Social History     Socioeconomic History    Marital status:     Number of children: 15    Years of education: 8th grade   Tobacco Use    Smoking status: Never     Passive exposure: Never    Smokeless tobacco: Never   Vaping Use    Vaping status: Never Used   Substance and Sexual Activity    Alcohol use: Never     Comment: no caffeine     Drug use: Never    Sexual activity: Not Currently     Partners: Male     Birth control/protection: Hysterectomy             Procedures  Procedures        Objective:      Vitals:    05/15/24 1425   BP: 139/68   Pulse: 75   Weight: 53.1 kg (117 lb)   Height: 157.5 cm (62\")     Body mass index is 21.4 kg/m².    Physical Exam    Lab Review:       Assessment:      Diagnosis Plan   1. Chronic heart failure with preserved ejection fraction        2. Longstanding persistent atrial fibrillation              1. Severe AS. S/p TAVR 12/2016.   2. Right carotid artery stenosis, 50-60%.   3. Hypertension. BP  is now low.  4. Hyperlipidemia.  On atorvastatin  5. Varicose veins; stable.   6. Diabetes mellitus type 2.   7. Dementia, fairly advanced.  8. Moderate to severe MS.  9. Grade 2 diastolic dysfunction.  10. Hiatal hernia which causes her dyspnea and chest pain.  11. Venous insufficiency, use compression stockings.  12. Gout.   13. CVA 9/2019, on " eliquis as was due to atrial fibrillation.  She is not on warfarin as she is not a candidate for this given her frailty.  14.  Iron deficiency.      Plan:       Will call again in about 3-4 months - no med changes.

## 2024-05-23 ENCOUNTER — TELEPHONE (OUTPATIENT)
Dept: FAMILY MEDICINE CLINIC | Facility: CLINIC | Age: 89
End: 2024-05-23
Payer: MEDICARE

## 2024-05-23 NOTE — TELEPHONE ENCOUNTER
Hub staff attempted to follow warm transfer process and was unsuccessful     Caller: TATY    Relationship to patient: Other    Best call back number:     Patient is needing: PLEASE CONTACT TATY WITH [facility] REGARDING THE PLAN OF CARE ORDERS THAT WERE FAXED TO THE OFFICE ON MAY 2, 2024.   SHE WOULD LIKE TO HAVE THE STATUS ON WHEN THEY WILL RECEIVE THOSE BACK FROM DR. BAXTER.

## 2024-05-28 ENCOUNTER — TELEPHONE (OUTPATIENT)
Dept: FAMILY MEDICINE CLINIC | Facility: CLINIC | Age: 89
End: 2024-05-28
Payer: MEDICARE

## 2024-05-28 NOTE — TELEPHONE ENCOUNTER
[facility] NEEDING VERBAL ORDERS ASAP BECAUSE THEY ARE SWITCHING PT'S HOME VISIT TO TELEPHONE VISIT. PHONE NUMBER TO CB [phone] -- SHE WANTED TO MAKE SURE I EMPHASIZE THAT THE AREA CODE .     THANKS,  DAPHNIE

## 2024-06-10 DIAGNOSIS — E11.9 TYPE 2 DIABETES MELLITUS WITHOUT COMPLICATION, WITHOUT LONG-TERM CURRENT USE OF INSULIN: ICD-10-CM

## 2024-06-10 RX ORDER — CALCIUM CITRATE/VITAMIN D3 200MG-6.25
TABLET ORAL
Qty: 100 EACH | Refills: 3 | Status: SHIPPED | OUTPATIENT
Start: 2024-06-10

## 2024-06-13 ENCOUNTER — TELEPHONE (OUTPATIENT)
Dept: FAMILY MEDICINE CLINIC | Facility: CLINIC | Age: 89
End: 2024-06-13

## 2024-07-11 ENCOUNTER — TELEPHONE (OUTPATIENT)
Dept: FAMILY MEDICINE CLINIC | Facility: CLINIC | Age: 89
End: 2024-07-11

## 2024-07-11 NOTE — TELEPHONE ENCOUNTER
Caller: [facility]    Relationship: Home Health    Best call back number: [phone]     What orders are you requesting (i.e. lab or imaging): HOME HEALTH PLAN OF CARE    In what timeframe would the patient need to come in: ASAP    Where will you receive your lab/imaging services: [facility]

## 2024-07-24 RX ORDER — BUMETANIDE 1 MG/1
TABLET ORAL
Qty: 90 TABLET | Refills: 0 | Status: SHIPPED | OUTPATIENT
Start: 2024-07-24

## 2024-08-02 DIAGNOSIS — E11.59 TYPE 2 DIABETES MELLITUS WITH OTHER CIRCULATORY COMPLICATION, WITHOUT LONG-TERM CURRENT USE OF INSULIN: ICD-10-CM

## 2024-08-05 RX ORDER — ATORVASTATIN CALCIUM 20 MG/1
TABLET, FILM COATED ORAL
Qty: 30 TABLET | Refills: 0 | Status: SHIPPED | OUTPATIENT
Start: 2024-08-05

## 2024-08-05 RX ORDER — OMEPRAZOLE 20 MG/1
20 CAPSULE, DELAYED RELEASE ORAL DAILY
Qty: 30 CAPSULE | Refills: 0 | Status: SHIPPED | OUTPATIENT
Start: 2024-08-05

## 2024-08-05 RX ORDER — APIXABAN 2.5 MG/1
TABLET, FILM COATED ORAL
Qty: 60 TABLET | Refills: 0 | Status: SHIPPED | OUTPATIENT
Start: 2024-08-05

## 2024-08-30 RX ORDER — MEMANTINE HYDROCHLORIDE 28 MG/1
28 CAPSULE, EXTENDED RELEASE ORAL DAILY
Qty: 30 CAPSULE | Refills: 0 | Status: SHIPPED | OUTPATIENT
Start: 2024-08-30

## 2024-09-09 RX ORDER — ATORVASTATIN CALCIUM 20 MG/1
TABLET, FILM COATED ORAL
Qty: 30 TABLET | Refills: 11 | OUTPATIENT
Start: 2024-09-09

## 2024-09-09 RX ORDER — MEMANTINE HYDROCHLORIDE 28 MG/1
28 CAPSULE, EXTENDED RELEASE ORAL DAILY
Qty: 30 CAPSULE | Refills: 11 | OUTPATIENT
Start: 2024-09-09

## 2024-09-09 RX ORDER — APIXABAN 2.5 MG/1
TABLET, FILM COATED ORAL
Qty: 60 TABLET | Refills: 0 | Status: SHIPPED | OUTPATIENT
Start: 2024-09-09

## 2024-10-07 RX ORDER — APIXABAN 2.5 MG/1
TABLET, FILM COATED ORAL
Qty: 60 TABLET | Refills: 0 | Status: SHIPPED | OUTPATIENT
Start: 2024-10-07

## 2024-10-07 RX ORDER — MEMANTINE HYDROCHLORIDE 28 MG/1
28 CAPSULE, EXTENDED RELEASE ORAL DAILY
Qty: 30 CAPSULE | Refills: 0 | Status: SHIPPED | OUTPATIENT
Start: 2024-10-07

## 2024-10-29 RX ORDER — APIXABAN 2.5 MG/1
2.5 TABLET, FILM COATED ORAL EVERY 12 HOURS SCHEDULED
Qty: 60 TABLET | Refills: 0 | Status: SHIPPED | OUTPATIENT
Start: 2024-10-29

## 2024-10-29 RX ORDER — MEMANTINE HYDROCHLORIDE 28 MG/1
28 CAPSULE, EXTENDED RELEASE ORAL DAILY
Qty: 30 CAPSULE | Refills: 0 | Status: SHIPPED | OUTPATIENT
Start: 2024-10-29

## 2024-11-01 RX ORDER — BUMETANIDE 1 MG/1
TABLET ORAL
Qty: 30 TABLET | Refills: 0 | Status: SHIPPED | OUTPATIENT
Start: 2024-11-01

## 2024-11-22 RX ORDER — APIXABAN 2.5 MG/1
2.5 TABLET, FILM COATED ORAL EVERY 12 HOURS SCHEDULED
Qty: 60 TABLET | Refills: 0 | Status: SHIPPED | OUTPATIENT
Start: 2024-11-22

## 2024-11-22 RX ORDER — MEMANTINE HYDROCHLORIDE 28 MG/1
28 CAPSULE, EXTENDED RELEASE ORAL DAILY
Qty: 30 CAPSULE | Refills: 0 | Status: SHIPPED | OUTPATIENT
Start: 2024-11-22

## 2024-12-16 RX ORDER — APIXABAN 2.5 MG/1
2.5 TABLET, FILM COATED ORAL EVERY 12 HOURS SCHEDULED
Qty: 60 TABLET | Refills: 11 | Status: SHIPPED | OUTPATIENT
Start: 2024-12-16

## 2024-12-16 RX ORDER — MEMANTINE HYDROCHLORIDE 28 MG/1
28 CAPSULE, EXTENDED RELEASE ORAL DAILY
Qty: 30 CAPSULE | Refills: 11 | Status: SHIPPED | OUTPATIENT
Start: 2024-12-16
